# Patient Record
Sex: FEMALE | Race: WHITE | Employment: UNEMPLOYED | URBAN - METROPOLITAN AREA
[De-identification: names, ages, dates, MRNs, and addresses within clinical notes are randomized per-mention and may not be internally consistent; named-entity substitution may affect disease eponyms.]

---

## 2017-01-03 RX ORDER — ALBUTEROL SULFATE 90 UG/1
2 AEROSOL, METERED RESPIRATORY (INHALATION) EVERY 6 HOURS PRN
Qty: 18 G | Refills: 1 | Status: SHIPPED | OUTPATIENT
Start: 2017-01-03 | End: 2017-05-24 | Stop reason: SDUPTHER

## 2017-01-04 DIAGNOSIS — M79.605 LEG PAIN, LEFT: ICD-10-CM

## 2017-01-04 RX ORDER — MELOXICAM 15 MG/1
15 TABLET ORAL DAILY
Qty: 30 TABLET | Refills: 3 | Status: SHIPPED | OUTPATIENT
Start: 2017-01-04 | End: 2017-04-06 | Stop reason: SDUPTHER

## 2017-01-04 RX ORDER — BUSPIRONE HYDROCHLORIDE 10 MG/1
10 TABLET ORAL 3 TIMES DAILY
Qty: 90 TABLET | Refills: 2 | Status: SHIPPED | OUTPATIENT
Start: 2017-01-04 | End: 2017-04-06 | Stop reason: SDUPTHER

## 2017-01-04 RX ORDER — ACETAMINOPHEN 500 MG
1000 TABLET ORAL 3 TIMES DAILY PRN
Qty: 90 TABLET | Refills: 5 | Status: SHIPPED | OUTPATIENT
Start: 2017-01-04 | End: 2017-05-18 | Stop reason: SDUPTHER

## 2017-01-11 DIAGNOSIS — M19.019 OSTEOARTHRITIS OF SHOULDER, UNSPECIFIED LATERALITY, UNSPECIFIED OSTEOARTHRITIS TYPE: ICD-10-CM

## 2017-01-11 RX ORDER — FLUOXETINE HYDROCHLORIDE 20 MG/1
CAPSULE ORAL
Qty: 90 CAPSULE | Refills: 3 | Status: SHIPPED | OUTPATIENT
Start: 2017-01-11 | End: 2017-04-06 | Stop reason: SDUPTHER

## 2017-01-11 RX ORDER — SIMVASTATIN 40 MG
TABLET ORAL
Qty: 30 TABLET | Refills: 3 | Status: SHIPPED | OUTPATIENT
Start: 2017-01-11 | End: 2017-04-06 | Stop reason: SDUPTHER

## 2017-01-11 RX ORDER — NITROGLYCERIN 0.4 MG/1
0.4 TABLET SUBLINGUAL EVERY 5 MIN PRN
Qty: 25 TABLET | Refills: 5 | Status: SHIPPED | OUTPATIENT
Start: 2017-01-11 | End: 2017-05-18 | Stop reason: SDUPTHER

## 2017-01-11 RX ORDER — CYCLOBENZAPRINE HCL 10 MG
10 TABLET ORAL 2 TIMES DAILY PRN
Qty: 60 TABLET | Refills: 2 | Status: SHIPPED | OUTPATIENT
Start: 2017-01-11 | End: 2017-04-06 | Stop reason: SDUPTHER

## 2017-01-11 RX ORDER — METOPROLOL SUCCINATE 50 MG/1
50 TABLET, EXTENDED RELEASE ORAL DAILY
Qty: 30 TABLET | Refills: 3 | Status: SHIPPED | OUTPATIENT
Start: 2017-01-11 | End: 2017-04-06 | Stop reason: SDUPTHER

## 2017-04-06 ENCOUNTER — OFFICE VISIT (OUTPATIENT)
Dept: FAMILY MEDICINE CLINIC | Age: 57
End: 2017-04-06
Payer: MEDICAID

## 2017-04-06 VITALS
WEIGHT: 167 LBS | DIASTOLIC BLOOD PRESSURE: 82 MMHG | OXYGEN SATURATION: 96 % | HEART RATE: 75 BPM | SYSTOLIC BLOOD PRESSURE: 138 MMHG | RESPIRATION RATE: 17 BRPM | TEMPERATURE: 98 F | BODY MASS INDEX: 30.73 KG/M2 | HEIGHT: 62 IN

## 2017-04-06 DIAGNOSIS — Z23 NEED FOR PNEUMOCOCCAL VACCINATION: ICD-10-CM

## 2017-04-06 DIAGNOSIS — I25.10 CORONARY ARTERY DISEASE INVOLVING NATIVE CORONARY ARTERY OF NATIVE HEART WITHOUT ANGINA PECTORIS: ICD-10-CM

## 2017-04-06 DIAGNOSIS — I10 ESSENTIAL HYPERTENSION: ICD-10-CM

## 2017-04-06 DIAGNOSIS — M19.019 OSTEOARTHRITIS OF SHOULDER, UNSPECIFIED LATERALITY, UNSPECIFIED OSTEOARTHRITIS TYPE: Primary | ICD-10-CM

## 2017-04-06 PROBLEM — M50.30 DEGENERATIVE DISC DISEASE, CERVICAL: Status: ACTIVE | Noted: 2017-04-06

## 2017-04-06 PROCEDURE — 90471 IMMUNIZATION ADMIN: CPT | Performed by: FAMILY MEDICINE

## 2017-04-06 PROCEDURE — 99214 OFFICE O/P EST MOD 30 MIN: CPT | Performed by: FAMILY MEDICINE

## 2017-04-06 PROCEDURE — 90732 PPSV23 VACC 2 YRS+ SUBQ/IM: CPT | Performed by: FAMILY MEDICINE

## 2017-04-06 RX ORDER — SIMVASTATIN 40 MG
TABLET ORAL
Qty: 30 TABLET | Refills: 3 | Status: SHIPPED | OUTPATIENT
Start: 2017-04-06 | End: 2017-08-01 | Stop reason: SDUPTHER

## 2017-04-06 RX ORDER — MELOXICAM 15 MG/1
15 TABLET ORAL DAILY
Qty: 30 TABLET | Refills: 3 | Status: SHIPPED | OUTPATIENT
Start: 2017-04-06 | End: 2017-07-27 | Stop reason: SDUPTHER

## 2017-04-06 RX ORDER — CYCLOBENZAPRINE HCL 10 MG
10 TABLET ORAL 2 TIMES DAILY PRN
Qty: 60 TABLET | Refills: 2 | Status: SHIPPED | OUTPATIENT
Start: 2017-04-06 | End: 2017-06-23 | Stop reason: SDUPTHER

## 2017-04-06 RX ORDER — METOPROLOL SUCCINATE 50 MG/1
50 TABLET, EXTENDED RELEASE ORAL DAILY
Qty: 30 TABLET | Refills: 3 | Status: SHIPPED | OUTPATIENT
Start: 2017-04-06 | End: 2017-08-01 | Stop reason: SDUPTHER

## 2017-04-06 RX ORDER — BUSPIRONE HYDROCHLORIDE 10 MG/1
10 TABLET ORAL 3 TIMES DAILY
Qty: 90 TABLET | Refills: 2 | Status: SHIPPED | OUTPATIENT
Start: 2017-04-06 | End: 2017-06-23 | Stop reason: SDUPTHER

## 2017-04-06 RX ORDER — FLUOXETINE HYDROCHLORIDE 20 MG/1
CAPSULE ORAL
Qty: 90 CAPSULE | Refills: 3 | Status: SHIPPED | OUTPATIENT
Start: 2017-04-06 | End: 2017-08-01 | Stop reason: SDUPTHER

## 2017-04-06 ASSESSMENT — ENCOUNTER SYMPTOMS
VOMITING: 0
NAUSEA: 0
SHORTNESS OF BREATH: 0
COUGH: 0

## 2017-05-18 DIAGNOSIS — M79.605 LEG PAIN, LEFT: ICD-10-CM

## 2017-05-18 RX ORDER — ACETAMINOPHEN 500 MG
1000 TABLET ORAL 3 TIMES DAILY PRN
Qty: 90 TABLET | Refills: 5 | Status: SHIPPED | OUTPATIENT
Start: 2017-05-18 | End: 2017-09-19 | Stop reason: SDUPTHER

## 2017-05-18 RX ORDER — NITROGLYCERIN 0.4 MG/1
0.4 TABLET SUBLINGUAL EVERY 5 MIN PRN
Qty: 25 TABLET | Refills: 5 | Status: SHIPPED | OUTPATIENT
Start: 2017-05-18 | End: 2017-10-12 | Stop reason: SDUPTHER

## 2017-05-24 RX ORDER — ALBUTEROL SULFATE 90 UG/1
2 AEROSOL, METERED RESPIRATORY (INHALATION) EVERY 6 HOURS PRN
Qty: 18 G | Refills: 1 | Status: CANCELLED | OUTPATIENT
Start: 2017-05-24

## 2017-06-23 DIAGNOSIS — M19.019 OSTEOARTHRITIS OF SHOULDER, UNSPECIFIED LATERALITY, UNSPECIFIED OSTEOARTHRITIS TYPE: ICD-10-CM

## 2017-06-26 DIAGNOSIS — M19.019 OSTEOARTHRITIS OF SHOULDER, UNSPECIFIED LATERALITY, UNSPECIFIED OSTEOARTHRITIS TYPE: ICD-10-CM

## 2017-06-27 DIAGNOSIS — M19.019 OSTEOARTHRITIS OF SHOULDER, UNSPECIFIED LATERALITY, UNSPECIFIED OSTEOARTHRITIS TYPE: ICD-10-CM

## 2017-06-27 RX ORDER — BUSPIRONE HYDROCHLORIDE 10 MG/1
10 TABLET ORAL 3 TIMES DAILY
Qty: 90 TABLET | Refills: 2 | Status: SHIPPED | OUTPATIENT
Start: 2017-06-27 | End: 2017-10-12 | Stop reason: SDUPTHER

## 2017-06-27 RX ORDER — CYCLOBENZAPRINE HCL 10 MG
10 TABLET ORAL 2 TIMES DAILY PRN
Qty: 60 TABLET | Refills: 2 | Status: SHIPPED | OUTPATIENT
Start: 2017-06-27 | End: 2017-06-27

## 2017-06-27 RX ORDER — BUSPIRONE HYDROCHLORIDE 10 MG/1
TABLET ORAL
Qty: 90 TABLET | Refills: 0 | Status: SHIPPED | OUTPATIENT
Start: 2017-06-27 | End: 2017-06-27

## 2017-06-27 RX ORDER — BUSPIRONE HYDROCHLORIDE 10 MG/1
10 TABLET ORAL 3 TIMES DAILY
Qty: 90 TABLET | Refills: 2 | Status: SHIPPED | OUTPATIENT
Start: 2017-06-27 | End: 2017-06-27

## 2017-06-27 RX ORDER — CYCLOBENZAPRINE HCL 10 MG
10 TABLET ORAL 2 TIMES DAILY PRN
Qty: 60 TABLET | Refills: 2 | Status: SHIPPED | OUTPATIENT
Start: 2017-06-27 | End: 2017-10-12 | Stop reason: SDUPTHER

## 2017-06-27 RX ORDER — BUSPIRONE HYDROCHLORIDE 10 MG/1
10 TABLET ORAL 3 TIMES DAILY
Qty: 90 TABLET | Refills: 2 | OUTPATIENT
Start: 2017-06-27

## 2017-06-27 RX ORDER — CYCLOBENZAPRINE HCL 10 MG
10 TABLET ORAL 2 TIMES DAILY PRN
Qty: 60 TABLET | Refills: 2 | OUTPATIENT
Start: 2017-06-27

## 2017-06-27 RX ORDER — CYCLOBENZAPRINE HCL 10 MG
TABLET ORAL
Qty: 60 TABLET | Refills: 0 | Status: SHIPPED | OUTPATIENT
Start: 2017-06-27 | End: 2017-06-27

## 2017-07-05 ENCOUNTER — OFFICE VISIT (OUTPATIENT)
Dept: FAMILY MEDICINE CLINIC | Age: 57
End: 2017-07-05
Payer: MEDICARE

## 2017-07-05 VITALS
OXYGEN SATURATION: 97 % | SYSTOLIC BLOOD PRESSURE: 100 MMHG | HEIGHT: 65 IN | HEART RATE: 69 BPM | DIASTOLIC BLOOD PRESSURE: 80 MMHG | RESPIRATION RATE: 20 BRPM | BODY MASS INDEX: 27.76 KG/M2 | WEIGHT: 166.6 LBS | TEMPERATURE: 97.7 F

## 2017-07-05 DIAGNOSIS — Z01.818 PREOPERATIVE CLEARANCE: Primary | ICD-10-CM

## 2017-07-05 PROCEDURE — 99396 PREV VISIT EST AGE 40-64: CPT | Performed by: FAMILY MEDICINE

## 2017-07-05 ASSESSMENT — ENCOUNTER SYMPTOMS
SHORTNESS OF BREATH: 0
VOMITING: 0
ABDOMINAL PAIN: 0
NAUSEA: 0
COUGH: 0

## 2017-07-13 ENCOUNTER — HOSPITAL ENCOUNTER (OUTPATIENT)
Age: 57
Discharge: HOME | End: 2017-07-13
Payer: MEDICARE

## 2017-07-13 ENCOUNTER — TELEPHONE (OUTPATIENT)
Dept: FAMILY MEDICINE CLINIC | Age: 57
End: 2017-07-13

## 2017-07-13 DIAGNOSIS — Z01.818 PREOPERATIVE CLEARANCE: ICD-10-CM

## 2017-07-13 LAB
ANION GAP SERPL CALCULATED.3IONS-SCNC: 13 MMOL/L (ref 9–17)
BUN BLDV-MCNC: 13 MG/DL (ref 6–20)
BUN/CREAT BLD: NORMAL (ref 9–20)
CALCIUM SERPL-MCNC: 9.7 MG/DL (ref 8.6–10.4)
CHLORIDE BLD-SCNC: 101 MMOL/L (ref 98–107)
CO2: 27 MMOL/L (ref 20–31)
CREAT SERPL-MCNC: 0.77 MG/DL (ref 0.5–0.9)
GFR AFRICAN AMERICAN: >60 ML/MIN
GFR NON-AFRICAN AMERICAN: >60 ML/MIN
GFR SERPL CREATININE-BSD FRML MDRD: NORMAL ML/MIN/{1.73_M2}
GFR SERPL CREATININE-BSD FRML MDRD: NORMAL ML/MIN/{1.73_M2}
GLUCOSE BLD-MCNC: 86 MG/DL (ref 70–99)
POTASSIUM SERPL-SCNC: 4.5 MMOL/L (ref 3.7–5.3)
SODIUM BLD-SCNC: 141 MMOL/L (ref 135–144)

## 2017-07-13 PROCEDURE — 93005 ELECTROCARDIOGRAM TRACING: CPT

## 2017-07-13 PROCEDURE — 36415 COLL VENOUS BLD VENIPUNCTURE: CPT

## 2017-07-13 PROCEDURE — 80048 BASIC METABOLIC PNL TOTAL CA: CPT

## 2017-07-14 LAB
EKG ATRIAL RATE: 77 BPM
EKG P AXIS: 43 DEGREES
EKG P-R INTERVAL: 148 MS
EKG Q-T INTERVAL: 406 MS
EKG QRS DURATION: 76 MS
EKG QTC CALCULATION (BAZETT): 459 MS
EKG R AXIS: 48 DEGREES
EKG T AXIS: 35 DEGREES
EKG VENTRICULAR RATE: 77 BPM

## 2017-07-31 RX ORDER — MELOXICAM 15 MG/1
TABLET ORAL
Qty: 30 TABLET | Refills: 0 | Status: SHIPPED | OUTPATIENT
Start: 2017-07-31 | End: 2017-08-25

## 2017-07-31 RX ORDER — ENALAPRIL MALEATE 10 MG/1
TABLET ORAL
Qty: 90 TABLET | Refills: 0 | Status: SHIPPED | OUTPATIENT
Start: 2017-07-31 | End: 2018-04-25 | Stop reason: SDUPTHER

## 2017-08-02 RX ORDER — FLUOXETINE HYDROCHLORIDE 20 MG/1
CAPSULE ORAL
Qty: 90 CAPSULE | Refills: 0 | Status: SHIPPED | OUTPATIENT
Start: 2017-08-02 | End: 2017-08-28 | Stop reason: SDUPTHER

## 2017-08-02 RX ORDER — METOPROLOL SUCCINATE 50 MG/1
TABLET, EXTENDED RELEASE ORAL
Qty: 30 TABLET | Refills: 0 | Status: SHIPPED | OUTPATIENT
Start: 2017-08-02 | End: 2017-08-28 | Stop reason: SDUPTHER

## 2017-08-02 RX ORDER — SIMVASTATIN 40 MG
TABLET ORAL
Qty: 30 TABLET | Refills: 0 | Status: SHIPPED | OUTPATIENT
Start: 2017-08-02 | End: 2017-08-28 | Stop reason: SDUPTHER

## 2017-08-28 RX ORDER — FLUOXETINE HYDROCHLORIDE 20 MG/1
CAPSULE ORAL
Qty: 90 CAPSULE | Refills: 0 | Status: SHIPPED | OUTPATIENT
Start: 2017-08-28 | End: 2017-09-19 | Stop reason: SDUPTHER

## 2017-08-28 RX ORDER — SIMVASTATIN 40 MG
TABLET ORAL
Qty: 30 TABLET | Refills: 0 | Status: SHIPPED | OUTPATIENT
Start: 2017-08-28 | End: 2017-09-19 | Stop reason: SDUPTHER

## 2017-08-28 RX ORDER — METOPROLOL SUCCINATE 50 MG/1
TABLET, EXTENDED RELEASE ORAL
Qty: 30 TABLET | Refills: 0 | Status: SHIPPED | OUTPATIENT
Start: 2017-08-28 | End: 2017-09-19 | Stop reason: SDUPTHER

## 2017-10-12 ENCOUNTER — OFFICE VISIT (OUTPATIENT)
Dept: FAMILY MEDICINE CLINIC | Age: 57
End: 2017-10-12
Payer: MEDICARE

## 2017-10-12 VITALS
HEIGHT: 62 IN | SYSTOLIC BLOOD PRESSURE: 140 MMHG | BODY MASS INDEX: 31.83 KG/M2 | DIASTOLIC BLOOD PRESSURE: 80 MMHG | WEIGHT: 173 LBS | TEMPERATURE: 97.9 F | RESPIRATION RATE: 20 BRPM | OXYGEN SATURATION: 98 % | HEART RATE: 71 BPM

## 2017-10-12 DIAGNOSIS — F41.9 ANXIETY: ICD-10-CM

## 2017-10-12 DIAGNOSIS — J30.9 ALLERGIC SINUSITIS: ICD-10-CM

## 2017-10-12 DIAGNOSIS — I10 ESSENTIAL HYPERTENSION: Primary | ICD-10-CM

## 2017-10-12 DIAGNOSIS — Z11.4 ENCOUNTER FOR SCREENING FOR HIV: ICD-10-CM

## 2017-10-12 DIAGNOSIS — Z23 NEED FOR VACCINATION: ICD-10-CM

## 2017-10-12 DIAGNOSIS — I25.10 CORONARY ARTERY DISEASE INVOLVING NATIVE CORONARY ARTERY OF NATIVE HEART WITHOUT ANGINA PECTORIS: ICD-10-CM

## 2017-10-12 DIAGNOSIS — Z11.59 NEED FOR HEPATITIS C SCREENING TEST: ICD-10-CM

## 2017-10-12 DIAGNOSIS — Z12.39 BREAST CANCER SCREENING: ICD-10-CM

## 2017-10-12 DIAGNOSIS — M19.019 OSTEOARTHRITIS OF SHOULDER, UNSPECIFIED LATERALITY, UNSPECIFIED OSTEOARTHRITIS TYPE: ICD-10-CM

## 2017-10-12 DIAGNOSIS — M50.30 DEGENERATIVE DISC DISEASE, CERVICAL: ICD-10-CM

## 2017-10-12 PROCEDURE — 90688 IIV4 VACCINE SPLT 0.5 ML IM: CPT | Performed by: FAMILY MEDICINE

## 2017-10-12 PROCEDURE — 90471 IMMUNIZATION ADMIN: CPT | Performed by: FAMILY MEDICINE

## 2017-10-12 PROCEDURE — 99214 OFFICE O/P EST MOD 30 MIN: CPT | Performed by: FAMILY MEDICINE

## 2017-10-12 RX ORDER — METOPROLOL SUCCINATE 50 MG/1
TABLET, EXTENDED RELEASE ORAL
Qty: 30 TABLET | Refills: 3 | Status: SHIPPED | OUTPATIENT
Start: 2017-10-12 | End: 2018-01-31 | Stop reason: SDUPTHER

## 2017-10-12 RX ORDER — FLUOXETINE HYDROCHLORIDE 20 MG/1
CAPSULE ORAL
Qty: 90 CAPSULE | Refills: 3 | Status: SHIPPED | OUTPATIENT
Start: 2017-10-12 | End: 2018-01-31 | Stop reason: SDUPTHER

## 2017-10-12 RX ORDER — CYCLOBENZAPRINE HCL 10 MG
10 TABLET ORAL 2 TIMES DAILY PRN
Qty: 60 TABLET | Refills: 2 | Status: SHIPPED | OUTPATIENT
Start: 2017-10-12 | End: 2018-02-27 | Stop reason: SDUPTHER

## 2017-10-12 RX ORDER — ASPIRIN 81 MG/1
81 TABLET ORAL DAILY
Qty: 30 TABLET | Refills: 3 | Status: SHIPPED | OUTPATIENT
Start: 2017-10-12 | End: 2018-01-31 | Stop reason: SDUPTHER

## 2017-10-12 RX ORDER — ISOSORBIDE MONONITRATE 30 MG/1
TABLET, EXTENDED RELEASE ORAL
Qty: 90 TABLET | Refills: 3 | Status: SHIPPED | OUTPATIENT
Start: 2017-10-12 | End: 2018-09-10 | Stop reason: SDUPTHER

## 2017-10-12 RX ORDER — ALBUTEROL SULFATE 90 UG/1
AEROSOL, METERED RESPIRATORY (INHALATION)
Qty: 18 G | Refills: 5 | Status: SHIPPED | OUTPATIENT
Start: 2017-10-12 | End: 2018-03-27 | Stop reason: SDUPTHER

## 2017-10-12 RX ORDER — MELOXICAM 15 MG/1
TABLET ORAL
Qty: 30 TABLET | Refills: 3 | Status: SHIPPED | OUTPATIENT
Start: 2017-10-12 | End: 2018-01-15 | Stop reason: SDUPTHER

## 2017-10-12 RX ORDER — ACETAMINOPHEN 500 MG
TABLET ORAL
Qty: 90 TABLET | Refills: 3 | Status: SHIPPED | OUTPATIENT
Start: 2017-10-12 | End: 2018-01-31 | Stop reason: SDUPTHER

## 2017-10-12 RX ORDER — NITROGLYCERIN 0.4 MG/1
0.4 TABLET SUBLINGUAL EVERY 5 MIN PRN
Qty: 25 TABLET | Refills: 5 | Status: SHIPPED | OUTPATIENT
Start: 2017-10-12 | End: 2018-03-27 | Stop reason: SDUPTHER

## 2017-10-12 RX ORDER — SIMVASTATIN 40 MG
TABLET ORAL
Qty: 30 TABLET | Refills: 3 | Status: SHIPPED | OUTPATIENT
Start: 2017-10-12 | End: 2018-01-31 | Stop reason: SDUPTHER

## 2017-10-12 RX ORDER — CETIRIZINE HYDROCHLORIDE 10 MG/1
10 TABLET ORAL DAILY
Qty: 30 TABLET | Refills: 3 | Status: SHIPPED | OUTPATIENT
Start: 2017-10-12 | End: 2018-01-31 | Stop reason: SDUPTHER

## 2017-10-12 RX ORDER — BUSPIRONE HYDROCHLORIDE 10 MG/1
10 TABLET ORAL 3 TIMES DAILY
Qty: 90 TABLET | Refills: 2 | Status: SHIPPED | OUTPATIENT
Start: 2017-10-12 | End: 2018-08-13 | Stop reason: SDUPTHER

## 2017-10-12 NOTE — PROGRESS NOTES
Chief Complaint   Patient presents with    Hypertension    Anxiety         Dinah Walter  here today for follow up on chronic medical problems, go over labs and/or diagnostic studies, and medication refills. Hypertension and Anxiety      HPI:Patient is here for follow-up on hypertension and anxiety.    -Hypertension fairly controlled, compliant with her medications. Denies any chest pain, shortness of breath. Patient doesn't check her blood pressure at home and it usually runs below 130/80. Blood pressure in the past is normal.    -Anxiety stable on Prozac and BuSpar.    -She has multiple degenerative disc disease and also bilateral osteoarthritis take a Tylenol and Mobic as needed basis also takes muscle relaxants as needed. She denies any acute pain    -Patient also needs medication refills. And also due for mammogram          BP (!) 140/80   Pulse 71   Temp 97.9 °F (36.6 °C)   Resp 20   Ht 5' 2\" (1.575 m)   Wt 173 lb (78.5 kg)   LMP 11/04/2012   SpO2 98%   BMI 31.64 kg/m²   Body mass index is 31.64 kg/m². Wt Readings from Last 3 Encounters:   10/12/17 173 lb (78.5 kg)   07/05/17 166 lb 9.6 oz (75.6 kg)   04/06/17 167 lb (75.8 kg)        []Negative depression screening. No flowsheet data found. []1-4 = Minimal depression   []5-9 = Mild depression   []10-14 = Moderate depression   []15-19 = Moderately severe depression   []20-27 = Severe depression    Discussed testing with the patient and all questions fully answered.     Hospital Outpatient Visit on 07/13/2017   Component Date Value Ref Range Status    Ventricular Rate 07/14/2017 77  BPM Final    Atrial Rate 07/14/2017 77  BPM Final    P-R Interval 07/14/2017 148  ms Final    QRS Duration 07/14/2017 76  ms Final    Q-T Interval 07/14/2017 406  ms Final    QTc Calculation (Bazett) 07/14/2017 459  ms Final    P Axis 07/14/2017 43  degrees Final    R Axis 07/14/2017 48  degrees Final    T Axis 07/14/2017 35  degrees Final         Most recent labs reviewed:     Lab Results   Component Value Date    WBC 6.3 10/04/2016    HGB 14.9 10/04/2016    HCT 43.8 10/04/2016    MCV 94.2 10/04/2016     10/04/2016       Lab Results   Component Value Date     07/13/2017    K 4.5 07/13/2017     07/13/2017    CO2 27 07/13/2017    BUN 13 07/13/2017    CREATININE 0.77 07/13/2017    GLUCOSE 86 07/13/2017    CALCIUM 9.7 07/13/2017        Lab Results   Component Value Date    ALT 16 10/04/2016    AST 20 10/04/2016    ALKPHOS 72 10/04/2016    BILITOT 0.38 10/04/2016       Lab Results   Component Value Date    TSH 2.18 10/04/2016       Lab Results   Component Value Date    CHOL 142 10/04/2016    CHOL 129 07/22/2015    CHOL 157 01/21/2014     Lab Results   Component Value Date    TRIG 111 10/04/2016    TRIG 79 07/22/2015    TRIG 92 01/21/2014     Lab Results   Component Value Date    HDL 56 10/04/2016    HDL 57 07/22/2015    HDL 53 01/21/2014     Lab Results   Component Value Date    LDLCHOLESTEROL 64 10/04/2016    LDLCHOLESTEROL 56 07/22/2015    LDLCHOLESTEROL 86 01/21/2014     Lab Results   Component Value Date    VLDL NOT REPORTED 10/04/2016    VLDL NOT REPORTED 07/22/2015    VLDL NOT REPORTED 01/21/2014     Lab Results   Component Value Date    CHOLHDLRATIO 2.5 10/04/2016    CHOLHDLRATIO 2.3 07/22/2015    CHOLHDLRATIO 3.0 01/21/2014       Lab Results   Component Value Date    LABA1C 5.3 10/04/2016       No results found for: [id number]    No results found for: FOLATE    No results found for: IRON, TIBC, FERRITIN    Lab Results   Component Value Date    VITD25 59.1 10/04/2016             Current Outpatient Prescriptions   Medication Sig Dispense Refill    busPIRone (BUSPAR) 10 MG tablet Take 1 tablet by mouth 3 times daily 90 tablet 2    cyclobenzaprine (FLEXERIL) 10 MG tablet Take 1 tablet by mouth 2 times daily as needed for Muscle spasms 60 tablet 2    FLUoxetine (PROZAC) 20 MG capsule TAKE 3 CAPSULES BY MOUTH EVERY DAY 90 capsule 3    isosorbide mononitrate (IMDUR) 30 MG extended release tablet TAKE 1 TABLET BY MOUTH EVERY DAY 90 tablet 3    acetaminophen (MAPAP) 500 MG tablet TAKE 2 TABLETS BY MOUTH THREE TIMES DAILY AS NEEDED FOR PAIN. MAXIMUM DOSE- 6 TABLETS/ 24 HOURS 90 tablet 3    meloxicam (MOBIC) 15 MG tablet TAKE 1 TABLET BY MOUTH DAILY 30 tablet 3    metoprolol succinate (TOPROL XL) 50 MG extended release tablet TAKE 1 TABLET BY MOUTH DAILY 30 tablet 3    nitroGLYCERIN (NITROSTAT) 0.4 MG SL tablet Place 1 tablet under the tongue every 5 minutes as needed for Chest pain up to max of 3 total doses. If no relief after 1 dose, call 911. 25 tablet 5    simvastatin (ZOCOR) 40 MG tablet TAKE 1 TABLET BY MOUTH EVERY NIGHT 30 tablet 3    albuterol sulfate HFA (VENTOLIN HFA) 108 (90 Base) MCG/ACT inhaler INHALE 2 PUFFS INTO THE LUNGS EVERY 6 HOURS AS NEEDED FOR WHEEZING 18 g 5    aspirin 81 MG EC tablet Take 1 tablet by mouth daily 30 tablet 3    Calcium Carbonate-Vitamin D (CALCIUM-D) 600-400 MG-UNIT TABS Take 1 tablet by mouth daily 60 tablet 3    cetirizine (ZYRTEC ALLERGY) 10 MG tablet Take 1 tablet by mouth daily 30 tablet 3    meloxicam (MOBIC) 15 MG tablet TAKE 1 TABLET BY MOUTH DAILY 30 tablet 0    enalapril (VASOTEC) 10 MG tablet TAKE 1 TABLET BY MOUTH EVERY DAY 90 tablet 0    Cetirizine HCl (ZYRTEC PO)   Take 10 mg by mouth daily        No current facility-administered medications for this visit. Social History     Social History    Marital status:      Spouse name: N/A    Number of children: N/A    Years of education: N/A     Occupational History    Not on file.      Social History Main Topics    Smoking status: Former Smoker     Years: 25.00     Types: Cigarettes     Quit date: 6/1/2009    Smokeless tobacco: Never Used      Comment: quit 5 years ago    Alcohol use No    Drug use: No    Sexual activity: Yes     Other Topics Concern    Not on file     Social History Narrative    No narrative on medications. Barriers to medication compliance addressed. Patient given educational materials - see patient instructions  Was a self-tracking handout given in paper form or via [employer]? Yes    Requested Prescriptions     Signed Prescriptions Disp Refills    busPIRone (BUSPAR) 10 MG tablet 90 tablet 2     Sig: Take 1 tablet by mouth 3 times daily    cyclobenzaprine (FLEXERIL) 10 MG tablet 60 tablet 2     Sig: Take 1 tablet by mouth 2 times daily as needed for Muscle spasms    FLUoxetine (PROZAC) 20 MG capsule 90 capsule 3     Sig: TAKE 3 CAPSULES BY MOUTH EVERY DAY    isosorbide mononitrate (IMDUR) 30 MG extended release tablet 90 tablet 3     Sig: TAKE 1 TABLET BY MOUTH EVERY DAY    acetaminophen (MAPAP) 500 MG tablet 90 tablet 3     Sig: TAKE 2 TABLETS BY MOUTH THREE TIMES DAILY AS NEEDED FOR PAIN. MAXIMUM DOSE- 6 TABLETS/ 24 HOURS    meloxicam (MOBIC) 15 MG tablet 30 tablet 3     Sig: TAKE 1 TABLET BY MOUTH DAILY    metoprolol succinate (TOPROL XL) 50 MG extended release tablet 30 tablet 3     Sig: TAKE 1 TABLET BY MOUTH DAILY    nitroGLYCERIN (NITROSTAT) 0.4 MG SL tablet 25 tablet 5     Sig: Place 1 tablet under the tongue every 5 minutes as needed for Chest pain up to max of 3 total doses. If no relief after 1 dose, call 911.  simvastatin (ZOCOR) 40 MG tablet 30 tablet 3     Sig: TAKE 1 TABLET BY MOUTH EVERY NIGHT    albuterol sulfate HFA (VENTOLIN HFA) 108 (90 Base) MCG/ACT inhaler 18 g 5     Sig: INHALE 2 PUFFS INTO THE LUNGS EVERY 6 HOURS AS NEEDED FOR WHEEZING    aspirin 81 MG EC tablet 30 tablet 3     Sig: Take 1 tablet by mouth daily    Calcium Carbonate-Vitamin D (CALCIUM-D) 600-400 MG-UNIT TABS 60 tablet 3     Sig: Take 1 tablet by mouth daily    cetirizine (ZYRTEC ALLERGY) 10 MG tablet 30 tablet 3     Sig: Take 1 tablet by mouth daily       All patient questions answered. Patient voiced understanding. Quality Measures    Body mass index is 31.64 kg/m². Elevated.  Weight control planned discussed Healthy diet and regular exercise. BP: (!) 140/80 Blood pressure is high. Treatment plan consists of Weight Reduction, DASH Eating Plan, Dietary Sodium Restriction, Increased Physical Activity and No treatment change needed. Lab Results   Component Value Date    LDLCHOLESTEROL 64 10/04/2016    (goal LDL reduction with dx if diabetes is 50% LDL reduction)      No flowsheet data found. Interpretation of Total Score Depression Severity: 1-4 = Minimal depression, 5-9 = Mild depression, 10-14 = Moderate depression, 15-19 = Moderately severe depression, 20-27 = Severe depression      The patient's past medical, surgical, social, and family history as well as her   current medications and allergies were reviewed as documented in today's encounter. Medications, labs, diagnostic studies, consultations and follow-up as documented in this encounter. Return in about 3 months (around 1/12/2018) for for htn,labw ork , mammogram , hld, . Patient was seen with total face to face time of  20 minutes. More than 50% of this visit was counseling and education. Future Appointments  Date Time Provider Matt Diez   1/15/2018 2:15 PM MD nicholas Nunes     This note was completed by using the assistance of a speech-recognition program. However, inadvertent computerized transcription errors may be present. Although every effort was made to ensure accuracy, no guarantees can be provided that every mistake has been identified and corrected by editing.   Electronically signed by Aviva Xiong MD on 10/12/2017  12:48 PM

## 2017-10-20 ENCOUNTER — HOSPITAL ENCOUNTER (OUTPATIENT)
Dept: WOMENS IMAGING | Age: 57
Discharge: HOME | End: 2017-10-20
Payer: MEDICARE

## 2017-10-20 DIAGNOSIS — Z12.39 BREAST CANCER SCREENING: ICD-10-CM

## 2017-10-20 PROCEDURE — 77063 BREAST TOMOSYNTHESIS BI: CPT

## 2017-10-26 ENCOUNTER — HOSPITAL ENCOUNTER (OUTPATIENT)
Age: 57
Discharge: HOME | End: 2017-10-26
Payer: MEDICARE

## 2017-10-26 DIAGNOSIS — I10 ESSENTIAL HYPERTENSION: ICD-10-CM

## 2017-10-26 DIAGNOSIS — Z11.59 NEED FOR HEPATITIS C SCREENING TEST: ICD-10-CM

## 2017-10-26 LAB
CHOLESTEROL/HDL RATIO: 2.1
CHOLESTEROL: 137 MG/DL
HDLC SERPL-MCNC: 64 MG/DL
HIV AG/AB: NONREACTIVE
LDL CHOLESTEROL: 51 MG/DL (ref 0–130)
TRIGL SERPL-MCNC: 108 MG/DL
VLDLC SERPL CALC-MCNC: NORMAL MG/DL (ref 1–30)

## 2017-10-26 PROCEDURE — 80061 LIPID PANEL: CPT

## 2017-10-26 PROCEDURE — 87389 HIV-1 AG W/HIV-1&-2 AB AG IA: CPT

## 2017-10-26 PROCEDURE — 36415 COLL VENOUS BLD VENIPUNCTURE: CPT

## 2018-01-08 RX ORDER — ENALAPRIL MALEATE 10 MG/1
TABLET ORAL
Qty: 90 TABLET | Refills: 0 | Status: SHIPPED | OUTPATIENT
Start: 2018-01-08 | End: 2018-01-15 | Stop reason: SDUPTHER

## 2018-01-15 ENCOUNTER — OFFICE VISIT (OUTPATIENT)
Dept: FAMILY MEDICINE CLINIC | Age: 58
End: 2018-01-15
Payer: MEDICARE

## 2018-01-15 ENCOUNTER — TELEPHONE (OUTPATIENT)
Dept: FAMILY MEDICINE CLINIC | Age: 58
End: 2018-01-15

## 2018-01-15 VITALS
HEART RATE: 85 BPM | DIASTOLIC BLOOD PRESSURE: 88 MMHG | SYSTOLIC BLOOD PRESSURE: 136 MMHG | HEIGHT: 62 IN | WEIGHT: 171.4 LBS | BODY MASS INDEX: 31.54 KG/M2

## 2018-01-15 DIAGNOSIS — Z23 NEED FOR VACCINATION: ICD-10-CM

## 2018-01-15 DIAGNOSIS — J45.20 MILD INTERMITTENT ASTHMA WITHOUT COMPLICATION: ICD-10-CM

## 2018-01-15 DIAGNOSIS — Z11.59 NEED FOR HEPATITIS C SCREENING TEST: ICD-10-CM

## 2018-01-15 DIAGNOSIS — M50.30 DEGENERATIVE DISC DISEASE, CERVICAL: ICD-10-CM

## 2018-01-15 DIAGNOSIS — E78.2 MIXED HYPERLIPIDEMIA: ICD-10-CM

## 2018-01-15 DIAGNOSIS — I10 ESSENTIAL HYPERTENSION: Primary | ICD-10-CM

## 2018-01-15 DIAGNOSIS — I25.10 CORONARY ARTERY DISEASE INVOLVING NATIVE CORONARY ARTERY OF NATIVE HEART WITHOUT ANGINA PECTORIS: ICD-10-CM

## 2018-01-15 PROCEDURE — G8427 DOCREV CUR MEDS BY ELIG CLIN: HCPCS | Performed by: FAMILY MEDICINE

## 2018-01-15 PROCEDURE — G8484 FLU IMMUNIZE NO ADMIN: HCPCS | Performed by: FAMILY MEDICINE

## 2018-01-15 PROCEDURE — G8598 ASA/ANTIPLAT THER USED: HCPCS | Performed by: FAMILY MEDICINE

## 2018-01-15 PROCEDURE — G8417 CALC BMI ABV UP PARAM F/U: HCPCS | Performed by: FAMILY MEDICINE

## 2018-01-15 PROCEDURE — 99214 OFFICE O/P EST MOD 30 MIN: CPT | Performed by: FAMILY MEDICINE

## 2018-01-15 PROCEDURE — 3017F COLORECTAL CA SCREEN DOC REV: CPT | Performed by: FAMILY MEDICINE

## 2018-01-15 PROCEDURE — 3014F SCREEN MAMMO DOC REV: CPT | Performed by: FAMILY MEDICINE

## 2018-01-15 PROCEDURE — 1036F TOBACCO NON-USER: CPT | Performed by: FAMILY MEDICINE

## 2018-01-15 ASSESSMENT — ENCOUNTER SYMPTOMS
SINUS PRESSURE: 0
BLOOD IN STOOL: 0
COUGH: 0
SINUS PAIN: 0
ABDOMINAL PAIN: 0
PHOTOPHOBIA: 0
BACK PAIN: 0
DIARRHEA: 0
SHORTNESS OF BREATH: 0
WHEEZING: 0
RHINORRHEA: 0
CONSTIPATION: 0

## 2018-01-15 NOTE — TELEPHONE ENCOUNTER
Patient seen today . She states that enalapril (VASOTEC) 10 MG tablet should be changed to 20 mg ?    Please Advice

## 2018-01-15 NOTE — PROGRESS NOTES
HYPERTENSION visit     BP Readings from Last 3 Encounters:   10/12/17 (!) 140/80   07/05/17 100/80   04/06/17 138/82       LDL Cholesterol (mg/dL)   Date Value   10/26/2017 51     HDL (mg/dL)   Date Value   10/26/2017 64     BUN (mg/dL)   Date Value   07/13/2017 13     CREATININE (mg/dL)   Date Value   07/13/2017 0.77     Glucose (mg/dL)   Date Value   07/13/2017 86              Have you changed or started any medications since your last visit including any over-the-counter medicines, vitamins, or herbal medicines? no   Have you stopped taking any of your medications? Is so, why? -  no  Are you having any side effects from any of your medications? - no    Have you seen any other physician or provider since your last visit?  no   Have you had any other diagnostic tests since your last visit?  no   Have you been seen in the emergency room and/or had an admission in a hospital since we last saw you?  no   Have you had your routine dental cleaning in the past 6 months?  no     Do you have an active [employer] account? If no, what is the barrier?   Yes    Patient Care Team:  Kaylene Lockhart MD as PCP - General (Family Medicine)    Medical History Review  Past Medical, Family, and Social History reviewed and does contribute to the patient presenting condition    Health Maintenance   Topic Date Due    Hepatitis C screen  1960    DTaP/Tdap/Td vaccine (1 - Tdap) 05/02/1979    Cervical cancer screen  01/14/2016    Potassium monitoring  07/13/2018    Creatinine monitoring  07/13/2018    Breast cancer screen  10/20/2019    Lipid screen  10/26/2022    Colon cancer screen colonoscopy  04/08/2024    Flu vaccine  Completed    Pneumococcal med risk  Completed    HIV screen  Completed

## 2018-01-15 NOTE — PROGRESS NOTES
64 10/26/2017    HDL 56 10/04/2016    HDL 57 07/22/2015     Lab Results   Component Value Date    LDLCHOLESTEROL 51 10/26/2017    LDLCHOLESTEROL 64 10/04/2016    LDLCHOLESTEROL 56 07/22/2015     Lab Results   Component Value Date    VLDL NOT REPORTED 10/26/2017    VLDL NOT REPORTED 10/04/2016    VLDL NOT REPORTED 07/22/2015     Lab Results   Component Value Date    CHOLHDLRATIO 2.1 10/26/2017    CHOLHDLRATIO 2.5 10/04/2016    CHOLHDLRATIO 2.3 07/22/2015       Lab Results   Component Value Date    LABA1C 5.3 10/04/2016       No results found for: [id number]    No results found for: FOLATE    No results found for: IRON, TIBC, FERRITIN    Lab Results   Component Value Date    VITD25 59.1 10/04/2016             Current Outpatient Prescriptions   Medication Sig Dispense Refill    Tdap (ADACEL) 5-2-15.5 LF-MCG/0.5 injection Inject 0.5 mLs into the muscle once for 1 dose 0.5 mL 0    busPIRone (BUSPAR) 10 MG tablet Take 1 tablet by mouth 3 times daily 90 tablet 2    cyclobenzaprine (FLEXERIL) 10 MG tablet Take 1 tablet by mouth 2 times daily as needed for Muscle spasms 60 tablet 2    FLUoxetine (PROZAC) 20 MG capsule TAKE 3 CAPSULES BY MOUTH EVERY DAY 90 capsule 3    isosorbide mononitrate (IMDUR) 30 MG extended release tablet TAKE 1 TABLET BY MOUTH EVERY DAY 90 tablet 3    acetaminophen (MAPAP) 500 MG tablet TAKE 2 TABLETS BY MOUTH THREE TIMES DAILY AS NEEDED FOR PAIN. MAXIMUM DOSE- 6 TABLETS/ 24 HOURS 90 tablet 3    metoprolol succinate (TOPROL XL) 50 MG extended release tablet TAKE 1 TABLET BY MOUTH DAILY 30 tablet 3    nitroGLYCERIN (NITROSTAT) 0.4 MG SL tablet Place 1 tablet under the tongue every 5 minutes as needed for Chest pain up to max of 3 total doses.  If no relief after 1 dose, call 911. 25 tablet 5    simvastatin (ZOCOR) 40 MG tablet TAKE 1 TABLET BY MOUTH EVERY NIGHT 30 tablet 3    albuterol sulfate HFA (VENTOLIN HFA) 108 (90 Base) MCG/ACT inhaler INHALE 2 PUFFS INTO THE LUNGS EVERY 6 HOURS AS NEEDED FOR WHEEZING 18 g 5    aspirin 81 MG EC tablet Take 1 tablet by mouth daily 30 tablet 3    Calcium Carbonate-Vitamin D (CALCIUM-D) 600-400 MG-UNIT TABS Take 1 tablet by mouth daily 60 tablet 3    cetirizine (ZYRTEC ALLERGY) 10 MG tablet Take 1 tablet by mouth daily 30 tablet 3    meloxicam (MOBIC) 15 MG tablet TAKE 1 TABLET BY MOUTH DAILY 30 tablet 0    enalapril (VASOTEC) 10 MG tablet TAKE 1 TABLET BY MOUTH EVERY DAY 90 tablet 0     No current facility-administered medications for this visit. Social History     Social History    Marital status:      Spouse name: N/A    Number of children: N/A    Years of education: N/A     Occupational History    Not on file. Social History Main Topics    Smoking status: Former Smoker     Years: 25.00     Types: Cigarettes     Quit date: 6/1/2009    Smokeless tobacco: Never Used      Comment: quit 5 years ago    Alcohol use No    Drug use: No    Sexual activity: Yes     Other Topics Concern    Not on file     Social History Narrative    No narrative on file     Counseling given: Yes        No family history on file.          -rest of complaints with corresponding details per ROS    The patient's past medical, surgical, social, and family history as well as her current medications and allergies were reviewed as documented in today's encounter. Review of Systems   Constitutional: Negative for appetite change, chills, fatigue, fever and unexpected weight change. HENT: Negative for congestion, postnasal drip, rhinorrhea, sinus pain and sinus pressure. Eyes: Negative for photophobia and visual disturbance. Respiratory: Negative for cough, shortness of breath and wheezing. Cardiovascular: Negative for chest pain, palpitations and leg swelling. Gastrointestinal: Negative for abdominal pain, blood in stool, constipation and diarrhea. Endocrine: Negative for polydipsia, polyphagia and polyuria.    Genitourinary: Negative for Inject 0.5 mLs into the muscle once for 1 dose  Dispense: 0.5 mL; Refill: 0    6. Need for hepatitis C screening test    - Hepatitis C Antibody; Future      Orders Placed This Encounter   Procedures    Hepatitis C Antibody     Standing Status:   Future     Standing Expiration Date:   1/15/2019   [address] [facility] Leeroy Ward MD*     Referral Priority:   Routine     Referral Type:   Consult for Advice and Opinion     Referral Reason:   Specialty Services Required     Referred to Provider:   Natasha Oleary MD     Requested Specialty:   Internal Medicine Cardiovascular Disease     Number of Visits Requested:   1         Medications Discontinued During This Encounter   Medication Reason    meloxicam (MOBIC) 15 MG tablet Duplicate Order    Cetirizine HCl (ZYRTEC PO) Duplicate Order    enalapril (VASOTEC) 10 MG tablet Duplicate Order       Elba Band received counseling on the following healthy behaviors: nutrition, exercise and medication adherence  Reviewed prior labs and health maintenance  Continue current medications, diet and exercise. Discussed use, benefit, and side effects of prescribed medications. Barriers to medication compliance addressed. Patient given educational materials - see patient instructions  Was a self-tracking handout given in paper form or via [employer]? Yes    Requested Prescriptions     Signed Prescriptions Disp Refills    Tdap (ADACEL) 5-2-15.5 LF-MCG/0.5 injection 0.5 mL 0     Sig: Inject 0.5 mLs into the muscle once for 1 dose       All patient questions answered. Patient voiced understanding. Quality Measures    Body mass index is 31.35 kg/m². Elevated. Weight control planned discussed Healthy diet and regular exercise.     BP: 136/88 Blood pressure is normal. Treatment plan consists of Weight Reduction, DASH Eating Plan, Dietary Sodium Restriction, Increased Physical Activity, Patient In-home Blood Pressure Monitoring and No treatment change

## 2018-02-26 ENCOUNTER — HOSPITAL ENCOUNTER (OUTPATIENT)
Age: 58
Setting detail: SPECIMEN
Discharge: HOME | End: 2018-02-26
Payer: MEDICARE

## 2018-02-26 LAB
ANION GAP SERPL CALCULATED.3IONS-SCNC: 12 MMOL/L (ref 9–17)
BUN BLDV-MCNC: 10 MG/DL (ref 6–20)
BUN/CREAT BLD: NORMAL (ref 9–20)
CALCIUM SERPL-MCNC: 9.2 MG/DL (ref 8.6–10.4)
CHLORIDE BLD-SCNC: 101 MMOL/L (ref 98–107)
CO2: 27 MMOL/L (ref 20–31)
CREAT SERPL-MCNC: 0.74 MG/DL (ref 0.5–0.9)
GFR AFRICAN AMERICAN: >60 ML/MIN
GFR NON-AFRICAN AMERICAN: >60 ML/MIN
GFR SERPL CREATININE-BSD FRML MDRD: NORMAL ML/MIN/{1.73_M2}
GFR SERPL CREATININE-BSD FRML MDRD: NORMAL ML/MIN/{1.73_M2}
GLUCOSE BLD-MCNC: 97 MG/DL (ref 70–99)
POTASSIUM SERPL-SCNC: 4.6 MMOL/L (ref 3.7–5.3)
SODIUM BLD-SCNC: 140 MMOL/L (ref 135–144)

## 2018-02-27 DIAGNOSIS — M50.30 DEGENERATIVE DISC DISEASE, CERVICAL: ICD-10-CM

## 2018-02-27 DIAGNOSIS — M19.019 OSTEOARTHRITIS OF SHOULDER, UNSPECIFIED LATERALITY, UNSPECIFIED OSTEOARTHRITIS TYPE: ICD-10-CM

## 2018-02-28 RX ORDER — CYCLOBENZAPRINE HCL 10 MG
TABLET ORAL
Qty: 60 TABLET | Refills: 0 | Status: SHIPPED | OUTPATIENT
Start: 2018-02-28 | End: 2018-03-27 | Stop reason: SDUPTHER

## 2018-02-28 NOTE — TELEPHONE ENCOUNTER
Please Approve or Refuse.        Next Visit Date:  4/16/2018    Hemoglobin A1C (%)   Date Value   10/04/2016 5.3   08/24/2012 5.2             ( goal A1C is < 7)   No results found for: LABMICR  LDL Cholesterol (mg/dL)   Date Value   10/26/2017 51       (goal LDL is <100)   AST (U/L)   Date Value   10/04/2016 20     ALT (U/L)   Date Value   10/04/2016 16     BUN (mg/dL)   Date Value   02/26/2018 10     BP Readings from Last 3 Encounters:   01/15/18 136/88   10/12/17 (!) 140/80   07/05/17 100/80          (goal 120/80)        Patient Active Problem List:     HTN (hypertension)     CAD (coronary artery disease)     OA (osteoarthritis)     Anxiety     Asthma     Depression     Neck pain     Endometrial polyp     PVD (peripheral vascular disease) with claudication (HCC)     Lymphadenopathy     Degenerative disc disease, cervical     Mild intermittent asthma without complication     Mixed hyperlipidemia

## 2018-03-27 DIAGNOSIS — M19.019 OSTEOARTHRITIS OF SHOULDER, UNSPECIFIED LATERALITY, UNSPECIFIED OSTEOARTHRITIS TYPE: ICD-10-CM

## 2018-03-27 DIAGNOSIS — M50.30 DEGENERATIVE DISC DISEASE, CERVICAL: ICD-10-CM

## 2018-03-27 DIAGNOSIS — I10 ESSENTIAL HYPERTENSION: ICD-10-CM

## 2018-03-27 RX ORDER — BUSPIRONE HYDROCHLORIDE 10 MG/1
TABLET ORAL
Qty: 90 TABLET | Refills: 0 | Status: SHIPPED | OUTPATIENT
Start: 2018-03-27 | End: 2018-04-16 | Stop reason: SDUPTHER

## 2018-03-27 RX ORDER — NITROGLYCERIN 0.4 MG/1
TABLET SUBLINGUAL
Qty: 25 TABLET | Refills: 0 | Status: SHIPPED | OUTPATIENT
Start: 2018-03-27 | End: 2018-04-02 | Stop reason: SDUPTHER

## 2018-03-27 RX ORDER — CYCLOBENZAPRINE HCL 10 MG
TABLET ORAL
Qty: 60 TABLET | Refills: 0 | Status: SHIPPED | OUTPATIENT
Start: 2018-03-27 | End: 2018-04-24 | Stop reason: SDUPTHER

## 2018-03-27 RX ORDER — ACETAMINOPHEN 500 MG
TABLET ORAL
Qty: 90 TABLET | Refills: 0 | Status: SHIPPED | OUTPATIENT
Start: 2018-03-27 | End: 2018-04-09 | Stop reason: SDUPTHER

## 2018-04-02 DIAGNOSIS — I10 ESSENTIAL HYPERTENSION: ICD-10-CM

## 2018-04-02 DIAGNOSIS — I25.10 CORONARY ARTERY DISEASE INVOLVING NATIVE CORONARY ARTERY OF NATIVE HEART WITHOUT ANGINA PECTORIS: Primary | ICD-10-CM

## 2018-04-02 RX ORDER — NITROGLYCERIN 0.4 MG/1
TABLET SUBLINGUAL
Qty: 25 TABLET | Refills: 1 | Status: SHIPPED | OUTPATIENT
Start: 2018-04-02 | End: 2018-06-10 | Stop reason: SDUPTHER

## 2018-04-09 RX ORDER — ACETAMINOPHEN 500 MG
TABLET ORAL
Qty: 90 TABLET | Refills: 0 | Status: SHIPPED | OUTPATIENT
Start: 2018-04-09 | End: 2018-04-23 | Stop reason: SDUPTHER

## 2018-04-16 ENCOUNTER — HOSPITAL ENCOUNTER (OUTPATIENT)
Age: 58
Setting detail: SPECIMEN
Discharge: HOME | End: 2018-04-16
Payer: MEDICARE

## 2018-04-16 ENCOUNTER — OFFICE VISIT (OUTPATIENT)
Dept: FAMILY MEDICINE CLINIC | Age: 58
End: 2018-04-16
Payer: MEDICARE

## 2018-04-16 VITALS
BODY MASS INDEX: 31.03 KG/M2 | DIASTOLIC BLOOD PRESSURE: 87 MMHG | HEART RATE: 89 BPM | WEIGHT: 168.6 LBS | SYSTOLIC BLOOD PRESSURE: 144 MMHG | HEIGHT: 62 IN

## 2018-04-16 DIAGNOSIS — Z23 NEED FOR PROPHYLACTIC VACCINATION AND INOCULATION AGAINST VARICELLA: ICD-10-CM

## 2018-04-16 DIAGNOSIS — B37.31 CANDIDAL VAGINITIS: ICD-10-CM

## 2018-04-16 DIAGNOSIS — Z23 NEED FOR PROPHYLACTIC VACCINATION AGAINST DIPHTHERIA-TETANUS-PERTUSSIS (DTP): ICD-10-CM

## 2018-04-16 DIAGNOSIS — Z12.4 PAP SMEAR FOR CERVICAL CANCER SCREENING: Primary | ICD-10-CM

## 2018-04-16 DIAGNOSIS — Z12.4 PAP SMEAR FOR CERVICAL CANCER SCREENING: ICD-10-CM

## 2018-04-16 PROCEDURE — 90715 TDAP VACCINE 7 YRS/> IM: CPT | Performed by: FAMILY MEDICINE

## 2018-04-16 PROCEDURE — 90471 IMMUNIZATION ADMIN: CPT | Performed by: FAMILY MEDICINE

## 2018-04-16 RX ORDER — KETOCONAZOLE 20 MG/G
CREAM TOPICAL
Qty: 60 G | Refills: 3 | Status: SHIPPED | OUTPATIENT
Start: 2018-04-16 | End: 2018-09-25 | Stop reason: ALTCHOICE

## 2018-04-16 RX ORDER — KETOCONAZOLE 20 MG/G
CREAM TOPICAL
Qty: 60 G | Refills: 3 | Status: SHIPPED | OUTPATIENT
Start: 2018-04-16 | End: 2018-04-16 | Stop reason: ALTCHOICE

## 2018-04-16 RX ORDER — FLUCONAZOLE 150 MG/1
150 TABLET ORAL ONCE
Qty: 1 TABLET | Refills: 0 | Status: SHIPPED | OUTPATIENT
Start: 2018-04-16 | End: 2018-04-16

## 2018-04-16 RX ORDER — DOXYCYCLINE HYCLATE 100 MG
100 TABLET ORAL 2 TIMES DAILY
Qty: 20 TABLET | Refills: 0 | Status: SHIPPED | OUTPATIENT
Start: 2018-04-16 | End: 2018-04-25 | Stop reason: ALTCHOICE

## 2018-04-16 ASSESSMENT — PATIENT HEALTH QUESTIONNAIRE - PHQ9
SUM OF ALL RESPONSES TO PHQ9 QUESTIONS 1 & 2: 0
1. LITTLE INTEREST OR PLEASURE IN DOING THINGS: 0
2. FEELING DOWN, DEPRESSED OR HOPELESS: 0
SUM OF ALL RESPONSES TO PHQ QUESTIONS 1-9: 0

## 2018-04-16 ASSESSMENT — ENCOUNTER SYMPTOMS
NAUSEA: 0
SINUS PRESSURE: 0
DIARRHEA: 0
PHOTOPHOBIA: 0
BACK PAIN: 0
CONSTIPATION: 0
SHORTNESS OF BREATH: 0
WHEEZING: 0
ABDOMINAL PAIN: 0
COUGH: 0
CHEST TIGHTNESS: 0
BLOOD IN STOOL: 0
RHINORRHEA: 0

## 2018-04-17 ENCOUNTER — TELEPHONE (OUTPATIENT)
Dept: FAMILY MEDICINE CLINIC | Age: 58
End: 2018-04-17

## 2018-04-17 DIAGNOSIS — N76.0 ACUTE VAGINITIS: Primary | ICD-10-CM

## 2018-04-17 LAB
C TRACH DNA GENITAL QL NAA+PROBE: NEGATIVE
N. GONORRHOEAE DNA: NEGATIVE

## 2018-04-18 LAB
HPV SAMPLE: NORMAL
HPV SOURCE: NORMAL
HPV, GENOTYPE 16: NOT DETECTED
HPV, GENOTYPE 18: NOT DETECTED
HPV, HIGH RISK OTHER: NOT DETECTED
HPV, INTERPRETATION: NORMAL

## 2018-04-19 ENCOUNTER — HOSPITAL ENCOUNTER (OUTPATIENT)
Age: 58
Discharge: HOME | End: 2018-04-19
Payer: MEDICARE

## 2018-04-19 DIAGNOSIS — Z11.59 NEED FOR HEPATITIS C SCREENING TEST: ICD-10-CM

## 2018-04-19 LAB
CULTURE: NORMAL
HEPATITIS C ANTIBODY: NONREACTIVE
Lab: NORMAL
SPECIMEN DESCRIPTION: NORMAL
STATUS: NORMAL

## 2018-04-19 PROCEDURE — 86803 HEPATITIS C AB TEST: CPT

## 2018-04-19 PROCEDURE — 36415 COLL VENOUS BLD VENIPUNCTURE: CPT

## 2018-04-25 ENCOUNTER — HOSPITAL ENCOUNTER (OUTPATIENT)
Dept: GENERAL RADIOLOGY | Age: 58
Discharge: HOME | End: 2018-04-27
Payer: MEDICARE

## 2018-04-25 ENCOUNTER — OFFICE VISIT (OUTPATIENT)
Dept: FAMILY MEDICINE CLINIC | Age: 58
End: 2018-04-25
Payer: MEDICARE

## 2018-04-25 ENCOUNTER — HOSPITAL ENCOUNTER (OUTPATIENT)
Age: 58
Discharge: HOME | End: 2018-04-27
Payer: MEDICARE

## 2018-04-25 VITALS
DIASTOLIC BLOOD PRESSURE: 85 MMHG | HEIGHT: 62 IN | SYSTOLIC BLOOD PRESSURE: 143 MMHG | WEIGHT: 171.8 LBS | HEART RATE: 73 BPM | OXYGEN SATURATION: 93 % | TEMPERATURE: 94.6 F | BODY MASS INDEX: 31.62 KG/M2

## 2018-04-25 DIAGNOSIS — M50.30 DEGENERATIVE DISC DISEASE, CERVICAL: ICD-10-CM

## 2018-04-25 DIAGNOSIS — S43.421A SPRAIN OF RIGHT ROTATOR CUFF CAPSULE, INITIAL ENCOUNTER: ICD-10-CM

## 2018-04-25 DIAGNOSIS — I10 ESSENTIAL HYPERTENSION: Primary | ICD-10-CM

## 2018-04-25 PROCEDURE — G8598 ASA/ANTIPLAT THER USED: HCPCS | Performed by: FAMILY MEDICINE

## 2018-04-25 PROCEDURE — 99214 OFFICE O/P EST MOD 30 MIN: CPT | Performed by: FAMILY MEDICINE

## 2018-04-25 PROCEDURE — 3017F COLORECTAL CA SCREEN DOC REV: CPT | Performed by: FAMILY MEDICINE

## 2018-04-25 PROCEDURE — G8427 DOCREV CUR MEDS BY ELIG CLIN: HCPCS | Performed by: FAMILY MEDICINE

## 2018-04-25 PROCEDURE — G8417 CALC BMI ABV UP PARAM F/U: HCPCS | Performed by: FAMILY MEDICINE

## 2018-04-25 PROCEDURE — 1036F TOBACCO NON-USER: CPT | Performed by: FAMILY MEDICINE

## 2018-04-25 PROCEDURE — 72040 X-RAY EXAM NECK SPINE 2-3 VW: CPT

## 2018-04-25 RX ORDER — LIDOCAINE 40 MG/G
CREAM TOPICAL
Qty: 45 G | Refills: 3 | Status: SHIPPED | OUTPATIENT
Start: 2018-04-25 | End: 2018-09-25 | Stop reason: ALTCHOICE

## 2018-04-25 RX ORDER — ENALAPRIL MALEATE 20 MG/1
TABLET ORAL
Qty: 30 TABLET | Refills: 3 | Status: SHIPPED | OUTPATIENT
Start: 2018-04-25 | End: 2018-06-25 | Stop reason: SDUPTHER

## 2018-04-25 ASSESSMENT — ENCOUNTER SYMPTOMS
RHINORRHEA: 0
BLOOD IN STOOL: 0
SHORTNESS OF BREATH: 0
DIARRHEA: 0
WHEEZING: 0
EYE REDNESS: 0
PHOTOPHOBIA: 0
NAUSEA: 0
COUGH: 0
SINUS PRESSURE: 0

## 2018-04-26 ENCOUNTER — HOSPITAL ENCOUNTER (OUTPATIENT)
Dept: PHYSICAL THERAPY | Age: 58
Setting detail: THERAPIES SERIES
Discharge: HOME | End: 2018-04-26
Payer: MEDICARE

## 2018-04-26 PROCEDURE — 97110 THERAPEUTIC EXERCISES: CPT

## 2018-04-26 PROCEDURE — 97162 PT EVAL MOD COMPLEX 30 MIN: CPT

## 2018-04-26 ASSESSMENT — ACTIVITIES OF DAILY LIVING (ADL): EFFECT OF PAIN ON DAILY ACTIVITIES: 100% OF HER ADL'S

## 2018-04-26 ASSESSMENT — PAIN SCALES - GENERAL: PAINLEVEL_OUTOF10: 4

## 2018-04-26 ASSESSMENT — PAIN DESCRIPTION - ONSET: ONSET: PROGRESSIVE

## 2018-04-26 ASSESSMENT — PAIN DESCRIPTION - LOCATION: LOCATION: NECK

## 2018-04-26 ASSESSMENT — PAIN DESCRIPTION - PAIN TYPE: TYPE: CHRONIC PAIN

## 2018-04-26 ASSESSMENT — PAIN DESCRIPTION - PROGRESSION: CLINICAL_PROGRESSION: GRADUALLY WORSENING

## 2018-04-26 ASSESSMENT — PAIN DESCRIPTION - ORIENTATION: ORIENTATION: RIGHT

## 2018-04-26 ASSESSMENT — PAIN DESCRIPTION - FREQUENCY: FREQUENCY: CONTINUOUS

## 2018-05-02 ENCOUNTER — HOSPITAL ENCOUNTER (OUTPATIENT)
Dept: PHYSICAL THERAPY | Age: 58
Setting detail: THERAPIES SERIES
Discharge: HOME | End: 2018-05-02
Payer: MEDICARE

## 2018-05-02 PROCEDURE — 97110 THERAPEUTIC EXERCISES: CPT

## 2018-05-02 PROCEDURE — 97112 NEUROMUSCULAR REEDUCATION: CPT

## 2018-05-02 ASSESSMENT — PAIN SCALES - GENERAL: PAINLEVEL_OUTOF10: 1

## 2018-05-02 ASSESSMENT — PAIN DESCRIPTION - PAIN TYPE: TYPE: CHRONIC PAIN

## 2018-05-02 ASSESSMENT — PAIN DESCRIPTION - PROGRESSION: CLINICAL_PROGRESSION: GRADUALLY WORSENING

## 2018-05-02 ASSESSMENT — PAIN DESCRIPTION - ORIENTATION: ORIENTATION: POSTERIOR;RIGHT

## 2018-05-02 ASSESSMENT — PAIN DESCRIPTION - LOCATION: LOCATION: NECK

## 2018-05-03 ENCOUNTER — HOSPITAL ENCOUNTER (OUTPATIENT)
Dept: PHYSICAL THERAPY | Age: 58
Setting detail: THERAPIES SERIES
Discharge: HOME | End: 2018-05-03
Payer: MEDICARE

## 2018-05-03 LAB — CYTOLOGY REPORT: NORMAL

## 2018-05-03 PROCEDURE — 97110 THERAPEUTIC EXERCISES: CPT

## 2018-05-03 ASSESSMENT — PAIN DESCRIPTION - FREQUENCY: FREQUENCY: CONTINUOUS

## 2018-05-03 ASSESSMENT — PAIN DESCRIPTION - PROGRESSION: CLINICAL_PROGRESSION: GRADUALLY WORSENING

## 2018-05-03 ASSESSMENT — PAIN DESCRIPTION - ORIENTATION: ORIENTATION: POSTERIOR;RIGHT

## 2018-05-03 ASSESSMENT — PAIN SCALES - GENERAL: PAINLEVEL_OUTOF10: 1

## 2018-05-03 ASSESSMENT — PAIN DESCRIPTION - LOCATION: LOCATION: NECK

## 2018-05-03 ASSESSMENT — PAIN DESCRIPTION - PAIN TYPE: TYPE: CHRONIC PAIN

## 2018-05-03 ASSESSMENT — PAIN DESCRIPTION - ONSET: ONSET: PROGRESSIVE

## 2018-05-07 ENCOUNTER — HOSPITAL ENCOUNTER (OUTPATIENT)
Dept: PHYSICAL THERAPY | Age: 58
Setting detail: THERAPIES SERIES
Discharge: HOME | End: 2018-05-07
Payer: MEDICARE

## 2018-05-07 PROCEDURE — 97110 THERAPEUTIC EXERCISES: CPT

## 2018-05-07 ASSESSMENT — PAIN SCALES - GENERAL: PAINLEVEL_OUTOF10: 1

## 2018-05-07 ASSESSMENT — PAIN DESCRIPTION - FREQUENCY: FREQUENCY: CONTINUOUS

## 2018-05-07 ASSESSMENT — PAIN DESCRIPTION - PROGRESSION: CLINICAL_PROGRESSION: GRADUALLY WORSENING

## 2018-05-07 ASSESSMENT — PAIN DESCRIPTION - ONSET: ONSET: PROGRESSIVE

## 2018-05-07 ASSESSMENT — PAIN DESCRIPTION - LOCATION: LOCATION: NECK

## 2018-05-07 ASSESSMENT — PAIN DESCRIPTION - PAIN TYPE: TYPE: CHRONIC PAIN

## 2018-05-07 ASSESSMENT — PAIN DESCRIPTION - ORIENTATION: ORIENTATION: POSTERIOR;RIGHT

## 2018-05-10 ENCOUNTER — HOSPITAL ENCOUNTER (OUTPATIENT)
Dept: PHYSICAL THERAPY | Age: 58
Setting detail: THERAPIES SERIES
Discharge: HOME | End: 2018-05-10
Payer: MEDICARE

## 2018-05-10 PROCEDURE — 97110 THERAPEUTIC EXERCISES: CPT

## 2018-05-10 ASSESSMENT — PAIN DESCRIPTION - PROGRESSION: CLINICAL_PROGRESSION: GRADUALLY IMPROVING

## 2018-05-10 ASSESSMENT — PAIN DESCRIPTION - ONSET: ONSET: ON-GOING

## 2018-05-10 ASSESSMENT — PAIN DESCRIPTION - PAIN TYPE: TYPE: CHRONIC PAIN

## 2018-05-10 ASSESSMENT — PAIN DESCRIPTION - LOCATION: LOCATION: FINGER (COMMENT WHICH ONE)

## 2018-05-10 ASSESSMENT — PAIN SCALES - GENERAL: PAINLEVEL_OUTOF10: 1

## 2018-05-10 ASSESSMENT — PAIN DESCRIPTION - FREQUENCY: FREQUENCY: CONTINUOUS

## 2018-05-16 ENCOUNTER — HOSPITAL ENCOUNTER (OUTPATIENT)
Dept: PHYSICAL THERAPY | Age: 58
Setting detail: THERAPIES SERIES
Discharge: HOME | End: 2018-05-16
Payer: MEDICARE

## 2018-05-16 PROCEDURE — 97140 MANUAL THERAPY 1/> REGIONS: CPT

## 2018-05-16 PROCEDURE — 97110 THERAPEUTIC EXERCISES: CPT

## 2018-05-16 ASSESSMENT — PAIN SCALES - GENERAL: PAINLEVEL_OUTOF10: 3

## 2018-05-16 ASSESSMENT — PAIN DESCRIPTION - PAIN TYPE: TYPE: CHRONIC PAIN

## 2018-05-16 ASSESSMENT — PAIN DESCRIPTION - PROGRESSION: CLINICAL_PROGRESSION: GRADUALLY IMPROVING

## 2018-05-16 ASSESSMENT — PAIN DESCRIPTION - LOCATION: LOCATION: FINGER (COMMENT WHICH ONE)

## 2018-05-23 ENCOUNTER — HOSPITAL ENCOUNTER (OUTPATIENT)
Dept: PHYSICAL THERAPY | Age: 58
Setting detail: THERAPIES SERIES
Discharge: HOME | End: 2018-05-23
Payer: MEDICARE

## 2018-05-23 PROCEDURE — 97110 THERAPEUTIC EXERCISES: CPT

## 2018-05-23 ASSESSMENT — PAIN SCALES - GENERAL: PAINLEVEL_OUTOF10: 1

## 2018-05-23 ASSESSMENT — PAIN DESCRIPTION - PROGRESSION: CLINICAL_PROGRESSION: GRADUALLY IMPROVING

## 2018-05-23 ASSESSMENT — PAIN DESCRIPTION - PAIN TYPE: TYPE: CHRONIC PAIN

## 2018-05-30 ENCOUNTER — HOSPITAL ENCOUNTER (OUTPATIENT)
Dept: PHYSICAL THERAPY | Age: 58
Setting detail: THERAPIES SERIES
Discharge: HOME | End: 2018-05-30
Payer: MEDICARE

## 2018-05-30 PROCEDURE — 97110 THERAPEUTIC EXERCISES: CPT

## 2018-06-06 ENCOUNTER — HOSPITAL ENCOUNTER (OUTPATIENT)
Dept: PHYSICAL THERAPY | Age: 58
Setting detail: THERAPIES SERIES
Discharge: HOME | End: 2018-06-06
Payer: MEDICARE

## 2018-06-06 PROCEDURE — 97110 THERAPEUTIC EXERCISES: CPT

## 2018-06-06 ASSESSMENT — PAIN SCALES - GENERAL: PAINLEVEL_OUTOF10: 1

## 2018-06-06 ASSESSMENT — PAIN DESCRIPTION - PROGRESSION: CLINICAL_PROGRESSION: GRADUALLY IMPROVING

## 2018-06-06 ASSESSMENT — PAIN DESCRIPTION - PAIN TYPE: TYPE: CHRONIC PAIN

## 2018-06-10 DIAGNOSIS — I25.10 CORONARY ARTERY DISEASE INVOLVING NATIVE CORONARY ARTERY OF NATIVE HEART WITHOUT ANGINA PECTORIS: ICD-10-CM

## 2018-06-12 RX ORDER — NITROGLYCERIN 0.4 MG/1
TABLET SUBLINGUAL
Qty: 25 TABLET | Refills: 0 | Status: SHIPPED | OUTPATIENT
Start: 2018-06-12 | End: 2018-08-13 | Stop reason: SDUPTHER

## 2018-06-13 ENCOUNTER — HOSPITAL ENCOUNTER (OUTPATIENT)
Dept: PHYSICAL THERAPY | Age: 58
Setting detail: THERAPIES SERIES
Discharge: HOME | End: 2018-06-13
Payer: MEDICARE

## 2018-06-13 PROCEDURE — 97110 THERAPEUTIC EXERCISES: CPT

## 2018-06-13 ASSESSMENT — PAIN SCALES - GENERAL: PAINLEVEL_OUTOF10: 0

## 2018-06-25 ENCOUNTER — OFFICE VISIT (OUTPATIENT)
Dept: FAMILY MEDICINE CLINIC | Age: 58
End: 2018-06-25
Payer: MEDICARE

## 2018-06-25 VITALS
OXYGEN SATURATION: 95 % | SYSTOLIC BLOOD PRESSURE: 148 MMHG | HEIGHT: 62 IN | HEART RATE: 71 BPM | DIASTOLIC BLOOD PRESSURE: 88 MMHG | BODY MASS INDEX: 31.17 KG/M2 | WEIGHT: 169.4 LBS

## 2018-06-25 DIAGNOSIS — M50.30 DEGENERATIVE DISC DISEASE, CERVICAL: Primary | ICD-10-CM

## 2018-06-25 DIAGNOSIS — I10 ESSENTIAL HYPERTENSION: ICD-10-CM

## 2018-06-25 PROCEDURE — 1036F TOBACCO NON-USER: CPT | Performed by: FAMILY MEDICINE

## 2018-06-25 PROCEDURE — G8598 ASA/ANTIPLAT THER USED: HCPCS | Performed by: FAMILY MEDICINE

## 2018-06-25 PROCEDURE — 3017F COLORECTAL CA SCREEN DOC REV: CPT | Performed by: FAMILY MEDICINE

## 2018-06-25 PROCEDURE — G8427 DOCREV CUR MEDS BY ELIG CLIN: HCPCS | Performed by: FAMILY MEDICINE

## 2018-06-25 PROCEDURE — 99213 OFFICE O/P EST LOW 20 MIN: CPT | Performed by: FAMILY MEDICINE

## 2018-06-25 PROCEDURE — G8417 CALC BMI ABV UP PARAM F/U: HCPCS | Performed by: FAMILY MEDICINE

## 2018-06-25 RX ORDER — ENALAPRIL MALEATE 10 MG/1
TABLET ORAL
Qty: 90 TABLET | Refills: 3 | Status: SHIPPED | OUTPATIENT
Start: 2018-06-25 | End: 2018-10-14 | Stop reason: SDUPTHER

## 2018-06-25 ASSESSMENT — ENCOUNTER SYMPTOMS
DIARRHEA: 0
NAUSEA: 0
ABDOMINAL DISTENTION: 0
WHEEZING: 0
ABDOMINAL PAIN: 0
SHORTNESS OF BREATH: 0
CONSTIPATION: 0
COUGH: 0

## 2018-07-19 RX ORDER — PSEUDOEPHED/ACETAMINOPH/DIPHEN 30MG-500MG
TABLET ORAL
Qty: 90 TABLET | Refills: 0 | Status: SHIPPED | OUTPATIENT
Start: 2018-07-19 | End: 2018-07-31 | Stop reason: SDUPTHER

## 2018-07-31 RX ORDER — ACETAMINOPHEN 500 MG
TABLET ORAL
Qty: 90 TABLET | Refills: 0 | Status: SHIPPED | OUTPATIENT
Start: 2018-07-31 | End: 2018-08-12 | Stop reason: SDUPTHER

## 2018-07-31 NOTE — TELEPHONE ENCOUNTER
Please Approve or Refuse.   Send to Pharmacy per Pt's Request:    Next Visit Date:  9/25/2018   Last Visit Date: 6/25/2018    Hemoglobin A1C (%)   Date Value   10/04/2016 5.3   08/24/2012 5.2             ( goal A1C is < 7)   BP Readings from Last 3 Encounters:   06/25/18 (!) 148/88   04/25/18 (!) 143/85   04/16/18 (!) 144/87          (goal 120/80)

## 2018-09-10 DIAGNOSIS — F41.9 ANXIETY: ICD-10-CM

## 2018-09-10 DIAGNOSIS — I10 ESSENTIAL HYPERTENSION: ICD-10-CM

## 2018-09-10 DIAGNOSIS — J30.9 ALLERGIC SINUSITIS: ICD-10-CM

## 2018-09-10 DIAGNOSIS — M19.019 OSTEOARTHRITIS OF SHOULDER, UNSPECIFIED LATERALITY, UNSPECIFIED OSTEOARTHRITIS TYPE: ICD-10-CM

## 2018-09-10 DIAGNOSIS — M50.30 DEGENERATIVE DISC DISEASE, CERVICAL: ICD-10-CM

## 2018-09-10 RX ORDER — FLUOXETINE HYDROCHLORIDE 20 MG/1
CAPSULE ORAL
Qty: 90 CAPSULE | Refills: 0 | Status: SHIPPED | OUTPATIENT
Start: 2018-09-10 | End: 2018-10-08 | Stop reason: SDUPTHER

## 2018-09-10 RX ORDER — ASPIRIN 81 MG/1
81 TABLET, COATED ORAL DAILY
Qty: 30 TABLET | Refills: 0 | Status: SHIPPED | OUTPATIENT
Start: 2018-09-10 | End: 2018-10-08 | Stop reason: SDUPTHER

## 2018-09-10 RX ORDER — SIMVASTATIN 40 MG
TABLET ORAL
Qty: 30 TABLET | Refills: 0 | Status: SHIPPED | OUTPATIENT
Start: 2018-09-10 | End: 2018-10-14 | Stop reason: SDUPTHER

## 2018-09-10 RX ORDER — METOPROLOL SUCCINATE 50 MG/1
TABLET, EXTENDED RELEASE ORAL
Qty: 30 TABLET | Refills: 0 | Status: SHIPPED | OUTPATIENT
Start: 2018-09-10 | End: 2018-10-08 | Stop reason: SDUPTHER

## 2018-09-10 RX ORDER — CETIRIZINE HYDROCHLORIDE 10 MG/1
10 TABLET ORAL DAILY
Qty: 30 TABLET | Refills: 0 | Status: SHIPPED | OUTPATIENT
Start: 2018-09-10 | End: 2018-10-08 | Stop reason: SDUPTHER

## 2018-09-10 RX ORDER — MELOXICAM 15 MG/1
TABLET ORAL
Qty: 60 TABLET | Refills: 0 | Status: SHIPPED | OUTPATIENT
Start: 2018-09-10 | End: 2018-09-25 | Stop reason: SDUPTHER

## 2018-09-10 RX ORDER — CYCLOBENZAPRINE HCL 10 MG
TABLET ORAL
Qty: 60 TABLET | Refills: 0 | Status: SHIPPED | OUTPATIENT
Start: 2018-09-10 | End: 2018-10-08 | Stop reason: SDUPTHER

## 2018-09-10 RX ORDER — ISOSORBIDE MONONITRATE 30 MG/1
TABLET, EXTENDED RELEASE ORAL
Qty: 90 TABLET | Refills: 0 | Status: SHIPPED | OUTPATIENT
Start: 2018-09-10 | End: 2018-12-05 | Stop reason: SDUPTHER

## 2018-09-10 NOTE — TELEPHONE ENCOUNTER
Please Approve or Refuse.   Send to Pharmacy per Pt's Request:      Next Visit Date:  9/25/2018   Last Visit Date: 6/25/2018    Hemoglobin A1C (%)   Date Value   10/04/2016 5.3   08/24/2012 5.2             ( goal A1C is < 7)   BP Readings from Last 3 Encounters:   06/25/18 (!) 148/88   04/25/18 (!) 143/85   04/16/18 (!) 144/87          (goal 120/80)  Lab Results   Component Value Date    BUN 10 02/26/2018     Lab Results   Component Value Date    CREATININE 0.74 02/26/2018     Lab Results   Component Value Date    K 4.6 02/26/2018     @[id number])@

## 2018-09-25 ENCOUNTER — OFFICE VISIT (OUTPATIENT)
Dept: FAMILY MEDICINE CLINIC | Age: 58
End: 2018-09-25
Payer: MEDICARE

## 2018-09-25 VITALS
HEIGHT: 62 IN | WEIGHT: 167.4 LBS | BODY MASS INDEX: 30.8 KG/M2 | SYSTOLIC BLOOD PRESSURE: 134 MMHG | HEART RATE: 73 BPM | OXYGEN SATURATION: 98 % | DIASTOLIC BLOOD PRESSURE: 86 MMHG | TEMPERATURE: 97 F

## 2018-09-25 DIAGNOSIS — I25.10 CORONARY ARTERY DISEASE INVOLVING NATIVE CORONARY ARTERY OF NATIVE HEART WITHOUT ANGINA PECTORIS: ICD-10-CM

## 2018-09-25 DIAGNOSIS — Z23 NEED FOR PROPHYLACTIC VACCINATION AND INOCULATION AGAINST INFLUENZA: ICD-10-CM

## 2018-09-25 DIAGNOSIS — I10 ESSENTIAL HYPERTENSION: Primary | ICD-10-CM

## 2018-09-25 DIAGNOSIS — M50.30 DEGENERATIVE DISC DISEASE, CERVICAL: ICD-10-CM

## 2018-09-25 PROCEDURE — G8598 ASA/ANTIPLAT THER USED: HCPCS | Performed by: FAMILY MEDICINE

## 2018-09-25 PROCEDURE — 90471 IMMUNIZATION ADMIN: CPT | Performed by: FAMILY MEDICINE

## 2018-09-25 PROCEDURE — 90686 IIV4 VACC NO PRSV 0.5 ML IM: CPT | Performed by: FAMILY MEDICINE

## 2018-09-25 PROCEDURE — G8427 DOCREV CUR MEDS BY ELIG CLIN: HCPCS | Performed by: FAMILY MEDICINE

## 2018-09-25 PROCEDURE — 1036F TOBACCO NON-USER: CPT | Performed by: FAMILY MEDICINE

## 2018-09-25 PROCEDURE — 99213 OFFICE O/P EST LOW 20 MIN: CPT | Performed by: FAMILY MEDICINE

## 2018-09-25 PROCEDURE — G8417 CALC BMI ABV UP PARAM F/U: HCPCS | Performed by: FAMILY MEDICINE

## 2018-09-25 PROCEDURE — 3017F COLORECTAL CA SCREEN DOC REV: CPT | Performed by: FAMILY MEDICINE

## 2018-09-25 ASSESSMENT — PATIENT HEALTH QUESTIONNAIRE - PHQ9
SUM OF ALL RESPONSES TO PHQ QUESTIONS 1-9: 0
2. FEELING DOWN, DEPRESSED OR HOPELESS: 0
SUM OF ALL RESPONSES TO PHQ9 QUESTIONS 1 & 2: 0
SUM OF ALL RESPONSES TO PHQ QUESTIONS 1-9: 0
1. LITTLE INTEREST OR PLEASURE IN DOING THINGS: 0

## 2018-09-25 ASSESSMENT — ENCOUNTER SYMPTOMS
ABDOMINAL PAIN: 0
SHORTNESS OF BREATH: 0
COUGH: 0
WHEEZING: 0
BLOOD IN STOOL: 0
CONSTIPATION: 0
PHOTOPHOBIA: 0
ABDOMINAL DISTENTION: 0

## 2018-09-25 NOTE — PROGRESS NOTES
narrative on file     Counseling given: Yes        No family history on file.          -rest of complaints with corresponding details per ROS    The patient's past medical, surgical, social, and family history as well as her current medications and allergies were reviewed as documented in today's encounter. Review of Systems   Constitutional: Negative for activity change, appetite change, diaphoresis and fatigue. Eyes: Negative for photophobia and visual disturbance. Respiratory: Negative for cough, shortness of breath and wheezing. Cardiovascular: Negative for chest pain, palpitations and leg swelling. Gastrointestinal: Negative for abdominal distention, abdominal pain, blood in stool and constipation. Endocrine: Negative for polyphagia. Musculoskeletal: Negative for arthralgias, myalgias and neck pain. Neurological: Negative for dizziness, weakness, numbness and headaches. Psychiatric/Behavioral: The patient is not nervous/anxious. Physical Exam  PHYSICAL EXAM:   VITALS:   Vitals:    09/25/18 1346   BP: 134/86   Pulse:    Temp:    SpO2:      GENERAL:  Patient is a well-developed, well-nourished female  in no acute distress, alert and oriented x3, appropriate and pleasant conversation. HEAD: Normocephalic, atraumatic. EYES: Pupils equal, round and reactive to light and accommodation, extraocular   movements intact. ENT: Moist mucous membranes. No erythema is noted. NECK: Supple. No masses. No lymphadenopathy. CARDIOVASCULAR: Regular rate and rhythm. PULMONARY: Lungs are clear to auscultation bilaterally. ABDOMEN: Soft, nontender, nondistended. Positive bowel sounds. ASSEESSMENT AND PLAN      1. Essential hypertension  -Controlled on current treatment, continue same medications    2. Degenerative disc disease, cervical  -Stable on current treatment    3.  Coronary artery disease involving native coronary artery of native heart without angina pectoris  -Stable

## 2018-10-05 RX ORDER — PSEUDOEPHED/ACETAMINOPH/DIPHEN 30MG-500MG
TABLET ORAL
Qty: 90 TABLET | Refills: 0 | Status: SHIPPED | OUTPATIENT
Start: 2018-10-05 | End: 2018-12-22

## 2018-10-08 DIAGNOSIS — F41.9 ANXIETY: ICD-10-CM

## 2018-10-08 DIAGNOSIS — I10 ESSENTIAL HYPERTENSION: ICD-10-CM

## 2018-10-08 DIAGNOSIS — M50.30 DEGENERATIVE DISC DISEASE, CERVICAL: ICD-10-CM

## 2018-10-08 DIAGNOSIS — M19.019 OSTEOARTHRITIS OF SHOULDER, UNSPECIFIED LATERALITY, UNSPECIFIED OSTEOARTHRITIS TYPE: ICD-10-CM

## 2018-10-08 DIAGNOSIS — J30.9 ALLERGIC SINUSITIS: ICD-10-CM

## 2018-10-08 RX ORDER — ASPIRIN 81 MG/1
81 TABLET, COATED ORAL DAILY
Qty: 30 TABLET | Refills: 0 | Status: SHIPPED | OUTPATIENT
Start: 2018-10-08 | End: 2018-11-06 | Stop reason: SDUPTHER

## 2018-10-08 RX ORDER — FLUOXETINE HYDROCHLORIDE 20 MG/1
CAPSULE ORAL
Qty: 90 CAPSULE | Refills: 0 | Status: SHIPPED | OUTPATIENT
Start: 2018-10-08 | End: 2018-11-06 | Stop reason: SDUPTHER

## 2018-10-08 RX ORDER — CETIRIZINE HYDROCHLORIDE 10 MG/1
10 TABLET ORAL DAILY
Qty: 30 TABLET | Refills: 0 | Status: SHIPPED | OUTPATIENT
Start: 2018-10-08 | End: 2018-11-06 | Stop reason: SDUPTHER

## 2018-10-08 RX ORDER — METOPROLOL SUCCINATE 50 MG/1
TABLET, EXTENDED RELEASE ORAL
Qty: 30 TABLET | Refills: 0 | Status: SHIPPED | OUTPATIENT
Start: 2018-10-08 | End: 2018-11-06 | Stop reason: SDUPTHER

## 2018-10-08 RX ORDER — CYCLOBENZAPRINE HCL 10 MG
TABLET ORAL
Qty: 60 TABLET | Refills: 0 | Status: SHIPPED | OUTPATIENT
Start: 2018-10-08 | End: 2018-11-06 | Stop reason: SDUPTHER

## 2018-11-04 DIAGNOSIS — M79.605 LEG PAIN, LEFT: ICD-10-CM

## 2018-11-05 RX ORDER — ACETAMINOPHEN 500 MG
TABLET ORAL
Qty: 90 TABLET | Refills: 0 | Status: SHIPPED | OUTPATIENT
Start: 2018-11-05 | End: 2018-11-20 | Stop reason: SDUPTHER

## 2018-11-06 DIAGNOSIS — M19.019 OSTEOARTHRITIS OF SHOULDER, UNSPECIFIED LATERALITY, UNSPECIFIED OSTEOARTHRITIS TYPE: ICD-10-CM

## 2018-11-06 DIAGNOSIS — J30.9 ALLERGIC SINUSITIS: ICD-10-CM

## 2018-11-06 DIAGNOSIS — F41.9 ANXIETY: ICD-10-CM

## 2018-11-06 DIAGNOSIS — M50.30 DEGENERATIVE DISC DISEASE, CERVICAL: ICD-10-CM

## 2018-11-06 DIAGNOSIS — I10 ESSENTIAL HYPERTENSION: ICD-10-CM

## 2018-11-06 RX ORDER — ASPIRIN 81 MG/1
81 TABLET, COATED ORAL DAILY
Qty: 30 TABLET | Refills: 0 | Status: SHIPPED | OUTPATIENT
Start: 2018-11-06 | End: 2018-11-23 | Stop reason: SDUPTHER

## 2018-11-06 RX ORDER — METOPROLOL SUCCINATE 50 MG/1
TABLET, EXTENDED RELEASE ORAL
Qty: 30 TABLET | Refills: 0 | Status: SHIPPED | OUTPATIENT
Start: 2018-11-06 | End: 2018-11-23 | Stop reason: SDUPTHER

## 2018-11-06 RX ORDER — MELOXICAM 15 MG/1
TABLET ORAL
Qty: 60 TABLET | Refills: 0 | Status: SHIPPED | OUTPATIENT
Start: 2018-11-06 | End: 2019-01-05 | Stop reason: SDUPTHER

## 2018-11-06 RX ORDER — CETIRIZINE HYDROCHLORIDE 10 MG/1
10 TABLET ORAL DAILY
Qty: 30 TABLET | Refills: 0 | Status: SHIPPED | OUTPATIENT
Start: 2018-11-06 | End: 2018-11-23 | Stop reason: SDUPTHER

## 2018-11-06 RX ORDER — FLUOXETINE HYDROCHLORIDE 20 MG/1
CAPSULE ORAL
Qty: 90 CAPSULE | Refills: 0 | Status: SHIPPED | OUTPATIENT
Start: 2018-11-06 | End: 2018-11-23 | Stop reason: SDUPTHER

## 2018-11-06 RX ORDER — CYCLOBENZAPRINE HCL 10 MG
TABLET ORAL
Qty: 60 TABLET | Refills: 0 | Status: SHIPPED | OUTPATIENT
Start: 2018-11-06 | End: 2018-11-23 | Stop reason: SDUPTHER

## 2018-11-20 DIAGNOSIS — M79.605 LEG PAIN, LEFT: ICD-10-CM

## 2018-11-20 RX ORDER — PSEUDOEPHED/ACETAMINOPH/DIPHEN 30MG-500MG
TABLET ORAL
Qty: 90 TABLET | Refills: 0 | Status: SHIPPED | OUTPATIENT
Start: 2018-11-20 | End: 2018-12-05 | Stop reason: SDUPTHER

## 2018-11-23 DIAGNOSIS — I10 ESSENTIAL HYPERTENSION: ICD-10-CM

## 2018-11-23 DIAGNOSIS — M79.605 LEG PAIN, LEFT: ICD-10-CM

## 2018-11-23 DIAGNOSIS — M50.30 DEGENERATIVE DISC DISEASE, CERVICAL: ICD-10-CM

## 2018-11-23 DIAGNOSIS — F41.9 ANXIETY: ICD-10-CM

## 2018-11-23 DIAGNOSIS — M19.019 OSTEOARTHRITIS OF SHOULDER, UNSPECIFIED LATERALITY, UNSPECIFIED OSTEOARTHRITIS TYPE: ICD-10-CM

## 2018-11-23 DIAGNOSIS — J30.9 ALLERGIC SINUSITIS: ICD-10-CM

## 2018-11-23 RX ORDER — CETIRIZINE HYDROCHLORIDE 10 MG/1
10 TABLET ORAL DAILY
Qty: 30 TABLET | Refills: 0 | Status: SHIPPED | OUTPATIENT
Start: 2018-11-23 | End: 2018-12-28 | Stop reason: SDUPTHER

## 2018-11-23 RX ORDER — ALBUTEROL SULFATE 90 UG/1
AEROSOL, METERED RESPIRATORY (INHALATION)
Qty: 2 INHALER | Refills: 1 | Status: SHIPPED | OUTPATIENT
Start: 2018-11-23 | End: 2019-11-05 | Stop reason: SDUPTHER

## 2018-11-23 RX ORDER — ENALAPRIL MALEATE 10 MG/1
TABLET ORAL
Qty: 270 TABLET | Refills: 3 | Status: SHIPPED | OUTPATIENT
Start: 2018-11-23 | End: 2019-12-08 | Stop reason: SDUPTHER

## 2018-11-23 RX ORDER — CYCLOBENZAPRINE HCL 10 MG
TABLET ORAL
Qty: 60 TABLET | Refills: 0 | Status: SHIPPED | OUTPATIENT
Start: 2018-11-23 | End: 2018-12-28 | Stop reason: SDUPTHER

## 2018-11-23 RX ORDER — FLUOXETINE HYDROCHLORIDE 20 MG/1
CAPSULE ORAL
Qty: 90 CAPSULE | Refills: 0 | Status: SHIPPED | OUTPATIENT
Start: 2018-11-23 | End: 2018-12-27 | Stop reason: SDUPTHER

## 2018-11-23 RX ORDER — ASPIRIN 81 MG/1
81 TABLET ORAL DAILY
Qty: 30 TABLET | Refills: 0 | Status: SHIPPED | OUTPATIENT
Start: 2018-11-23 | End: 2018-12-27 | Stop reason: SDUPTHER

## 2018-11-23 RX ORDER — BUSPIRONE HYDROCHLORIDE 10 MG/1
TABLET ORAL
Qty: 90 TABLET | Refills: 3 | Status: SHIPPED | OUTPATIENT
Start: 2018-11-23 | End: 2019-03-26 | Stop reason: SDUPTHER

## 2018-11-23 RX ORDER — SIMVASTATIN 40 MG
TABLET ORAL
Qty: 90 TABLET | Refills: 1 | Status: SHIPPED | OUTPATIENT
Start: 2018-11-23 | End: 2019-02-11 | Stop reason: SDUPTHER

## 2018-11-23 RX ORDER — METOPROLOL SUCCINATE 50 MG/1
TABLET, EXTENDED RELEASE ORAL
Qty: 90 TABLET | Refills: 1 | Status: SHIPPED | OUTPATIENT
Start: 2018-11-23 | End: 2019-05-22 | Stop reason: SDUPTHER

## 2018-11-23 NOTE — TELEPHONE ENCOUNTER
Patient emailed asking for 90 day supply on all of her scripts.      Health Maintenance   Topic Date Due    Potassium monitoring  02/26/2019    Creatinine monitoring  02/26/2019    Breast cancer screen  10/20/2019    Lipid screen  10/26/2022    Cervical cancer screen  04/16/2023    Colon cancer screen colonoscopy  04/08/2024    DTaP/Tdap/Td vaccine (2 - Td) 04/16/2028    Flu vaccine  Completed    Pneumococcal med risk  Completed    Shingles Vaccine  Completed    Hepatitis C screen  Completed    HIV screen  Completed             (applicable per patient's age: Cancer Screenings, Depression Screening, Fall Risk Screening, Immunizations)    Hemoglobin A1C (%)   Date Value   10/04/2016 5.3   08/24/2012 5.2     LDL Cholesterol (mg/dL)   Date Value   10/26/2017 51     AST (U/L)   Date Value   10/04/2016 20     ALT (U/L)   Date Value   10/04/2016 16     BUN (mg/dL)   Date Value   02/26/2018 10      (goal A1C is < 7)   (goal LDL is <100) need 30-50% reduction from baseline     BP Readings from Last 3 Encounters:   09/25/18 134/86   06/25/18 (!) 148/88   04/25/18 (!) 143/85    (goal /80)      All Future Testing planned in [facility]:  Lab Frequency Next Occurrence       Next Visit Date:  Future Appointments  Date Time Provider Matt Diez   3/25/2019 2:15 PM Lui Cheema MD [id number]            Patient Active Problem List:     Essential hypertension     CAD (coronary artery disease)     OA (osteoarthritis)     Anxiety     Asthma     Depression     Neck pain     Endometrial polyp     PVD (peripheral vascular disease) with claudication (HCC)     Degenerative disc disease, cervical     Mild intermittent asthma without complication     Mixed hyperlipidemia

## 2018-12-05 DIAGNOSIS — J30.9 ALLERGIC SINUSITIS: ICD-10-CM

## 2018-12-05 DIAGNOSIS — M79.605 LEG PAIN, LEFT: ICD-10-CM

## 2018-12-05 DIAGNOSIS — M19.019 OSTEOARTHRITIS OF SHOULDER, UNSPECIFIED LATERALITY, UNSPECIFIED OSTEOARTHRITIS TYPE: ICD-10-CM

## 2018-12-05 DIAGNOSIS — M50.30 DEGENERATIVE DISC DISEASE, CERVICAL: ICD-10-CM

## 2018-12-05 RX ORDER — BUSPIRONE HYDROCHLORIDE 10 MG/1
TABLET ORAL
Qty: 90 TABLET | Refills: 0 | Status: SHIPPED | OUTPATIENT
Start: 2018-12-05 | End: 2019-03-25

## 2018-12-05 RX ORDER — CYCLOBENZAPRINE HCL 10 MG
TABLET ORAL
Qty: 60 TABLET | Refills: 0 | Status: SHIPPED | OUTPATIENT
Start: 2018-12-05 | End: 2019-01-27 | Stop reason: SDUPTHER

## 2018-12-05 RX ORDER — ISOSORBIDE MONONITRATE 30 MG/1
TABLET, EXTENDED RELEASE ORAL
Qty: 90 TABLET | Refills: 0 | Status: SHIPPED | OUTPATIENT
Start: 2018-12-05 | End: 2019-03-05 | Stop reason: SDUPTHER

## 2018-12-05 RX ORDER — CETIRIZINE HYDROCHLORIDE 10 MG/1
10 TABLET ORAL DAILY
Qty: 30 TABLET | Refills: 0 | Status: SHIPPED | OUTPATIENT
Start: 2018-12-05 | End: 2019-01-27 | Stop reason: SDUPTHER

## 2018-12-05 RX ORDER — PSEUDOEPHED/ACETAMINOPH/DIPHEN 30MG-500MG
TABLET ORAL
Qty: 90 TABLET | Refills: 0 | Status: SHIPPED | OUTPATIENT
Start: 2018-12-05 | End: 2018-12-21 | Stop reason: SDUPTHER

## 2018-12-21 DIAGNOSIS — M79.605 LEG PAIN, LEFT: ICD-10-CM

## 2018-12-22 RX ORDER — PSEUDOEPHED/ACETAMINOPH/DIPHEN 30MG-500MG
TABLET ORAL
Qty: 90 TABLET | Refills: 0 | Status: SHIPPED | OUTPATIENT
Start: 2018-12-22 | End: 2019-01-11 | Stop reason: SDUPTHER

## 2018-12-27 DIAGNOSIS — F41.9 ANXIETY: ICD-10-CM

## 2018-12-27 DIAGNOSIS — I10 ESSENTIAL HYPERTENSION: ICD-10-CM

## 2018-12-27 RX ORDER — FLUOXETINE HYDROCHLORIDE 20 MG/1
CAPSULE ORAL
Qty: 90 CAPSULE | Refills: 0 | Status: SHIPPED | OUTPATIENT
Start: 2018-12-27 | End: 2019-01-26 | Stop reason: SDUPTHER

## 2018-12-27 RX ORDER — ASPIRIN 81 MG/1
81 TABLET, COATED ORAL DAILY
Qty: 30 TABLET | Refills: 0 | Status: SHIPPED | OUTPATIENT
Start: 2018-12-27 | End: 2019-01-26 | Stop reason: SDUPTHER

## 2018-12-28 DIAGNOSIS — M50.30 DEGENERATIVE DISC DISEASE, CERVICAL: ICD-10-CM

## 2018-12-28 DIAGNOSIS — J30.9 ALLERGIC SINUSITIS: ICD-10-CM

## 2018-12-28 DIAGNOSIS — M19.019 OSTEOARTHRITIS OF SHOULDER, UNSPECIFIED LATERALITY, UNSPECIFIED OSTEOARTHRITIS TYPE: ICD-10-CM

## 2018-12-28 RX ORDER — CYCLOBENZAPRINE HCL 10 MG
TABLET ORAL
Qty: 60 TABLET | Refills: 0 | Status: SHIPPED | OUTPATIENT
Start: 2018-12-28 | End: 2019-10-29 | Stop reason: SDUPTHER

## 2018-12-28 RX ORDER — CETIRIZINE HYDROCHLORIDE 10 MG/1
10 TABLET ORAL DAILY
Qty: 30 TABLET | Refills: 0 | Status: SHIPPED | OUTPATIENT
Start: 2018-12-28 | End: 2019-10-29 | Stop reason: SDUPTHER

## 2019-01-05 DIAGNOSIS — M50.30 DEGENERATIVE DISC DISEASE, CERVICAL: ICD-10-CM

## 2019-01-05 DIAGNOSIS — M19.019 OSTEOARTHRITIS OF SHOULDER, UNSPECIFIED LATERALITY, UNSPECIFIED OSTEOARTHRITIS TYPE: ICD-10-CM

## 2019-01-07 RX ORDER — MELOXICAM 15 MG/1
TABLET ORAL
Qty: 60 TABLET | Refills: 0 | Status: SHIPPED | OUTPATIENT
Start: 2019-01-07 | End: 2019-03-05 | Stop reason: SDUPTHER

## 2019-01-11 DIAGNOSIS — M79.605 LEG PAIN, LEFT: ICD-10-CM

## 2019-01-11 RX ORDER — PSEUDOEPHED/ACETAMINOPH/DIPHEN 30MG-500MG
TABLET ORAL
Qty: 90 TABLET | Refills: 0 | Status: SHIPPED | OUTPATIENT
Start: 2019-01-11 | End: 2019-01-25 | Stop reason: SDUPTHER

## 2019-01-25 DIAGNOSIS — M79.605 LEG PAIN, LEFT: ICD-10-CM

## 2019-01-25 RX ORDER — PSEUDOEPHED/ACETAMINOPH/DIPHEN 30MG-500MG
TABLET ORAL
Qty: 90 TABLET | Refills: 0 | Status: SHIPPED | OUTPATIENT
Start: 2019-01-25 | End: 2019-02-09 | Stop reason: SDUPTHER

## 2019-01-26 DIAGNOSIS — F41.9 ANXIETY: ICD-10-CM

## 2019-01-26 DIAGNOSIS — I10 ESSENTIAL HYPERTENSION: ICD-10-CM

## 2019-01-27 DIAGNOSIS — J30.9 ALLERGIC SINUSITIS: ICD-10-CM

## 2019-01-27 DIAGNOSIS — M19.019 OSTEOARTHRITIS OF SHOULDER, UNSPECIFIED LATERALITY, UNSPECIFIED OSTEOARTHRITIS TYPE: ICD-10-CM

## 2019-01-27 DIAGNOSIS — M50.30 DEGENERATIVE DISC DISEASE, CERVICAL: ICD-10-CM

## 2019-01-28 RX ORDER — FLUOXETINE HYDROCHLORIDE 20 MG/1
CAPSULE ORAL
Qty: 90 CAPSULE | Refills: 0 | Status: SHIPPED | OUTPATIENT
Start: 2019-01-28 | End: 2019-02-24 | Stop reason: SDUPTHER

## 2019-01-28 RX ORDER — CYCLOBENZAPRINE HCL 10 MG
TABLET ORAL
Qty: 60 TABLET | Refills: 0 | Status: SHIPPED | OUTPATIENT
Start: 2019-01-28 | End: 2019-03-25 | Stop reason: SDUPTHER

## 2019-01-28 RX ORDER — ASPIRIN 81 MG/1
81 TABLET, COATED ORAL DAILY
Qty: 30 TABLET | Refills: 0 | Status: SHIPPED | OUTPATIENT
Start: 2019-01-28 | End: 2019-02-24 | Stop reason: SDUPTHER

## 2019-01-28 RX ORDER — CETIRIZINE HYDROCHLORIDE 10 MG/1
10 TABLET ORAL DAILY
Qty: 30 TABLET | Refills: 0 | Status: SHIPPED | OUTPATIENT
Start: 2019-01-28 | End: 2019-03-25 | Stop reason: SDUPTHER

## 2019-02-09 DIAGNOSIS — M79.605 LEG PAIN, LEFT: ICD-10-CM

## 2019-02-11 RX ORDER — PSEUDOEPHED/ACETAMINOPH/DIPHEN 30MG-500MG
TABLET ORAL
Qty: 90 TABLET | Refills: 0 | Status: SHIPPED | OUTPATIENT
Start: 2019-02-11 | End: 2019-02-28 | Stop reason: SDUPTHER

## 2019-02-11 RX ORDER — SIMVASTATIN 40 MG
TABLET ORAL
Qty: 30 TABLET | Refills: 0 | Status: SHIPPED | OUTPATIENT
Start: 2019-02-11 | End: 2019-04-07 | Stop reason: SDUPTHER

## 2019-02-24 DIAGNOSIS — I10 ESSENTIAL HYPERTENSION: ICD-10-CM

## 2019-02-24 DIAGNOSIS — F41.9 ANXIETY: ICD-10-CM

## 2019-02-25 RX ORDER — ASPIRIN 81 MG/1
81 TABLET, COATED ORAL DAILY
Qty: 30 TABLET | Refills: 0 | Status: SHIPPED | OUTPATIENT
Start: 2019-02-25 | End: 2019-03-25 | Stop reason: SDUPTHER

## 2019-02-25 RX ORDER — FLUOXETINE HYDROCHLORIDE 20 MG/1
CAPSULE ORAL
Qty: 90 CAPSULE | Refills: 0 | Status: SHIPPED | OUTPATIENT
Start: 2019-02-25 | End: 2019-03-25 | Stop reason: SDUPTHER

## 2019-02-28 DIAGNOSIS — M79.605 LEG PAIN, LEFT: ICD-10-CM

## 2019-02-28 RX ORDER — PSEUDOEPHED/ACETAMINOPH/DIPHEN 30MG-500MG
TABLET ORAL
Qty: 90 TABLET | Refills: 0 | Status: SHIPPED | OUTPATIENT
Start: 2019-02-28 | End: 2019-03-15 | Stop reason: SDUPTHER

## 2019-03-05 DIAGNOSIS — M19.019 OSTEOARTHRITIS OF SHOULDER, UNSPECIFIED LATERALITY, UNSPECIFIED OSTEOARTHRITIS TYPE: ICD-10-CM

## 2019-03-05 DIAGNOSIS — M50.30 DEGENERATIVE DISC DISEASE, CERVICAL: ICD-10-CM

## 2019-03-05 RX ORDER — ISOSORBIDE MONONITRATE 30 MG/1
TABLET, EXTENDED RELEASE ORAL
Qty: 90 TABLET | Refills: 0 | Status: SHIPPED | OUTPATIENT
Start: 2019-03-05 | End: 2019-05-28 | Stop reason: SDUPTHER

## 2019-03-05 RX ORDER — MELOXICAM 15 MG/1
TABLET ORAL
Qty: 60 TABLET | Refills: 0 | Status: SHIPPED | OUTPATIENT
Start: 2019-03-05 | End: 2019-03-25 | Stop reason: SDUPTHER

## 2019-03-15 DIAGNOSIS — M79.605 LEG PAIN, LEFT: ICD-10-CM

## 2019-03-15 RX ORDER — PSEUDOEPHED/ACETAMINOPH/DIPHEN 30MG-500MG
TABLET ORAL
Qty: 90 TABLET | Refills: 0 | Status: SHIPPED | OUTPATIENT
Start: 2019-03-15 | End: 2019-03-29 | Stop reason: SDUPTHER

## 2019-03-25 ENCOUNTER — OFFICE VISIT (OUTPATIENT)
Dept: FAMILY MEDICINE CLINIC | Age: 59
End: 2019-03-25
Payer: MEDICARE

## 2019-03-25 VITALS
WEIGHT: 167.6 LBS | DIASTOLIC BLOOD PRESSURE: 88 MMHG | HEART RATE: 69 BPM | OXYGEN SATURATION: 96 % | HEIGHT: 62 IN | SYSTOLIC BLOOD PRESSURE: 138 MMHG | BODY MASS INDEX: 30.84 KG/M2

## 2019-03-25 DIAGNOSIS — Z12.39 BREAST CANCER SCREENING: ICD-10-CM

## 2019-03-25 DIAGNOSIS — K63.5 POLYP OF COLON, UNSPECIFIED PART OF COLON, UNSPECIFIED TYPE: ICD-10-CM

## 2019-03-25 DIAGNOSIS — Z12.11 SCREENING FOR COLON CANCER: ICD-10-CM

## 2019-03-25 DIAGNOSIS — I10 ESSENTIAL HYPERTENSION: ICD-10-CM

## 2019-03-25 DIAGNOSIS — F41.9 ANXIETY: ICD-10-CM

## 2019-03-25 DIAGNOSIS — J45.20 MILD INTERMITTENT ASTHMA WITHOUT COMPLICATION: ICD-10-CM

## 2019-03-25 DIAGNOSIS — I25.10 CORONARY ARTERY DISEASE INVOLVING NATIVE CORONARY ARTERY OF NATIVE HEART WITHOUT ANGINA PECTORIS: ICD-10-CM

## 2019-03-25 DIAGNOSIS — M19.019 OSTEOARTHRITIS OF SHOULDER, UNSPECIFIED LATERALITY, UNSPECIFIED OSTEOARTHRITIS TYPE: ICD-10-CM

## 2019-03-25 DIAGNOSIS — I10 ESSENTIAL HYPERTENSION: Primary | ICD-10-CM

## 2019-03-25 DIAGNOSIS — J30.9 ALLERGIC SINUSITIS: ICD-10-CM

## 2019-03-25 DIAGNOSIS — E78.2 MIXED HYPERLIPIDEMIA: ICD-10-CM

## 2019-03-25 DIAGNOSIS — M50.30 DEGENERATIVE DISC DISEASE, CERVICAL: ICD-10-CM

## 2019-03-25 PROCEDURE — 1036F TOBACCO NON-USER: CPT | Performed by: FAMILY MEDICINE

## 2019-03-25 PROCEDURE — 99214 OFFICE O/P EST MOD 30 MIN: CPT | Performed by: FAMILY MEDICINE

## 2019-03-25 PROCEDURE — G8482 FLU IMMUNIZE ORDER/ADMIN: HCPCS | Performed by: FAMILY MEDICINE

## 2019-03-25 PROCEDURE — G8417 CALC BMI ABV UP PARAM F/U: HCPCS | Performed by: FAMILY MEDICINE

## 2019-03-25 PROCEDURE — G8427 DOCREV CUR MEDS BY ELIG CLIN: HCPCS | Performed by: FAMILY MEDICINE

## 2019-03-25 PROCEDURE — G8598 ASA/ANTIPLAT THER USED: HCPCS | Performed by: FAMILY MEDICINE

## 2019-03-25 PROCEDURE — 3017F COLORECTAL CA SCREEN DOC REV: CPT | Performed by: FAMILY MEDICINE

## 2019-03-25 RX ORDER — CETIRIZINE HYDROCHLORIDE 10 MG/1
10 TABLET ORAL DAILY
Qty: 30 TABLET | Refills: 0 | Status: SHIPPED | OUTPATIENT
Start: 2019-03-25 | End: 2019-03-25 | Stop reason: SDUPTHER

## 2019-03-25 RX ORDER — ASPIRIN 81 MG/1
TABLET ORAL
Qty: 30 TABLET | Refills: 0 | Status: SHIPPED | OUTPATIENT
Start: 2019-03-25 | End: 2019-04-22 | Stop reason: SDUPTHER

## 2019-03-25 RX ORDER — FLUOXETINE HYDROCHLORIDE 20 MG/1
CAPSULE ORAL
Qty: 90 CAPSULE | Refills: 0 | Status: SHIPPED | OUTPATIENT
Start: 2019-03-25 | End: 2019-04-22 | Stop reason: SDUPTHER

## 2019-03-25 RX ORDER — NITROGLYCERIN 0.4 MG/1
TABLET SUBLINGUAL
Qty: 25 TABLET | Refills: 0 | Status: SHIPPED | OUTPATIENT
Start: 2019-03-25 | End: 2019-03-31 | Stop reason: SDUPTHER

## 2019-03-25 RX ORDER — CYCLOBENZAPRINE HCL 10 MG
TABLET ORAL
Qty: 60 TABLET | Refills: 0 | Status: SHIPPED | OUTPATIENT
Start: 2019-03-25 | End: 2019-03-25 | Stop reason: SDUPTHER

## 2019-03-25 ASSESSMENT — PATIENT HEALTH QUESTIONNAIRE - PHQ9
2. FEELING DOWN, DEPRESSED OR HOPELESS: 0
SUM OF ALL RESPONSES TO PHQ QUESTIONS 1-9: 0
SUM OF ALL RESPONSES TO PHQ QUESTIONS 1-9: 0
1. LITTLE INTEREST OR PLEASURE IN DOING THINGS: 0
SUM OF ALL RESPONSES TO PHQ9 QUESTIONS 1 & 2: 0

## 2019-03-25 ASSESSMENT — ENCOUNTER SYMPTOMS
SHORTNESS OF BREATH: 0
CHEST TIGHTNESS: 0
SINUS PRESSURE: 0
RHINORRHEA: 0
BACK PAIN: 0
WHEEZING: 0
NAUSEA: 0
BLOOD IN STOOL: 0
ABDOMINAL DISTENTION: 0
COUGH: 0
PHOTOPHOBIA: 0
CONSTIPATION: 0

## 2019-03-26 ENCOUNTER — TELEPHONE (OUTPATIENT)
Dept: FAMILY MEDICINE CLINIC | Age: 59
End: 2019-03-26

## 2019-03-26 ENCOUNTER — HOSPITAL ENCOUNTER (OUTPATIENT)
Age: 59
Discharge: HOME | End: 2019-03-26
Payer: MEDICARE

## 2019-03-26 DIAGNOSIS — E78.2 MIXED HYPERLIPIDEMIA: ICD-10-CM

## 2019-03-26 DIAGNOSIS — Z12.11 COLON CANCER SCREENING: Primary | ICD-10-CM

## 2019-03-26 DIAGNOSIS — F41.9 ANXIETY: Primary | ICD-10-CM

## 2019-03-26 DIAGNOSIS — I10 ESSENTIAL HYPERTENSION: ICD-10-CM

## 2019-03-26 DIAGNOSIS — K63.5 POLYP OF COLON, UNSPECIFIED PART OF COLON, UNSPECIFIED TYPE: ICD-10-CM

## 2019-03-26 LAB
ABSOLUTE EOS #: 0.1 K/UL (ref 0–0.4)
ABSOLUTE IMMATURE GRANULOCYTE: ABNORMAL K/UL (ref 0–0.3)
ABSOLUTE LYMPH #: 1.9 K/UL (ref 1–4.8)
ABSOLUTE MONO #: 0.9 K/UL (ref 0.1–1.3)
ALBUMIN SERPL-MCNC: 4.3 G/DL (ref 3.5–5.2)
ALBUMIN/GLOBULIN RATIO: NORMAL (ref 1–2.5)
ALP BLD-CCNC: 72 U/L (ref 35–104)
ALT SERPL-CCNC: 19 U/L (ref 5–33)
ANION GAP SERPL CALCULATED.3IONS-SCNC: 12 MMOL/L (ref 9–17)
AST SERPL-CCNC: 28 U/L
BASOPHILS # BLD: 1 % (ref 0–2)
BASOPHILS ABSOLUTE: 0 K/UL (ref 0–0.2)
BILIRUB SERPL-MCNC: 0.41 MG/DL (ref 0.3–1.2)
BUN BLDV-MCNC: 11 MG/DL (ref 6–20)
BUN/CREAT BLD: NORMAL (ref 9–20)
CALCIUM SERPL-MCNC: 9.7 MG/DL (ref 8.6–10.4)
CHLORIDE BLD-SCNC: 98 MMOL/L (ref 98–107)
CHOLESTEROL/HDL RATIO: 2.3
CHOLESTEROL: 116 MG/DL
CO2: 30 MMOL/L (ref 20–31)
CREAT SERPL-MCNC: 0.78 MG/DL (ref 0.5–0.9)
DIFFERENTIAL TYPE: ABNORMAL
EOSINOPHILS RELATIVE PERCENT: 2 % (ref 0–4)
GFR AFRICAN AMERICAN: >60 ML/MIN
GFR NON-AFRICAN AMERICAN: >60 ML/MIN
GFR SERPL CREATININE-BSD FRML MDRD: NORMAL ML/MIN/{1.73_M2}
GFR SERPL CREATININE-BSD FRML MDRD: NORMAL ML/MIN/{1.73_M2}
GLUCOSE BLD-MCNC: 96 MG/DL (ref 70–99)
HCT VFR BLD CALC: 43.7 % (ref 36–46)
HDLC SERPL-MCNC: 51 MG/DL
HEMOGLOBIN: 14.8 G/DL (ref 12–16)
IMMATURE GRANULOCYTES: ABNORMAL %
LDL CHOLESTEROL: 47 MG/DL (ref 0–130)
LYMPHOCYTES # BLD: 23 % (ref 24–44)
MCH RBC QN AUTO: 32.5 PG (ref 26–34)
MCHC RBC AUTO-ENTMCNC: 33.8 G/DL (ref 31–37)
MCV RBC AUTO: 96.1 FL (ref 80–100)
MONOCYTES # BLD: 11 % (ref 1–7)
NRBC AUTOMATED: ABNORMAL PER 100 WBC
PDW BLD-RTO: 13.8 % (ref 11.5–14.9)
PLATELET # BLD: 209 K/UL (ref 150–450)
PLATELET ESTIMATE: ABNORMAL
PMV BLD AUTO: 8.6 FL (ref 6–12)
POTASSIUM SERPL-SCNC: 4.1 MMOL/L (ref 3.7–5.3)
RBC # BLD: 4.55 M/UL (ref 4–5.2)
RBC # BLD: ABNORMAL 10*6/UL
SEG NEUTROPHILS: 63 % (ref 36–66)
SEGMENTED NEUTROPHILS ABSOLUTE COUNT: 5.3 K/UL (ref 1.3–9.1)
SODIUM BLD-SCNC: 140 MMOL/L (ref 135–144)
TOTAL PROTEIN: 7.1 G/DL (ref 6.4–8.3)
TRIGL SERPL-MCNC: 91 MG/DL
VLDLC SERPL CALC-MCNC: NORMAL MG/DL (ref 1–30)
WBC # BLD: 8.4 K/UL (ref 3.5–11)
WBC # BLD: ABNORMAL 10*3/UL

## 2019-03-26 PROCEDURE — 80053 COMPREHEN METABOLIC PANEL: CPT

## 2019-03-26 PROCEDURE — 80061 LIPID PANEL: CPT

## 2019-03-26 PROCEDURE — 85025 COMPLETE CBC W/AUTO DIFF WBC: CPT

## 2019-03-26 PROCEDURE — 36415 COLL VENOUS BLD VENIPUNCTURE: CPT

## 2019-03-26 RX ORDER — BUSPIRONE HYDROCHLORIDE 10 MG/1
TABLET ORAL
Qty: 90 TABLET | Refills: 0 | Status: SHIPPED | OUTPATIENT
Start: 2019-03-26 | End: 2019-03-26

## 2019-03-26 RX ORDER — BUSPIRONE HYDROCHLORIDE 15 MG/1
TABLET ORAL
Qty: 60 TABLET | Refills: 2 | Status: SHIPPED | OUTPATIENT
Start: 2019-03-26 | End: 2019-06-19 | Stop reason: SDUPTHER

## 2019-03-27 ENCOUNTER — TELEPHONE (OUTPATIENT)
Dept: GASTROENTEROLOGY | Age: 59
End: 2019-03-27

## 2019-03-29 DIAGNOSIS — M79.605 LEG PAIN, LEFT: ICD-10-CM

## 2019-03-29 RX ORDER — PSEUDOEPHED/ACETAMINOPH/DIPHEN 30MG-500MG
TABLET ORAL
Qty: 90 TABLET | Refills: 0 | Status: SHIPPED | OUTPATIENT
Start: 2019-03-29 | End: 2019-04-13 | Stop reason: SDUPTHER

## 2019-03-31 DIAGNOSIS — I25.10 CORONARY ARTERY DISEASE INVOLVING NATIVE CORONARY ARTERY OF NATIVE HEART WITHOUT ANGINA PECTORIS: ICD-10-CM

## 2019-04-01 RX ORDER — NITROGLYCERIN 0.4 MG/1
TABLET SUBLINGUAL
Qty: 25 TABLET | Refills: 0 | Status: SHIPPED | OUTPATIENT
Start: 2019-04-01 | End: 2019-10-14 | Stop reason: SDUPTHER

## 2019-04-01 NOTE — TELEPHONE ENCOUNTER
Please Approve or Refuse.   Send to Pharmacy per Pt's Request:      Next Visit Date:  9/25/2019   Last Visit Date: 3/25/2019    Hemoglobin A1C (%)   Date Value   10/04/2016 5.3   08/24/2012 5.2             ( goal A1C is < 7)   BP Readings from Last 3 Encounters:   03/25/19 138/88   09/25/18 134/86   06/25/18 (!) 148/88          (goal 120/80)  BUN   Date Value Ref Range Status   03/26/2019 11 6 - 20 mg/dL Final     CREATININE   Date Value Ref Range Status   03/26/2019 0.78 0.50 - 0.90 mg/dL Final     Potassium   Date Value Ref Range Status   03/26/2019 4.1 3.7 - 5.3 mmol/L Final

## 2019-04-08 RX ORDER — SIMVASTATIN 40 MG
TABLET ORAL
Qty: 30 TABLET | Refills: 0 | Status: SHIPPED | OUTPATIENT
Start: 2019-04-08 | End: 2019-05-05 | Stop reason: SDUPTHER

## 2019-04-11 ENCOUNTER — HOSPITAL ENCOUNTER (OUTPATIENT)
Dept: WOMENS IMAGING | Age: 59
Discharge: HOME | End: 2019-04-13
Payer: MEDICARE

## 2019-04-11 DIAGNOSIS — Z12.39 BREAST CANCER SCREENING: ICD-10-CM

## 2019-04-11 PROCEDURE — 77063 BREAST TOMOSYNTHESIS BI: CPT

## 2019-04-13 DIAGNOSIS — M79.605 LEG PAIN, LEFT: ICD-10-CM

## 2019-04-15 RX ORDER — PSEUDOEPHED/ACETAMINOPH/DIPHEN 30MG-500MG
TABLET ORAL
Qty: 90 TABLET | Refills: 0 | Status: SHIPPED | OUTPATIENT
Start: 2019-04-15 | End: 2019-04-28 | Stop reason: SDUPTHER

## 2019-04-22 DIAGNOSIS — M50.30 DEGENERATIVE DISC DISEASE, CERVICAL: ICD-10-CM

## 2019-04-22 DIAGNOSIS — M19.019 OSTEOARTHRITIS OF SHOULDER, UNSPECIFIED LATERALITY, UNSPECIFIED OSTEOARTHRITIS TYPE: ICD-10-CM

## 2019-04-22 DIAGNOSIS — J30.9 ALLERGIC SINUSITIS: ICD-10-CM

## 2019-04-22 DIAGNOSIS — F41.9 ANXIETY: ICD-10-CM

## 2019-04-22 DIAGNOSIS — I10 ESSENTIAL HYPERTENSION: ICD-10-CM

## 2019-04-22 RX ORDER — CETIRIZINE HYDROCHLORIDE 10 MG/1
10 TABLET ORAL DAILY
Qty: 30 TABLET | Refills: 0 | Status: SHIPPED | OUTPATIENT
Start: 2019-04-22 | End: 2019-05-22 | Stop reason: SDUPTHER

## 2019-04-22 RX ORDER — ASPIRIN 81 MG/1
TABLET ORAL
Qty: 30 TABLET | Refills: 0 | Status: SHIPPED | OUTPATIENT
Start: 2019-04-22 | End: 2019-05-22 | Stop reason: SDUPTHER

## 2019-04-22 RX ORDER — CYCLOBENZAPRINE HCL 10 MG
TABLET ORAL
Qty: 60 TABLET | Refills: 0 | Status: SHIPPED | OUTPATIENT
Start: 2019-04-22 | End: 2019-05-22 | Stop reason: SDUPTHER

## 2019-04-22 RX ORDER — FLUOXETINE HYDROCHLORIDE 20 MG/1
CAPSULE ORAL
Qty: 90 CAPSULE | Refills: 0 | Status: SHIPPED | OUTPATIENT
Start: 2019-04-22 | End: 2019-05-22 | Stop reason: SDUPTHER

## 2019-04-28 DIAGNOSIS — M79.605 LEG PAIN, LEFT: ICD-10-CM

## 2019-04-29 RX ORDER — PSEUDOEPHED/ACETAMINOPH/DIPHEN 30MG-500MG
TABLET ORAL
Qty: 90 TABLET | Refills: 0 | Status: SHIPPED | OUTPATIENT
Start: 2019-04-29 | End: 2019-05-13 | Stop reason: SDUPTHER

## 2019-04-30 DIAGNOSIS — M19.019 OSTEOARTHRITIS OF SHOULDER, UNSPECIFIED LATERALITY, UNSPECIFIED OSTEOARTHRITIS TYPE: ICD-10-CM

## 2019-04-30 DIAGNOSIS — M50.30 DEGENERATIVE DISC DISEASE, CERVICAL: ICD-10-CM

## 2019-04-30 RX ORDER — MELOXICAM 15 MG/1
TABLET ORAL
Qty: 60 TABLET | Refills: 0 | Status: SHIPPED | OUTPATIENT
Start: 2019-04-30 | End: 2019-06-24 | Stop reason: SDUPTHER

## 2019-05-01 ENCOUNTER — TELEPHONE (OUTPATIENT)
Dept: GASTROENTEROLOGY | Age: 59
End: 2019-05-01

## 2019-05-01 NOTE — TELEPHONE ENCOUNTER
lvm for Manny Lemons to confirm scr colon on 05/06/19 with Dr Catalina Terry at [facility]

## 2019-05-05 ENCOUNTER — ANESTHESIA EVENT (OUTPATIENT)
Dept: ENDOSCOPY | Age: 59
End: 2019-05-05
Payer: MEDICARE

## 2019-05-06 ENCOUNTER — HOSPITAL ENCOUNTER (OUTPATIENT)
Age: 59
Setting detail: OUTPATIENT SURGERY
Discharge: HOME | End: 2019-05-06
Attending: INTERNAL MEDICINE | Admitting: INTERNAL MEDICINE
Payer: MEDICARE

## 2019-05-06 ENCOUNTER — ANESTHESIA (OUTPATIENT)
Dept: ENDOSCOPY | Age: 59
End: 2019-05-06
Payer: MEDICARE

## 2019-05-06 VITALS
SYSTOLIC BLOOD PRESSURE: 143 MMHG | OXYGEN SATURATION: 100 % | RESPIRATION RATE: 20 BRPM | DIASTOLIC BLOOD PRESSURE: 78 MMHG

## 2019-05-06 VITALS
WEIGHT: 165 LBS | DIASTOLIC BLOOD PRESSURE: 83 MMHG | HEIGHT: 62 IN | TEMPERATURE: 97.8 F | BODY MASS INDEX: 30.36 KG/M2 | RESPIRATION RATE: 16 BRPM | HEART RATE: 82 BPM | SYSTOLIC BLOOD PRESSURE: 126 MMHG | OXYGEN SATURATION: 98 %

## 2019-05-06 PROCEDURE — 6360000002 HC RX W HCPCS: Performed by: NURSE ANESTHETIST, CERTIFIED REGISTERED

## 2019-05-06 PROCEDURE — 45378 DIAGNOSTIC COLONOSCOPY: CPT | Performed by: INTERNAL MEDICINE

## 2019-05-06 PROCEDURE — 7100000010 HC PHASE II RECOVERY - FIRST 15 MIN: Performed by: INTERNAL MEDICINE

## 2019-05-06 PROCEDURE — 7100000011 HC PHASE II RECOVERY - ADDTL 15 MIN: Performed by: INTERNAL MEDICINE

## 2019-05-06 PROCEDURE — 2580000003 HC RX 258: Performed by: INTERNAL MEDICINE

## 2019-05-06 PROCEDURE — 3609027000 HC COLONOSCOPY: Performed by: INTERNAL MEDICINE

## 2019-05-06 PROCEDURE — 2500000003 HC RX 250 WO HCPCS: Performed by: NURSE ANESTHETIST, CERTIFIED REGISTERED

## 2019-05-06 PROCEDURE — 3700000001 HC ADD 15 MINUTES (ANESTHESIA): Performed by: INTERNAL MEDICINE

## 2019-05-06 PROCEDURE — 2580000003 HC RX 258: Performed by: NURSE ANESTHETIST, CERTIFIED REGISTERED

## 2019-05-06 PROCEDURE — 3700000000 HC ANESTHESIA ATTENDED CARE: Performed by: INTERNAL MEDICINE

## 2019-05-06 RX ORDER — PROPOFOL 10 MG/ML
INJECTION, EMULSION INTRAVENOUS CONTINUOUS PRN
Status: DISCONTINUED | OUTPATIENT
Start: 2019-05-06 | End: 2019-05-06 | Stop reason: SDUPTHER

## 2019-05-06 RX ORDER — GLYCOPYRROLATE 1 MG/5 ML
SYRINGE (ML) INTRAVENOUS PRN
Status: DISCONTINUED | OUTPATIENT
Start: 2019-05-06 | End: 2019-05-06 | Stop reason: SDUPTHER

## 2019-05-06 RX ORDER — SODIUM CHLORIDE 9 MG/ML
INJECTION, SOLUTION INTRAVENOUS CONTINUOUS PRN
Status: DISCONTINUED | OUTPATIENT
Start: 2019-05-06 | End: 2019-05-06 | Stop reason: SDUPTHER

## 2019-05-06 RX ORDER — PROPOFOL 10 MG/ML
INJECTION, EMULSION INTRAVENOUS PRN
Status: DISCONTINUED | OUTPATIENT
Start: 2019-05-06 | End: 2019-05-06 | Stop reason: SDUPTHER

## 2019-05-06 RX ORDER — LIDOCAINE HYDROCHLORIDE 20 MG/ML
INJECTION, SOLUTION EPIDURAL; INFILTRATION; INTRACAUDAL; PERINEURAL PRN
Status: DISCONTINUED | OUTPATIENT
Start: 2019-05-06 | End: 2019-05-06 | Stop reason: SDUPTHER

## 2019-05-06 RX ORDER — SIMVASTATIN 40 MG
TABLET ORAL
Qty: 30 TABLET | Refills: 0 | Status: SHIPPED | OUTPATIENT
Start: 2019-05-06 | End: 2019-06-03 | Stop reason: SDUPTHER

## 2019-05-06 RX ORDER — SODIUM CHLORIDE 9 MG/ML
INJECTION, SOLUTION INTRAVENOUS ONCE
Status: COMPLETED | OUTPATIENT
Start: 2019-05-06 | End: 2019-05-06

## 2019-05-06 RX ADMIN — PROPOFOL 50 MG: 10 INJECTION, EMULSION INTRAVENOUS at 10:54

## 2019-05-06 RX ADMIN — SODIUM CHLORIDE: 9 INJECTION, SOLUTION INTRAVENOUS at 10:48

## 2019-05-06 RX ADMIN — Medication 0.2 MG: at 10:54

## 2019-05-06 RX ADMIN — LIDOCAINE HYDROCHLORIDE 50 MG: 20 INJECTION, SOLUTION EPIDURAL; INFILTRATION; INTRACAUDAL; PERINEURAL at 10:54

## 2019-05-06 RX ADMIN — SODIUM CHLORIDE: 9 INJECTION, SOLUTION INTRAVENOUS at 09:53

## 2019-05-06 RX ADMIN — PROPOFOL 20 MG: 10 INJECTION, EMULSION INTRAVENOUS at 10:56

## 2019-05-06 RX ADMIN — PROPOFOL 125 MCG/KG/MIN: 10 INJECTION, EMULSION INTRAVENOUS at 10:54

## 2019-05-06 ASSESSMENT — PAIN SCALES - GENERAL
PAINLEVEL_OUTOF10: 0

## 2019-05-06 ASSESSMENT — PAIN - FUNCTIONAL ASSESSMENT: PAIN_FUNCTIONAL_ASSESSMENT: 0-10

## 2019-05-06 ASSESSMENT — LIFESTYLE VARIABLES: SMOKING_STATUS: 0

## 2019-05-06 NOTE — ANESTHESIA PRE PROCEDURE
Department of Anesthesiology  Preprocedure Note       Name:  Re Foster   Age:  61 y.o.  :  1960                                          MRN:  [id number]         Date:  2019      Surgeon: Ephraim Horta):  Renetta Lakhani MD    Procedure: COLORECTAL CANCER SCREENING, NOT HIGH RISK (N/A )    Medications prior to admission:   Prior to Admission medications    Medication Sig Start Date End Date Taking? Authorizing Provider   meloxicam (MOBIC) 15 MG tablet TAKE 1 TABLET BY MOUTH DAILY 19   Ana Laura Alcantar MD   ACETAMINOPHEN EXTRA STRENGTH 500 MG tablet TAKE 2 TABLETS BY MOUTH THREE TIMES DAILY AS NEEDED FOR PAIN. MAXIMUM DOSE- 6 TABLETS/ 24 HOURS 19   Ana Laura Alcantar MD   FLUoxetine (PROZAC) 20 MG capsule TAKE 3 CAPSULES BY MOUTH EVERY DAY 19   Ana Laura Alcantar MD   cyclobenzaprine (FLEXERIL) 10 MG tablet TAKE 1 TABLET BY MOUTH TWICE DAILY AS NEEDED FOR MUSCLE SPASMS 19   Ana Laura Alcantar MD   cetirizine (ZYRTEC) 10 MG tablet TAKE 1 TABLET BY MOUTH DAILY 19   Ana Laura Alcantar MD   aspirin 81 MG EC tablet TAKE 1 TABLET BY MOUTH DAILY 19   Ana Laura Alcantar MD   simvastatin (ZOCOR) 40 MG tablet TAKE 1 TABLET BY MOUTH EVERY NIGHT 19   Ana Laura Alcantar MD   nitroGLYCERIN (NITROSTAT) 0.4 MG SL tablet DISSOLVE 1 TABLET UNDER THE TONGUE EVERY 5 MINUTES AS NEEDED FOR CHEST PAIN.  IF NO RELIEF AFTER 1 DOSE, CALL 911 19   Ana Laura Alcantar MD   busPIRone (BUSPAR) 15 MG tablet Use every 12 hrs as needed 3/26/19   Ana Laura Alcantar MD   isosorbide mononitrate (IMDUR) 30 MG extended release tablet TAKE 1 TABLET BY MOUTH EVERY DAY 3/5/19   Ana Laura Alcantar MD   cyclobenzaprine (FLEXERIL) 10 MG tablet TAKE 1 TABLET BY MOUTH TWICE DAILY AS NEEDED FOR MUSCLE SPASMS 18   Ana Laura Alcantar MD   cetirizine (ZYRTEC) 10 MG tablet TAKE 1 TABLET BY MOUTH DAILY 18   Ana Laura Alcantar MD   enalapril (VASOTEC) 10 MG tablet TAKE 2 TABLETS BY MOUTH IN THE MORNING AND TAKE ONE TABLET BY MOUTH IN THE EVENING 18 Julio C Renteria MD   metoprolol succinate (TOPROL XL) 50 MG extended release tablet TAKE 1 TABLET BY MOUTH DAILY 18   Julio C Renteria MD   albuterol sulfate HFA (VENTOLIN HFA) 108 (90 Base) MCG/ACT inhaler INHALE 2 PUFFS INTO THE LUNGS EVERY 6 HOURS AS NEEDED FOR WHEEZING 18   Julio C Renteria MD   calcium carbonate-vitamin D3 (CALCIUM 600+D3) 600-400 MG-UNIT TABS Take 1 tablet by mouth daily 18   Julio C Renteria MD   Heat Wraps ([address]) [address] Use daily for neck pain 18   Julio C Renteria MD       Current medications:    No current facility-administered medications for this encounter. Allergies:     Allergies   Allergen Reactions    Claritin [Loratadine]      2010 Heart palpitations  2010 Heart palpitations    Codeine Nausea Only       Problem List:    Patient Active Problem List   Diagnosis Code    Essential hypertension I10    CAD (coronary artery disease) I25.10    OA (osteoarthritis) M19.90    Anxiety F41.9    Asthma J45.909    Depression F32.9    Neck pain M54.2    Endometrial polyp N84.0    PVD (peripheral vascular disease) with claudication ([address] I73.9    Degenerative disc disease, cervical M50.30    Mild intermittent asthma without complication M63.29    Mixed hyperlipidemia E78.2    Polyp of colon K63.5       Past Medical History:        Diagnosis Date    Anxiety     Asthma     CAD (coronary artery disease)     Depression     Examination of participant in clinical trial 11    End date 2015    HTN (hypertension)     Mixed hyperlipidemia 1/15/2018    OA (osteoarthritis)     PVD (peripheral vascular disease) with claudication ([address] 2015       Past Surgical History:        Procedure Laterality Date     SECTION      CORONARY ANGIOPLASTY         Social History:    Social History     Tobacco Use    Smoking status: Former Smoker     Packs/day: 1.00     Years: 25.00     Pack years: 25.00     Types: Cigarettes     Last attempt to quit: 2009     Years since quittin.9    Smokeless tobacco: Never Used    Tobacco comment: quit 5 years ago   Substance Use Topics    Alcohol use: No     Alcohol/week: 0.0 oz                                Counseling given: Not Answered  Comment: quit 5 years ago      Vital Signs (Current): There were no vitals filed for this visit. BP Readings from Last 3 Encounters:   19 138/88   18 134/86   18 (!) 148/88       NPO Status:                                                                                 BMI:   Wt Readings from Last 3 Encounters:   19 167 lb 9.6 oz (76 kg)   18 167 lb 6.4 oz (75.9 kg)   18 169 lb 6.4 oz (76.8 kg)     There is no height or weight on file to calculate BMI.    CBC:   Lab Results   Component Value Date    WBC 8.4 2019    RBC 4.55 2019    HGB 14.8 2019    HCT 43.7 2019    MCV 96.1 2019    RDW 13.8 2019     2019       CMP:   Lab Results   Component Value Date     2019    K 4.1 2019    CL 98 2019    CO2 30 2019    BUN 11 2019    CREATININE 0.78 2019    GFRAA >60 2019    LABGLOM >60 2019    GLUCOSE 96 2019    PROT 7.1 2019    CALCIUM 9.7 2019    BILITOT 0.41 2019    ALKPHOS 72 2019    AST 28 2019    ALT 19 2019       POC Tests: No results for input(s): POCGLU, POCNA, POCK, POCCL, POCBUN, POCHEMO, POCHCT in the last 72 hours.     Coags:   Lab Results   Component Value Date    PROTIME 10.7 2013    INR 1.0 2013       HCG (If Applicable): No results found for: PREGTESTUR, PREGSERUM, HCG, HCGQUANT     ABGs: No results found for: PHART, PO2ART, QEG2BPS, RAB4QPK, BEART, H6DASRSC     Type & Screen (If Applicable):  No results found for: LABABO, [address]    Anesthesia Evaluation  Patient summary reviewed no history of anesthetic complications:   Airway: Mallampati: II  TM distance: >3 FB   Neck ROM: full  Mouth opening: > = 3 FB Dental: normal exam         Pulmonary:normal exam    (+) asthma:     (-) not a current smoker                           Cardiovascular:  Exercise tolerance: good (>4 METS),   (+) hypertension:, CAD:, hyperlipidemia         Beta Blocker:  Dose within 24 Hrs         Neuro/Psych:   (+) psychiatric history:depression/anxiety             GI/Hepatic/Renal: Neg GI/Hepatic/Renal ROS            Endo/Other:    (+) : arthritis:., .          Pt had PAT visit. Abdominal:           Vascular:                                        Anesthesia Plan      MAC     ASA 3       Induction: intravenous. Anesthetic plan and risks discussed with patient. Plan discussed with CRNA.                   Babs Garsia MD   5/6/2019

## 2019-05-06 NOTE — OP NOTE
[place] [facility]     [facility] ENDOSCOPY     COLONOSCOPY    PROCEDURE DATE: 05/06/19    REFERRING PHYSICIAN: No ref. provider found     PRIMARY CARE PROVIDER: Sarah Hugo MD    ATTENDING PHYSICIAN: Ebonie Segura MD     HISTORY: Ms. Jeana Ramirez is a 61 y.o. female who presents to the [facility] endoscopy unit for colonoscopy. The patient's clinical history is remarkable for asthma, depression, endometrial polyp, PVD, HTN, HL. She is currently medically stable and appropriate for the planned procedure. PREOPERATIVE DIAGNOSIS: previous adenomatous polyp. PROCEDURES:   Transanal Colonoscopy --screening. POSTPROCEDURE DIAGNOSIS:    FAIR prep  Mild left sided diverticulosis  No large polyps, lesions, or masses identified  Moderate sized internal and external hemorrhoids    MEDICATIONS:     MAC per anesthesia     EBL 0cc    INSTRUMENT: Olympus CF-H180 AL Pediatric flexible Colonoscope. PREPARATION: The nature and character of the procedure as well as risks, benefits, and alternatives were discussed with the patient and informed consent was obtained. Complications were said to include, but were not limited to: medication allergy, medication reaction, cardiovascular and respiratory problems, bleeding, perforation, infection, and/or missed diagnosis. Following arrival in the endoscopy room, the patient was placed in the left lateral decubitus position and final time-out accomplished in the presence of the nursing staff. Baseline vital signs were obtained and reviewed, and IV sedation was subsequently initiated. FINDINGS: Rectal examination demonstrated no significant visible external abnormality and digital palpation was unremarkable. Following adequate conscious sedation the colonoscope was introduced and advanced under direct visualization to the cecum, which was identified by the ileocecal valve and appendiceal orifice. The bowel preparation was felt to be FAIR.  This included small amounts of green stool that was mostly able to be adequately irrigated and aspirated. Cecal intubation time was 9 minutes. Once maximally inserted, the endoscope was withdrawn and the mucosa was carefully inspected. The mucosal exam was mild diverticular disease noted, left sided, no large polyps, lesions, no masses identified, smaller lesions, if present, could not be identified due to prep quality. Retroflexion was performed in the rectum and moderate sized internal hemorrhoids seen. Withdrawal time was 9 minutes. IMPRESSION:      FAIR prep  Mild left sided diverticulosis  No large polyps, lesions, or masses identified  Moderate sized internal and external hemorrhoids    RECOMMENDATIONS:   1) Follow up with referring provider, as previously scheduled.    2) Repeat Colonoscopy in 3 yrs with extended bowel prep       Mario Storey MD  [facility]   05/06/19

## 2019-05-06 NOTE — H&P
History and Physical    Pt Name: Franklin Montemayor  MRN: [id number]  YOB: 1960  Date of evaluation: 2019  Primary Care Physician: Hannah Tobar MD  Patient evaluated at the request of  Dr. Nolberto Heredia    Reason for evaluation:  Colon polyp   SUBJECTIVE:   History of Chief Complaint:      Mahesh Murphy is a 61 y.o. female     Who is scheduled to have  His  Her  Colonoscopy    today , her last colonoscopy was 5 yrs ago , hx of a colon polyp, denies GI blleding , abdominal pain , and quit smoking 10 yrs ago . Past Medical History      has a past medical history of Anxiety, Asthma, CAD (coronary artery disease), Depression, Examination of participant in clinical trial, HTN (hypertension), Mixed hyperlipidemia, OA (osteoarthritis), and PVD (peripheral vascular disease) with claudication [address] [address] Past Surgical History   has a past surgical history that includes  section and Coronary angioplasty (). Medications   Scheduled Meds:  Continuous Infusions:  PRN Meds:. Allergies  is allergic to claritin [loratadine] and codeine. Family History    family history is not on file. No family status information on file.          Social History  Social History     Socioeconomic History    Marital status:      Spouse name: Not on file    Number of children: Not on file    Years of education: Not on file    Highest education level: Not on file   Occupational History    Not on file   Social Needs    Financial resource strain: Not on file    Food insecurity:     Worry: Not on file     Inability: Not on file    Transportation needs:     Medical: Not on file     Non-medical: Not on file   Tobacco Use    Smoking status: Former Smoker     Packs/day: 1.00     Years: 25.00     Pack years: 25.00     Types: Cigarettes     Last attempt to quit: 2009     Years since quittin.9    Smokeless tobacco: Never Used    Tobacco comment: quit 5 years ago   Substance and Sexual Activity    Alcohol use: No     Alcohol/week: 0.0 oz    Drug use: No    Sexual activity: Yes   Lifestyle    Physical activity:     Days per week: Not on file     Minutes per session: Not on file    Stress: Not on file   Relationships    Social connections:     Talks on phone: Not on file     Gets together: Not on file     Attends Mandaen service: Not on file     Active member of club or organization: Not on file     Attends meetings of clubs or organizations: Not on file     Relationship status: Not on file    Intimate partner violence:     Fear of current or ex partner: Not on file     Emotionally abused: Not on file     Physically abused: Not on file     Forced sexual activity: Not on file   Other Topics Concern    Not on file   Social History Narrative    Not on file                 Hobbies: is a grandmother     OBJECTIVE:   VITALS:  height is 5' 2\" (1.575 m) and weight is 165 lb (74.8 kg). Her oral temperature is 98.3 °F (36.8 °C). Her blood pressure is 130/80 and her pulse is 79. Her respiration is 17 and oxygen saturation is 96%. CONSTITUTIONAL: Alert and oriented times 3, no acute distress and cooperative to examination. friendly and pleasant     SKIN: rash No    HEENT: Head is normocephalic, atraumatic. EOMI, PERRLA    Oral air way :slightly narrow Yes    NECK: neck supple, no lymphadenopathy noted, trachea midline and straight       2+ carotid, no bruit    LUNGS: Chest expands equally bilaterally upon respiration, no accessory muscle used. Ausculation reveals no adventitious breath sounds. CARDIOVASCULAR: \"Heart sounds are normal.  Regular rate and rhythm without murmur,    ABDOMEN: Bowel sounds are present in all four quadrants      GENATALIA:Deferred. NEUROLOGIC: \"CN II-XII are grossly intact.        EXTREMITIES: Pitting edema:  No,  Varicose veins: No     Dorsal pedal/posterior tibial pulses palpable: Yes         Strength:  Normal       Patient Active Problem List   Diagnosis   

## 2019-05-06 NOTE — ANESTHESIA POSTPROCEDURE EVALUATION
Department of Anesthesiology  Postprocedure Note    Patient: Jacquelyn Rosario  MRN: [id number]  YOB: 1960  Date of evaluation: 5/6/2019  Time:  11:29 AM     Procedure Summary     Date:  05/06/19 Room / Location:  [facility] ENDO 03 / [facility]    Anesthesia Start:  2105 Anesthesia Stop:  4506    Procedure:  COLORECTAL CANCER SCREENING, NOT HIGH RISK (N/A ) Diagnosis:  (SCREENING)    Surgeon:  Juwan Garcia MD Responsible Provider:  Ramone Orourke MD    Anesthesia Type:  MAC ASA Status:  3          Anesthesia Type: MAC    Jenaro Phase I: Jenaro Score: 10    Jenaro Phase II: Jenaro Score: 8    Last vitals: Reviewed and per EMR flowsheets.        Anesthesia Post Evaluation    Patient location during evaluation: PACU  Patient participation: complete - patient participated  Level of consciousness: awake and alert  Pain score: 2  Airway patency: patent  Nausea & Vomiting: no nausea and no vomiting  Complications: no  Cardiovascular status: hemodynamically stable  Respiratory status: acceptable, nasal cannula and room air  Hydration status: stable

## 2019-05-06 NOTE — TELEPHONE ENCOUNTER
Please Approve or Refuse.   Send to Pharmacy per Pt's Request:      Next Visit Date:  9/25/2019   Last Visit Date: 3/25/2019    Hemoglobin A1C (%)   Date Value   10/04/2016 5.3   08/24/2012 5.2             ( goal A1C is < 7)   BP Readings from Last 3 Encounters:   05/06/19 130/80   03/25/19 138/88   09/25/18 134/86          (goal 120/80)  BUN   Date Value Ref Range Status   03/26/2019 11 6 - 20 mg/dL Final     CREATININE   Date Value Ref Range Status   03/26/2019 0.78 0.50 - 0.90 mg/dL Final     Potassium   Date Value Ref Range Status   03/26/2019 4.1 3.7 - 5.3 mmol/L Final

## 2019-05-13 DIAGNOSIS — M79.605 LEG PAIN, LEFT: ICD-10-CM

## 2019-05-13 RX ORDER — PSEUDOEPHED/ACETAMINOPH/DIPHEN 30MG-500MG
TABLET ORAL
Qty: 90 TABLET | Refills: 0 | Status: SHIPPED | OUTPATIENT
Start: 2019-05-13 | End: 2019-05-26 | Stop reason: SDUPTHER

## 2019-05-13 NOTE — TELEPHONE ENCOUNTER
Please Approve or Refuse.   Send to Pharmacy per Pt's Request:      Next Visit Date:  9/25/2019   Last Visit Date: 3/25/2019    Hemoglobin A1C (%)   Date Value   10/04/2016 5.3   08/24/2012 5.2             ( goal A1C is < 7)   BP Readings from Last 3 Encounters:   05/06/19 (!) 143/78   05/06/19 126/83   03/25/19 138/88          (goal 120/80)  BUN   Date Value Ref Range Status   03/26/2019 11 6 - 20 mg/dL Final     CREATININE   Date Value Ref Range Status   03/26/2019 0.78 0.50 - 0.90 mg/dL Final     Potassium   Date Value Ref Range Status   03/26/2019 4.1 3.7 - 5.3 mmol/L Final

## 2019-05-22 DIAGNOSIS — M50.30 DEGENERATIVE DISC DISEASE, CERVICAL: ICD-10-CM

## 2019-05-22 DIAGNOSIS — J30.9 ALLERGIC SINUSITIS: ICD-10-CM

## 2019-05-22 DIAGNOSIS — M19.019 OSTEOARTHRITIS OF SHOULDER, UNSPECIFIED LATERALITY, UNSPECIFIED OSTEOARTHRITIS TYPE: ICD-10-CM

## 2019-05-22 DIAGNOSIS — F41.9 ANXIETY: ICD-10-CM

## 2019-05-22 DIAGNOSIS — I10 ESSENTIAL HYPERTENSION: ICD-10-CM

## 2019-05-22 RX ORDER — FLUOXETINE HYDROCHLORIDE 20 MG/1
CAPSULE ORAL
Qty: 90 CAPSULE | Refills: 0 | Status: SHIPPED | OUTPATIENT
Start: 2019-05-22 | End: 2019-06-19 | Stop reason: SDUPTHER

## 2019-05-22 RX ORDER — CETIRIZINE HYDROCHLORIDE 10 MG/1
10 TABLET ORAL DAILY
Qty: 30 TABLET | Refills: 0 | Status: SHIPPED | OUTPATIENT
Start: 2019-05-22 | End: 2019-06-19 | Stop reason: SDUPTHER

## 2019-05-22 RX ORDER — CYCLOBENZAPRINE HCL 10 MG
TABLET ORAL
Qty: 60 TABLET | Refills: 0 | Status: SHIPPED | OUTPATIENT
Start: 2019-05-22 | End: 2019-06-19 | Stop reason: SDUPTHER

## 2019-05-22 RX ORDER — METOPROLOL SUCCINATE 50 MG/1
TABLET, EXTENDED RELEASE ORAL
Qty: 90 TABLET | Refills: 0 | Status: SHIPPED | OUTPATIENT
Start: 2019-05-22 | End: 2019-08-14 | Stop reason: SDUPTHER

## 2019-05-22 RX ORDER — ASPIRIN 81 MG/1
TABLET ORAL
Qty: 30 TABLET | Refills: 0 | Status: SHIPPED | OUTPATIENT
Start: 2019-05-22 | End: 2019-06-19 | Stop reason: SDUPTHER

## 2019-05-26 DIAGNOSIS — M79.605 LEG PAIN, LEFT: ICD-10-CM

## 2019-05-28 RX ORDER — PSEUDOEPHED/ACETAMINOPH/DIPHEN 30MG-500MG
TABLET ORAL
Qty: 90 TABLET | Refills: 0 | Status: SHIPPED | OUTPATIENT
Start: 2019-05-28 | End: 2019-06-09 | Stop reason: SDUPTHER

## 2019-05-28 RX ORDER — ISOSORBIDE MONONITRATE 30 MG/1
TABLET, EXTENDED RELEASE ORAL
Qty: 90 TABLET | Refills: 0 | Status: SHIPPED | OUTPATIENT
Start: 2019-05-28 | End: 2019-08-19 | Stop reason: SDUPTHER

## 2019-06-03 RX ORDER — SIMVASTATIN 40 MG
TABLET ORAL
Qty: 30 TABLET | Refills: 0 | Status: SHIPPED | OUTPATIENT
Start: 2019-06-03 | End: 2019-07-03 | Stop reason: SDUPTHER

## 2019-06-09 DIAGNOSIS — M79.605 LEG PAIN, LEFT: ICD-10-CM

## 2019-06-10 RX ORDER — PSEUDOEPHED/ACETAMINOPH/DIPHEN 30MG-500MG
TABLET ORAL
Qty: 90 TABLET | Refills: 0 | Status: SHIPPED | OUTPATIENT
Start: 2019-06-10 | End: 2019-06-24 | Stop reason: SDUPTHER

## 2019-06-19 DIAGNOSIS — M50.30 DEGENERATIVE DISC DISEASE, CERVICAL: ICD-10-CM

## 2019-06-19 DIAGNOSIS — M19.019 OSTEOARTHRITIS OF SHOULDER, UNSPECIFIED LATERALITY, UNSPECIFIED OSTEOARTHRITIS TYPE: ICD-10-CM

## 2019-06-19 DIAGNOSIS — F41.9 ANXIETY: ICD-10-CM

## 2019-06-19 DIAGNOSIS — J30.9 ALLERGIC SINUSITIS: ICD-10-CM

## 2019-06-19 DIAGNOSIS — I10 ESSENTIAL HYPERTENSION: ICD-10-CM

## 2019-06-19 RX ORDER — CYCLOBENZAPRINE HCL 10 MG
TABLET ORAL
Qty: 60 TABLET | Refills: 0 | Status: SHIPPED | OUTPATIENT
Start: 2019-06-19 | End: 2019-07-17 | Stop reason: SDUPTHER

## 2019-06-19 RX ORDER — ASPIRIN 81 MG/1
TABLET, COATED ORAL
Qty: 30 TABLET | Refills: 0 | Status: SHIPPED | OUTPATIENT
Start: 2019-06-19 | End: 2019-07-17 | Stop reason: SDUPTHER

## 2019-06-19 RX ORDER — BUSPIRONE HYDROCHLORIDE 15 MG/1
TABLET ORAL
Qty: 60 TABLET | Refills: 3 | Status: SHIPPED | OUTPATIENT
Start: 2019-06-19 | End: 2019-10-15 | Stop reason: SDUPTHER

## 2019-06-19 RX ORDER — CETIRIZINE HYDROCHLORIDE 10 MG/1
10 TABLET ORAL DAILY
Qty: 30 TABLET | Refills: 0 | Status: SHIPPED | OUTPATIENT
Start: 2019-06-19 | End: 2019-07-17 | Stop reason: SDUPTHER

## 2019-06-19 RX ORDER — FLUOXETINE HYDROCHLORIDE 20 MG/1
CAPSULE ORAL
Qty: 90 CAPSULE | Refills: 0 | Status: SHIPPED | OUTPATIENT
Start: 2019-06-19 | End: 2019-07-17 | Stop reason: SDUPTHER

## 2019-06-24 DIAGNOSIS — M79.605 LEG PAIN, LEFT: ICD-10-CM

## 2019-06-24 DIAGNOSIS — M19.019 OSTEOARTHRITIS OF SHOULDER, UNSPECIFIED LATERALITY, UNSPECIFIED OSTEOARTHRITIS TYPE: ICD-10-CM

## 2019-06-24 DIAGNOSIS — M50.30 DEGENERATIVE DISC DISEASE, CERVICAL: ICD-10-CM

## 2019-06-24 RX ORDER — MELOXICAM 15 MG/1
TABLET ORAL
Qty: 60 TABLET | Refills: 0 | Status: SHIPPED | OUTPATIENT
Start: 2019-06-24 | End: 2019-08-19 | Stop reason: SDUPTHER

## 2019-06-24 RX ORDER — PSEUDOEPHED/ACETAMINOPH/DIPHEN 30MG-500MG
TABLET ORAL
Qty: 90 TABLET | Refills: 0 | Status: SHIPPED | OUTPATIENT
Start: 2019-06-24 | End: 2019-07-07 | Stop reason: SDUPTHER

## 2019-07-03 RX ORDER — SIMVASTATIN 40 MG
TABLET ORAL
Qty: 30 TABLET | Refills: 0 | Status: SHIPPED | OUTPATIENT
Start: 2019-07-03 | End: 2019-08-01 | Stop reason: SDUPTHER

## 2019-07-07 DIAGNOSIS — M79.605 LEG PAIN, LEFT: ICD-10-CM

## 2019-07-08 RX ORDER — PSEUDOEPHED/ACETAMINOPH/DIPHEN 30MG-500MG
TABLET ORAL
Qty: 90 TABLET | Refills: 0 | Status: SHIPPED | OUTPATIENT
Start: 2019-07-08 | End: 2019-07-21 | Stop reason: SDUPTHER

## 2019-07-17 DIAGNOSIS — J30.9 ALLERGIC SINUSITIS: ICD-10-CM

## 2019-07-17 DIAGNOSIS — F41.9 ANXIETY: ICD-10-CM

## 2019-07-17 DIAGNOSIS — M50.30 DEGENERATIVE DISC DISEASE, CERVICAL: ICD-10-CM

## 2019-07-17 DIAGNOSIS — I10 ESSENTIAL HYPERTENSION: ICD-10-CM

## 2019-07-17 DIAGNOSIS — M19.019 OSTEOARTHRITIS OF SHOULDER, UNSPECIFIED LATERALITY, UNSPECIFIED OSTEOARTHRITIS TYPE: ICD-10-CM

## 2019-07-17 RX ORDER — CETIRIZINE HYDROCHLORIDE 10 MG/1
10 TABLET ORAL DAILY
Qty: 30 TABLET | Refills: 0 | Status: SHIPPED | OUTPATIENT
Start: 2019-07-17 | End: 2019-08-14 | Stop reason: SDUPTHER

## 2019-07-17 RX ORDER — FLUOXETINE HYDROCHLORIDE 20 MG/1
CAPSULE ORAL
Qty: 90 CAPSULE | Refills: 0 | Status: SHIPPED | OUTPATIENT
Start: 2019-07-17 | End: 2019-08-14 | Stop reason: SDUPTHER

## 2019-07-17 RX ORDER — CYCLOBENZAPRINE HCL 10 MG
TABLET ORAL
Qty: 60 TABLET | Refills: 0 | Status: SHIPPED | OUTPATIENT
Start: 2019-07-17 | End: 2019-08-14 | Stop reason: SDUPTHER

## 2019-07-17 RX ORDER — ASPIRIN 81 MG/1
TABLET, COATED ORAL
Qty: 30 TABLET | Refills: 0 | Status: SHIPPED | OUTPATIENT
Start: 2019-07-17 | End: 2019-08-14 | Stop reason: SDUPTHER

## 2019-07-21 DIAGNOSIS — M79.605 LEG PAIN, LEFT: ICD-10-CM

## 2019-07-22 RX ORDER — ACETAMINOPHEN 500 MG
500 TABLET ORAL EVERY 6 HOURS PRN
Qty: 120 TABLET | Refills: 0 | Status: SHIPPED | OUTPATIENT
Start: 2019-07-22 | End: 2019-08-18 | Stop reason: SDUPTHER

## 2019-08-01 RX ORDER — SIMVASTATIN 40 MG
TABLET ORAL
Qty: 30 TABLET | Refills: 0 | Status: SHIPPED | OUTPATIENT
Start: 2019-08-01 | End: 2019-08-29 | Stop reason: SDUPTHER

## 2019-08-14 DIAGNOSIS — M19.019 OSTEOARTHRITIS OF SHOULDER, UNSPECIFIED LATERALITY, UNSPECIFIED OSTEOARTHRITIS TYPE: ICD-10-CM

## 2019-08-14 DIAGNOSIS — J30.9 ALLERGIC SINUSITIS: ICD-10-CM

## 2019-08-14 DIAGNOSIS — F41.9 ANXIETY: ICD-10-CM

## 2019-08-14 DIAGNOSIS — I10 ESSENTIAL HYPERTENSION: ICD-10-CM

## 2019-08-14 DIAGNOSIS — M50.30 DEGENERATIVE DISC DISEASE, CERVICAL: ICD-10-CM

## 2019-08-14 RX ORDER — CYCLOBENZAPRINE HCL 10 MG
TABLET ORAL
Qty: 60 TABLET | Refills: 0 | Status: SHIPPED | OUTPATIENT
Start: 2019-08-14 | End: 2019-08-29 | Stop reason: SDUPTHER

## 2019-08-14 RX ORDER — CETIRIZINE HYDROCHLORIDE 10 MG/1
10 TABLET ORAL DAILY
Qty: 30 TABLET | Refills: 0 | Status: SHIPPED | OUTPATIENT
Start: 2019-08-14 | End: 2019-08-29 | Stop reason: SDUPTHER

## 2019-08-14 RX ORDER — ASPIRIN 81 MG/1
TABLET, COATED ORAL
Qty: 30 TABLET | Refills: 0 | Status: SHIPPED | OUTPATIENT
Start: 2019-08-14 | End: 2019-09-12 | Stop reason: SDUPTHER

## 2019-08-14 RX ORDER — FLUOXETINE HYDROCHLORIDE 20 MG/1
CAPSULE ORAL
Qty: 90 CAPSULE | Refills: 0 | Status: SHIPPED | OUTPATIENT
Start: 2019-08-14 | End: 2019-09-12 | Stop reason: SDUPTHER

## 2019-08-14 RX ORDER — METOPROLOL SUCCINATE 50 MG/1
TABLET, EXTENDED RELEASE ORAL
Qty: 90 TABLET | Refills: 0 | Status: SHIPPED | OUTPATIENT
Start: 2019-08-14 | End: 2019-11-08 | Stop reason: SDUPTHER

## 2019-08-18 DIAGNOSIS — M79.605 LEG PAIN, LEFT: ICD-10-CM

## 2019-08-19 DIAGNOSIS — M50.30 DEGENERATIVE DISC DISEASE, CERVICAL: ICD-10-CM

## 2019-08-19 DIAGNOSIS — M19.019 OSTEOARTHRITIS OF SHOULDER, UNSPECIFIED LATERALITY, UNSPECIFIED OSTEOARTHRITIS TYPE: ICD-10-CM

## 2019-08-19 RX ORDER — ISOSORBIDE MONONITRATE 30 MG/1
TABLET, EXTENDED RELEASE ORAL
Qty: 90 TABLET | Refills: 0 | Status: SHIPPED | OUTPATIENT
Start: 2019-08-19 | End: 2019-11-08 | Stop reason: SDUPTHER

## 2019-08-19 RX ORDER — MELOXICAM 15 MG/1
TABLET ORAL
Qty: 60 TABLET | Refills: 0 | Status: SHIPPED | OUTPATIENT
Start: 2019-08-19 | End: 2019-10-09 | Stop reason: SDUPTHER

## 2019-08-19 RX ORDER — PSEUDOEPHED/ACETAMINOPH/DIPHEN 30MG-500MG
TABLET ORAL
Qty: 120 TABLET | Refills: 0 | Status: SHIPPED | OUTPATIENT
Start: 2019-08-19 | End: 2019-09-16 | Stop reason: SDUPTHER

## 2019-08-29 ENCOUNTER — OFFICE VISIT (OUTPATIENT)
Dept: FAMILY MEDICINE CLINIC | Age: 59
End: 2019-08-29
Payer: MEDICARE

## 2019-08-29 VITALS
BODY MASS INDEX: 29.7 KG/M2 | HEART RATE: 76 BPM | DIASTOLIC BLOOD PRESSURE: 80 MMHG | WEIGHT: 161.4 LBS | HEIGHT: 62 IN | OXYGEN SATURATION: 98 % | SYSTOLIC BLOOD PRESSURE: 150 MMHG | TEMPERATURE: 96.9 F

## 2019-08-29 DIAGNOSIS — M51.36 DEGENERATIVE DISC DISEASE, LUMBAR: Primary | ICD-10-CM

## 2019-08-29 DIAGNOSIS — S33.6XXA SPRAIN OF SACROILIAC LIGAMENT, INITIAL ENCOUNTER: ICD-10-CM

## 2019-08-29 DIAGNOSIS — I10 ESSENTIAL HYPERTENSION: ICD-10-CM

## 2019-08-29 DIAGNOSIS — M16.10 HIP ARTHRITIS: ICD-10-CM

## 2019-08-29 PROBLEM — M51.369 DEGENERATIVE DISC DISEASE, LUMBAR: Status: ACTIVE | Noted: 2019-08-29

## 2019-08-29 PROCEDURE — 99214 OFFICE O/P EST MOD 30 MIN: CPT | Performed by: FAMILY MEDICINE

## 2019-08-29 PROCEDURE — 1036F TOBACCO NON-USER: CPT | Performed by: FAMILY MEDICINE

## 2019-08-29 PROCEDURE — 3017F COLORECTAL CA SCREEN DOC REV: CPT | Performed by: FAMILY MEDICINE

## 2019-08-29 PROCEDURE — G8427 DOCREV CUR MEDS BY ELIG CLIN: HCPCS | Performed by: FAMILY MEDICINE

## 2019-08-29 PROCEDURE — G8598 ASA/ANTIPLAT THER USED: HCPCS | Performed by: FAMILY MEDICINE

## 2019-08-29 PROCEDURE — G8417 CALC BMI ABV UP PARAM F/U: HCPCS | Performed by: FAMILY MEDICINE

## 2019-08-29 RX ORDER — GABAPENTIN 100 MG/1
100 CAPSULE ORAL 2 TIMES DAILY
Qty: 60 CAPSULE | Refills: 0 | Status: SHIPPED | OUTPATIENT
Start: 2019-08-29 | End: 2019-09-25 | Stop reason: SDUPTHER

## 2019-08-29 ASSESSMENT — ENCOUNTER SYMPTOMS
ABDOMINAL PAIN: 0
WHEEZING: 0
RHINORRHEA: 0
SHORTNESS OF BREATH: 0
CHEST TIGHTNESS: 0
PHOTOPHOBIA: 0
CONSTIPATION: 0
ABDOMINAL DISTENTION: 0
DIARRHEA: 0
BACK PAIN: 1
SINUS PRESSURE: 0

## 2019-08-29 NOTE — PROGRESS NOTES
TABLET BY MOUTH DAILY 30 tablet 0    FLUoxetine (PROZAC) 20 MG capsule TAKE 3 CAPSULES BY MOUTH EVERY DAY 90 capsule 0    busPIRone (BUSPAR) 15 MG tablet TAKE 1 TABLET BY MOUTH EVERY 12 HOURS AS NEEDED 60 tablet 3    nitroGLYCERIN (NITROSTAT) 0.4 MG SL tablet DISSOLVE 1 TABLET UNDER THE TONGUE EVERY 5 MINUTES AS NEEDED FOR CHEST PAIN. IF NO RELIEF AFTER 1 DOSE, CALL 911 25 tablet 0    cyclobenzaprine (FLEXERIL) 10 MG tablet TAKE 1 TABLET BY MOUTH TWICE DAILY AS NEEDED FOR MUSCLE SPASMS 60 tablet 0    cetirizine (ZYRTEC) 10 MG tablet TAKE 1 TABLET BY MOUTH DAILY 30 tablet 0    enalapril (VASOTEC) 10 MG tablet TAKE 2 TABLETS BY MOUTH IN THE MORNING AND TAKE ONE TABLET BY MOUTH IN THE EVENING 270 tablet 3    albuterol sulfate HFA (VENTOLIN HFA) 108 (90 Base) MCG/ACT inhaler INHALE 2 PUFFS INTO THE LUNGS EVERY 6 HOURS AS NEEDED FOR WHEEZING 2 Inhaler 1    calcium carbonate-vitamin D3 (CALCIUM 600+D3) 600-400 MG-UNIT TABS Take 1 tablet by mouth daily 90 tablet 3    Heat Wraps (SOFTHEAT HEATING WRAP ULTRA) MISC Use daily for neck pain 1 each 0     No current facility-administered medications for this visit.               Social History     Socioeconomic History    Marital status:      Spouse name: Not on file    Number of children: Not on file    Years of education: Not on file    Highest education level: Not on file   Occupational History    Not on file   Social Needs    Financial resource strain: Not on file    Food insecurity:     Worry: Not on file     Inability: Not on file    Transportation needs:     Medical: Not on file     Non-medical: Not on file   Tobacco Use    Smoking status: Former Smoker     Packs/day: 1.00     Years: 25.00     Pack years: 25.00     Types: Cigarettes     Last attempt to quit: 6/1/2009     Years since quitting: 10.2    Smokeless tobacco: Never Used    Tobacco comment: quit 5 years ago   Substance and Sexual Activity    Alcohol use: No     Alcohol/week: 0.0 standard drinks    Drug use: No    Sexual activity: Yes   Lifestyle    Physical activity:     Days per week: Not on file     Minutes per session: Not on file    Stress: Not on file   Relationships    Social connections:     Talks on phone: Not on file     Gets together: Not on file     Attends [employer] service: Not on file     Active member of club or organization: Not on file     Attends meetings of clubs or organizations: Not on file     Relationship status: Not on file    Intimate partner violence:     Fear of current or ex partner: Not on file     Emotionally abused: Not on file     Physically abused: Not on file     Forced sexual activity: Not on file   Other Topics Concern    Not on file   Social History Narrative    Not on file     Counseling given: Not Answered  Comment: quit 5 years ago        No family history on file.          -rest of complaints with corresponding details per ROS    The patient's past medical, surgical, social, and family history as well as her current medications and allergies were reviewed as documented intoday's encounter. Review of Systems   Constitutional: Positive for activity change. Negative for appetite change, diaphoresis, fatigue and unexpected weight change. HENT: Negative for congestion, postnasal drip, rhinorrhea and sinus pressure. Eyes: Negative for photophobia and visual disturbance. Respiratory: Negative for chest tightness, shortness of breath and wheezing. Cardiovascular: Negative for chest pain, palpitations and leg swelling. Gastrointestinal: Negative for abdominal distention, abdominal pain, constipation and diarrhea. Endocrine: Negative for polyphagia and polyuria. Genitourinary: Negative for difficulty urinating, flank pain, frequency and vaginal pain. Musculoskeletal: Positive for arthralgias, back pain, gait problem, joint swelling and myalgias. Negative for neck pain and neck stiffness.    Neurological: Positive for weakness and

## 2019-08-29 NOTE — LETTER
which may also result in my being prevented from receiving further care from this office. · Other:____________________________________________________________________    AGREEMENT:    I have read the above and have had all of my questions answered. For chronic disease management, I know that my symptoms can be managed with many types of treatments. A chronic medication trial may be part of my treatment, but I must be an active participant in my care. Medication therapy is only one part of my symptom management plan. In some cases, there may be limited scientific evidence to support the chronic use of certain medications to improve symptoms and daily function. Furthermore, in certain circumstances, there may be scientific information that suggests that use of chronic controlled substances may actually worsen my symptoms and increase my risk of unintentional death directly related to this medication therapy. I know that if my provider feels my risk from controlled medications is greater than my benefit, I will have my controlled substance medication(s) compassionately lowered or removed altogether. I agree to a controlled substance medication trial.      I further agree to allow this office to contact my HIPAA contact on file if there are concerns about my safety and use of controlled medications. I have agreed to use the following medications above as instructed by my physician and as stated in this [id number].      Patient Signature:  ______________________  Date:8/29/2019 or _____________    Provider Signature:______________________  Date:8/29/2019 or _____________

## 2019-08-29 NOTE — PATIENT INSTRUCTIONS
Patient Education        Sacroiliac Pain: Exercises  Your Care Instructions  Here are some examples of typical rehabilitation exercises for your condition. Start each exercise slowly. Ease off the exercise if you start to have pain. Your doctor or physical therapist will tell you when you can start these exercises and which ones will work best for you. How to do the exercises  Knee-to-chest stretch    1. Do not do the knee-to-chest exercise if it causes or increases back or leg pain. 2. Lie on your back with your knees bent and your feet flat on the floor. You can put a small pillow under your head and neck if it is more comfortable. 3. Grasp your hands under one knee and bring the knee to your chest, keeping the other foot flat on the floor. 4. Keep your lower back pressed to the floor. Hold for at least 15 to 30 seconds. 5. Relax and lower the knee to the starting position. Repeat with the other leg. 6. Repeat 2 to 4 times with each leg. 7. To get more stretch, keep your other leg flat on the floor while pulling your knee to your chest.    Bridging    1. Lie on your back with both knees bent. Your knees should be bent about 90 degrees. 2. Tighten your belly muscles by pulling in your belly button toward your spine. Then push your feet into the floor, squeeze your buttocks, and lift your hips off the floor until your shoulders, hips, and knees are all in a straight line. 3. Hold for about 6 seconds as you continue to breathe normally, and then slowly lower your hips back down to the floor and rest for up to 10 seconds. 4. Repeat 8 to 12 times. Hip extension    1. Get down on your hands and knees on the floor. 2. Keeping your back and neck straight, lift one leg straight out behind you. When you lift your leg, keep your hips level. Don't let your back twist, and don't let your hip drop toward the floor. 3. Hold for 6 seconds. Repeat 8 to 12 times with each leg.   4. If you feel steady and strong spine.  3. Lift one foot off the floor and bring your knee toward your chest, so that your knee is straight above your hip and your leg is bent like the letter \"L. \"  4. Lift the other knee up to the same position. 5. Lower one leg at a time to the starting position. 6. Keep alternating legs until you have lifted each leg 8 to 12 times. 7. Be sure to keep your belly muscles tight and your back still as you are moving your legs. Be sure to breathe normally. Piriformis stretch    1. Lie on your back with your legs straight. 2. Lift your affected leg, and bend your knee. With your opposite hand, reach across your body, and then gently pull your knee toward your opposite shoulder. 3. Hold the stretch for 15 to 30 seconds. 4. Switch legs and repeat steps 1 through 3.  5. Repeat 2 to 4 times. Follow-up care is a key part of your treatment and safety. Be sure to make and go to all appointments, and call your doctor if you are having problems. It's also a good idea to know your test results and keep a list of the medicines you take. Where can you learn more? Go to [email or web address] and sign in to your [employer] account. Enter C271 in the [employer] box to learn more about \"Sacroiliac Pain: Exercises. \"     If you do not have an account, please click on the \"Sign Up Now\" link. Current as of: September 20, 2018  Content Version: 12.1  © 8790-6473 [employer]wise, Incorporated. Care instructions adapted under license by [facility] ([facility]). If you have questions about a medical condition or this instruction, always ask your healthcare professional. [place] [address] any warranty or liability for your use of this information.

## 2019-09-04 ENCOUNTER — OFFICE VISIT (OUTPATIENT)
Dept: FAMILY MEDICINE CLINIC | Age: 59
End: 2019-09-04
Payer: MEDICARE

## 2019-09-04 VITALS
HEIGHT: 62 IN | WEIGHT: 160.2 LBS | SYSTOLIC BLOOD PRESSURE: 136 MMHG | OXYGEN SATURATION: 96 % | HEART RATE: 87 BPM | BODY MASS INDEX: 29.48 KG/M2 | DIASTOLIC BLOOD PRESSURE: 84 MMHG

## 2019-09-04 DIAGNOSIS — M51.36 DEGENERATIVE DISC DISEASE, LUMBAR: ICD-10-CM

## 2019-09-04 DIAGNOSIS — K80.20 CALCULUS OF GALLBLADDER WITHOUT CHOLECYSTITIS WITHOUT OBSTRUCTION: ICD-10-CM

## 2019-09-04 DIAGNOSIS — N17.9 AKI (ACUTE KIDNEY INJURY) (HCC): ICD-10-CM

## 2019-09-04 DIAGNOSIS — S33.6XXD SPRAIN OF SACROILIAC LIGAMENT, SUBSEQUENT ENCOUNTER: ICD-10-CM

## 2019-09-04 DIAGNOSIS — K59.01 SLOW TRANSIT CONSTIPATION: ICD-10-CM

## 2019-09-04 DIAGNOSIS — K52.9 COLITIS: Primary | ICD-10-CM

## 2019-09-04 PROCEDURE — 3017F COLORECTAL CA SCREEN DOC REV: CPT | Performed by: FAMILY MEDICINE

## 2019-09-04 PROCEDURE — G8598 ASA/ANTIPLAT THER USED: HCPCS | Performed by: FAMILY MEDICINE

## 2019-09-04 PROCEDURE — G8427 DOCREV CUR MEDS BY ELIG CLIN: HCPCS | Performed by: FAMILY MEDICINE

## 2019-09-04 PROCEDURE — 99214 OFFICE O/P EST MOD 30 MIN: CPT | Performed by: FAMILY MEDICINE

## 2019-09-04 PROCEDURE — G8417 CALC BMI ABV UP PARAM F/U: HCPCS | Performed by: FAMILY MEDICINE

## 2019-09-04 PROCEDURE — 1036F TOBACCO NON-USER: CPT | Performed by: FAMILY MEDICINE

## 2019-09-04 ASSESSMENT — ENCOUNTER SYMPTOMS
ABDOMINAL DISTENTION: 0
SINUS PAIN: 0
BACK PAIN: 1
SHORTNESS OF BREATH: 0
VOMITING: 0
DIARRHEA: 1
ABDOMINAL PAIN: 1
COUGH: 0
BLOOD IN STOOL: 1
SINUS PRESSURE: 0
CONSTIPATION: 1
WHEEZING: 0
RECTAL PAIN: 0
NAUSEA: 1
PHOTOPHOBIA: 0

## 2019-09-04 NOTE — PROGRESS NOTES
Visit Information    Have you changed or started any medications since your last visit including any over-the-counter medicines, vitamins, or herbal medicines? no   Are you having any side effects from any of your medications? -  no  Have you stopped taking any of your medications? Is so, why? -  no    Have you seen any other physician or provider since your last visit? No  Have you had any other diagnostic tests since your last visit? Yes - Records Obtained  Have you been seen in the emergency room and/or had an admission to a hospital since we last saw you? Yes - Records Obtained  Have you had your routine dental cleaning in the past 6 months? no    Have you activated your [employer] account? If not, what are your barriers?  Yes     Patient Care Team:  Shayla Foley MD as PCP - General (Family Medicine)  Shayla Foley MD as PCP - [facility]    Medical History Review  Past Medical, Family, and Social History reviewed and does contribute to the patient presenting condition    Health Maintenance   Topic Date Due    Flu vaccine (1) 09/01/2019    Potassium monitoring  03/26/2020    Creatinine monitoring  03/26/2020    Breast cancer screen  04/11/2021    Cervical cancer screen  04/16/2023    Lipid screen  03/26/2024    Colon cancer screen colonoscopy  05/06/2024    DTaP/Tdap/Td vaccine (2 - Td) 04/16/2028    Shingles Vaccine  Completed    Pneumococcal 0-64 years Vaccine  Completed    Hepatitis C screen  Completed    HIV screen  Completed

## 2019-09-04 NOTE — PROGRESS NOTES
Chief Complaint   Patient presents with    ED Follow-up     rectal bleeding and gallstones         Alaina Marcelino  here today for follow up on chronic medical problems, go over labs and/or diagnostic studies, and medication refills. ED Follow-up (rectal bleeding and gallstones)      HPI: Patient is here for ER visit,  went to ER on with symptoms of abdominal pain cramping and was constipated for couple of days. Patient reports she had a bowel movement followed by diarrhea mixed with blood. She had chills denies any fever. She was feeling nauseated denied any vomiting. Patient reports she had 5-6 episodes of diarrhea. Patient reports she passed out    She went to [facility] had blood work done that showed elevated WBC count, had CT abdomen done that showed gallstones and colitis. Patient was given antibiotics Flagyl and Cipro reports her symptoms have improved. She still has diarrhea but has improved. She was seen for sciatic pain in degenerative disc disease, reports that has improved. She was started on Neurontin which is helping, has not started physical therapy yet. /84   Pulse 87   Ht 5' 2\" (1.575 m)   Wt 160 lb 3.2 oz (72.7 kg)   LMP 11/04/2012 (Approximate)   SpO2 96% Comment: resting @ RA  BMI 29.30 kg/m²    Body mass index is 29.3 kg/m². Wt Readings from Last 3 Encounters:   09/04/19 160 lb 3.2 oz (72.7 kg)   08/29/19 161 lb 6.4 oz (73.2 kg)   05/06/19 165 lb (74.8 kg)        [x]Negative depression screening. PHQ Scores 3/25/2019 9/25/2018 4/16/2018   PHQ2 Score 0 0 0   PHQ9 Score 0 0 0      []1-4 = Minimal depression   []5-9 = Milddepression   []10-14 = Moderate depression   []15-19 = Moderately severe depression   []20-27 = Severe depression    Discussed testing with the patient and all questions fully answered.     Hospital Outpatient Visit on 03/26/2019   Component Date Value Ref Range Status    Glucose 03/26/2019 96  70 - 99 mg/dL Final    BUN 03/26/2019 11  6 - 20

## 2019-09-11 ENCOUNTER — HOSPITAL ENCOUNTER (OUTPATIENT)
Dept: PHYSICAL THERAPY | Age: 59
Setting detail: THERAPIES SERIES
Discharge: HOME | End: 2019-09-11
Payer: MEDICARE

## 2019-09-11 PROCEDURE — 97110 THERAPEUTIC EXERCISES: CPT

## 2019-09-11 PROCEDURE — 97161 PT EVAL LOW COMPLEX 20 MIN: CPT

## 2019-09-11 PROCEDURE — G0283 ELEC STIM OTHER THAN WOUND: HCPCS

## 2019-09-11 ASSESSMENT — PAIN DESCRIPTION - ORIENTATION: ORIENTATION: RIGHT;LOWER

## 2019-09-11 ASSESSMENT — PAIN SCALES - GENERAL: PAINLEVEL_OUTOF10: 2

## 2019-09-11 ASSESSMENT — PAIN DESCRIPTION - DESCRIPTORS: DESCRIPTORS: CONSTANT

## 2019-09-11 ASSESSMENT — PAIN DESCRIPTION - FREQUENCY: FREQUENCY: CONTINUOUS

## 2019-09-11 ASSESSMENT — PAIN DESCRIPTION - LOCATION: LOCATION: BACK;HIP

## 2019-09-11 NOTE — PROGRESS NOTES
to Supine: Independent  Transfers  Sit to Stand: Independent  Stand to sit: Independent  Bed to Chair: Independent  Stand Pivot Transfers: Independent  Ambulation  Ambulation?: Yes  Ambulation 1  Surface: level tile  Device: No Device  Assistance: Independent  Gait Deviations: None  Stairs/Curb  Stairs?: No     Exercises  Exercise 1: B SKTC 10X10\"  Exercise 2: B LTR 10X10\"  Exercise 3: B hamstring stretch 3x30\"  Exercise 4: B pyriformis stretch 3x30\"  Exercise 5: hot pack and EStim to lumbar area 20 min sitting    Assessment   Conditions Requiring Skilled Therapeutic Intervention  Body structures, Functions, Activity limitations: Decreased functional mobility ; Decreased ADL status; Decreased ROM; Increased Pain  Assessment: low back and R hip pain limiting function  Treatment Diagnosis: difficulty walking  Prognosis: Good  Decision Making: Low Complexity  History: low back and R hip pain started 3 weeks ago,no injury  Exam: limited trunk ROM  Clinical Presentation: oswestry score 32  Barriers to Learning: none  REQUIRES PT FOLLOW UP: Yes  Treatment Initiated : therapeutic ex hot pack and EStim to lumbar area  Discharge Recommendations: Home independently  Activity Tolerance  Activity Tolerance: Patient Tolerated treatment well         Plan   Plan  Times per week: 2x/week  Plan weeks: 6 weeks  Specific instructions for Next Treatment: progress with ex as tolerated  Current Treatment Recommendations: Strengthening, ROM, Home Exercise Program, Modalities  Plan Comment: given written HEP    OutComes Score  Oswestry CMS Modifier: CJ (09/11/19 1500)  Oswestry Disability Scores %: 32 (09/11/19 1500)    Goals  Short term goals  Time Frame for Short term goals: 6 visits  Short term goal 1: decrease pain on low back and R hip 0-1/10 so patient can  Short term goal 2: increase AROM trunk to full  Short term goal 3: indep with HEP  Long term goals  Time Frame for Long term goals : 12 visits  Long term goal 1: improve oswestry

## 2019-09-12 DIAGNOSIS — J30.9 ALLERGIC SINUSITIS: ICD-10-CM

## 2019-09-12 DIAGNOSIS — I10 ESSENTIAL HYPERTENSION: ICD-10-CM

## 2019-09-12 DIAGNOSIS — F41.9 ANXIETY: ICD-10-CM

## 2019-09-12 RX ORDER — FLUOXETINE HYDROCHLORIDE 20 MG/1
CAPSULE ORAL
Qty: 90 CAPSULE | Refills: 0 | Status: SHIPPED | OUTPATIENT
Start: 2019-09-12 | End: 2019-10-09 | Stop reason: SDUPTHER

## 2019-09-12 RX ORDER — ASPIRIN 81 MG/1
TABLET, COATED ORAL
Qty: 30 TABLET | Refills: 0 | Status: SHIPPED | OUTPATIENT
Start: 2019-09-12 | End: 2019-10-09 | Stop reason: SDUPTHER

## 2019-09-12 RX ORDER — CETIRIZINE HYDROCHLORIDE 10 MG/1
10 TABLET ORAL DAILY
Qty: 30 TABLET | Refills: 0 | Status: SHIPPED | OUTPATIENT
Start: 2019-09-12 | End: 2019-10-14 | Stop reason: SDUPTHER

## 2019-09-13 DIAGNOSIS — M50.30 DEGENERATIVE DISC DISEASE, CERVICAL: ICD-10-CM

## 2019-09-13 DIAGNOSIS — M19.019 OSTEOARTHRITIS OF SHOULDER, UNSPECIFIED LATERALITY, UNSPECIFIED OSTEOARTHRITIS TYPE: ICD-10-CM

## 2019-09-13 RX ORDER — CYCLOBENZAPRINE HCL 10 MG
TABLET ORAL
Qty: 60 TABLET | Refills: 0 | Status: SHIPPED | OUTPATIENT
Start: 2019-09-13 | End: 2019-10-10 | Stop reason: SDUPTHER

## 2019-09-16 DIAGNOSIS — M79.605 LEG PAIN, LEFT: ICD-10-CM

## 2019-09-16 RX ORDER — PSEUDOEPHED/ACETAMINOPH/DIPHEN 30MG-500MG
TABLET ORAL
Qty: 120 TABLET | Refills: 0 | Status: SHIPPED | OUTPATIENT
Start: 2019-09-16 | End: 2019-10-14 | Stop reason: SDUPTHER

## 2019-09-17 ENCOUNTER — HOSPITAL ENCOUNTER (OUTPATIENT)
Dept: PHYSICAL THERAPY | Age: 59
Setting detail: THERAPIES SERIES
Discharge: HOME | End: 2019-09-17
Payer: MEDICARE

## 2019-09-17 PROCEDURE — G0283 ELEC STIM OTHER THAN WOUND: HCPCS

## 2019-09-17 PROCEDURE — 97110 THERAPEUTIC EXERCISES: CPT

## 2019-09-17 ASSESSMENT — PAIN DESCRIPTION - FREQUENCY: FREQUENCY: CONTINUOUS

## 2019-09-17 ASSESSMENT — PAIN DESCRIPTION - DESCRIPTORS: DESCRIPTORS: CONSTANT

## 2019-09-17 ASSESSMENT — PAIN DESCRIPTION - ORIENTATION: ORIENTATION: LOWER;RIGHT

## 2019-09-17 ASSESSMENT — PAIN DESCRIPTION - LOCATION: LOCATION: BACK;HIP

## 2019-09-17 ASSESSMENT — PAIN SCALES - GENERAL: PAINLEVEL_OUTOF10: 3

## 2019-09-19 ENCOUNTER — HOSPITAL ENCOUNTER (OUTPATIENT)
Dept: PHYSICAL THERAPY | Age: 59
Setting detail: THERAPIES SERIES
Discharge: HOME | End: 2019-09-19
Payer: MEDICARE

## 2019-09-19 PROCEDURE — G0283 ELEC STIM OTHER THAN WOUND: HCPCS

## 2019-09-19 PROCEDURE — 97110 THERAPEUTIC EXERCISES: CPT

## 2019-09-19 ASSESSMENT — PAIN DESCRIPTION - ORIENTATION: ORIENTATION: LOWER;RIGHT

## 2019-09-19 ASSESSMENT — PAIN DESCRIPTION - FREQUENCY: FREQUENCY: CONTINUOUS

## 2019-09-19 ASSESSMENT — PAIN SCALES - GENERAL: PAINLEVEL_OUTOF10: 2

## 2019-09-19 ASSESSMENT — PAIN DESCRIPTION - LOCATION: LOCATION: BACK;LEG

## 2019-09-19 ASSESSMENT — PAIN DESCRIPTION - DESCRIPTORS: DESCRIPTORS: CONSTANT

## 2019-09-19 NOTE — PROGRESS NOTES
Physical Therapy  Daily Treatment Note  Date: 2019  Patient Name: Geroge Burkitt  MRN: [id number]     :   1960    Subjective:      PT Visit Information  Onset Date: 19  PT Insurance Information: paramount advantage  Total # of Visits Approved: 12  Total # of Visits to Date: 3  Subjective  Subjective: complains of low back and R ant thigh pain  Pain Screening  Patient Currently in Pain: Yes  Pain Assessment  Pain Assessment: 0-10  Pain Level: 2  Pain Location: Back;Leg  Pain Orientation: Lower;Right  Pain Descriptors: Constant  Pain Frequency: Continuous  Vital Signs  Patient Currently in Pain: Yes       Treatment Activities:   Exercises  Exercise 1: slant board stretch 3x30\"  Exercise 2: step up F/L 6\" 10x  Exercise 3: B hamstring stretch 3x30\"  Exercise 4: B pyriformis stretch 3x30\"  Exercise 5: hot pack and EStim to lumbar area 20 min sitting  Exercise 6: Lime Tband 4 way hip holding on RW BLE 10x  Exercise 7: Green Tband standing rows/pull down 3x10  Exercise 8: prone R quads stretch 3x30\"  Exercise 9: seated FB stretch 10x10\" hold     Assessment:   Conditions Requiring Skilled Therapeutic Intervention  Assessment: progress with ex  REQUIRES PT FOLLOW UP: Yes  Discharge Recommendations: Home independently     Plan:    Plan  Times per week: 2x/week  Current Treatment Recommendations: Strengthening, ROM, Home Exercise Program, Modalities  Plan Comment: to continue PT per POC  Timed Code Treatment Minutes: 30 Minutes   Treatment Charges: Minutes Units   []  Ultrasound     [x]  Electrical-Stim 20 1   []  Iontophoresis     []  Traction     []  Massage       []  Eval     []  Gait     [x]  Ther Exercise 30  2    []  Manual Therapy       []  Ther Activities       []  Aquatics     []  Vasopneumatic Device     []  Neuro Re-Ed       []  Other       Total Treatment Time: 50  3       Therapy Time   Individual Concurrent Group Co-treatment   Time In 1445         Time Out 1535         Minutes 50         Timed Code Treatment Minutes: 30 Minutes     Electronically signed by: Cheyenne Huff PT

## 2019-09-24 ENCOUNTER — HOSPITAL ENCOUNTER (OUTPATIENT)
Dept: PHYSICAL THERAPY | Age: 59
Setting detail: THERAPIES SERIES
Discharge: HOME | End: 2019-09-24
Payer: MEDICARE

## 2019-09-24 PROCEDURE — 97110 THERAPEUTIC EXERCISES: CPT

## 2019-09-24 PROCEDURE — G0283 ELEC STIM OTHER THAN WOUND: HCPCS

## 2019-09-24 ASSESSMENT — PAIN DESCRIPTION - LOCATION: LOCATION: LEG

## 2019-09-24 ASSESSMENT — PAIN DESCRIPTION - FREQUENCY: FREQUENCY: INTERMITTENT

## 2019-09-24 ASSESSMENT — PAIN SCALES - GENERAL: PAINLEVEL_OUTOF10: 1

## 2019-09-24 ASSESSMENT — PAIN DESCRIPTION - ORIENTATION: ORIENTATION: RIGHT

## 2019-09-24 NOTE — PROGRESS NOTES
Physical Therapy  Daily Treatment Note  Date: 2019  Patient Name: Shira Galloway  MRN: [id number]     :   1960    Subjective:      PT Visit Information  Onset Date: 19  PT Insurance Information: paramount advantage  Total # of Visits Approved: 12  Total # of Visits to Date: 4  Subjective  Subjective: complains of  R ant thigh pain  Pain Screening  Patient Currently in Pain: Yes  Pain Assessment  Pain Assessment: 0-10  Pain Level: 1  Pain Location: Leg  Pain Orientation: Right  Pain Frequency: Intermittent  Vital Signs  Patient Currently in Pain: Yes       Treatment Activities:   Exercises  Exercise 1: slant board stretch 3x30\"  Exercise 2: step up F/L 6\" 10x  Exercise 3: B hamstring stretch 3x30\"  Exercise 4: B pyriformis stretch 3x30\"  Exercise 5: hot pack and EStim to lumbar area 20 min sitting  Exercise 6: Lime Tband 4 way hip holding on RW BLE 10x  Exercise 7: Green Tband standing rows/pull down 3x10  Exercise 8: prone R quads stretch 3x30\"  Exercise 9: seated FB stretch 10x10\" hold     Assessment:   Conditions Requiring Skilled Therapeutic Intervention  REQUIRES PT FOLLOW UP: Yes  Discharge Recommendations: Home independently     Plan:    Plan  Times per week: 2x/week  Current Treatment Recommendations: Strengthening, ROM, Home Exercise Program, Modalities  Plan Comment: to continue PT per POC  Timed Code Treatment Minutes: 25 Minutes   Treatment Charges: Minutes Units   []  Ultrasound     [x]  Electrical-Stim 20 1   []  Iontophoresis     []  Traction     []  Massage       []  Eval     []  Gait     [x]  Ther Exercise 25  2    []  Manual Therapy       []  Ther Activities       []  Aquatics     []  Vasopneumatic Device     []  Neuro Re-Ed       []  Other       Total Treatment Time: 45 3        Therapy Time   Individual Concurrent Group Co-treatment   Time In 1525         Time Out 1610         Minutes 45         Timed Code Treatment Minutes: 25 Minutes     Electronically signed by: Estella Goins

## 2019-09-25 DIAGNOSIS — M51.36 DEGENERATIVE DISC DISEASE, LUMBAR: ICD-10-CM

## 2019-09-25 DIAGNOSIS — M16.10 HIP ARTHRITIS: ICD-10-CM

## 2019-09-25 RX ORDER — GABAPENTIN 100 MG/1
CAPSULE ORAL
Qty: 60 CAPSULE | Refills: 0 | Status: SHIPPED | OUTPATIENT
Start: 2019-09-25 | End: 2019-10-21 | Stop reason: SDUPTHER

## 2019-09-27 ENCOUNTER — HOSPITAL ENCOUNTER (OUTPATIENT)
Dept: PHYSICAL THERAPY | Age: 59
Setting detail: THERAPIES SERIES
Discharge: HOME | End: 2019-09-27
Payer: MEDICARE

## 2019-09-27 PROCEDURE — G0283 ELEC STIM OTHER THAN WOUND: HCPCS

## 2019-09-27 PROCEDURE — 97110 THERAPEUTIC EXERCISES: CPT

## 2019-09-27 ASSESSMENT — PAIN SCALES - GENERAL: PAINLEVEL_OUTOF10: 2

## 2019-09-27 ASSESSMENT — PAIN DESCRIPTION - LOCATION: LOCATION: LEG

## 2019-09-27 ASSESSMENT — PAIN DESCRIPTION - FREQUENCY: FREQUENCY: INTERMITTENT

## 2019-09-27 ASSESSMENT — PAIN DESCRIPTION - ORIENTATION: ORIENTATION: RIGHT

## 2019-09-27 NOTE — PROGRESS NOTES
increase AROM trunk to full  Short term goal 3: indep with HEP  Long term goals  Time Frame for Long term goals : 12 visits  Long term goal 1: improve oswestry score from 32 to 22 or better  Patient Goals   Patient goals : pain free    Plan:      Progress ex's as marissa.         Therapy Time   Individual Concurrent Group Co-treatment   Time In 1325         Time Out 1410         Minutes 23 [facility] Evelyn Scales, PT

## 2019-09-30 ENCOUNTER — HOSPITAL ENCOUNTER (OUTPATIENT)
Dept: PHYSICAL THERAPY | Age: 59
Setting detail: THERAPIES SERIES
Discharge: HOME | End: 2019-09-30
Payer: MEDICARE

## 2019-09-30 PROCEDURE — 97110 THERAPEUTIC EXERCISES: CPT

## 2019-09-30 PROCEDURE — G0283 ELEC STIM OTHER THAN WOUND: HCPCS

## 2019-09-30 ASSESSMENT — PAIN DESCRIPTION - FREQUENCY: FREQUENCY: INTERMITTENT

## 2019-09-30 ASSESSMENT — PAIN DESCRIPTION - ORIENTATION: ORIENTATION: RIGHT

## 2019-09-30 ASSESSMENT — PAIN SCALES - GENERAL: PAINLEVEL_OUTOF10: 1

## 2019-09-30 ASSESSMENT — PAIN DESCRIPTION - LOCATION: LOCATION: LEG

## 2019-10-02 ENCOUNTER — HOSPITAL ENCOUNTER (OUTPATIENT)
Dept: PHYSICAL THERAPY | Age: 59
Setting detail: THERAPIES SERIES
Discharge: HOME | End: 2019-10-02
Payer: MEDICARE

## 2019-10-02 PROCEDURE — 97110 THERAPEUTIC EXERCISES: CPT

## 2019-10-02 PROCEDURE — G0283 ELEC STIM OTHER THAN WOUND: HCPCS

## 2019-10-07 ENCOUNTER — HOSPITAL ENCOUNTER (OUTPATIENT)
Dept: PHYSICAL THERAPY | Age: 59
Setting detail: THERAPIES SERIES
Discharge: HOME | End: 2019-10-07
Payer: MEDICARE

## 2019-10-07 PROCEDURE — G0283 ELEC STIM OTHER THAN WOUND: HCPCS

## 2019-10-07 PROCEDURE — 97110 THERAPEUTIC EXERCISES: CPT

## 2019-10-09 ENCOUNTER — HOSPITAL ENCOUNTER (OUTPATIENT)
Dept: PHYSICAL THERAPY | Age: 59
Setting detail: THERAPIES SERIES
Discharge: HOME | End: 2019-10-09
Payer: MEDICARE

## 2019-10-09 DIAGNOSIS — M19.019 OSTEOARTHRITIS OF SHOULDER, UNSPECIFIED LATERALITY, UNSPECIFIED OSTEOARTHRITIS TYPE: ICD-10-CM

## 2019-10-09 DIAGNOSIS — M50.30 DEGENERATIVE DISC DISEASE, CERVICAL: ICD-10-CM

## 2019-10-09 DIAGNOSIS — F41.9 ANXIETY: ICD-10-CM

## 2019-10-09 DIAGNOSIS — I10 ESSENTIAL HYPERTENSION: ICD-10-CM

## 2019-10-09 PROCEDURE — G0283 ELEC STIM OTHER THAN WOUND: HCPCS

## 2019-10-09 PROCEDURE — 97110 THERAPEUTIC EXERCISES: CPT

## 2019-10-09 RX ORDER — MELOXICAM 15 MG/1
TABLET ORAL
Qty: 60 TABLET | Refills: 0 | Status: SHIPPED | OUTPATIENT
Start: 2019-10-09 | End: 2019-12-08 | Stop reason: SDUPTHER

## 2019-10-09 RX ORDER — ASPIRIN 81 MG/1
TABLET, COATED ORAL
Qty: 30 TABLET | Refills: 0 | Status: SHIPPED | OUTPATIENT
Start: 2019-10-09 | End: 2019-11-08 | Stop reason: SDUPTHER

## 2019-10-09 RX ORDER — FLUOXETINE HYDROCHLORIDE 20 MG/1
CAPSULE ORAL
Qty: 90 CAPSULE | Refills: 0 | Status: SHIPPED | OUTPATIENT
Start: 2019-10-09 | End: 2019-11-13 | Stop reason: SDUPTHER

## 2019-10-09 ASSESSMENT — PAIN DESCRIPTION - DESCRIPTORS: DESCRIPTORS: CONSTANT

## 2019-10-09 ASSESSMENT — PAIN DESCRIPTION - LOCATION: LOCATION: LEG

## 2019-10-09 ASSESSMENT — PAIN DESCRIPTION - FREQUENCY: FREQUENCY: CONTINUOUS

## 2019-10-09 ASSESSMENT — PAIN SCALES - GENERAL: PAINLEVEL_OUTOF10: 1

## 2019-10-09 ASSESSMENT — PAIN DESCRIPTION - ORIENTATION: ORIENTATION: RIGHT

## 2019-10-10 DIAGNOSIS — M19.019 OSTEOARTHRITIS OF SHOULDER, UNSPECIFIED LATERALITY, UNSPECIFIED OSTEOARTHRITIS TYPE: ICD-10-CM

## 2019-10-10 DIAGNOSIS — M50.30 DEGENERATIVE DISC DISEASE, CERVICAL: ICD-10-CM

## 2019-10-10 RX ORDER — CYCLOBENZAPRINE HCL 10 MG
TABLET ORAL
Qty: 60 TABLET | Refills: 0 | Status: SHIPPED | OUTPATIENT
Start: 2019-10-10 | End: 2019-11-08 | Stop reason: SDUPTHER

## 2019-10-14 DIAGNOSIS — M79.605 LEG PAIN, LEFT: ICD-10-CM

## 2019-10-14 DIAGNOSIS — J30.9 ALLERGIC SINUSITIS: ICD-10-CM

## 2019-10-14 DIAGNOSIS — I25.10 CORONARY ARTERY DISEASE INVOLVING NATIVE CORONARY ARTERY OF NATIVE HEART WITHOUT ANGINA PECTORIS: ICD-10-CM

## 2019-10-14 RX ORDER — CETIRIZINE HYDROCHLORIDE 10 MG/1
10 TABLET ORAL DAILY
Qty: 30 TABLET | Refills: 0 | Status: SHIPPED | OUTPATIENT
Start: 2019-10-14 | End: 2019-11-13 | Stop reason: SDUPTHER

## 2019-10-14 RX ORDER — NITROGLYCERIN 0.4 MG/1
TABLET SUBLINGUAL
Qty: 25 TABLET | Refills: 0 | Status: SHIPPED | OUTPATIENT
Start: 2019-10-14 | End: 2019-11-13 | Stop reason: SDUPTHER

## 2019-10-14 RX ORDER — PSEUDOEPHED/ACETAMINOPH/DIPHEN 30MG-500MG
TABLET ORAL
Qty: 120 TABLET | Refills: 0 | Status: SHIPPED | OUTPATIENT
Start: 2019-10-14 | End: 2019-11-13 | Stop reason: SDUPTHER

## 2019-10-15 DIAGNOSIS — F41.9 ANXIETY: ICD-10-CM

## 2019-10-15 RX ORDER — BUSPIRONE HYDROCHLORIDE 15 MG/1
TABLET ORAL
Qty: 60 TABLET | Refills: 0 | Status: SHIPPED | OUTPATIENT
Start: 2019-10-15 | End: 2019-12-11 | Stop reason: SDUPTHER

## 2019-10-16 ENCOUNTER — HOSPITAL ENCOUNTER (OUTPATIENT)
Dept: PHYSICAL THERAPY | Age: 59
Setting detail: THERAPIES SERIES
Discharge: HOME | End: 2019-10-16
Payer: MEDICARE

## 2019-10-16 PROCEDURE — 97110 THERAPEUTIC EXERCISES: CPT

## 2019-10-16 PROCEDURE — G0283 ELEC STIM OTHER THAN WOUND: HCPCS

## 2019-10-18 ENCOUNTER — HOSPITAL ENCOUNTER (OUTPATIENT)
Dept: PHYSICAL THERAPY | Age: 59
Setting detail: THERAPIES SERIES
Discharge: HOME | End: 2019-10-18
Payer: MEDICARE

## 2019-10-18 PROCEDURE — 97110 THERAPEUTIC EXERCISES: CPT

## 2019-10-18 PROCEDURE — G0283 ELEC STIM OTHER THAN WOUND: HCPCS

## 2019-10-18 ASSESSMENT — PAIN DESCRIPTION - ORIENTATION: ORIENTATION: RIGHT

## 2019-10-18 ASSESSMENT — PAIN DESCRIPTION - DESCRIPTORS: DESCRIPTORS: CONSTANT

## 2019-10-18 ASSESSMENT — PAIN DESCRIPTION - FREQUENCY: FREQUENCY: CONTINUOUS

## 2019-10-18 ASSESSMENT — PAIN DESCRIPTION - LOCATION: LOCATION: OTHER (COMMENT)

## 2019-10-18 ASSESSMENT — PAIN SCALES - GENERAL: PAINLEVEL_OUTOF10: 2

## 2019-10-21 DIAGNOSIS — M51.36 DEGENERATIVE DISC DISEASE, LUMBAR: ICD-10-CM

## 2019-10-21 DIAGNOSIS — M16.10 HIP ARTHRITIS: ICD-10-CM

## 2019-10-21 RX ORDER — GABAPENTIN 100 MG/1
CAPSULE ORAL
Qty: 60 CAPSULE | Refills: 0 | Status: SHIPPED | OUTPATIENT
Start: 2019-10-21 | End: 2019-10-29

## 2019-10-29 ENCOUNTER — OFFICE VISIT (OUTPATIENT)
Dept: FAMILY MEDICINE CLINIC | Age: 59
End: 2019-10-29
Payer: MEDICARE

## 2019-10-29 VITALS
HEART RATE: 80 BPM | OXYGEN SATURATION: 96 % | DIASTOLIC BLOOD PRESSURE: 81 MMHG | SYSTOLIC BLOOD PRESSURE: 131 MMHG | HEIGHT: 62 IN | WEIGHT: 162.8 LBS | BODY MASS INDEX: 29.96 KG/M2

## 2019-10-29 DIAGNOSIS — E78.2 MIXED HYPERLIPIDEMIA: ICD-10-CM

## 2019-10-29 DIAGNOSIS — M16.10 HIP ARTHRITIS: ICD-10-CM

## 2019-10-29 DIAGNOSIS — Z23 NEED FOR VACCINATION: ICD-10-CM

## 2019-10-29 DIAGNOSIS — I10 ESSENTIAL HYPERTENSION: Primary | ICD-10-CM

## 2019-10-29 DIAGNOSIS — N17.9 AKI (ACUTE KIDNEY INJURY) (HCC): ICD-10-CM

## 2019-10-29 DIAGNOSIS — M51.36 DEGENERATIVE DISC DISEASE, LUMBAR: ICD-10-CM

## 2019-10-29 PROCEDURE — 1036F TOBACCO NON-USER: CPT | Performed by: FAMILY MEDICINE

## 2019-10-29 PROCEDURE — 90686 IIV4 VACC NO PRSV 0.5 ML IM: CPT | Performed by: FAMILY MEDICINE

## 2019-10-29 PROCEDURE — 3017F COLORECTAL CA SCREEN DOC REV: CPT | Performed by: FAMILY MEDICINE

## 2019-10-29 PROCEDURE — G8417 CALC BMI ABV UP PARAM F/U: HCPCS | Performed by: FAMILY MEDICINE

## 2019-10-29 PROCEDURE — 99213 OFFICE O/P EST LOW 20 MIN: CPT | Performed by: FAMILY MEDICINE

## 2019-10-29 PROCEDURE — G8598 ASA/ANTIPLAT THER USED: HCPCS | Performed by: FAMILY MEDICINE

## 2019-10-29 PROCEDURE — 90471 IMMUNIZATION ADMIN: CPT | Performed by: FAMILY MEDICINE

## 2019-10-29 PROCEDURE — G8427 DOCREV CUR MEDS BY ELIG CLIN: HCPCS | Performed by: FAMILY MEDICINE

## 2019-10-29 PROCEDURE — G8482 FLU IMMUNIZE ORDER/ADMIN: HCPCS | Performed by: FAMILY MEDICINE

## 2019-10-29 RX ORDER — LIDOCAINE 40 MG/G
CREAM TOPICAL
Qty: 45 G | Refills: 3 | Status: SHIPPED | OUTPATIENT
Start: 2019-10-29 | End: 2021-04-29

## 2019-10-29 RX ORDER — GABAPENTIN 300 MG/1
CAPSULE ORAL
Qty: 60 CAPSULE | Refills: 0 | Status: SHIPPED | OUTPATIENT
Start: 2019-10-29 | End: 2019-11-21 | Stop reason: SDUPTHER

## 2019-10-29 ASSESSMENT — ENCOUNTER SYMPTOMS
WHEEZING: 0
SHORTNESS OF BREATH: 0
ABDOMINAL PAIN: 0
PHOTOPHOBIA: 0
ABDOMINAL DISTENTION: 0
CHEST TIGHTNESS: 0
BACK PAIN: 1

## 2019-11-05 RX ORDER — ALBUTEROL SULFATE 90 UG/1
AEROSOL, METERED RESPIRATORY (INHALATION)
Qty: 36 G | Refills: 0 | Status: SHIPPED | OUTPATIENT
Start: 2019-11-05 | End: 2020-08-27

## 2019-11-08 DIAGNOSIS — I10 ESSENTIAL HYPERTENSION: ICD-10-CM

## 2019-11-08 DIAGNOSIS — M19.019 OSTEOARTHRITIS OF SHOULDER, UNSPECIFIED LATERALITY, UNSPECIFIED OSTEOARTHRITIS TYPE: ICD-10-CM

## 2019-11-08 DIAGNOSIS — M50.30 DEGENERATIVE DISC DISEASE, CERVICAL: ICD-10-CM

## 2019-11-08 RX ORDER — ASPIRIN 81 MG/1
TABLET, COATED ORAL
Qty: 30 TABLET | Refills: 0 | Status: SHIPPED | OUTPATIENT
Start: 2019-11-08 | End: 2019-12-08 | Stop reason: SDUPTHER

## 2019-11-08 RX ORDER — ISOSORBIDE MONONITRATE 30 MG/1
TABLET, EXTENDED RELEASE ORAL
Qty: 90 TABLET | Refills: 0 | Status: SHIPPED | OUTPATIENT
Start: 2019-11-08 | End: 2020-02-05

## 2019-11-08 RX ORDER — CYCLOBENZAPRINE HCL 10 MG
TABLET ORAL
Qty: 60 TABLET | Refills: 0 | Status: SHIPPED | OUTPATIENT
Start: 2019-11-08 | End: 2019-12-08 | Stop reason: SDUPTHER

## 2019-11-08 RX ORDER — METOPROLOL SUCCINATE 50 MG/1
TABLET, EXTENDED RELEASE ORAL
Qty: 90 TABLET | Refills: 0 | Status: SHIPPED | OUTPATIENT
Start: 2019-11-08 | End: 2020-02-05

## 2019-11-13 DIAGNOSIS — J30.9 ALLERGIC SINUSITIS: ICD-10-CM

## 2019-11-13 DIAGNOSIS — M50.30 DEGENERATIVE DISC DISEASE, CERVICAL: ICD-10-CM

## 2019-11-13 DIAGNOSIS — M79.605 LEG PAIN, LEFT: ICD-10-CM

## 2019-11-13 DIAGNOSIS — I25.10 CORONARY ARTERY DISEASE INVOLVING NATIVE CORONARY ARTERY OF NATIVE HEART WITHOUT ANGINA PECTORIS: ICD-10-CM

## 2019-11-13 DIAGNOSIS — F41.9 ANXIETY: ICD-10-CM

## 2019-11-13 RX ORDER — NITROGLYCERIN 0.4 MG/1
TABLET SUBLINGUAL
Qty: 25 TABLET | Refills: 0 | Status: SHIPPED | OUTPATIENT
Start: 2019-11-13 | End: 2019-12-11 | Stop reason: SDUPTHER

## 2019-11-13 RX ORDER — PSEUDOEPHED/ACETAMINOPH/DIPHEN 30MG-500MG
TABLET ORAL
Qty: 120 TABLET | Refills: 0 | Status: SHIPPED | OUTPATIENT
Start: 2019-11-13 | End: 2019-12-10 | Stop reason: SDUPTHER

## 2019-11-13 RX ORDER — MULTIVITAMIN
TABLET ORAL
Qty: 90 TABLET | Refills: 0 | Status: SHIPPED | OUTPATIENT
Start: 2019-11-13 | End: 2020-02-13

## 2019-11-13 RX ORDER — CETIRIZINE HYDROCHLORIDE 10 MG/1
10 TABLET ORAL DAILY
Qty: 30 TABLET | Refills: 0 | Status: SHIPPED | OUTPATIENT
Start: 2019-11-13 | End: 2019-12-10 | Stop reason: SDUPTHER

## 2019-11-13 RX ORDER — FLUOXETINE HYDROCHLORIDE 20 MG/1
CAPSULE ORAL
Qty: 90 CAPSULE | Refills: 0 | Status: SHIPPED | OUTPATIENT
Start: 2019-11-13 | End: 2019-12-10 | Stop reason: SDUPTHER

## 2019-12-08 DIAGNOSIS — M19.019 OSTEOARTHRITIS OF SHOULDER, UNSPECIFIED LATERALITY, UNSPECIFIED OSTEOARTHRITIS TYPE: ICD-10-CM

## 2019-12-08 DIAGNOSIS — M50.30 DEGENERATIVE DISC DISEASE, CERVICAL: ICD-10-CM

## 2019-12-08 DIAGNOSIS — I10 ESSENTIAL HYPERTENSION: ICD-10-CM

## 2019-12-09 RX ORDER — ENALAPRIL MALEATE 10 MG/1
TABLET ORAL
Qty: 270 TABLET | Refills: 0 | Status: SHIPPED | OUTPATIENT
Start: 2019-12-09 | End: 2020-06-29

## 2019-12-09 RX ORDER — MELOXICAM 15 MG/1
TABLET ORAL
Qty: 60 TABLET | Refills: 0 | Status: SHIPPED | OUTPATIENT
Start: 2019-12-09 | End: 2020-02-05

## 2019-12-09 RX ORDER — ASPIRIN 81 MG/1
TABLET, COATED ORAL
Qty: 30 TABLET | Refills: 0 | Status: SHIPPED | OUTPATIENT
Start: 2019-12-09 | End: 2020-01-07

## 2019-12-09 RX ORDER — CYCLOBENZAPRINE HCL 10 MG
TABLET ORAL
Qty: 60 TABLET | Refills: 0 | Status: SHIPPED | OUTPATIENT
Start: 2019-12-09 | End: 2020-01-07

## 2019-12-10 DIAGNOSIS — M79.605 LEG PAIN, LEFT: ICD-10-CM

## 2019-12-10 DIAGNOSIS — I10 ESSENTIAL HYPERTENSION: ICD-10-CM

## 2019-12-10 DIAGNOSIS — J30.9 ALLERGIC SINUSITIS: ICD-10-CM

## 2019-12-10 DIAGNOSIS — F41.9 ANXIETY: ICD-10-CM

## 2019-12-10 RX ORDER — PSEUDOEPHED/ACETAMINOPH/DIPHEN 30MG-500MG
TABLET ORAL
Qty: 120 TABLET | Refills: 0 | Status: SHIPPED | OUTPATIENT
Start: 2019-12-10 | End: 2020-01-10

## 2019-12-10 RX ORDER — CETIRIZINE HYDROCHLORIDE 10 MG/1
10 TABLET ORAL DAILY
Qty: 30 TABLET | Refills: 0 | Status: SHIPPED | OUTPATIENT
Start: 2019-12-10 | End: 2020-01-10

## 2019-12-10 RX ORDER — FLUOXETINE HYDROCHLORIDE 20 MG/1
CAPSULE ORAL
Qty: 90 CAPSULE | Refills: 0 | Status: SHIPPED | OUTPATIENT
Start: 2019-12-10 | End: 2020-01-10

## 2019-12-10 RX ORDER — ENALAPRIL MALEATE 10 MG/1
TABLET ORAL
Qty: 90 TABLET | Refills: 0 | Status: SHIPPED | OUTPATIENT
Start: 2019-12-10 | End: 2020-01-07

## 2019-12-11 DIAGNOSIS — I25.10 CORONARY ARTERY DISEASE INVOLVING NATIVE CORONARY ARTERY OF NATIVE HEART WITHOUT ANGINA PECTORIS: ICD-10-CM

## 2019-12-12 RX ORDER — NITROGLYCERIN 0.4 MG/1
TABLET SUBLINGUAL
Qty: 25 TABLET | Refills: 0 | Status: SHIPPED | OUTPATIENT
Start: 2019-12-12 | End: 2020-04-24

## 2019-12-28 DIAGNOSIS — M51.36 DEGENERATIVE DISC DISEASE, LUMBAR: ICD-10-CM

## 2019-12-28 DIAGNOSIS — M16.10 HIP ARTHRITIS: ICD-10-CM

## 2019-12-30 RX ORDER — GABAPENTIN 300 MG/1
CAPSULE ORAL
Qty: 60 CAPSULE | Refills: 0 | Status: SHIPPED | OUTPATIENT
Start: 2019-12-30 | End: 2020-01-27

## 2020-01-07 RX ORDER — CYCLOBENZAPRINE HCL 10 MG
TABLET ORAL
Qty: 60 TABLET | Refills: 0 | Status: SHIPPED | OUTPATIENT
Start: 2020-01-07 | End: 2020-02-05

## 2020-01-07 RX ORDER — ASPIRIN 81 MG/1
TABLET ORAL
Qty: 30 TABLET | Refills: 0 | Status: SHIPPED | OUTPATIENT
Start: 2020-01-07 | End: 2020-02-05

## 2020-01-07 RX ORDER — ENALAPRIL MALEATE 10 MG/1
TABLET ORAL
Qty: 90 TABLET | Refills: 0 | Status: SHIPPED | OUTPATIENT
Start: 2020-01-07 | End: 2020-02-05

## 2020-01-10 RX ORDER — FLUOXETINE HYDROCHLORIDE 20 MG/1
CAPSULE ORAL
Qty: 90 CAPSULE | Refills: 0 | Status: SHIPPED | OUTPATIENT
Start: 2020-01-10 | End: 2020-02-06

## 2020-01-10 RX ORDER — CETIRIZINE HYDROCHLORIDE 10 MG/1
10 TABLET ORAL DAILY
Qty: 30 TABLET | Refills: 0 | Status: SHIPPED | OUTPATIENT
Start: 2020-01-10 | End: 2020-02-10

## 2020-01-10 RX ORDER — PSEUDOEPHED/ACETAMINOPH/DIPHEN 30MG-500MG
TABLET ORAL
Qty: 120 TABLET | Refills: 0 | Status: SHIPPED | OUTPATIENT
Start: 2020-01-10 | End: 2020-02-10

## 2020-01-27 RX ORDER — GABAPENTIN 300 MG/1
CAPSULE ORAL
Qty: 60 CAPSULE | Refills: 0 | Status: SHIPPED | OUTPATIENT
Start: 2020-01-27 | End: 2020-02-25

## 2020-01-29 ENCOUNTER — OFFICE VISIT (OUTPATIENT)
Dept: FAMILY MEDICINE CLINIC | Age: 60
End: 2020-01-29
Payer: MEDICARE

## 2020-01-29 VITALS
BODY MASS INDEX: 31.06 KG/M2 | SYSTOLIC BLOOD PRESSURE: 132 MMHG | WEIGHT: 168.8 LBS | OXYGEN SATURATION: 97 % | HEIGHT: 62 IN | DIASTOLIC BLOOD PRESSURE: 86 MMHG | HEART RATE: 80 BPM

## 2020-01-29 PROCEDURE — G8482 FLU IMMUNIZE ORDER/ADMIN: HCPCS | Performed by: FAMILY MEDICINE

## 2020-01-29 PROCEDURE — G8427 DOCREV CUR MEDS BY ELIG CLIN: HCPCS | Performed by: FAMILY MEDICINE

## 2020-01-29 PROCEDURE — 99214 OFFICE O/P EST MOD 30 MIN: CPT | Performed by: FAMILY MEDICINE

## 2020-01-29 PROCEDURE — 1036F TOBACCO NON-USER: CPT | Performed by: FAMILY MEDICINE

## 2020-01-29 PROCEDURE — 3017F COLORECTAL CA SCREEN DOC REV: CPT | Performed by: FAMILY MEDICINE

## 2020-01-29 PROCEDURE — G8417 CALC BMI ABV UP PARAM F/U: HCPCS | Performed by: FAMILY MEDICINE

## 2020-01-29 ASSESSMENT — ENCOUNTER SYMPTOMS
RHINORRHEA: 0
BACK PAIN: 1
WHEEZING: 0
BLOOD IN STOOL: 0
ANAL BLEEDING: 0
DIARRHEA: 0
ABDOMINAL PAIN: 0
COLOR CHANGE: 0
CHEST TIGHTNESS: 0
SHORTNESS OF BREATH: 0
COUGH: 0
ABDOMINAL DISTENTION: 0

## 2020-01-29 NOTE — PROGRESS NOTES
Component Date Value Ref Range Status    Glucose 03/26/2019 96  70 - 99 mg/dL Final    BUN 03/26/2019 11  6 - 20 mg/dL Final    CREATININE 03/26/2019 0.78  0.50 - 0.90 mg/dL Final    Bun/Cre Ratio 03/26/2019 NOT REPORTED  9 - 20 Final    Calcium 03/26/2019 9.7  8.6 - 10.4 mg/dL Final    Sodium 03/26/2019 140  135 - 144 mmol/L Final    Potassium 03/26/2019 4.1  3.7 - 5.3 mmol/L Final    Chloride 03/26/2019 98  98 - 107 mmol/L Final    CO2 03/26/2019 30  20 - 31 mmol/L Final    Anion Gap 03/26/2019 12  9 - 17 mmol/L Final    Alkaline Phosphatase 03/26/2019 72  35 - 104 U/L Final    ALT 03/26/2019 19  5 - 33 U/L Final    AST 03/26/2019 28  <32 U/L Final    Total Bilirubin 03/26/2019 0.41  0.3 - 1.2 mg/dL Final    Total Protein 03/26/2019 7.1  6.4 - 8.3 g/dL Final    Alb 03/26/2019 4.3  3.5 - 5.2 g/dL Final    Albumin/Globulin Ratio 03/26/2019 NOT REPORTED  1.0 - 2.5 Final    GFR Non- 03/26/2019 >60  >60 mL/min Final    GFR  03/26/2019 >60  >60 mL/min Final    GFR Comment 03/26/2019        Final    Comment: Average GFR for 52-63 years old:   80 mL/min/1.73sq m  Chronic Kidney Disease:   <60 mL/min/1.73sq m  Kidney failure:   <15 mL/min/1.73sq m              eGFR calculated using average adult body mass.  Additional eGFR calculator available at:        BevSpot.br            GFR Staging 03/26/2019 NOT REPORTED   Final    WBC 03/26/2019 8.4  3.5 - 11.0 k/uL Final    RBC 03/26/2019 4.55  4.0 - 5.2 m/uL Final    Hemoglobin 03/26/2019 14.8  12.0 - 16.0 g/dL Final    Hematocrit 03/26/2019 43.7  36 - 46 % Final    MCV 03/26/2019 96.1  80 - 100 fL Final    MCH 03/26/2019 32.5  26 - 34 pg Final    MCHC 03/26/2019 33.8  31 - 37 g/dL Final    RDW 03/26/2019 13.8  11.5 - 14.9 % Final    Platelets 96/27/5625 209  150 - 450 k/uL Final    MPV 03/26/2019 8.6  6.0 - 12.0 fL Final    NRBC Automated 03/26/2019 NOT REPORTED  per 100 WBC 03/26/2019     03/26/2019       @BRIEFLAB(NA,K,CL,CO2,BUN,CREATININE,GLUCOSE,CALCIUM)@     Lab Results   Component Value Date    ALT 19 03/26/2019    AST 28 03/26/2019    ALKPHOS 72 03/26/2019    BILITOT 0.41 03/26/2019       Lab Results   Component Value Date    TSH 2.18 10/04/2016       Lab Results   Component Value Date    CHOL 116 03/26/2019    CHOL 137 10/26/2017    CHOL 142 10/04/2016     Lab Results   Component Value Date    TRIG 91 03/26/2019    TRIG 108 10/26/2017    TRIG 111 10/04/2016     Lab Results   Component Value Date    HDL 51 03/26/2019    HDL 64 10/26/2017    HDL 56 10/04/2016     Lab Results   Component Value Date    LDLCHOLESTEROL 47 03/26/2019    LDLCHOLESTEROL 51 10/26/2017    LDLCHOLESTEROL 64 10/04/2016     Lab Results   Component Value Date    VLDL NOT REPORTED 03/26/2019    VLDL NOT REPORTED 10/26/2017    VLDL NOT REPORTED 10/04/2016     Lab Results   Component Value Date    CHOLHDLRATIO 2.3 03/26/2019    CHOLHDLRATIO 2.1 10/26/2017    CHOLHDLRATIO 2.5 10/04/2016       Lab Results   Component Value Date    LABA1C 5.3 10/04/2016       No results found for: [id number]    No results found for: FOLATE    No results found for: IRON, TIBC, FERRITIN    Lab Results   Component Value Date    VITD25 59.1 10/04/2016             Current Outpatient Medications   Medication Sig Dispense Refill    diclofenac (SOLARAZE) 3 % gel Apply topically 2 times daily as needed 1 Tube 3    gabapentin (NEURONTIN) 300 MG capsule TAKE 1 CAPSULE BY MOUTH TWICE DAILY 60 capsule 0    ACETAMINOPHEN EXTRA STRENGTH 500 MG tablet TAKE 1 TABLET BY MOUTH EVERY 6 HOURS AS NEEDED FOR PAIN 120 tablet 0    cetirizine (ZYRTEC) 10 MG tablet TAKE 1 TABLET BY MOUTH DAILY 30 tablet 0    FLUoxetine (PROZAC) 20 MG capsule TAKE 3 CAPSULES BY MOUTH EVERY DAY 90 capsule 0    cyclobenzaprine (FLEXERIL) 10 MG tablet TAKE 1 TABLET BY MOUTH TWICE DAILY AS NEEDED FOR MUSCLE SPASMS 60 tablet 0    ASPIRIN ADULT LOW STRENGTH 81 MG EC 3/25/2019 9/25/2018 4/16/2018   PHQ2 Score 0 0 0   PHQ9 Score 0 0 0     Interpretation of Total Score Depression Severity: 1-4 = Minimal depression, 5-9 = Mild depression, 10-14 = Moderate depression, 15-19 = Moderately severe depression, 20-27 = Severe depression    The patient'spast medical, surgical, social, and family history as well as her   current medications and allergies were reviewed as documented in today's encounter. Medications, labs, diagnostic studies, consultations andfollow-up as documented in this encounter. Return in about 3 months (around 4/29/2020). Patient wasseen with total face to face time of 25 minutes. More than 50% of this visit was counseling and education. Future Appointments   Date Time Provider Matt Chary   4/29/2020  2:45 PM Max Patton MD [address]     This note was completed by using the assistance of a speech-recognition program. However, inadvertent computerized transcription errors may be present. Althoughevery effort was made to ensure accuracy, no guarantees can be provided that every mistake has been identified and corrected by editing.   Electronically signed by Max Patton MD on 1/29/2020  3:35 PM

## 2020-02-06 RX ORDER — FLUOXETINE HYDROCHLORIDE 20 MG/1
CAPSULE ORAL
Qty: 90 CAPSULE | Refills: 0 | Status: SHIPPED | OUTPATIENT
Start: 2020-02-06 | End: 2020-03-09

## 2020-02-08 ENCOUNTER — HOSPITAL ENCOUNTER (OUTPATIENT)
Dept: MRI IMAGING | Age: 60
Discharge: HOME | End: 2020-02-10
Payer: MEDICARE

## 2020-02-08 PROCEDURE — 72148 MRI LUMBAR SPINE W/O DYE: CPT

## 2020-02-13 RX ORDER — MULTIVITAMIN
TABLET ORAL
Qty: 90 TABLET | Refills: 0 | Status: SHIPPED | OUTPATIENT
Start: 2020-02-13 | End: 2020-05-26 | Stop reason: SDUPTHER

## 2020-02-25 RX ORDER — GABAPENTIN 300 MG/1
CAPSULE ORAL
Qty: 60 CAPSULE | Refills: 0 | Status: SHIPPED | OUTPATIENT
Start: 2020-02-25 | End: 2020-03-23

## 2020-02-25 NOTE — TELEPHONE ENCOUNTER
Please Approve or Refuse.   Send to Pharmacy per Pt's Request:      Next Visit Date:  4/29/2020   Last Visit Date: 1/29/2020    Hemoglobin A1C (%)   Date Value   10/04/2016 5.3   08/24/2012 5.2             ( goal A1C is < 7)   BP Readings from Last 3 Encounters:   01/29/20 132/86   10/29/19 131/81   09/04/19 136/84          (goal 120/80)  BUN   Date Value Ref Range Status   03/26/2019 11 6 - 20 mg/dL Final     CREATININE   Date Value Ref Range Status   03/26/2019 0.78 0.50 - 0.90 mg/dL Final     Potassium   Date Value Ref Range Status   03/26/2019 4.1 3.7 - 5.3 mmol/L Final

## 2020-03-06 ENCOUNTER — TELEPHONE (OUTPATIENT)
Dept: FAMILY MEDICINE CLINIC | Age: 60
End: 2020-03-06

## 2020-03-06 ENCOUNTER — HOSPITAL ENCOUNTER (OUTPATIENT)
Dept: PAIN MANAGEMENT | Age: 60
Discharge: HOME | End: 2020-03-06
Payer: MEDICARE

## 2020-03-06 VITALS
HEIGHT: 62 IN | HEART RATE: 80 BPM | RESPIRATION RATE: 17 BRPM | BODY MASS INDEX: 30.91 KG/M2 | SYSTOLIC BLOOD PRESSURE: 164 MMHG | WEIGHT: 168 LBS | DIASTOLIC BLOOD PRESSURE: 92 MMHG | TEMPERATURE: 97.9 F

## 2020-03-06 PROCEDURE — 99204 OFFICE O/P NEW MOD 45 MIN: CPT

## 2020-03-06 PROCEDURE — 99214 OFFICE O/P EST MOD 30 MIN: CPT | Performed by: PAIN MEDICINE

## 2020-03-06 PROCEDURE — 80307 DRUG TEST PRSMV CHEM ANLYZR: CPT

## 2020-03-06 ASSESSMENT — PAIN DESCRIPTION - LOCATION: LOCATION: BACK;LEG;NECK

## 2020-03-06 ASSESSMENT — PAIN DESCRIPTION - ONSET: ONSET: ON-GOING

## 2020-03-06 ASSESSMENT — ENCOUNTER SYMPTOMS
SORE THROAT: 0
DIARRHEA: 0
ALLERGIC/IMMUNOLOGIC NEGATIVE: 1
WHEEZING: 0
EYE PAIN: 0
EYE DISCHARGE: 0
BACK PAIN: 1
NAUSEA: 0
GASTROINTESTINAL NEGATIVE: 1
SHORTNESS OF BREATH: 0
COUGH: 0
VOMITING: 0
RESPIRATORY NEGATIVE: 1
EYES NEGATIVE: 1
CONSTIPATION: 0

## 2020-03-06 ASSESSMENT — PAIN DESCRIPTION - FREQUENCY: FREQUENCY: INTERMITTENT

## 2020-03-06 ASSESSMENT — PAIN DESCRIPTION - PAIN TYPE: TYPE: CHRONIC PAIN

## 2020-03-06 ASSESSMENT — PAIN DESCRIPTION - PROGRESSION: CLINICAL_PROGRESSION: NOT CHANGED

## 2020-03-06 ASSESSMENT — PAIN - FUNCTIONAL ASSESSMENT: PAIN_FUNCTIONAL_ASSESSMENT: PREVENTS OR INTERFERES SOME ACTIVE ACTIVITIES AND ADLS

## 2020-03-06 NOTE — TELEPHONE ENCOUNTER
Received a fax from [facility] regarding permission to hold ASA 81 mg for 7 days. Please advise.

## 2020-03-09 LAB
6-ACETYLMORPHINE, UR: NOT DETECTED
7-AMINOCLONAZEPAM, URINE: NOT DETECTED
ALPHA-OH-ALPRAZ, URINE: NOT DETECTED
ALPRAZOLAM, URINE: NOT DETECTED
AMPHETAMINES, URINE: NOT DETECTED
BARBITURATES, URINE: NOT DETECTED
BENZOYLECGONINE, UR: NOT DETECTED
BUPRENORPHINE URINE: NOT DETECTED
CARISOPRODOL, UR: NOT DETECTED
CLONAZEPAM, URINE: NOT DETECTED
CODEINE, URINE: NOT DETECTED
CREATININE URINE: <20 MG/DL (ref 20–400)
DIAZEPAM, URINE: NOT DETECTED
DRUGS EXPECTED, UR: ABNORMAL
EER HI RES INTERP UR: ABNORMAL
ETHYL GLUCURONIDE UR: NOT DETECTED
FENTANYL URINE: NOT DETECTED
HYDROCODONE, URINE: NOT DETECTED
HYDROMORPHONE, URINE: NOT DETECTED
LORAZEPAM, URINE: NOT DETECTED
MARIJUANA METAB, UR: NOT DETECTED
MDA, UR: NOT DETECTED
MDEA, EVE, UR: NOT DETECTED
MDMA URINE: NOT DETECTED
MEPERIDINE METAB, UR: NOT DETECTED
METHADONE, URINE: NOT DETECTED
METHAMPHETAMINE, URINE: NOT DETECTED
METHYLPHENIDATE: NOT DETECTED
MIDAZOLAM, URINE: NOT DETECTED
MORPHINE URINE: NOT DETECTED
NORBUPRENORPHINE, URINE: NOT DETECTED
NORDIAZEPAM, URINE: NOT DETECTED
NORFENTANYL, URINE: NOT DETECTED
NORHYDROCODONE, URINE: NOT DETECTED
NOROXYCODONE, URINE: NOT DETECTED
NOROXYMORPHONE, URINE: NOT DETECTED
OXAZEPAM, URINE: NOT DETECTED
OXYCODONE URINE: NOT DETECTED
OXYMORPHONE, URINE: NOT DETECTED
PAIN MANAGEMENT DRUG PANEL INTERP, URINE: ABNORMAL
PAIN MGT DRUG PANEL, HI RES, UR: ABNORMAL
PCP,URINE: NOT DETECTED
PHENTERMINE, UR: NOT DETECTED
PROPOXYPHENE, URINE: NOT DETECTED
TAPENTADOL, URINE: NOT DETECTED
TAPENTADOL-O-SULFATE, URINE: NOT DETECTED
TEMAZEPAM, URINE: NOT DETECTED
TRAMADOL, URINE: NOT DETECTED
ZOLPIDEM, URINE: NOT DETECTED

## 2020-03-09 RX ORDER — PSEUDOEPHED/ACETAMINOPH/DIPHEN 30MG-500MG
TABLET ORAL
Qty: 120 TABLET | Refills: 3 | Status: SHIPPED | OUTPATIENT
Start: 2020-03-09 | End: 2020-07-01

## 2020-03-09 RX ORDER — FLUOXETINE HYDROCHLORIDE 20 MG/1
CAPSULE ORAL
Qty: 90 CAPSULE | Refills: 3 | Status: SHIPPED | OUTPATIENT
Start: 2020-03-09 | End: 2020-07-01

## 2020-03-23 RX ORDER — GABAPENTIN 300 MG/1
CAPSULE ORAL
Qty: 60 CAPSULE | Refills: 0 | Status: SHIPPED | OUTPATIENT
Start: 2020-03-23 | End: 2020-04-23

## 2020-03-23 NOTE — TELEPHONE ENCOUNTER
Please Approve or Refuse.   Send to Pharmacy per Pt's Request:      Next Visit Date:  4/29/2020   Last Visit Date: 1/29/2020    Hemoglobin A1C (%)   Date Value   10/04/2016 5.3   08/24/2012 5.2             ( goal A1C is < 7)   BP Readings from Last 3 Encounters:   03/06/20 (!) 164/92   01/29/20 132/86   10/29/19 131/81          (goal 120/80)  BUN   Date Value Ref Range Status   03/26/2019 11 6 - 20 mg/dL Final     CREATININE   Date Value Ref Range Status   03/26/2019 0.78 0.50 - 0.90 mg/dL Final     Potassium   Date Value Ref Range Status   03/26/2019 4.1 3.7 - 5.3 mmol/L Final

## 2020-04-23 RX ORDER — GABAPENTIN 300 MG/1
CAPSULE ORAL
Qty: 60 CAPSULE | Refills: 0 | Status: SHIPPED | OUTPATIENT
Start: 2020-04-23 | End: 2020-05-25 | Stop reason: SDUPTHER

## 2020-04-24 RX ORDER — NITROGLYCERIN 0.4 MG/1
TABLET SUBLINGUAL
Qty: 25 TABLET | Refills: 0 | Status: SHIPPED | OUTPATIENT
Start: 2020-04-24 | End: 2020-09-01 | Stop reason: SDUPTHER

## 2020-05-06 RX ORDER — CETIRIZINE HYDROCHLORIDE 10 MG/1
10 TABLET ORAL DAILY
Qty: 90 TABLET | Refills: 2 | Status: SHIPPED | OUTPATIENT
Start: 2020-05-06 | End: 2021-01-20 | Stop reason: SDUPTHER

## 2020-05-06 RX ORDER — ASPIRIN 81 MG/1
TABLET, COATED ORAL
Qty: 30 TABLET | Refills: 2 | Status: SHIPPED | OUTPATIENT
Start: 2020-05-06 | End: 2020-07-27 | Stop reason: SDUPTHER

## 2020-05-06 RX ORDER — CYCLOBENZAPRINE HCL 10 MG
TABLET ORAL
Qty: 60 TABLET | Refills: 0 | Status: SHIPPED | OUTPATIENT
Start: 2020-05-06 | End: 2020-06-02

## 2020-05-06 RX ORDER — ENALAPRIL MALEATE 10 MG/1
TABLET ORAL
Qty: 90 TABLET | Refills: 2 | Status: SHIPPED | OUTPATIENT
Start: 2020-05-06 | End: 2020-07-27 | Stop reason: SDUPTHER

## 2020-05-26 RX ORDER — CALCIUM CARBONATE/VITAMIN D3 600 MG-10
TABLET ORAL
Qty: 90 TABLET | Refills: 0 | Status: SHIPPED | OUTPATIENT
Start: 2020-05-26 | End: 2020-08-18

## 2020-05-26 NOTE — TELEPHONE ENCOUNTER
Please Approve or Refuse.   Send to Pharmacy per Pt's Request:     RX: Greg Schulte     Next Visit Date:  5/26/2020   Last Visit Date: 1/29/2020    Hemoglobin A1C (%)   Date Value   10/04/2016 5.3   08/24/2012 5.2             ( goal A1C is < 7)   BP Readings from Last 3 Encounters:   03/06/20 (!) 164/92   01/29/20 132/86   10/29/19 131/81          (goal 120/80)  BUN   Date Value Ref Range Status   03/26/2019 11 6 - 20 mg/dL Final     CREATININE   Date Value Ref Range Status   03/26/2019 0.78 0.50 - 0.90 mg/dL Final     Potassium   Date Value Ref Range Status   03/26/2019 4.1 3.7 - 5.3 mmol/L Final

## 2020-05-27 RX ORDER — GABAPENTIN 300 MG/1
CAPSULE ORAL
Qty: 60 CAPSULE | Refills: 0 | Status: SHIPPED | OUTPATIENT
Start: 2020-05-27 | End: 2020-06-23

## 2020-06-02 RX ORDER — CYCLOBENZAPRINE HCL 10 MG
TABLET ORAL
Qty: 60 TABLET | Refills: 0 | Status: SHIPPED | OUTPATIENT
Start: 2020-06-02 | End: 2020-07-01

## 2020-06-23 RX ORDER — GABAPENTIN 300 MG/1
CAPSULE ORAL
Qty: 60 CAPSULE | Refills: 0 | Status: SHIPPED | OUTPATIENT
Start: 2020-06-23 | End: 2020-07-20

## 2020-06-30 ENCOUNTER — OFFICE VISIT (OUTPATIENT)
Dept: FAMILY MEDICINE CLINIC | Age: 60
End: 2020-06-30
Payer: MEDICARE

## 2020-06-30 VITALS
SYSTOLIC BLOOD PRESSURE: 130 MMHG | WEIGHT: 176 LBS | BODY MASS INDEX: 32.39 KG/M2 | HEART RATE: 72 BPM | OXYGEN SATURATION: 97 % | HEIGHT: 62 IN | DIASTOLIC BLOOD PRESSURE: 70 MMHG

## 2020-06-30 PROBLEM — E66.811 CLASS 1 OBESITY DUE TO EXCESS CALORIES WITH SERIOUS COMORBIDITY AND BODY MASS INDEX (BMI) OF 32.0 TO 32.9 IN ADULT: Status: ACTIVE | Noted: 2020-06-30

## 2020-06-30 PROBLEM — E66.09 CLASS 1 OBESITY DUE TO EXCESS CALORIES WITH SERIOUS COMORBIDITY AND BODY MASS INDEX (BMI) OF 32.0 TO 32.9 IN ADULT: Status: ACTIVE | Noted: 2020-06-30

## 2020-06-30 PROCEDURE — 99214 OFFICE O/P EST MOD 30 MIN: CPT | Performed by: FAMILY MEDICINE

## 2020-06-30 PROCEDURE — G8427 DOCREV CUR MEDS BY ELIG CLIN: HCPCS | Performed by: FAMILY MEDICINE

## 2020-06-30 PROCEDURE — 1036F TOBACCO NON-USER: CPT | Performed by: FAMILY MEDICINE

## 2020-06-30 PROCEDURE — G8417 CALC BMI ABV UP PARAM F/U: HCPCS | Performed by: FAMILY MEDICINE

## 2020-06-30 PROCEDURE — 3017F COLORECTAL CA SCREEN DOC REV: CPT | Performed by: FAMILY MEDICINE

## 2020-06-30 RX ORDER — LIDOCAINE 4 G/G
1 PATCH TOPICAL DAILY
Qty: 30 PATCH | Refills: 0 | Status: SHIPPED | OUTPATIENT
Start: 2020-06-30 | End: 2020-07-27

## 2020-06-30 ASSESSMENT — ENCOUNTER SYMPTOMS
DIARRHEA: 0
NAUSEA: 0
VOMITING: 0
BACK PAIN: 1
ABDOMINAL PAIN: 0
ABDOMINAL DISTENTION: 0
SHORTNESS OF BREATH: 0
BLOOD IN STOOL: 0
RHINORRHEA: 0
SINUS PRESSURE: 0
COUGH: 0
CHEST TIGHTNESS: 0
FACIAL SWELLING: 0
WHEEZING: 0

## 2020-06-30 NOTE — PROGRESS NOTES
Visit Information    Have you changed or started any medications since your last visit including any over-the-counter medicines, vitamins, or herbal medicines? no   Are you having any side effects from any of your medications? -  no  Have you stopped taking any of your medications? Is so, why? -  no    Have you seen any other physician or provider since your last visit? No  Have you had any other diagnostic tests since your last visit? No  Have you been seen in the emergency room and/or had an admission to a hospital since we last saw you? No  Have you had your routine dental cleaning in the past 6 months? no    Have you activated your [employer] account? If not, what are your barriers?  Yes     Patient Care Team:  Abby Espinal MD as PCP - General (Family Medicine)  Abby Espinal MD as PCP - [facility]    Medical History Review  Past Medical, Family, and Social History reviewed and does contribute to the patient presenting condition    Health Maintenance   Topic Date Due    Lipid screen  03/26/2020    Potassium monitoring  03/26/2020    Creatinine monitoring  03/26/2020    Breast cancer screen  04/11/2021    Cervical cancer screen  04/16/2023    Colon cancer screen colonoscopy  05/06/2024    DTaP/Tdap/Td vaccine (2 - Td) 04/16/2028    Flu vaccine  Completed    Shingles Vaccine  Completed    Pneumococcal 0-64 years Vaccine  Completed    Hepatitis C screen  Completed    HIV screen  Completed    Hepatitis A vaccine  Aged Out    Hepatitis B vaccine  Aged Out    Hib vaccine  Aged Out    Meningococcal (ACWY) vaccine  Aged Out

## 2020-06-30 NOTE — PROGRESS NOTES
116 03/26/2019    CHOL 137 10/26/2017    CHOL 142 10/04/2016     Lab Results   Component Value Date    TRIG 91 03/26/2019    TRIG 108 10/26/2017    TRIG 111 10/04/2016     Lab Results   Component Value Date    HDL 51 03/26/2019    HDL 64 10/26/2017    HDL 56 10/04/2016     Lab Results   Component Value Date    LDLCHOLESTEROL 47 03/26/2019    LDLCHOLESTEROL 51 10/26/2017    LDLCHOLESTEROL 64 10/04/2016     Lab Results   Component Value Date    VLDL NOT REPORTED 03/26/2019    VLDL NOT REPORTED 10/26/2017    VLDL NOT REPORTED 10/04/2016     Lab Results   Component Value Date    CHOLHDLRATIO 2.3 03/26/2019    CHOLHDLRATIO 2.1 10/26/2017    CHOLHDLRATIO 2.5 10/04/2016       Lab Results   Component Value Date    LABA1C 5.3 10/04/2016       No results found for: [id number]    No results found for: FOLATE    No results found for: IRON, TIBC, FERRITIN    Lab Results   Component Value Date    VITD25 59.1 10/04/2016             Current Outpatient Medications   Medication Sig Dispense Refill    lidocaine 4 % external patch Place 1 patch onto the skin daily 30 patch 0    gabapentin (NEURONTIN) 300 MG capsule TAKE 1 CAPSULE BY MOUTH TWICE DAILY 60 capsule 0    cyclobenzaprine (FLEXERIL) 10 MG tablet TAKE 1 TABLET BY MOUTH TWICE DAILY AS NEEDED FOR MUSCLE SPASMS 60 tablet 0    Calcium Carb-Cholecalciferol (CALCIUM-VITAMIN D) 600-400 MG-UNIT TABS TAKE 1 TABLET BY MOUTH DAILY 90 tablet 0    cetirizine (ZYRTEC) 10 MG tablet TAKE 1 TABLET BY MOUTH DAILY 90 tablet 2    enalapril (VASOTEC) 10 MG tablet TAKE 2 TABLETS BY MOUTH EVERY MORNING AND 1 TABLET BY MOUTH EVERY EVENING 90 tablet 2    ASPIRIN LOW DOSE 81 MG EC tablet TAKE 1 TABLET BY MOUTH DAILY 30 tablet 2    nitroGLYCERIN (NITROSTAT) 0.4 MG SL tablet DISSOLVE 1 TABLET UNDER THE TONGUE EVERY 5 MINUTES AS NEEDED FOR CHEST PAIN.  IF NO RELIEF AFTER 1 DOSE, CALL 911 25 tablet 0    busPIRone (BUSPAR) 15 MG tablet TAKE 1 TABLET BY MOUTH EVERY 12 HOURS AS NEEDED 120 tablet 3    ACETAMINOPHEN EXTRA STRENGTH 500 MG tablet TAKE 1 TABLET BY MOUTH EVERY 6 HOURS AS NEEDED FOR PAIN 120 tablet 3    FLUoxetine (PROZAC) 20 MG capsule TAKE 3 CAPSULES BY MOUTH EVERY DAY 90 capsule 3    isosorbide mononitrate (IMDUR) 30 MG extended release tablet TAKE 1 TABLET BY MOUTH EVERY DAY 90 tablet 2    meloxicam (MOBIC) 15 MG tablet TAKE 1 TABLET BY MOUTH DAILY 120 tablet 2    metoprolol succinate (TOPROL XL) 50 MG extended release tablet TAKE 1 TABLET BY MOUTH DAILY 90 tablet 1    diclofenac (SOLARAZE) 3 % gel Apply topically 2 times daily as needed 1 Tube 3    albuterol sulfate  (90 Base) MCG/ACT inhaler INHALE 2 PUFFS INTO THE LUNGS EVERY 6 HOURS AS NEEDED FOR WHEEZING 36 g 0    lidocaine (LMX) 4 % cream Apply topically every 8 hrs as needed for pain 45 g 3    simvastatin (ZOCOR) 40 MG tablet TAKE 1 TABLET BY MOUTH EVERY NIGHT 90 tablet 3    Heat Wraps (SOFTHEAT HEATING WRAP ULTRA) MISC Use daily for neck pain 1 each 0     No current facility-administered medications for this visit.               Social History     Socioeconomic History    Marital status:      Spouse name: Not on file    Number of children: Not on file    Years of education: Not on file    Highest education level: Not on file   Occupational History    Not on file   Social Needs    Financial resource strain: Not on file    Food insecurity     Worry: Not on file     Inability: Not on file    Transportation needs     Medical: Not on file     Non-medical: Not on file   Tobacco Use    Smoking status: Former Smoker     Packs/day: 0.25     Years: 25.00     Pack years: 6.25     Types: Cigarettes     Last attempt to quit: 2009     Years since quittin.0    Smokeless tobacco: Never Used   Substance and Sexual Activity    Alcohol use: No     Alcohol/week: 0.0 standard drinks    Drug use: No    Sexual activity: Yes   Lifestyle    Physical activity     Days per week: Not on file     Minutes per session: Not on

## 2020-07-01 ENCOUNTER — HOSPITAL ENCOUNTER (OUTPATIENT)
Age: 60
Discharge: HOME | End: 2020-07-01
Payer: MEDICARE

## 2020-07-01 LAB
ALBUMIN SERPL-MCNC: 4.1 G/DL (ref 3.5–5.2)
ALBUMIN/GLOBULIN RATIO: ABNORMAL (ref 1–2.5)
ALP BLD-CCNC: 75 U/L (ref 35–104)
ALT SERPL-CCNC: 13 U/L (ref 5–33)
ANION GAP SERPL CALCULATED.3IONS-SCNC: 10 MMOL/L (ref 9–17)
AST SERPL-CCNC: 19 U/L
BILIRUB SERPL-MCNC: 0.38 MG/DL (ref 0.3–1.2)
BUN BLDV-MCNC: 15 MG/DL (ref 8–23)
BUN/CREAT BLD: ABNORMAL (ref 9–20)
CALCIUM SERPL-MCNC: 9.8 MG/DL (ref 8.6–10.4)
CHLORIDE BLD-SCNC: 102 MMOL/L (ref 98–107)
CHOLESTEROL, FASTING: 144 MG/DL
CHOLESTEROL/HDL RATIO: 2.4
CO2: 26 MMOL/L (ref 20–31)
CREAT SERPL-MCNC: 0.85 MG/DL (ref 0.5–0.9)
CREATININE URINE: 141.5 MG/DL (ref 28–217)
ESTIMATED AVERAGE GLUCOSE: 117 MG/DL
GFR AFRICAN AMERICAN: >60 ML/MIN
GFR NON-AFRICAN AMERICAN: >60 ML/MIN
GFR SERPL CREATININE-BSD FRML MDRD: ABNORMAL ML/MIN/{1.73_M2}
GFR SERPL CREATININE-BSD FRML MDRD: ABNORMAL ML/MIN/{1.73_M2}
GLUCOSE BLD-MCNC: 102 MG/DL (ref 70–99)
HBA1C MFR BLD: 5.7 % (ref 4–6)
HCT VFR BLD CALC: 46 % (ref 36–46)
HDLC SERPL-MCNC: 59 MG/DL
HEMOGLOBIN: 15.4 G/DL (ref 12–16)
LDL CHOLESTEROL: 67 MG/DL (ref 0–130)
MCH RBC QN AUTO: 32.4 PG (ref 26–34)
MCHC RBC AUTO-ENTMCNC: 33.4 G/DL (ref 31–37)
MCV RBC AUTO: 96.8 FL (ref 80–100)
MICROALBUMIN/CREAT 24H UR: 65 MG/L
MICROALBUMIN/CREAT UR-RTO: 46 MCG/MG CREAT
NRBC AUTOMATED: NORMAL PER 100 WBC
PDW BLD-RTO: 14.2 % (ref 11.5–14.9)
PLATELET # BLD: 211 K/UL (ref 150–450)
PMV BLD AUTO: 8.3 FL (ref 6–12)
POTASSIUM SERPL-SCNC: 5.2 MMOL/L (ref 3.7–5.3)
RBC # BLD: 4.75 M/UL (ref 4–5.2)
SODIUM BLD-SCNC: 138 MMOL/L (ref 135–144)
TOTAL PROTEIN: 7.5 G/DL (ref 6.4–8.3)
TRIGLYCERIDE, FASTING: 91 MG/DL
TSH SERPL DL<=0.05 MIU/L-ACNC: 3.25 MIU/L (ref 0.3–5)
VLDLC SERPL CALC-MCNC: NORMAL MG/DL (ref 1–30)
WBC # BLD: 6.8 K/UL (ref 3.5–11)

## 2020-07-01 PROCEDURE — 82043 UR ALBUMIN QUANTITATIVE: CPT

## 2020-07-01 PROCEDURE — 80061 LIPID PANEL: CPT

## 2020-07-01 PROCEDURE — 85027 COMPLETE CBC AUTOMATED: CPT

## 2020-07-01 PROCEDURE — 80053 COMPREHEN METABOLIC PANEL: CPT

## 2020-07-01 PROCEDURE — 36415 COLL VENOUS BLD VENIPUNCTURE: CPT

## 2020-07-01 PROCEDURE — 83036 HEMOGLOBIN GLYCOSYLATED A1C: CPT

## 2020-07-01 PROCEDURE — 84443 ASSAY THYROID STIM HORMONE: CPT

## 2020-07-01 PROCEDURE — 82570 ASSAY OF URINE CREATININE: CPT

## 2020-07-01 RX ORDER — FLUOXETINE HYDROCHLORIDE 20 MG/1
CAPSULE ORAL
Qty: 90 CAPSULE | Refills: 3 | Status: SHIPPED | OUTPATIENT
Start: 2020-07-01 | End: 2020-09-24 | Stop reason: SDUPTHER

## 2020-07-01 RX ORDER — PSEUDOEPHED/ACETAMINOPH/DIPHEN 30MG-500MG
TABLET ORAL
Qty: 120 TABLET | Refills: 3 | Status: SHIPPED | OUTPATIENT
Start: 2020-07-01 | End: 2020-10-21

## 2020-07-01 RX ORDER — CYCLOBENZAPRINE HCL 10 MG
TABLET ORAL
Qty: 60 TABLET | Refills: 0 | Status: SHIPPED | OUTPATIENT
Start: 2020-07-01 | End: 2020-07-27

## 2020-07-01 NOTE — TELEPHONE ENCOUNTER
Please Approve or Refuse.   Send to Pharmacy per Pt's Request:      Next Visit Date:  11/3/2020   Last Visit Date: 6/30/2020    Hemoglobin A1C (%)   Date Value   10/04/2016 5.3   08/24/2012 5.2             ( goal A1C is < 7)   BP Readings from Last 3 Encounters:   06/30/20 130/70   03/06/20 (!) 164/92   01/29/20 132/86          (goal 120/80)  BUN   Date Value Ref Range Status   03/26/2019 11 6 - 20 mg/dL Final     CREATININE   Date Value Ref Range Status   03/26/2019 0.78 0.50 - 0.90 mg/dL Final     Potassium   Date Value Ref Range Status   03/26/2019 4.1 3.7 - 5.3 mmol/L Final

## 2020-07-07 ASSESSMENT — ENCOUNTER SYMPTOMS
NAUSEA: 0
SORE THROAT: 0
ALLERGIC/IMMUNOLOGIC NEGATIVE: 1
EYES NEGATIVE: 1
VOMITING: 0
COUGH: 0
RESPIRATORY NEGATIVE: 1
EYE DISCHARGE: 0
EYE PAIN: 0
BACK PAIN: 1
DIARRHEA: 0
GASTROINTESTINAL NEGATIVE: 1
WHEEZING: 0
CONSTIPATION: 0
SHORTNESS OF BREATH: 0

## 2020-07-07 NOTE — PROGRESS NOTES
Leatha Liriano is a 61 y.o. female evaluated on 7/8/2020. Modality of virtual service provided -via Video+audio   Consent:  Patient and/or health care decision maker is aware that that patient may receive a bill for this telephone service, depending on one's insurance coverage, and has provided verbal consent to proceed: Yes    Patient identification was verified at the start of the visit: Yes    Chief complaint: Leatha Liriano is 61 y.o.,  female, with  No chief complaint on file. .    Patient is complaining of pain involving the low back area as well as in the cervical region. She reports she started physical therapy and since then she developed numbness in the lower extremities. Patient pain radiates from the low back to the right lower extremity. She is also has a right thigh numbness  Patient reports he tried physical therapy without much improvement. She is using lidocaine patch which is helping her pain to a certain extent. Back Pain   This is a chronic problem. The current episode started more than 1 year ago. The problem occurs constantly. The problem has been gradually worsening since onset. The pain is present in the lumbar spine and sacro-iliac. The quality of the pain is described as aching, shooting and stabbing. The pain radiates to the right thigh. The pain is at a severity of 6/10 (3-8). The pain is moderate. The pain is worse during the day. The symptoms are aggravated by bending, position, lying down, standing and sitting (walking, lifting, ADLs). Stiffness is present: numbness rt. thigh. Associated symptoms include headaches, numbness and tingling. Pertinent negatives include no chest pain, dysuria or fever. (Mostly in the right thigh mostly in the right thigh) Risk factors include lack of exercise and sedentary lifestyle (COPD, smoking).         Alleviating factors:heat and rest   Lifestyle changes experienced with pain: Prevents or limits ADLs, Increases w/prolonged sitting/standing/walking  Mood changes,none  Patient currently unemployed. Physical therapy did not help the pain. Are you under psychological counseling at present: No  Goals for treatment include:  Decrease in pain  Enjoy daily and recreational activities, return to previous status. Patient relates current medications are helping the pain. Patient reports taking pain medications as prescribed, denies obtaining medications from different sources and denies use of illegal drugs. Patient denies side effects from medications like nausea, vomiting, constipation or drowsiness. Patient reports current activities of daily living ar possible due to medications and would like to continue them. ADVERSE MEDICATION EFFECTS:   Nausea and vomiting: no   Constipation: no-Undercontrol-: yes  Dizziness/drowsy/sleepy--no  Urinary Retention: no    ACTIVITY/SOCIAL/EMOTIONAL:  Sleep Pattern: 8 hours per night.  generally restful sleep  Home Exercises: -streaching daily   Activity:decreased  Emotional Issues: normal.   Currently seeing a Psychiatrist or Psychologist:  No      ABERRANT BEHAVIORS SINCE LAST VISIT  Lost rx/pills:------------------------------------------ no  Taking  medication as prescribed: ----------- yes  Urine Drug Screen ---------------------------------  yes  Recent ER visits: -------------------------------------No  Pill count is appropriate: ---------------------------yes   Refills for prescriptions appropriate:---------- yes      Past Medical History:   Diagnosis Date    ARIADNE (acute kidney injury) ([facility] [address] 9/4/2019    Anxiety     Asthma     CAD (coronary artery disease)     Class 1 obesity due to excess calories with serious comorbidity and body mass index (BMI) of 32.0 to 32.9 in adult 6/30/2020    Depression     Examination of participant in clinical trial 6/23/11    End date 2/2015    HTN (hypertension)     Lumbar radiculopathy     Mixed hyperlipidemia 1/15/2018    OA (osteoarthritis)     PVD (peripheral vascular disease) with claudication ([facility] [address] 2015       Past Surgical History:   Procedure Laterality Date     SECTION      COLONOSCOPY N/A 2019    COLORECTAL CANCER SCREENING, NOT HIGH RISK performed by Ifeoma Gonzales MD at [address]      2 stents    DILATION AND CURETTAGE OF UTERUS N/A     uterine polyp       Family History   Problem Relation Age of Onset    Emphysema Mother        Social History     Socioeconomic History    Marital status:      Spouse name: None    Number of children: None    Years of education: None    Highest education level: None   Occupational History    None   Social Needs    Financial resource strain: None    Food insecurity     Worry: None     Inability: None    Transportation needs     Medical: None     Non-medical: None   Tobacco Use    Smoking status: Former Smoker     Packs/day: 0.25     Years: 25.00     Pack years: 6.25     Types: Cigarettes     Last attempt to quit: 2009     Years since quittin.1    Smokeless tobacco: Never Used   Substance and Sexual Activity    Alcohol use: No     Alcohol/week: 0.0 standard drinks    Drug use: No    Sexual activity: Yes   Lifestyle    Physical activity     Days per week: None     Minutes per session: None    Stress: None   Relationships    Social connections     Talks on phone: None     Gets together: None     Attends Bahai service: None     Active member of club or organization: None     Attends meetings of clubs or organizations: None     Relationship status: None    Intimate partner violence     Fear of current or ex partner: None     Emotionally abused: None     Physically abused: None     Forced sexual activity: None   Other Topics Concern    None   Social History Narrative    None       Allergies   Allergen Reactions    Claritin [Loratadine]      2010 Heart palpitations  2010 Heart palpitations    Codeine Nausea Only       Current Outpatient Medications on File Prior to Encounter   Medication Sig Dispense Refill    ACETAMINOPHEN EXTRA STRENGTH 500 MG tablet TAKE 1 TABLET BY MOUTH EVERY 6 HOURS AS NEEDED FOR PAIN 120 tablet 3    cyclobenzaprine (FLEXERIL) 10 MG tablet TAKE 1 TABLET BY MOUTH TWICE DAILY AS NEEDED FOR MUSCLE SPASMS 60 tablet 0    FLUoxetine (PROZAC) 20 MG capsule TAKE 3 CAPSULES BY MOUTH EVERY DAY 90 capsule 3    lidocaine 4 % external patch Place 1 patch onto the skin daily 30 patch 0    gabapentin (NEURONTIN) 300 MG capsule TAKE 1 CAPSULE BY MOUTH TWICE DAILY 60 capsule 0    Calcium Carb-Cholecalciferol (CALCIUM-VITAMIN D) 600-400 MG-UNIT TABS TAKE 1 TABLET BY MOUTH DAILY 90 tablet 0    cetirizine (ZYRTEC) 10 MG tablet TAKE 1 TABLET BY MOUTH DAILY 90 tablet 2    enalapril (VASOTEC) 10 MG tablet TAKE 2 TABLETS BY MOUTH EVERY MORNING AND 1 TABLET BY MOUTH EVERY EVENING 90 tablet 2    ASPIRIN LOW DOSE 81 MG EC tablet TAKE 1 TABLET BY MOUTH DAILY 30 tablet 2    nitroGLYCERIN (NITROSTAT) 0.4 MG SL tablet DISSOLVE 1 TABLET UNDER THE TONGUE EVERY 5 MINUTES AS NEEDED FOR CHEST PAIN.  IF NO RELIEF AFTER 1 DOSE, CALL 911 25 tablet 0    busPIRone (BUSPAR) 15 MG tablet TAKE 1 TABLET BY MOUTH EVERY 12 HOURS AS NEEDED 120 tablet 3    isosorbide mononitrate (IMDUR) 30 MG extended release tablet TAKE 1 TABLET BY MOUTH EVERY DAY 90 tablet 2    meloxicam (MOBIC) 15 MG tablet TAKE 1 TABLET BY MOUTH DAILY 120 tablet 2    metoprolol succinate (TOPROL XL) 50 MG extended release tablet TAKE 1 TABLET BY MOUTH DAILY 90 tablet 1    diclofenac (SOLARAZE) 3 % gel Apply topically 2 times daily as needed 1 Tube 3    albuterol sulfate  (90 Base) MCG/ACT inhaler INHALE 2 PUFFS INTO THE LUNGS EVERY 6 HOURS AS NEEDED FOR WHEEZING 36 g 0    lidocaine (LMX) 4 % cream Apply topically every 8 hrs as needed for pain 45 g 3    simvastatin (ZOCOR) 40 MG tablet TAKE 1 TABLET BY MOUTH EVERY NIGHT 90 tablet 3    Heat Wraps (SOFTHEAT HEATING WRAP ULTRA) MISC Use daily for neck pain 1 each 0     No current facility-administered medications on file prior to encounter. Review of Systems   Constitutional: Negative. Negative for activity change, chills and fever. HENT: Negative. Negative for dental problem, hearing loss and sore throat. Eyes: Negative. Negative for pain and discharge. Respiratory: Negative. Negative for cough, shortness of breath and wheezing. ASthms  SOB on exertion   Cardiovascular: Positive for leg swelling. Negative for chest pain and palpitations. H/ [id number] has a stent   Gastrointestinal: Negative. Negative for constipation, diarrhea, nausea and vomiting. Endocrine: Negative. Negative for heat intolerance and polydipsia. Genitourinary: Negative. Negative for dysuria, frequency and urgency. Musculoskeletal: Positive for back pain and neck pain. Negative for neck stiffness. Skin: Negative. Negative for rash and wound. Allergic/Immunologic: Negative. Negative for food allergies. Neurological: Positive for tingling, numbness and headaches. Negative for tremors and seizures. Hematological: Bruises/bleeds easily. Psychiatric/Behavioral: Negative for self-injury and suicidal ideas. The patient is nervous/anxious. Physical Exam  Skin:         Neurological:      Mental Status: She is alert and oriented to person, place, and time.    Psychiatric:         Mood and Affect: Mood normal.            DATA:  LAB.:  7/1/2020 12:38 PM - Jaime, Vamshi Incoming Lab Results From [employer]     Component Value Ref Range & Units Status Collected Lab   Glucose 102High   70 - 99 mg/dL Final 07/01/2020 11:48 AM [facility]   BUN 15  8 - 23 mg/dL Final 07/01/2020 11:48 AM [facility]   CREATININE 0.85  0.50 - 0.90 mg/dL Final 07/01/2020 11:48 AM [facility]   Bun/Cre Ratio NOT REPORTED  9 - 20 Final 07/01/2020 11:48 AM [facility]   Calcium 9.8 8.6 - 10.4 mg/dL Final 07/01/2020 11:48 AM [facility]   Sodium 138  135 - 144 mmol/L Final 07/01/2020 11:48 AM [facility]   Potassium 5.2  3.7 - 5.3 mmol/L Final 07/01/2020 11:48 AM [facility]   Chloride 102  98 - 107 mmol/L Final 07/01/2020 11:48 AM [facility]   CO2 26  20 - 31 mmol/L Final 07/01/2020 11:48 AM [facility]   Anion Gap 10  9 - 17 mmol/L Final 07/01/2020 11:48 AM [facility]   Alkaline Phosphatase 75  35 - 104 U/L Final 07/01/2020 11:48 AM [facility]   ALT 13  5 - 33 U/L Final 07/01/2020 11:48 AM [facility]   AST 19  <32 U/L Final 07/01/2020 11:48 AM [facility]   Total Bilirubin 0.38  0.3 - 1.2 mg/dL Final 07/01/2020 11:48 AM [facility]   Total Protein 7.5  6.4 - 8.3 g/dL Final 07/01/2020 11:48 AM [facility]   Alb 4.1  3.5 - 5.2 g/dL Final 07/01/2020 11:48 AM [facility]   Albumin/Globulin Ratio NOT REPORTED  1.0 - 2.5 Final 07/01/2020 11:48 AM [facility]   GFR Non-African American >60  >60 mL/min Final 07/01/2020 11:48 AM [facility]   GFR African American >60  >60 mL/min Final 07/01/2020 11:48 AM [facility]   GFR Comment        Final 07/01/2020 11:48 AM Mert Contreras        X-Ray reports:  EXAMINATION:   MRI OF THE LUMBAR SPINE WITHOUT CONTRAST, 2/8/2020 2:30 pm       TECHNIQUE:   Multiplanar multisequence MRI of the lumbar spine was performed without the   administration of intravenous contrast.       COMPARISON:   None       HISTORY:   ORDERING SYSTEM PROVIDED HISTORY: Lumbar degenerative disc disease   TECHNOLOGIST PROVIDED HISTORY:   lower back pain   Reason for Exam: Pt c/o low back pain and right leg pain and numbness on   outside of leg x 6 months.  States did fall Sept 2019.       FINDINGS:   BONES/ALIGNMENT: There is normal alignment of the spine. The vertebral body   heights are maintained. The bone marrow signal appears unremarkable.  No   acute fracture is identified.       The S1 vertebral body is transitional and lumbarized.       SPINAL CORD: The conus terminates normally.       SOFT TISSUES: No paraspinal mass identified.       L1-L2: There is no significant disc herniation, spinal canal stenosis or   neural foraminal narrowing.       L2-L3: Moderate-sized right foraminal disc protrusion is identified resulting   in moderate right foraminal stenosis.       L3-L4: Annular disc bulge and facet arthropathy are identified resulting in   mild central canal stenosis and mild bilateral foraminal stenosis.       L4-L5: Moderate central canal stenosis identified due to shallow disc bulge,   facet arthropathy and ligamentum flavum hypertrophy.  The right foramina is   mildly stenotic.       L5-S1: Broad disc osteophyte complex and facet arthropathy results in severe   bilateral foraminal stenosis and moderate central canal stenosis.         Impression   Transitional S1 vertebral body which is lumbarized.       Moderate-sized right foraminal disc protrusion at L2-L3 resulting in moderate   right foraminal stenosis.       Moderate central canal stenosis and severe bilateral foraminal stenosis at   L5-S1.       Moderate central canal stenosis and mild right foraminal stenosis at L4-L5.       Mild central canal stenosis and mild bilateral foraminal stenosis at L3-L4. Clinical  impression:  1. Lumbar radiculopathy    2. DDD (degenerative disc disease), lumbar    3. Lumbosacral spondylosis without myelopathy    4. Arthropathy of lumbar facet joint    5. Degenerative lumbar spinal stenosis        Plan of care: The following treatment plan was developed after discussion with patient:    We discussed Lumbar Epidural steroid Injections x 1  at L4 - L5.     Patient tried and failed NSAIDS,Home exercises, Physical Therapy, Chiropractic manipulations without relief. Patient exhibited signs of radiculopathy on right    Patient has not had prior Lumbar Surgery. We will see the patient in 2 weeks after the procedures and re-evaluate symptoms. PDMP Monitoring:    Last PDMP Benitez Felix as Reviewed [facility]):  Review User Review Instant Review Result   Justine Alert 7/7/2020  5:31 AM Reviewed PDMP [1]     Counselling/Preventive measures for pain  Control:    [x]  Spine strengthening exercises are discussed with patient in detail. Orders Placed This Encounter   Procedures    Lumbar Epidural Steroid Injection/Caudal     Standing Status:   Future     Standing Expiration Date:   7/8/2021   Delmy Revel For Surgical Procedures     Standing Status:   Future     Standing Expiration Date:   7/8/2021    Saline lock IV     Standing Status:   Future     Standing Expiration Date:   1/8/2022       Decision Making Process : Patient's health history and referral records thoroughly reviewed before focused physical examination and discussion with patient. I have spent 25 mins. Over 50% of today's visit is spent on examining the patient and counseling and coordinating the care. Level of complexity of date to be reviewed is Moderate. The chart date reviewed include the following: Imaging Reports. Summary of Care. Time spent reviewing with patient the below reports:   Medication safety, Treatment options. Level of diagnosis and management options of this case is multiple: involving the following management options: Interventions as needed, medication management as appropriate, future visits, activity modification, heat/ice as needed, Urine drug screen as required. [x]The patient's questions were answered to the best of my abilities. This note was created using voice recognition software. There may be inaccuracies of transcription  that are inadvertently overlooked prior to the signature.   There is any questions about the transcription please contact me.    Return in  4 weeks  with physician / CNP  for further plan of treatment. Due to the COVID-19 pandemic and the appropriate interventions by [address], our non-urgent pain management patients will not be seen in the office at this time for their protection and the protection of our staff. To offer continuity of care, their prescriptions will be escribed this month after a careful chart review and review of their OARRS report  Pursuant to the emergency declaration under the [employer] and [facility], 1135 waiver authority and the [employer] and [employer] Act, this Virtual Visit was conducted, with patient's consent, to reduce the patient's risk of exposure to COVID-19 and provide continuity of care for an established patient. Services were provided through a video synchronous discussion virtually to substitute for in-person appointment. \"  Documentation:  I communicated with the patient and/or health care decision maker about plan of care  Details of this discussion including any medical advice provided: Total Time: minutes: 21-30 minutes    I affirm this is a Patient Initiated Episode with an Established Patient who has not had a related appointment within my department in the past 7 days or scheduled within the next 24 hours.     Electronically signed by Sena Valderrama MD on 7/8/2020 at 7:30 PM

## 2020-07-08 ENCOUNTER — HOSPITAL ENCOUNTER (OUTPATIENT)
Dept: PAIN MANAGEMENT | Age: 60
Discharge: HOME | End: 2020-07-08
Payer: MEDICARE

## 2020-07-08 PROCEDURE — 99213 OFFICE O/P EST LOW 20 MIN: CPT

## 2020-07-08 PROCEDURE — 99214 OFFICE O/P EST MOD 30 MIN: CPT | Performed by: PAIN MEDICINE

## 2020-07-08 PROCEDURE — G0463 HOSPITAL OUTPT CLINIC VISIT: HCPCS

## 2020-07-17 ENCOUNTER — HOSPITAL ENCOUNTER (OUTPATIENT)
Age: 60
Discharge: HOME | End: 2020-07-21
Payer: MEDICARE

## 2020-07-17 PROCEDURE — U0003 INFECTIOUS AGENT DETECTION BY NUCLEIC ACID (DNA OR RNA); SEVERE ACUTE RESPIRATORY SYNDROME CORONAVIRUS 2 (SARS-COV-2) (CORONAVIRUS DISEASE [COVID-19]), AMPLIFIED PROBE TECHNIQUE, MAKING USE OF HIGH THROUGHPUT TECHNOLOGIES AS DESCRIBED BY CMS-2020-01-R: HCPCS

## 2020-07-20 LAB
SARS-COV-2, PCR: NORMAL
SARS-COV-2, RAPID: NORMAL
SARS-COV-2: NOT DETECTED
SOURCE: NORMAL

## 2020-07-20 RX ORDER — GABAPENTIN 300 MG/1
CAPSULE ORAL
Qty: 60 CAPSULE | Refills: 0 | Status: SHIPPED | OUTPATIENT
Start: 2020-07-20 | End: 2020-07-22

## 2020-07-21 ENCOUNTER — HOSPITAL ENCOUNTER (OUTPATIENT)
Dept: GENERAL RADIOLOGY | Age: 60
Discharge: HOME | End: 2020-07-23
Payer: MEDICARE

## 2020-07-21 ENCOUNTER — HOSPITAL ENCOUNTER (OUTPATIENT)
Dept: PAIN MANAGEMENT | Age: 60
Discharge: HOME | End: 2020-07-21
Payer: MEDICARE

## 2020-07-21 VITALS
OXYGEN SATURATION: 96 % | HEART RATE: 71 BPM | DIASTOLIC BLOOD PRESSURE: 71 MMHG | TEMPERATURE: 98.8 F | BODY MASS INDEX: 32.2 KG/M2 | HEIGHT: 62 IN | SYSTOLIC BLOOD PRESSURE: 120 MMHG | WEIGHT: 175 LBS | RESPIRATION RATE: 18 BRPM

## 2020-07-21 PROCEDURE — 62323 NJX INTERLAMINAR LMBR/SAC: CPT

## 2020-07-21 PROCEDURE — 62323 NJX INTERLAMINAR LMBR/SAC: CPT | Performed by: PAIN MEDICINE

## 2020-07-21 PROCEDURE — 6360000004 HC RX CONTRAST MEDICATION: Performed by: PAIN MEDICINE

## 2020-07-21 PROCEDURE — 6360000002 HC RX W HCPCS: Performed by: PAIN MEDICINE

## 2020-07-21 PROCEDURE — 3209999900 FLUORO FOR SURGICAL PROCEDURES

## 2020-07-21 RX ORDER — MIDAZOLAM HYDROCHLORIDE 1 MG/ML
INJECTION INTRAMUSCULAR; INTRAVENOUS
Status: COMPLETED | OUTPATIENT
Start: 2020-07-21 | End: 2020-07-21

## 2020-07-21 RX ORDER — TRIAMCINOLONE ACETONIDE 40 MG/ML
INJECTION, SUSPENSION INTRA-ARTICULAR; INTRAMUSCULAR
Status: COMPLETED | OUTPATIENT
Start: 2020-07-21 | End: 2020-07-21

## 2020-07-21 RX ADMIN — IOHEXOL 2 ML: 180 INJECTION INTRAVENOUS at 12:14

## 2020-07-21 RX ADMIN — MIDAZOLAM 2 MG: 1 INJECTION INTRAMUSCULAR; INTRAVENOUS at 12:10

## 2020-07-21 RX ADMIN — TRIAMCINOLONE ACETONIDE 80 MG: 40 INJECTION, SUSPENSION INTRA-ARTICULAR; INTRAMUSCULAR at 12:13

## 2020-07-21 ASSESSMENT — PAIN DESCRIPTION - ORIENTATION: ORIENTATION: LOWER;RIGHT;LEFT

## 2020-07-21 ASSESSMENT — PAIN DESCRIPTION - PAIN TYPE: TYPE: CHRONIC PAIN

## 2020-07-21 ASSESSMENT — PAIN DESCRIPTION - PROGRESSION: CLINICAL_PROGRESSION: GRADUALLY WORSENING

## 2020-07-21 ASSESSMENT — PAIN SCALES - GENERAL: PAINLEVEL_OUTOF10: 2

## 2020-07-21 ASSESSMENT — PAIN DESCRIPTION - FREQUENCY: FREQUENCY: INTERMITTENT

## 2020-07-21 ASSESSMENT — PAIN DESCRIPTION - DESCRIPTORS: DESCRIPTORS: ACHING;DULL

## 2020-07-21 ASSESSMENT — PAIN - FUNCTIONAL ASSESSMENT
PAIN_FUNCTIONAL_ASSESSMENT: 0-10
PAIN_FUNCTIONAL_ASSESSMENT: PREVENTS OR INTERFERES SOME ACTIVE ACTIVITIES AND ADLS
PAIN_FUNCTIONAL_ASSESSMENT: 0-10

## 2020-07-21 ASSESSMENT — PAIN DESCRIPTION - LOCATION: LOCATION: BACK

## 2020-07-21 ASSESSMENT — PAIN DESCRIPTION - ONSET: ONSET: ON-GOING

## 2020-07-21 NOTE — PROGRESS NOTES
Discharge criteria met. Patient alert and oriented x3  Post procedure dressing dry and intact. Sensory and motor function intact as per pre-procedure. Instructions and follow up reviewed with pt at patient at discharge. Patient discharged by wheelchair with staff @ 12:45.  to drive pt home and care for her today. Steady gait.

## 2020-07-21 NOTE — PROCEDURES
Pre-Procedure Note    Patient Name: Lena Garcia   YOB: 1960  Room/Bed: Room/bed info not found  Medical Record Number: [id number]  Date: 2020       Indication:    1. Lumbar radiculopathy    2. DDD (degenerative disc disease), lumbar    3. Lumbosacral spondylosis without myelopathy    4. Arthropathy of lumbar facet joint    5. Degenerative lumbar spinal stenosis        Consent: On file. Vital Signs:   Vitals:    20 1220   BP: 134/76   Pulse: 79   Resp: 18   Temp:    SpO2: 96%       Past Medical History:   has a past medical history of ARIADNE (acute kidney injury) ([facility], [place] [address] Anxiety, Asthma, CAD (coronary artery disease), Class 1 obesity due to excess calories with serious comorbidity and body mass index (BMI) of 32.0 to 32.9 in adult, Depression, Examination of participant in clinical trial, HTN (hypertension), Lumbar radiculopathy, Mixed hyperlipidemia, OA (osteoarthritis), and PVD (peripheral vascular disease) with claudication ([facility], [place] [address] Past Surgical History:   has a past surgical history that includes  section; Dilation and curettage of uterus (N/A); Colonoscopy (N/A, 2019); and Coronary angioplasty (). Pre-Sedation Documentation and Exam:   Vital signs have been reviewed (see flow sheet for vitals). Mallampati Airway Assessment:  normal    ASA Classification:  Class 3 - A patient with severe systemic disease that limits activity but is not incapacitating    Sedation/ Anesthesia Plan:   intravenous sedation   as needed. Medications Planned:   midazolam (Versed) / Fentanyl  Intravenously  as needed. Patient is an appropriate candidate for plan of sedation: yes  Patient's History and Physical examination was reviewed and there is no change. Electronically signed by Geovanni Garnett MD on 2020 at 12:21 PM        Preoperative Diagnosis:    1. Lumbar radiculopathy    2. DDD (degenerative disc disease), lumbar    3.  Lumbosacral spondylosis without myelopathy 4. Arthropathy of lumbar facet joint    5. Degenerative lumbar spinal stenosis        Postoperative Diagnosis:    1. Lumbar radiculopathy    2. DDD (degenerative disc disease), lumbar    3. Lumbosacral spondylosis without myelopathy    4. Arthropathy of lumbar facet joint    5. Degenerative lumbar spinal stenosis        Procedure Performed:  Lumbar epidural steroid injection under fluoroscopy guidance with IV sedation. Indication for the Procedure: The patient failed conservative management  for pain in the low back radiating to lower extremities. As the patient is not responding to conservative management and it is interfering with activities of daily living we decided to proceed with lumbar epidural steroid injection. The procedure and risks were discussed with the patient and an informed consent was obtained  Current Pain Assessment  Pain Assessment  Pain Assessment: 0-10  Pain Level: 1  Patient's Stated Pain Goal: No pain  Pain Type: Chronic pain  Pain Location: Back  Pain Orientation: Lower, Right, Left(worse right)  Pain Radiating Towards: none  Pain Descriptors: Aching, Dull  Pain Frequency: Intermittent  Pain Onset: On-going  Clinical Progression: Gradually worsening  Functional Pain Assessment: Prevents or interferes some active activities and ADLs  Non-Pharmaceutical Pain Intervention(s): Rest, Heat applied  POSS Score (Patient Ctrl Analgesia): 1     Procedure:    After starting an IV, the patient was sedated with 2 mg of Midazolam and 0 mcg of Fentanyl Intravenously by the RN under my direct supervision. Patient's vital signs including BP, EKG and SaO2 were monitored by the RN and they remained stable during the procedure. A meaningful communication was kept up with the patient throughout  the procedure. The patient is placed in prone position. Skin over the back was prepped and draped in sterile manner.  Then using fluoroscopy the L4, L5 interspace was observed and the skin and deep tissues in the right paramedian area were infiltrated with 4 ml of 1% lidocaine. The #20-gauge, 3-1/2 inch Tuohy needle was inserted through the skin wheal and the epidural space was identified using loss of resistance technique with normal saline. This was confirmed with AP and lateral views using fluoroscopy after injecting about 2 ml of Omnipaque-180 and observing the spread of the contrast in the epidural space. Then after negative aspiration a total of 80 mg of triamcinolone with 8 ml of normal saline was injected into the epidural space. The needle is removed and a Band-Aid was placed over the needle insertion site. Patient's vital signs remained stable and the patient tolerated the procedure well. The patient was discharged home in stable condition and will be followed in the pain clinic in the next few weeks for further planning.     Electronically signed by James Meyer MD on 7/21/2020 at 12:21 PM

## 2020-07-22 RX ORDER — GABAPENTIN 300 MG/1
CAPSULE ORAL
Qty: 60 CAPSULE | Refills: 2 | Status: SHIPPED | OUTPATIENT
Start: 2020-07-22 | End: 2020-10-15

## 2020-07-23 ENCOUNTER — TELEPHONE (OUTPATIENT)
Dept: PAIN MANAGEMENT | Age: 60
End: 2020-07-23

## 2020-07-27 RX ORDER — METOPROLOL SUCCINATE 50 MG/1
TABLET, EXTENDED RELEASE ORAL
Qty: 90 TABLET | Refills: 1 | Status: SHIPPED | OUTPATIENT
Start: 2020-07-27 | End: 2021-01-13 | Stop reason: SDUPTHER

## 2020-07-27 RX ORDER — LIDOCAINE 246 MG/1
PATCH TOPICAL
Qty: 30 PATCH | Refills: 0 | Status: SHIPPED | OUTPATIENT
Start: 2020-07-27 | End: 2020-08-25

## 2020-07-27 RX ORDER — ENALAPRIL MALEATE 10 MG/1
TABLET ORAL
Qty: 90 TABLET | Refills: 2 | Status: SHIPPED | OUTPATIENT
Start: 2020-07-27 | End: 2020-09-25 | Stop reason: SDUPTHER

## 2020-07-27 RX ORDER — SIMVASTATIN 40 MG
TABLET ORAL
Qty: 90 TABLET | Refills: 3 | Status: SHIPPED | OUTPATIENT
Start: 2020-07-27 | End: 2021-08-03

## 2020-07-27 RX ORDER — ASPIRIN 81 MG/1
TABLET ORAL
Qty: 30 TABLET | Refills: 2 | Status: SHIPPED | OUTPATIENT
Start: 2020-07-27 | End: 2020-10-21

## 2020-07-27 RX ORDER — CYCLOBENZAPRINE HCL 10 MG
TABLET ORAL
Qty: 60 TABLET | Refills: 0 | Status: SHIPPED | OUTPATIENT
Start: 2020-07-27 | End: 2020-08-25

## 2020-07-27 NOTE — TELEPHONE ENCOUNTER
Please Approve or Refuse.   Send to Pharmacy per Pt's Request:      Next Visit Date:  11/3/2020   Last Visit Date: 6/30/2020    Hemoglobin A1C (%)   Date Value   07/01/2020 5.7   10/04/2016 5.3   08/24/2012 5.2             ( goal A1C is < 7)   BP Readings from Last 3 Encounters:   07/21/20 120/71   06/30/20 130/70   03/06/20 (!) 164/92          (goal 120/80)  BUN   Date Value Ref Range Status   07/01/2020 15 8 - 23 mg/dL Final     CREATININE   Date Value Ref Range Status   07/01/2020 0.85 0.50 - 0.90 mg/dL Final     Potassium   Date Value Ref Range Status   07/01/2020 5.2 3.7 - 5.3 mmol/L Final

## 2020-08-04 ENCOUNTER — HOSPITAL ENCOUNTER (OUTPATIENT)
Dept: PAIN MANAGEMENT | Age: 60
Discharge: HOME | End: 2020-08-04
Payer: MEDICARE

## 2020-08-04 PROCEDURE — 99213 OFFICE O/P EST LOW 20 MIN: CPT

## 2020-08-04 PROCEDURE — 99213 OFFICE O/P EST LOW 20 MIN: CPT | Performed by: NURSE PRACTITIONER

## 2020-08-04 ASSESSMENT — ENCOUNTER SYMPTOMS
HEARTBURN: 1
BACK PAIN: 1

## 2020-08-04 NOTE — PROGRESS NOTES
[address] PROGRESS NOTE      Patient video visit to review Medication Agreement    Chief Complaint: back pain    She had lumbar epidural injection L4, L5 on 7- with 75% relief. No side effects. No history of lumbar surgery,She is a little more active, it is easier to get out of bed in the am. She states till has numbness in her right groing and thigh, which she has had, she is on gabapentin. No Ed visits. Back Pain   This is a chronic problem. The problem occurs intermittently. The pain is present in the lumbar spine. The quality of the pain is described as aching (dull). The pain does not radiate. The pain is at a severity of 1/10. The pain is the same all the time. Stiffness is present: activity. Associated symptoms include numbness. (Numbness right thigh and groin) Risk factors include menopause. She has tried bed rest for the symptoms. The treatment provided mild relief. Patient denies any new neurological symptoms. No bowel or bladder incontinence, no weakness, and no falling. Any new diagnostic workup: [x] no  [] yes [] Xray [] CT scan [] MRI [] DEXA scan     [] Other                    Treatment goals:  Functional status: to get rid of numbness right thigh and groin      Aberrancy:   Any alcoholic beverages       Any illegal drugs no        Analgesia:1      Adverse  Effects :n/a    ADL;s :active        Pill count: appropriate  n/a  Morphine equivalent dose as reported on [place]ARRS: n/a     Review ofOARRS does not show any aberrant prescription behavior. Medication is helping the patient stay active. Patient denies any side effects and reports adequate analgesia. No sign of misuse/abuse.         When was thelast UDS:      n/a       Was the UDS appropriate:      Record/Diagnostics Review:      As above, I did review the imaging          Past Medical History:   Diagnosis Date    ARIADNE (acute kidney injury) ([facility] [address] 9/4/2019    Anxiety     Asthma     CAD (coronary artery disease)     Class 1 obesity due to excess calories with serious comorbidity and body mass index (BMI) of 32.0 to 32.9 in adult 2020    Depression     Examination of participant in clinical trial 11    End date 2015    HTN (hypertension)     Lumbar radiculopathy     Mixed hyperlipidemia 1/15/2018    OA (osteoarthritis)     PVD (peripheral vascular disease) with claudication ([facility] [address] 2015       Past Surgical History:   Procedure Laterality Date     SECTION      COLONOSCOPY N/A 2019    COLORECTAL CANCER SCREENING, NOT HIGH RISK performed by Humera Franz MD at [address]      2 stents    DILATION AND CURETTAGE OF UTERUS N/A     uterine polyp       Allergies   Allergen Reactions    Claritin [Loratadine]      2010 Heart palpitations  2010 Heart palpitations    Codeine Nausea Only         Current Outpatient Medications:     simvastatin (ZOCOR) 40 MG tablet, TAKE 1 TABLET BY MOUTH EVERY NIGHT, Disp: 90 tablet, Rfl: 3    metoprolol succinate (TOPROL XL) 50 MG extended release tablet, TAKE 1 TABLET BY MOUTH DAILY, Disp: 90 tablet, Rfl: 1    enalapril (VASOTEC) 10 MG tablet, TAKE 2 TABLETS BY MOUTH EVERY MORNING AND 1 TABLET BY MOUTH EVERY EVENING, Disp: 90 tablet, Rfl: 2    aspirin (ASPIRIN LOW DOSE) 81 MG EC tablet, TAKE 1 TABLET BY MOUTH DAILY, Disp: 30 tablet, Rfl: 2    gabapentin (NEURONTIN) 300 MG capsule, TAKE 1 CAPSULE BY MOUTH TWICE DAILY, Disp: 60 capsule, Rfl: 2    FLUoxetine (PROZAC) 20 MG capsule, TAKE 3 CAPSULES BY MOUTH EVERY DAY, Disp: 90 capsule, Rfl: 3    Calcium Carb-Cholecalciferol (CALCIUM-VITAMIN D) 600-400 MG-UNIT TABS, TAKE 1 TABLET BY MOUTH DAILY, Disp: 90 tablet, Rfl: 0    cetirizine (ZYRTEC) 10 MG tablet, TAKE 1 TABLET BY MOUTH DAILY, Disp: 90 tablet, Rfl: 2    busPIRone (BUSPAR) 15 MG tablet, TAKE 1 TABLET BY MOUTH EVERY 12 HOURS AS NEEDED, Disp: 120 tablet, Rfl: 3    isosorbide mononitrate (IMDUR) 30 MG extended release tablet, TAKE 1 TABLET BY MOUTH EVERY DAY, Disp: 90 tablet, Rfl: 2    meloxicam (MOBIC) 15 MG tablet, TAKE 1 TABLET BY MOUTH DAILY, Disp: 120 tablet, Rfl: 2    cyclobenzaprine (FLEXERIL) 10 MG tablet, TAKE 1 TABLET BY MOUTH TWICE DAILY AS NEEDED FOR MUSCLE SPASMS, Disp: 60 tablet, Rfl: 0    ASPERCREME LIDOCAINE 4 % external patch, APPLY 1 PATCH EXTERNALLY TO THE SKIN DAILY, Disp: 30 patch, Rfl: 0    ACETAMINOPHEN EXTRA STRENGTH 500 MG tablet, TAKE 1 TABLET BY MOUTH EVERY 6 HOURS AS NEEDED FOR PAIN, Disp: 120 tablet, Rfl: 3    nitroGLYCERIN (NITROSTAT) 0.4 MG SL tablet, DISSOLVE 1 TABLET UNDER THE TONGUE EVERY 5 MINUTES AS NEEDED FOR CHEST PAIN.  IF NO RELIEF AFTER 1 DOSE, CALL 911, Disp: 25 tablet, Rfl: 0    diclofenac (SOLARAZE) 3 % gel, Apply topically 2 times daily as needed, Disp: 1 Tube, Rfl: 3    albuterol sulfate  (90 Base) MCG/ACT inhaler, INHALE 2 PUFFS INTO THE LUNGS EVERY 6 HOURS AS NEEDED FOR WHEEZING, Disp: 36 g, Rfl: 0    lidocaine (LMX) 4 % cream, Apply topically every 8 hrs as needed for pain, Disp: 45 g, Rfl: 3    Heat Wraps (SOFTHEAT HEATING WRAP ULTRA) MISC, Use daily for neck pain, Disp: 1 each, Rfl: 0    Family History   Problem Relation Age of Onset    Emphysema Mother        Social History     Socioeconomic History    Marital status:      Spouse name: Not on file    Number of children: Not on file    Years of education: Not on file    Highest education level: Not on file   Occupational History    Not on file   Social Needs    Financial resource strain: Not on file    Food insecurity     Worry: Not on file     Inability: Not on file    Transportation needs     Medical: Not on file     Non-medical: Not on file   Tobacco Use    Smoking status: Former Smoker     Packs/day: 0.25     Years: 25.00     Pack years: 6.25     Types: Cigarettes     Last attempt to quit: 2009     Years since quittin.1    Smokeless tobacco: Never Used   Substance and Sexual Activity    Alcohol use: No     Alcohol/week: 0.0 standard drinks    Drug use: No    Sexual activity: Yes   Lifestyle    Physical activity     Days per week: Not on file     Minutes per session: Not on file    Stress: Not on file   Relationships    Social connections     Talks on phone: Not on file     Gets together: Not on file     Attends Restorationism service: Not on file     Active member of club or organization: Not on file     Attends meetings of clubs or organizations: Not on file     Relationship status: Not on file    Intimate partner violence     Fear of current or ex partner: Not on file     Emotionally abused: Not on file     Physically abused: Not on file     Forced sexual activity: Not on file   Other Topics Concern    Not on file   Social History Narrative    Not on file         Review of Systems:  Review of Systems   Constitution: Negative. HENT: Negative. Eyes:        Glasses   Cardiovascular: Negative. Endocrine: Negative. Hematologic/Lymphatic: Bruises/bleeds easily. Skin: Negative. Musculoskeletal: Positive for back pain and joint pain. Knees and right hip   Gastrointestinal: Positive for heartburn. Genitourinary: Negative. Neurological: Positive for numbness. Psychiatric/Behavioral: Negative. Physical Exam:  [facility] 11/07/2012     Physical Exam  HENT:      Head: Normocephalic. Pulmonary:      Effort: Pulmonary effort is normal.   Skin:         Neurological:      Mental Status: She is alert. Assessment:    Problem List Items Addressed This Visit     Lumbar radiculopathy - Primary    Lumbar degenerative disc disease              Treatment Plan:  DISCUSSION: Treatment options discussed withpatient and all questions answered to patient's satisfaction.           .    Location:  patient  at     ,provider working from home  Discussed talking to her PCP about numbness right thigh and groin, possibly seeing a neurologist      TREATMENT OPTIONS: See as needed

## 2020-08-18 RX ORDER — CALCIUM CARBONATE/VITAMIN D3 600 MG-10
TABLET ORAL
Qty: 90 TABLET | Refills: 0 | Status: SHIPPED | OUTPATIENT
Start: 2020-08-18 | End: 2020-11-13

## 2020-08-25 RX ORDER — CYCLOBENZAPRINE HCL 10 MG
TABLET ORAL
Qty: 60 TABLET | Refills: 0 | Status: SHIPPED | OUTPATIENT
Start: 2020-08-25 | End: 2020-09-23

## 2020-08-25 RX ORDER — LIDOCAINE 246 MG/1
PATCH TOPICAL
Qty: 30 PATCH | Refills: 0 | Status: SHIPPED | OUTPATIENT
Start: 2020-08-25 | End: 2021-02-08 | Stop reason: SDUPTHER

## 2020-08-27 RX ORDER — ALBUTEROL SULFATE 90 UG/1
AEROSOL, METERED RESPIRATORY (INHALATION)
Qty: 36 G | Refills: 0 | Status: SHIPPED | OUTPATIENT
Start: 2020-08-27 | End: 2020-10-12

## 2020-09-01 RX ORDER — NITROGLYCERIN 0.4 MG/1
TABLET SUBLINGUAL
Qty: 25 TABLET | Refills: 0 | Status: SHIPPED | OUTPATIENT
Start: 2020-09-01 | End: 2021-02-18

## 2020-09-23 RX ORDER — CYCLOBENZAPRINE HCL 10 MG
TABLET ORAL
Qty: 60 TABLET | Refills: 0 | Status: SHIPPED | OUTPATIENT
Start: 2020-09-23 | End: 2020-10-14

## 2020-09-25 RX ORDER — FLUOXETINE HYDROCHLORIDE 20 MG/1
CAPSULE ORAL
Qty: 90 CAPSULE | Refills: 3 | Status: SHIPPED | OUTPATIENT
Start: 2020-09-25 | End: 2021-02-17

## 2020-09-25 RX ORDER — ISOSORBIDE MONONITRATE 30 MG/1
TABLET, EXTENDED RELEASE ORAL
Qty: 90 TABLET | Refills: 2 | Status: SHIPPED | OUTPATIENT
Start: 2020-09-25 | End: 2021-07-01

## 2020-09-25 RX ORDER — ENALAPRIL MALEATE 10 MG/1
TABLET ORAL
Qty: 90 TABLET | Refills: 2 | Status: SHIPPED | OUTPATIENT
Start: 2020-09-25 | End: 2021-01-13 | Stop reason: SDUPTHER

## 2020-10-08 ENCOUNTER — HOSPITAL ENCOUNTER (OUTPATIENT)
Dept: WOMENS IMAGING | Age: 60
Discharge: HOME | End: 2020-10-10
Payer: MEDICARE

## 2020-10-08 PROCEDURE — 77063 BREAST TOMOSYNTHESIS BI: CPT

## 2020-10-12 RX ORDER — ALBUTEROL SULFATE 90 UG/1
AEROSOL, METERED RESPIRATORY (INHALATION)
Qty: 36 G | Refills: 3 | Status: SHIPPED | OUTPATIENT
Start: 2020-10-12 | End: 2021-04-28

## 2020-10-14 RX ORDER — CYCLOBENZAPRINE HCL 10 MG
TABLET ORAL
Qty: 60 TABLET | Refills: 0 | Status: SHIPPED | OUTPATIENT
Start: 2020-10-14 | End: 2020-11-13

## 2020-10-15 RX ORDER — GABAPENTIN 300 MG/1
CAPSULE ORAL
Qty: 60 CAPSULE | Refills: 2 | Status: SHIPPED | OUTPATIENT
Start: 2020-10-15 | End: 2020-11-05 | Stop reason: ALTCHOICE

## 2020-10-21 RX ORDER — PSEUDOEPHED/ACETAMINOPH/DIPHEN 30MG-500MG
TABLET ORAL
Qty: 120 TABLET | Refills: 3 | Status: SHIPPED | OUTPATIENT
Start: 2020-10-21 | End: 2021-02-09

## 2020-10-21 RX ORDER — ASPIRIN 81 MG/1
TABLET ORAL
Qty: 30 TABLET | Refills: 2 | Status: SHIPPED | OUTPATIENT
Start: 2020-10-21 | End: 2021-01-13 | Stop reason: SDUPTHER

## 2020-11-05 ENCOUNTER — OFFICE VISIT (OUTPATIENT)
Dept: FAMILY MEDICINE CLINIC | Age: 60
End: 2020-11-05
Payer: MEDICARE

## 2020-11-05 VITALS
SYSTOLIC BLOOD PRESSURE: 132 MMHG | TEMPERATURE: 97.3 F | HEART RATE: 88 BPM | WEIGHT: 179 LBS | BODY MASS INDEX: 32.94 KG/M2 | OXYGEN SATURATION: 96 % | HEIGHT: 62 IN | DIASTOLIC BLOOD PRESSURE: 88 MMHG

## 2020-11-05 PROBLEM — K52.9 COLITIS: Status: RESOLVED | Noted: 2019-09-04 | Resolved: 2020-11-05

## 2020-11-05 PROCEDURE — 1036F TOBACCO NON-USER: CPT | Performed by: FAMILY MEDICINE

## 2020-11-05 PROCEDURE — G8484 FLU IMMUNIZE NO ADMIN: HCPCS | Performed by: FAMILY MEDICINE

## 2020-11-05 PROCEDURE — 3017F COLORECTAL CA SCREEN DOC REV: CPT | Performed by: FAMILY MEDICINE

## 2020-11-05 PROCEDURE — G8417 CALC BMI ABV UP PARAM F/U: HCPCS | Performed by: FAMILY MEDICINE

## 2020-11-05 PROCEDURE — 99214 OFFICE O/P EST MOD 30 MIN: CPT | Performed by: FAMILY MEDICINE

## 2020-11-05 PROCEDURE — G8427 DOCREV CUR MEDS BY ELIG CLIN: HCPCS | Performed by: FAMILY MEDICINE

## 2020-11-05 RX ORDER — PREGABALIN 75 MG/1
75 CAPSULE ORAL 2 TIMES DAILY
Qty: 60 CAPSULE | Refills: 0 | Status: SHIPPED | OUTPATIENT
Start: 2020-11-05 | End: 2020-12-03

## 2020-11-05 ASSESSMENT — ENCOUNTER SYMPTOMS
COLOR CHANGE: 0
ABDOMINAL PAIN: 0
WHEEZING: 0
SINUS PRESSURE: 0
BACK PAIN: 1
ABDOMINAL DISTENTION: 0
CONSTIPATION: 0
FACIAL SWELLING: 0
RHINORRHEA: 0
VOMITING: 0
PHOTOPHOBIA: 0
SHORTNESS OF BREATH: 0
BLOOD IN STOOL: 0
COUGH: 0
NAUSEA: 0
APNEA: 0

## 2020-11-05 NOTE — PROGRESS NOTES
Chief Complaint   Patient presents with    Hypertension    Hyperlipidemia    Discuss Labs         Jhoan Javier  here today for follow up on chronic medical problems, go over labs and/or diagnostic studies, and medication refills. Hypertension; Hyperlipidemia; and Discuss Labs      HPI: Patient is here for follow-up on hypertension hyperlipidemia. Hypertension controlled, patient denies any chest pain shortness of breath. Hyperlipidemia stable on current treatment. Patient is up-to-date on blood work. Denies any side effects from statins. Patient has history of peripheral vascular disease follows with [facility] vascular surgeon, has recent fever done which was normal.  She had one stent placed in the left leg. Patient has lumbar radiculopathy, with L2-L3 disc protrusions, with lumbar spinal stenosis reports pain is not under control. She still complains of pain 5-6 on scale radiating to both lower extremities associated with numbness in her bottom area and also in thighs. She was referred to pain management had 1 epidural that relieved her pain. Patient reports pain is again getting worse and pain management recommended to see neurologist.  Patient is currently on muscle relaxant, Neurontin not sure if Neurontin is helping. Obesity stable, looks on diet and exercise. History of gallstones denies any abdominal pain, reports her pain is stable, denies any abdominal distention. /88   Pulse 88   Temp 97.3 °F (36.3 °C)   Ht 5' 2\" (1.575 m)   Wt 179 lb (81.2 kg)   LMP 11/07/2012   SpO2 96%   BMI 32.74 kg/m²    Body mass index is 32.74 kg/m². Wt Readings from Last 3 Encounters:   11/05/20 179 lb (81.2 kg)   07/21/20 175 lb (79.4 kg)   06/30/20 176 lb (79.8 kg)        [x]Negative depression screening.   PHQ Scores 3/25/2019 9/25/2018 4/16/2018   PHQ2 Score 0 0 0   PHQ9 Score 0 0 0      []1-4 = Minimal depression   []5-9 = Milddepression   []10-14 = Moderate depression   []15-19 = Moderately severe depression   []20-27 = Severe depression    Discussed testing with the patient and all questions fully answered. Hospital Outpatient Visit on 07/17/2020   Component Date Value Ref Range Status    SARS-CoV-2 07/17/2020 Not Detected  Not Detected Final    Comment:       The specimen is NEGATIVE for SARS-CoV-2, the novel coronavirus associated with COVID-19. A negative result does not rule out COVID-19. This test has been authorized by the FDA under an Emergency Use Authorization (EUA) for use   by authorized laboratories. Fact sheet for Healthcare Providers: [employer]Her.es  Fact sheet for Patients: [employer] Experience[employer].es        METHODOLOGY: RT-PCR      SARS-CoV-2, Rapid 07/17/2020        Final    Source 07/17/2020 . NASOPHARYNGEAL SWAB   Final    SARS-CoV-2, PCR 07/17/2020        Final         Most recent labs reviewed:     Lab Results   Component Value Date    WBC 6.8 07/01/2020    HGB 15.4 07/01/2020    HCT 46.0 07/01/2020    MCV 96.8 07/01/2020     07/01/2020       @BRIEFLAB(NA,K,CL,CO2,BUN,CREATININE,GLUCOSE,CALCIUM)@     Lab Results   Component Value Date    ALT 13 07/01/2020    AST 19 07/01/2020    ALKPHOS 75 07/01/2020    BILITOT 0.38 07/01/2020       Lab Results   Component Value Date    TSH 3.25 07/01/2020       Lab Results   Component Value Date    CHOL 116 03/26/2019    CHOL 137 10/26/2017    CHOL 142 10/04/2016     Lab Results   Component Value Date    TRIG 91 03/26/2019    TRIG 108 10/26/2017    TRIG 111 10/04/2016     Lab Results   Component Value Date    HDL 59 07/01/2020    HDL 51 03/26/2019    HDL 64 10/26/2017     Lab Results   Component Value Date    LDLCHOLESTEROL 67 07/01/2020    LDLCHOLESTEROL 47 03/26/2019    LDLCHOLESTEROL 51 10/26/2017     Lab Results   Component Value Date    VLDL NOT REPORTED 07/01/2020    VLDL NOT REPORTED 03/26/2019    VLDL NOT REPORTED 10/26/2017     Lab TABLET BY MOUTH DAILY 90 tablet 1    cetirizine (ZYRTEC) 10 MG tablet TAKE 1 TABLET BY MOUTH DAILY 90 tablet 2    busPIRone (BUSPAR) 15 MG tablet TAKE 1 TABLET BY MOUTH EVERY 12 HOURS AS NEEDED 120 tablet 3    meloxicam (MOBIC) 15 MG tablet TAKE 1 TABLET BY MOUTH DAILY 120 tablet 2    diclofenac (SOLARAZE) 3 % gel Apply topically 2 times daily as needed 1 Tube 3    lidocaine (LMX) 4 % cream Apply topically every 8 hrs as needed for pain 45 g 3    Heat Wraps (SOFTHEAT HEATING WRAP ULTRA) MISC Use daily for neck pain 1 each 0     No current facility-administered medications for this visit.               Social History     Socioeconomic History    Marital status:      Spouse name: Not on file    Number of children: Not on file    Years of education: Not on file    Highest education level: Not on file   Occupational History    Not on file   Social Needs    Financial resource strain: Not on file    Food insecurity     Worry: Not on file     Inability: Not on file    Transportation needs     Medical: Not on file     Non-medical: Not on file   Tobacco Use    Smoking status: Former Smoker     Packs/day: 0.25     Years: 25.00     Pack years: 6.25     Types: Cigarettes     Last attempt to quit: 2009     Years since quittin.4    Smokeless tobacco: Never Used   Substance and Sexual Activity    Alcohol use: No     Alcohol/week: 0.0 standard drinks    Drug use: No    Sexual activity: Yes   Lifestyle    Physical activity     Days per week: Not on file     Minutes per session: Not on file    Stress: Not on file   Relationships    Social connections     Talks on phone: Not on file     Gets together: Not on file     Attends [employer] service: Not on file     Active member of club or organization: Not on file     Attends meetings of clubs or organizations: Not on file     Relationship status: Not on file    Intimate partner violence     Fear of current or ex partner: Not on file     Emotionally abused: Not on file     Physically abused: Not on file     Forced sexual activity: Not on file   Other Topics Concern    Not on file   Social History Narrative    Not on file     Counseling given: Not Answered        Family History   Problem Relation Age of Onset    Emphysema Mother              -rest of complaints with corresponding details per ROS    The patient's past medical, surgical, social, and family history as well as her current medications and allergies were reviewed as documented intoday's encounter. Review of Systems   Constitutional: Positive for activity change. Negative for appetite change, diaphoresis, fatigue and unexpected weight change. HENT: Negative for congestion, ear discharge, ear pain, facial swelling, postnasal drip, rhinorrhea and sinus pressure. Eyes: Negative for photophobia and visual disturbance. Respiratory: Negative for apnea, cough, shortness of breath and wheezing. Cardiovascular: Negative for chest pain, palpitations and leg swelling. Gastrointestinal: Negative for abdominal distention, abdominal pain, blood in stool, constipation, nausea and vomiting. Genitourinary: Negative for decreased urine volume, difficulty urinating, flank pain, frequency, hematuria and urgency. Musculoskeletal: Positive for arthralgias, back pain and gait problem. Negative for joint swelling, myalgias, neck pain and neck stiffness. Skin: Negative for color change, rash and wound. Neurological: Positive for weakness and numbness. Negative for dizziness, tremors, speech difficulty and headaches. Psychiatric/Behavioral: Negative for agitation, decreased concentration, dysphoric mood, sleep disturbance and suicidal ideas. The patient is nervous/anxious. Physical Exam  Vitals signs and nursing note reviewed. Constitutional:       Appearance: Normal appearance. She is obese. HENT:      Head: Normocephalic and atraumatic.       Nose: Nose normal.   Eyes: Pupils: Pupils are equal, round, and reactive to light. Neck:      Musculoskeletal: Normal range of motion and neck supple. Cardiovascular:      Rate and Rhythm: Normal rate and regular rhythm. Pulmonary:      Effort: Pulmonary effort is normal.      Breath sounds: Normal breath sounds. Abdominal:      General: Bowel sounds are normal.      Palpations: Abdomen is soft. There is no mass. Tenderness: There is no abdominal tenderness. Musculoskeletal:      Lumbar back: She exhibits decreased range of motion, tenderness, pain and spasm. She exhibits no bony tenderness, no swelling, no edema, no deformity and normal pulse. Lymphadenopathy:      Cervical: No cervical adenopathy. Neurological:      Mental Status: She is alert and oriented to person, place, and time. Cranial Nerves: Cranial nerves are intact. No cranial nerve deficit. Sensory: Sensory deficit present. Motor: Motor function is intact. Coordination: Coordination is intact. Gait: Gait is intact. Psychiatric:         Attention and Perception: Attention and perception normal.         Mood and Affect: Mood and affect normal.         Speech: Speech normal.         Behavior: Behavior normal.         Thought Content: Thought content normal.             ASSESSMENT AND PLAN      1. Essential hypertension  -Controlled continue same medications. 2. Lumbar degenerative disc disease  Discontinue Neurontin start on Lyrica 75 mg twice daily. Will sign contract and next appointment  - pregabalin (LYRICA) 75 MG capsule; Take 1 capsule by mouth 2 times daily for 30 days. Dispense: 60 capsule; Refill: 0  - Gennaro Villalobos MD, Neurology, [facility]  - diclofenac sodium (VOLTAREN) 1 % GEL; Apply 2 g topically 4 times daily  Dispense: 2 Tube; Refill: 1    3. Sprain of sacroiliac ligament, subsequent encounter  -Neurology referral placed discussed with patient to schedule appointment with pain management.   - diclofenac sodium (VOLTAREN) 1 % GEL; Apply 2 g topically 4 times daily  Dispense: 2 Tube; Refill: 1    4. PVD (peripheral vascular disease) with claudication [facility])  -Patient has been recently evaluated by vascular surgeon. Notes are in epic    5. Protruded lumbar disc    - pregabalin (LYRICA) 75 MG capsule; Take 1 capsule by mouth 2 times daily for 30 days. Dispense: 60 capsule; Refill: 0  - Leon Burch MD, Neurology, [facility]  - diclofenac sodium (VOLTAREN) 1 % GEL; Apply 2 g topically 4 times daily  Dispense: 2 Tube; Refill: 1    6. Mixed hyperlipidemia  Stable continue same medications    7. Class 1 obesity due to excess calories with serious comorbidity and body mass index (BMI) of 32.0 to 32.9 in adult  Stable continue to work on diet and exercise    8. Calculus of gallbladder without cholecystitis without obstruction  Asymptomatic continue to monitor      Orders Placed This Encounter   Procedures   Leon Burch MD, Neurology, [facility]     Referral Priority:   Routine     Referral Type:   Eval and Treat     Referral Reason:   Specialty Services Required     Referred to Provider:   Simpson Lundborg, MD     Requested Specialty:   Neurology     Number of Visits Requested:   1         Medications Discontinued During This Encounter   Medication Reason    gabapentin (NEURONTIN) 300 MG capsule Alternate therapy       Christina received counseling on the following healthy behaviors: nutrition, exercise and medication adherence  Reviewed prior labs and health maintenance  Continue current medications, diet and exercise. Discussed use, benefit, and side effects of prescribed medications. Barriers to medication compliance addressed. Patient given educational materials - see patient instructions  Was a self-tracking handout given in paper form or via [employer]?  Yes    Requested Prescriptions     Signed Prescriptions Disp Refills    pregabalin (LYRICA) 75 MG capsule 60 capsule 0 Sig: Take 1 capsule by mouth 2 times daily for 30 days.  diclofenac sodium (VOLTAREN) 1 % GEL 2 Tube 1     Sig: Apply 2 g topically 4 times daily       All patient questions answered. Patient voiced understanding. Quality Measures    Body mass index is 32.74 kg/m². Elevated. Weight control planned discussed daily exercise regimen and Healthy diet and regular exercise. BP: 132/88 Blood pressure is normal. Treatment plan consists of Weight Reduction, DASH Eating Plan, Dietary Sodium Restriction, Increased Physical Activity and No treatment change needed. Lab Results   Component Value Date    LDLCHOLESTEROL 67 07/01/2020    (goal LDL reduction with dx if diabetes is 50% LDL reduction)      PHQ Scores 3/25/2019 9/25/2018 4/16/2018   PHQ2 Score 0 0 0   PHQ9 Score 0 0 0     Interpretation of Total Score Depression Severity: 1-4 = Minimal depression, 5-9 = Mild depression, 10-14 = Moderate depression, 15-19 = Moderately severe depression, 20-27 = Severe depression    The patient'spast medical, surgical, social, and family history as well as her   current medications and allergies were reviewed as documented in today's encounter. Medications, labs, diagnostic studies, consultations andfollow-up as documented in this encounter. Return in about 3 months (around 2/5/2021). Patient wasseen with total face to face time of 25 minutes. More than 50% of this visit was counseling and education. Future Appointments   Date Time Provider Matt Diez   2/8/2021  2:15 PM MD nicholas Gonzalez     This note was completed by using the assistance of a speech-recognition program. However, inadvertent computerized transcription errors may be present. Althoughevery effort was made to ensure accuracy, no guarantees can be provided that every mistake has been identified and corrected by editing.   Electronically signed by Ryan Hair MD on 11/5/2020  1:54 PM

## 2020-11-10 ENCOUNTER — OFFICE VISIT (OUTPATIENT)
Dept: NEUROLOGY | Age: 60
End: 2020-11-10
Payer: MEDICARE

## 2020-11-10 VITALS
TEMPERATURE: 97.4 F | SYSTOLIC BLOOD PRESSURE: 171 MMHG | HEART RATE: 73 BPM | DIASTOLIC BLOOD PRESSURE: 102 MMHG | RESPIRATION RATE: 16 BRPM

## 2020-11-10 PROCEDURE — G8427 DOCREV CUR MEDS BY ELIG CLIN: HCPCS | Performed by: STUDENT IN AN ORGANIZED HEALTH CARE EDUCATION/TRAINING PROGRAM

## 2020-11-10 PROCEDURE — 3017F COLORECTAL CA SCREEN DOC REV: CPT | Performed by: STUDENT IN AN ORGANIZED HEALTH CARE EDUCATION/TRAINING PROGRAM

## 2020-11-10 PROCEDURE — G8417 CALC BMI ABV UP PARAM F/U: HCPCS | Performed by: STUDENT IN AN ORGANIZED HEALTH CARE EDUCATION/TRAINING PROGRAM

## 2020-11-10 PROCEDURE — 99204 OFFICE O/P NEW MOD 45 MIN: CPT | Performed by: STUDENT IN AN ORGANIZED HEALTH CARE EDUCATION/TRAINING PROGRAM

## 2020-11-10 PROCEDURE — 1036F TOBACCO NON-USER: CPT | Performed by: STUDENT IN AN ORGANIZED HEALTH CARE EDUCATION/TRAINING PROGRAM

## 2020-11-10 PROCEDURE — G8484 FLU IMMUNIZE NO ADMIN: HCPCS | Performed by: STUDENT IN AN ORGANIZED HEALTH CARE EDUCATION/TRAINING PROGRAM

## 2020-11-10 ASSESSMENT — ENCOUNTER SYMPTOMS
VOMITING: 0
BACK PAIN: 0
COUGH: 0
EYE DISCHARGE: 0
CHEST TIGHTNESS: 0
DIARRHEA: 0
WHEEZING: 0
ABDOMINAL PAIN: 0
SORE THROAT: 0
SHORTNESS OF BREATH: 0
NAUSEA: 0

## 2020-11-10 NOTE — PROGRESS NOTES
[address] [place] [address],  [address], [facility], [address] [facility], [address] [place] [address]  P: [phone]  F: [phone]    NEUROLOGY CLINIC NOTE     PATIENT NAME: Jana Stout  PATIENT MRN: [id number]  PRIMARY CARE PHYSICIAN: Adriana Morales MD    HPI:      Jana Stout is a 61 y.o. right handed  female with PMH significant for  was seen in the clinic for numbness on the right groin for almost a year     History obtained from Patient    70-year-old female with complex medical history, lumbar radiculopathy L2-L3 disc protrusion, HTN, ARIADNE, CKD, CAD, presented with chief complaint of lower back pain times resolved, numbness on the right groin and lateral side of the thigh. Intermittent paresthesia no numbness area. This has been going for over a period of 1 year. Patient also complained of backache radiating back to both legs down to the ankle, intermittent shooting pain. Which started in 3 to 4 weeks. Patient states pain is worse early in the morning. Patient denies any weakness, change in bowel bladder habit, and tingling, paresthesias, neck pain, headache. Medication:   Aspirin, BuSpar, Flexeril, Mobic, Lyrica 75, Zocor 40       Off note:   Patient follows with vascular. At [address] [place] [address]  the patient  History of right lower extremity stent. Previous TERRI exam 7/11/2019: Bilateral: Normal lower extremity TERRI examination at rest.  Vascular recommended follow-up arterial study in 1 year. Patient has lumbar radiculopathy, with L2-L3 disc protrusions, with lumbar spinal stenosis reports pain is not under control. She still complains of pain 5-6 on scale radiating to both lower extremities associated with numbness in her bottom area and also in thighs. She was referred to pain management had 1 epidural that relieved her pain.   Patient reports pain is again getting worse and pain management recommended to see neurologist.  Patient is currently on muscle relaxant, Neurontin not sure if Neurontin is helping.     PATIENT HISTORY:     Past Medical History:   Diagnosis Date    [facility]    Anxiety     Asthma     CAD (coronary artery disease)     Class 1 obesity due to excess calories with serious comorbidity and body mass index (BMI) of 32.0 to 32.9 in adult 2020    Depression     Examination of participant in clinical trial 11    End date 2015    HTN (hypertension)     Lumbar radiculopathy     Mixed hyperlipidemia 1/15/2018    OA (osteoarthritis)     PVD (peripheral vascular disease) with claudication ([facility] 2015        Past Surgical History:   Procedure Laterality Date     SECTION      COLONOSCOPY N/A 2019    COLORECTAL CANCER SCREENING, NOT HIGH RISK performed by Salma Niño MD at [address]      2 stents    DILATION AND CURETTAGE OF UTERUS N/A     uterine polyp        Social History     Socioeconomic History    Marital status:      Spouse name: Not on file    Number of children: Not on file    Years of education: Not on file    Highest education level: Not on file   Occupational History    Not on file   Social Needs    Financial resource strain: Not on file    Food insecurity     Worry: Not on file     Inability: Not on file    Transportation needs     Medical: Not on file     Non-medical: Not on file   Tobacco Use    Smoking status: Former Smoker     Packs/day: 0.25     Years: 25.00     Pack years: 6.25     Types: Cigarettes     Last attempt to quit: 2009     Years since quittin.4    Smokeless tobacco: Never Used   Substance and Sexual Activity    Alcohol use: No     Alcohol/week: 0.0 standard drinks    Drug use: No    Sexual activity: Yes   Lifestyle    Physical activity     Days per week: Not on file     Minutes per session: Not on file    Stress: Not on file   Relationships    Social connections     Talks on phone: Not on file     Gets together: Not on file     Attends Shinto service: Not on file     Active member of club or organization: Not on file     Attends meetings of clubs or organizations: Not on file     Relationship status: Not on file    Intimate partner violence     Fear of current or ex partner: Not on file     Emotionally abused: Not on file     Physically abused: Not on file     Forced sexual activity: Not on file   Other Topics Concern    Not on file   Social History Narrative    Not on file        Current Outpatient Medications   Medication Sig Dispense Refill    pregabalin (LYRICA) 75 MG capsule Take 1 capsule by mouth 2 times daily for 30 days. 60 capsule 0    diclofenac sodium (VOLTAREN) 1 % GEL Apply 2 g topically 4 times daily 2 Tube 1    ACETAMINOPHEN EXTRA STRENGTH 500 MG tablet TAKE 1 TABLET BY MOUTH EVERY 6 HOURS AS NEEDED FOR PAIN 120 tablet 3    aspirin (ASPIRIN LOW DOSE) 81 MG EC tablet TAKE 1 TABLET BY MOUTH DAILY 30 tablet 2    cyclobenzaprine (FLEXERIL) 10 MG tablet TAKE 1 TABLET BY MOUTH TWICE DAILY AS NEEDED FOR MUSCLE SPASMS 60 tablet 0    albuterol sulfate  (90 Base) MCG/ACT inhaler INHALE 2 PUFFS INTO THE LUNGS EVERY 6 HOURS AS NEEDED FOR WHEEZING 36 g 3    isosorbide mononitrate (IMDUR) 30 MG extended release tablet TAKE 1 TABLET BY MOUTH EVERY DAY 90 tablet 2    enalapril (VASOTEC) 10 MG tablet TAKE 2 TABLETS BY MOUTH EVERY MORNING AND 1 TABLET BY MOUTH EVERY EVENING 90 tablet 2    FLUoxetine (PROZAC) 20 MG capsule TAKE 3 CAPSULES BY MOUTH EVERY DAY 90 capsule 3    nitroGLYCERIN (NITROSTAT) 0.4 MG SL tablet DISSOLVE 1 TABLET UNDER THE TONGUE EVERY 5 MINUTES AS NEEDED FOR CHEST PAIN.  IF NO RELIEF AFTER 1 DOSE, CALL 911 25 tablet 0    ASPERCREME LIDOCAINE 4 % external patch APPLY 1 PATCH EXTERNALLY TO THE SKIN DAILY 30 patch 0    Calcium Carb-Cholecalciferol (CALCIUM-VITAMIN D) 600-400 MG-UNIT TABS TAKE 1 TABLET BY MOUTH DAILY 90 tablet 0    simvastatin (ZOCOR) 40 MG tablet TAKE 1 TABLET BY MOUTH EVERY NIGHT 90 tablet 3    metoprolol succinate (TOPROL XL) 50 MG extended release tablet TAKE 1 TABLET BY MOUTH DAILY 90 tablet 1    cetirizine (ZYRTEC) 10 MG tablet TAKE 1 TABLET BY MOUTH DAILY 90 tablet 2    busPIRone (BUSPAR) 15 MG tablet TAKE 1 TABLET BY MOUTH EVERY 12 HOURS AS NEEDED 120 tablet 3    meloxicam (MOBIC) 15 MG tablet TAKE 1 TABLET BY MOUTH DAILY 120 tablet 2    diclofenac (SOLARAZE) 3 % gel Apply topically 2 times daily as needed 1 Tube 3    lidocaine (LMX) 4 % cream Apply topically every 8 hrs as needed for pain 45 g 3    Heat Wraps (SOFTHEAT HEATING WRAP ULTRA) MISC Use daily for neck pain 1 each 0     No current facility-administered medications for this visit. Allergies   Allergen Reactions    Claritin [Loratadine]      02/2010 Heart palpitations  02/2010 Heart palpitations    Codeine Nausea Only        REVIEW OF SYSTEMS:     Review of Systems   Constitutional: Negative for activity change, chills, fever and unexpected weight change. HENT: Negative for congestion, hearing loss and sore throat. Eyes: Negative for discharge and visual disturbance. Respiratory: Negative for cough, chest tightness, shortness of breath and wheezing. Cardiovascular: Negative for chest pain, palpitations and leg swelling. Gastrointestinal: Negative for abdominal pain, diarrhea, nausea and vomiting. Endocrine: Negative for polydipsia and polyphagia. Genitourinary: Negative for decreased urine volume, difficulty urinating, dysuria, frequency and vaginal pain. Musculoskeletal: Negative for arthralgias, back pain, joint swelling and neck stiffness. Skin: Negative for rash. Allergic/Immunologic: Negative for environmental allergies. Neurological: Positive for numbness (right groin and thigh ). Negative for dizziness, tremors, seizures, syncope, facial asymmetry, speech difficulty, weakness, light-headedness and headaches. Hematological: Negative for adenopathy. Psychiatric/Behavioral: Negative for agitation, confusion, hallucinations and suicidal ideas. VITALS  BP (!) 171/102   Pulse 73   Temp 97.4 °F (36.3 °C)   Resp 16   LMP 11/07/2012      PHYSICAL EXAMINATION:     Physical Exam     Constitutional: Well developed, well nourished and in no acute distress. Head:  normocephalic, atraumatic. Neck: supple, no carotid bruits, thyroid not palpable  Respiratory: Clear to auscultation bilaterally with no use of accessory muscles during respiration. Cardiovascular: normal rate, regular rhythm, no murmur, gallop, rub. Abdomen: Soft, nontender, nondistended, normal bowel sounds, no hepatomegaly or splenomegaly  Extremities:  peripheral pulses palpable, no pedal edema or calf pain with palpation  Psych: normal affect      NEUROLOGICAL EXAMINATION:     Mental status   Alert and oriented; intact memory with no confusion, speech or language problems; no hallucinations or delusions     Cranial nerves   II - visual fields intact to confrontation                                                III, IV, VI - extra-ocular muscles full: no pupillary defect; no JEFFREY, no nystagmus, no ptosis   V - normal facial sensation                                                               VII - normal facial symmetry                                                             VIII - intact hearing                                                                             IX, X - symmetrical palate                                                                  XI - symmetrical shoulder shrug                                                       XII - midline tongue without atrophy or fasciculation     Motor function  Normal muscle bulk and tone  Muscle strength: normal power 5/5  fine motor movements     Sensory function Intact to touch, pin prick, except in the right groin and thigh    Cerebellar Intact fine motor movement.  No involuntary movements or tremors     Reflex function Intact 2+ DTR and symmetric. 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         Moderate-sized right foraminal disc protrusion at L2-L3 resulting in moderate    right foraminal stenosis.         Moderate central canal stenosis and severe bilateral foraminal stenosis at    L5-S1.         Moderate central canal stenosis and mild right foraminal stenosis at L4-L5.         Mild central canal stenosis and mild bilateral foraminal stenosis at L3-L4. ASSESSMENT / PLAN:     -Lumbar radiculopathy  -Numbness in the right groin   -Hypertension  -Hyperlipidemia        PLAN:   - MRI lumbar spine wo   - Don't bend at the back,   - Continue home medication   - Follow up in the clinic in 6-8 weeks   - Instructed patient to call the clinic if symptoms worsen or develop any new symptoms. Ms. Basia Choe received counseling on the following healthy behaviors: medical compliance, smoking cessation, blood pressure control, regular follow up with primary doctor.         Electronically signed by Garry Hackett MD on 11/10/2020 at 4:33 PM

## 2020-11-13 RX ORDER — CYCLOBENZAPRINE HCL 10 MG
TABLET ORAL
Qty: 60 TABLET | Refills: 0 | Status: SHIPPED | OUTPATIENT
Start: 2020-11-13 | End: 2020-12-11

## 2020-11-13 RX ORDER — MULTIVITAMIN
TABLET ORAL
Qty: 90 TABLET | Refills: 0 | Status: SHIPPED | OUTPATIENT
Start: 2020-11-13 | End: 2021-02-09

## 2020-11-13 NOTE — TELEPHONE ENCOUNTER
Please Approve or Refuse.   Send to Pharmacy per Pt's Request:      Next Visit Date:  2/8/2021   Last Visit Date: 11/5/2020    Hemoglobin A1C (%)   Date Value   07/01/2020 5.7   10/04/2016 5.3   08/24/2012 5.2             ( goal A1C is < 7)   BP Readings from Last 3 Encounters:   11/10/20 (!) 171/102   11/05/20 132/88   07/21/20 120/71          (goal 120/80)  BUN   Date Value Ref Range Status   07/01/2020 15 8 - 23 mg/dL Final     CREATININE   Date Value Ref Range Status   07/01/2020 0.85 0.50 - 0.90 mg/dL Final     Potassium   Date Value Ref Range Status   07/01/2020 5.2 3.7 - 5.3 mmol/L Final

## 2020-11-18 RX ORDER — BUSPIRONE HYDROCHLORIDE 15 MG/1
TABLET ORAL
Qty: 120 TABLET | Refills: 3 | Status: SHIPPED | OUTPATIENT
Start: 2020-11-18 | End: 2021-06-07

## 2020-11-24 ENCOUNTER — HOSPITAL ENCOUNTER (OUTPATIENT)
Dept: MRI IMAGING | Age: 60
Discharge: HOME | End: 2020-11-26
Payer: MEDICARE

## 2020-11-24 PROCEDURE — 72148 MRI LUMBAR SPINE W/O DYE: CPT

## 2020-12-03 ENCOUNTER — HOSPITAL ENCOUNTER (OUTPATIENT)
Dept: PAIN MANAGEMENT | Age: 60
Discharge: HOME | End: 2020-12-03
Payer: MEDICARE

## 2020-12-03 PROCEDURE — 99213 OFFICE O/P EST LOW 20 MIN: CPT

## 2020-12-03 PROCEDURE — 99214 OFFICE O/P EST MOD 30 MIN: CPT | Performed by: NURSE PRACTITIONER

## 2020-12-03 RX ORDER — PREGABALIN 75 MG/1
CAPSULE ORAL
Qty: 60 CAPSULE | Refills: 0 | Status: SHIPPED | OUTPATIENT
Start: 2020-12-03 | End: 2020-12-27

## 2020-12-03 ASSESSMENT — ENCOUNTER SYMPTOMS
BACK PAIN: 1
CONSTIPATION: 0
SHORTNESS OF BREATH: 0
COUGH: 0

## 2020-12-03 NOTE — PROGRESS NOTES
UNDER THE TONGUE EVERY 5 MINUTES AS NEEDED FOR CHEST PAIN.  IF NO RELIEF AFTER 1 DOSE, CALL 911, Disp: 25 tablet, Rfl: 0    ASPERCREME LIDOCAINE 4 % external patch, APPLY 1 PATCH EXTERNALLY TO THE SKIN DAILY, Disp: 30 patch, Rfl: 0    simvastatin (ZOCOR) 40 MG tablet, TAKE 1 TABLET BY MOUTH EVERY NIGHT, Disp: 90 tablet, Rfl: 3    metoprolol succinate (TOPROL XL) 50 MG extended release tablet, TAKE 1 TABLET BY MOUTH DAILY, Disp: 90 tablet, Rfl: 1    cetirizine (ZYRTEC) 10 MG tablet, TAKE 1 TABLET BY MOUTH DAILY, Disp: 90 tablet, Rfl: 2    meloxicam (MOBIC) 15 MG tablet, TAKE 1 TABLET BY MOUTH DAILY, Disp: 120 tablet, Rfl: 2    diclofenac (SOLARAZE) 3 % gel, Apply topically 2 times daily as needed, Disp: 1 Tube, Rfl: 3    lidocaine (LMX) 4 % cream, Apply topically every 8 hrs as needed for pain, Disp: 45 g, Rfl: 3    Heat Wraps (SOFTHEAT HEATING WRAP ULTRA) MISC, Use daily for neck pain, Disp: 1 each, Rfl: 0    Family History   Problem Relation Age of Onset    Emphysema Mother        Social History     Socioeconomic History    Marital status:      Spouse name: Not on file    Number of children: Not on file    Years of education: Not on file    Highest education level: Not on file   Occupational History    Not on file   Social Needs    Financial resource strain: Not on file    Food insecurity     Worry: Not on file     Inability: Not on file    Transportation needs     Medical: Not on file     Non-medical: Not on file   Tobacco Use    Smoking status: Former Smoker     Packs/day: 0.25     Years: 25.00     Pack years: 6.25     Types: Cigarettes     Last attempt to quit: 2009     Years since quittin.5    Smokeless tobacco: Never Used   Substance and Sexual Activity    Alcohol use: No     Alcohol/week: 0.0 standard drinks    Drug use: No    Sexual activity: Yes   Lifestyle    Physical activity     Days per week: Not on file     Minutes per session: Not on file    Stress: Not on file Relationships    Social connections     Talks on phone: Not on file     Gets together: Not on file     Attends Uatsdin service: Not on file     Active member of club or organization: Not on file     Attends meetings of clubs or organizations: Not on file     Relationship status: Not on file    Intimate partner violence     Fear of current or ex partner: Not on file     Emotionally abused: Not on file     Physically abused: Not on file     Forced sexual activity: Not on file   Other Topics Concern    Not on file   Social History Narrative    Not on file       Review of Systems:  Review of Systems   Constitution: Negative for chills and fever. Cardiovascular: Negative for chest pain and palpitations. Respiratory: Negative for cough and shortness of breath. Musculoskeletal: Positive for back pain. Gastrointestinal: Negative for constipation. Neurological: Positive for loss of balance and weakness. Negative for numbness, paresthesias and tingling. Physical Exam:  [facility] 11/07/2012     Physical Exam  Neurological:      Mental Status: She is alert. Psychiatric:         Mood and Affect: Mood normal.         Record/Diagnostics Review:    1. 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    2. Increased severe L5-S1 degenerative disc disease. 3. Severe L4-5 and L5-S1 and moderate L3-4 spinal canal stenosis. 4. Multilevel neural foraminal narrowing as detailed above and greatest    involving the L5 neural foramina where it is moderate bilaterally.           Assessment:  Problem List Items Addressed This Visit     Lumbar degenerative disc disease    Relevant Orders    Natasha Javed DO, Neurosurgery, [facility]    Lumbar radiculopathy    Relevant Orders    Maria Dolores Gutierrez DO, Neurosurgery, [facility]      Other Visit Diagnoses     Spinal stenosis of lumbar region, unspecified whether neurogenic claudication present    -  Primary    Relevant Orders    [facility] Phelps DO, Neurosurgery, [facility]             Treatment Plan:  Reviewed MRi with patient  Discussed different treatment options including continued conservative care such as physical therapy, chiropractic care, acupuncture. Discussed different interventional options such as epidural steroids or medial branch blocks. Also discussed surgical evaluation. At this point with the rapidly worsening leg pain, instability and severe stenosis on MRI, we will have her touch base with NS for evaluation. Follow up with pain clinic [facility]    Teena Montoya is a 61 y.o. female being evaluated by a Virtual Visit (video visit) encounter to address concerns as mentioned above. A caregiver was present when appropriate. Due to this being a TeleHealth encounter (During PUL-32 public health emergency), evaluation of the following organ systems was limited: Vitals/Constitutional/EENT/Resp/CV/GI//MS/Neuro/Skin/Heme-Lymph-Imm. Pursuant to the emergency declaration under the [address] [place] [address] authority and the [employer] Act, this Virtual Visit was conducted with patient's (and/or legal guardian's) consent, to reduce the patient's risk of exposure to COVID-19 and provide necessary medical care. The patient (and/or legal guardian) has also been advised to contact this office for worsening conditions or problems, and seek emergency medical treatment and/or call 911 if deemed necessary. Patient identification was verified at the start of the visit: Yes    Total time spent for this encounter: Not billed by time    Services were provided through a video synchronous discussion virtually to substitute for in-person clinic visit. Patient and provider were located at their individual homes. --Yanique Crowley, APRN - CNP on 12/3/2020 at 3:33 PM    An electronic signature was used to authenticate this note.

## 2020-12-11 RX ORDER — CYCLOBENZAPRINE HCL 10 MG
TABLET ORAL
Qty: 60 TABLET | Refills: 0 | Status: SHIPPED | OUTPATIENT
Start: 2020-12-11 | End: 2021-01-13 | Stop reason: SDUPTHER

## 2020-12-11 NOTE — TELEPHONE ENCOUNTER
Last visit: 11/05/2020  Last Med refill: 11/13/2020  Does patient have enough medication for 72 hours: No:     Next Visit Date:  Future Appointments   Date Time Provider Matt Rodriguezi   1/5/2021  4:00 PM Natasha Crowley MD [facility]   2/8/2021  2:15 PM Ibeth Wilson MD fp [address] Maintenance   Topic Date Due    A1C test (Diabetic or Prediabetic)  07/01/2021    Lipid screen  07/01/2021    Potassium monitoring  07/01/2021    Creatinine monitoring  07/01/2021    Breast cancer screen  10/08/2022    Cervical cancer screen  04/16/2023    Colon cancer screen colonoscopy  05/06/2024    DTaP/Tdap/Td vaccine (2 - Td) 04/16/2028    Flu vaccine  Completed    Shingles Vaccine  Completed    Hepatitis C screen  Completed    HIV screen  Completed    Hepatitis A vaccine  Aged Out    Hepatitis B vaccine  Aged Out    Hib vaccine  Aged Out    Meningococcal (ACWY) vaccine  Aged Out    Pneumococcal 0-64 years Vaccine  Aged Out       Hemoglobin A1C (%)   Date Value   07/01/2020 5.7   10/04/2016 5.3   08/24/2012 5.2             ( goal A1C is < 7)   Microalb/Crt.  Ratio (mcg/mg creat)   Date Value   07/01/2020 46 (H)     LDL Cholesterol (mg/dL)   Date Value   07/01/2020 67   03/26/2019 47       (goal LDL is <100)   AST (U/L)   Date Value   07/01/2020 19     ALT (U/L)   Date Value   07/01/2020 13     BUN (mg/dL)   Date Value   07/01/2020 15     BP Readings from Last 3 Encounters:   11/10/20 (!) 171/102   11/05/20 132/88   07/21/20 120/71          (goal 120/80)    All Future Testing planned in [facility]  Lab Frequency Next Occurrence   POCT FECAL IMMUNOCHEMICAL TEST (FIT) Once 12/25/2021   Lumbar Epidural Steroid Injection/Caudal Once 09/06/2020   Fluoro For Surgical Procedures Once 09/06/2020   Saline lock IV Once 01/08/2021   Lumbar Epidural Steroid Injection/Caudal Once 07/08/2020               Patient Active Problem List:     Essential hypertension     CAD (coronary artery disease)     OA (osteoarthritis)     Anxiety     Asthma     Depression     Endometrial polyp     PVD (peripheral vascular disease) with claudication (HCC)     Degenerative disc disease, cervical     Mild intermittent asthma without complication     Mixed hyperlipidemia     Polyp of colon     Hip arthritis     Lumbar degenerative disc disease     Sprain of sacroiliac ligament     Slow transit constipation     Calculus of gallbladder without cholecystitis without obstruction     Lumbar radiculopathy     Class 1 obesity due to excess calories with serious comorbidity and body mass index (BMI) of 32.0 to 32.9 in adult     Spinal stenosis of lumbar region

## 2020-12-28 RX ORDER — PREGABALIN 75 MG/1
CAPSULE ORAL
Qty: 60 CAPSULE | Refills: 0 | Status: SHIPPED | OUTPATIENT
Start: 2020-12-28 | End: 2021-02-02

## 2021-01-11 ENCOUNTER — OFFICE VISIT (OUTPATIENT)
Dept: NEUROSURGERY | Age: 61
End: 2021-01-11
Payer: MEDICARE

## 2021-01-11 VITALS
HEART RATE: 81 BPM | SYSTOLIC BLOOD PRESSURE: 135 MMHG | WEIGHT: 179 LBS | HEIGHT: 62 IN | OXYGEN SATURATION: 97 % | BODY MASS INDEX: 32.94 KG/M2 | DIASTOLIC BLOOD PRESSURE: 84 MMHG

## 2021-01-11 DIAGNOSIS — M48.062 LUMBAR STENOSIS WITH NEUROGENIC CLAUDICATION: Primary | ICD-10-CM

## 2021-01-11 PROCEDURE — 99213 OFFICE O/P EST LOW 20 MIN: CPT | Performed by: NURSE PRACTITIONER

## 2021-01-11 PROCEDURE — G8484 FLU IMMUNIZE NO ADMIN: HCPCS | Performed by: NURSE PRACTITIONER

## 2021-01-11 PROCEDURE — 3017F COLORECTAL CA SCREEN DOC REV: CPT | Performed by: NURSE PRACTITIONER

## 2021-01-11 PROCEDURE — 1036F TOBACCO NON-USER: CPT | Performed by: NURSE PRACTITIONER

## 2021-01-11 PROCEDURE — G8427 DOCREV CUR MEDS BY ELIG CLIN: HCPCS | Performed by: NURSE PRACTITIONER

## 2021-01-11 PROCEDURE — G8417 CALC BMI ABV UP PARAM F/U: HCPCS | Performed by: NURSE PRACTITIONER

## 2021-01-11 NOTE — PROGRESS NOTES
[place]  [address] [facility]  MOB [id number]  [id number]  Dept: [phone]    Patient:  Hair Gaytan  YOB: 1960  Date: 21    The patient is a 61 y.o. female who presents today for consult of the following problems:     Chief Complaint   Patient presents with    New Patient     spinal stenosis of lumbar region         HPI:     Hair Gaytan is a 61 y.o. female on whom neurosurgical consultation was requested by Gomez Johnson MD for management of back and leg pain. Pt has had low back pain on and off for the last year or so. Pain is 4/10 on average, described as dull, aching, sometimes sharp/shooting. Pain is typically in the lower back, sometimes radiating down the posterior aspect of bilateral thighs and calves. This occurs with ambulation at times, and sometimes at rest. Does have some persistent numbness to upper right thigh, and lateral left shin. Pain is worsened with position changing, walking. Has had multiple falls, including yesterday. Left knee will abruptly give out and she will fall. Did complete physical therapy just over a year ago through [facility], this did not provide any lasting relief from symptoms. Has been following with pain specialist, has received recent L4-5 KALPANA, with improvement in symptoms for several months however they have returned. Does feel as though symptoms are progressing, do interfere with activities on a daily basis. Groin pain: No  Saddle anesthesia: No  Hip Pain: Yes  Bowel/bladder incontinence: No  Constipation or Urinary retention: No    LIMITATIONS    Pain significantly limiting from a daily standpoint.    Assistive devices: cane  Daily pharmacologic pain control include: tylenol; mobic, lyrica  Back versus le/60    MANAGEMENT     Prior Surgery: No  Prior to 1yr ago: PT  In the last year:    Physical Therapy: No   Chiropractic Interventions: No Injections: Yes  Improvement: near complete resolution for several months    Types of injections/responses:   2020: L4-L5 KALPANA-near complete resolution for several months    History:     Past Medical History:   Diagnosis Date    ARIADNE (acute kidney injury) ([facility] [address] 2019    Anxiety     Asthma     CAD (coronary artery disease)     Class 1 obesity due to excess calories with serious comorbidity and body mass index (BMI) of 32.0 to 32.9 in adult 2020    Depression     Examination of participant in clinical trial 11    End date 2015    HTN (hypertension)     Lumbar radiculopathy     Mixed hyperlipidemia 1/15/2018    OA (osteoarthritis)     PVD (peripheral vascular disease) with claudication ([facility] [address] 2015     Past Surgical History:   Procedure Laterality Date     SECTION      COLONOSCOPY N/A 2019    COLORECTAL CANCER SCREENING, NOT HIGH RISK performed by Willy Meza MD at [address]      2 stents    DILATION AND CURETTAGE OF UTERUS N/A     uterine polyp     Family History   Problem Relation Age of Onset    Emphysema Mother      Current Outpatient Medications on File Prior to Visit   Medication Sig Dispense Refill    diclofenac sodium (VOLTAREN) 1 % GEL APPLY 2 GRAMS EXTERNALLY TO THE AFFECTED AREA FOUR TIMES DAILY 200 g 0    pregabalin (LYRICA) 75 MG capsule TAKE 1 CAPSULE BY MOUTH TWICE DAILY 60 capsule 0    cyclobenzaprine (FLEXERIL) 10 MG tablet TAKE 1 TABLET BY MOUTH TWICE DAILY AS NEEDED FOR MUSCLE SPASMS 60 tablet 0    busPIRone (BUSPAR) 15 MG tablet TAKE 1 TABLET BY MOUTH EVERY 12 HOURS AS NEEDED 120 tablet 3    CALCIUM 600+D3 600-400 MG-UNIT TABS per tab TAKE 1 TABLET BY MOUTH DAILY 90 tablet 0    ACETAMINOPHEN EXTRA STRENGTH 500 MG tablet TAKE 1 TABLET BY MOUTH EVERY 6 HOURS AS NEEDED FOR PAIN 120 tablet 3    aspirin (ASPIRIN LOW DOSE) 81 MG EC tablet TAKE 1 TABLET BY MOUTH DAILY 30 tablet 2  albuterol sulfate  (90 Base) MCG/ACT inhaler INHALE 2 PUFFS INTO THE LUNGS EVERY 6 HOURS AS NEEDED FOR WHEEZING 36 g 3    isosorbide mononitrate (IMDUR) 30 MG extended release tablet TAKE 1 TABLET BY MOUTH EVERY DAY 90 tablet 2    enalapril (VASOTEC) 10 MG tablet TAKE 2 TABLETS BY MOUTH EVERY MORNING AND 1 TABLET BY MOUTH EVERY EVENING 90 tablet 2    FLUoxetine (PROZAC) 20 MG capsule TAKE 3 CAPSULES BY MOUTH EVERY DAY 90 capsule 3    nitroGLYCERIN (NITROSTAT) 0.4 MG SL tablet DISSOLVE 1 TABLET UNDER THE TONGUE EVERY 5 MINUTES AS NEEDED FOR CHEST PAIN. IF NO RELIEF AFTER 1 DOSE, CALL 911 25 tablet 0    simvastatin (ZOCOR) 40 MG tablet TAKE 1 TABLET BY MOUTH EVERY NIGHT 90 tablet 3    metoprolol succinate (TOPROL XL) 50 MG extended release tablet TAKE 1 TABLET BY MOUTH DAILY 90 tablet 1    cetirizine (ZYRTEC) 10 MG tablet TAKE 1 TABLET BY MOUTH DAILY 90 tablet 2    meloxicam (MOBIC) 15 MG tablet TAKE 1 TABLET BY MOUTH DAILY 120 tablet 2    lidocaine (LMX) 4 % cream Apply topically every 8 hrs as needed for pain 45 g 3    Heat Wraps (SOFTHEAT HEATING WRAP ULTRA) MISC Use daily for neck pain 1 each 0    ASPERCREME LIDOCAINE 4 % external patch APPLY 1 PATCH EXTERNALLY TO THE SKIN DAILY (Patient not taking: Reported on 2021) 30 patch 0    diclofenac (SOLARAZE) 3 % gel Apply topically 2 times daily as needed (Patient not taking: Reported on 2021) 1 Tube 3     No current facility-administered medications on file prior to visit.       Social History     Tobacco Use    Smoking status: Former Smoker     Packs/day: 0.25     Years: 25.00     Pack years: 6.25     Types: Cigarettes     Quit date: 2009     Years since quittin.6    Smokeless tobacco: Never Used   Substance Use Topics    Alcohol use: No     Alcohol/week: 0.0 standard drinks    Drug use: No       Allergies   Allergen Reactions    Claritin [Loratadine]      2010 Heart palpitations  2010 Heart palpitations  Codeine Nausea Only       Review of Systems  Constitutional: Negative for activity change and appetite change. HENT: Negative for ear pain and facial swelling. Eyes: Negative for discharge and itching. Respiratory: Negative for choking and chest tightness. Cardiovascular: Negative for chest pain and leg swelling. Gastrointestinal: Negative for nausea and abdominal pain. Endocrine: Negative for cold intolerance and heat intolerance. Genitourinary: Negative for frequency and flank pain. Musculoskeletal: Negative for myalgias and joint swelling. Skin: Negative for rash and wound. Allergic/Immunologic: Negative for environmental allergies and food allergies. Hematological: Negative for adenopathy. Does not bruise/bleed easily. Psychiatric/Behavioral: Negative for self-injury. The patient is not nervous/anxious. Physical Exam:      /84   Pulse 81   Ht 5' 2\" (1.575 m)   Wt 179 lb (81.2 kg)   LMP 11/07/2012   SpO2 97%   BMI 32.74 kg/m²   Estimated body mass index is 32.74 kg/m² as calculated from the following:    Height as of this encounter: 5' 2\" (1.575 m). Weight as of this encounter: 179 lb (81.2 kg). General:  Shilo Mahmood is a 61y.o. year old female who appears her stated age. HEENT: Normocephalic atraumatic. Neck supple. Chest: regular rate; pulses equal  Abdomen: Soft nontender nondistended.    Ext: DP and PT pulses 2+, good cap refill  Neuro    Mentation  Appropriate affect  Registration intact  Orientation intact    Cranial Nerves:   Pupils equal and reactive to light  Extraocular motion intact  Face and shrug symmetric  Tongue midline  No dysarthria  v1-3 sensation symmetric, masseter tone symmetric  Hearing symmetric and intact    Sensation: Decreased to upper anterior right thigh, as well as lateral lower left leg    Motor  L deltoid 5/5; R deltoid 5/5  L biceps 5/5; R biceps 5/5  L triceps 5/5; R triceps 5/5 L wrist extension 5/5; R wrist extension 5/5  L intrinsics 5/5; R intrinsics 5/5     L iliopsoas 4/5 , R iliopsoas 5/5  L quadriceps 4-/5; R quadriceps 5/5  L Dorsiflexion 4/5; R dorsiflexion 5/5  L Plantarflexion 5/5; R plantarflexion 5/5  L EHL 5/5; R EHL 5/5    Reflexes  L Brachioradialis 2+/4; R brachioradialis 2+/4  L Biceps 2+/4; R Biceps 2+/4  L Triceps 2+/4; R Triceps 2+/4  L Patellar 2+/4: R Patellar 2+/4  L Achilles 2+/4; R Achilles 2+/4    hoffmans L: neg  hoffmans R: neg  Clonus L: neg  Clonus R: neg  Babinski L: neg  Babinski R: neg    ZORAIDA: Negative  Straight leg raise: Positive  SI joint tenderness: Positive left side    Studies Review:     MRI lumbar spine 11/24/2020:  FINDINGS:   BONES/ALIGNMENT: There is transitional lumbosacral anatomy with partial   lumbarization of S1 and a partially formed S1-2 disc.  Vertebral body heights   are maintained.  There is unchanged 3 mm degenerate anterolisthesis of L4 on   L5 secondary to facet arthropathy.  Alignment is otherwise normal.  Next   edematous, sclerotic, and fatty degenerative endplate changes at A2-R0 have   increased since the prior study.  No focal suspect osseous lesion is evident.       SPINAL CORD: The conus terminates normally at the L2 level.  The visualized   spinal cord has normal signal and morphology.  No evidence of mass or   abnormal fluid collection within the spinal canal.       SOFT TISSUES: Paraspinal soft tissues are unremarkable.       L1-L2:  Disc height and signal maintained.  No neural foraminal narrowing or   spinal canal stenosis.       L2-L3: Disc height and signal maintained.  No left neural foraminal   narrowing.  Mild right neural foraminal narrowing secondary to a right   foraminal disc protrusion.  No spinal canal stenosis.       L3-L4: Mild disc height loss and desiccation.  Mild bilateral neural   foraminal narrowing and moderate spinal canal stenosis secondary to disc   bulge.     L4-L5: Mild disc height loss and desiccation.  Mild bilateral neural   foraminal narrowing secondary to disc bulge and facet hypertrophy.  Severe   spinal canal stenosis secondary to disc bulge, facet and ligamentum flavum   hypertrophy, and epidural lipomatosis.       L5-S1: Increased severe disc height loss and desiccation with intradiscal   vacuum phenomenon.  Moderate bilateral neural foraminal narrowing secondary   to disc bulge and facet hypertrophy.  Severe spinal canal stenosis secondary   to disc bulge, facet and ligamentum flavum hypertrophy, and epidural   lipomatosis. Assessment and Plan:      1. Lumbar stenosis with neurogenic claudication          Plan: We will obtain flexion-extension imaging, as well as scoliosis x-rays. Patient to return to the office for evaluation by surgeon for further recommendations. Discussed the importance of safety measures to avoid any injuries secondary to falls. Monitor for any new or worsening symptoms including saddle anesthesia, loss of bowel or bladder function, weakness. Followup: Return in about 4 weeks (around 2/8/2021), or if symptoms worsen or fail to improve. Prescriptions Ordered:  No orders of the defined types were placed in this encounter.        Orders Placed:  Orders Placed This Encounter   Procedures    XR LUMBAR SPINE FLEXION AND EXTENSION ONLY     Standing lateral Flexion, Neutral, extension  Include both femoral heads on neutral imaging     Standing Status:   Future     Standing Expiration Date:   1/11/2022     Order Specific Question:   Reason for exam:     Answer:   evaluate for instability    XR SPINE ENTIRE (2-3 VIEWS)     Standing Status:   Future     Standing Expiration Date:   1/11/2022     Order Specific Question:   Reason for exam:     Answer:   evaluate curvature/alignment        Electronically signed by ADELA Sunshine CNP on 1/12/2021 at 8:42 AM Please note that this chart was generated using voice recognition Dragon dictation software. Although every effort was made to ensure the accuracy of this automated transcription, some errors in transcription may have occurred.

## 2021-01-13 DIAGNOSIS — I10 ESSENTIAL HYPERTENSION: ICD-10-CM

## 2021-01-13 DIAGNOSIS — M19.019 OSTEOARTHRITIS OF SHOULDER, UNSPECIFIED LATERALITY, UNSPECIFIED OSTEOARTHRITIS TYPE: ICD-10-CM

## 2021-01-13 DIAGNOSIS — M50.30 DEGENERATIVE DISC DISEASE, CERVICAL: ICD-10-CM

## 2021-01-13 RX ORDER — METOPROLOL SUCCINATE 50 MG/1
TABLET, EXTENDED RELEASE ORAL
Qty: 90 TABLET | Refills: 2 | Status: SHIPPED | OUTPATIENT
Start: 2021-01-13 | End: 2021-08-30

## 2021-01-13 RX ORDER — ENALAPRIL MALEATE 10 MG/1
TABLET ORAL
Qty: 90 TABLET | Refills: 2 | Status: SHIPPED | OUTPATIENT
Start: 2021-01-13 | End: 2021-04-08

## 2021-01-13 RX ORDER — ASPIRIN 81 MG/1
TABLET ORAL
Qty: 90 TABLET | Refills: 2 | Status: SHIPPED | OUTPATIENT
Start: 2021-01-13 | End: 2021-12-22

## 2021-01-13 RX ORDER — CYCLOBENZAPRINE HCL 10 MG
TABLET ORAL
Qty: 60 TABLET | Refills: 0 | Status: SHIPPED | OUTPATIENT
Start: 2021-01-13 | End: 2021-02-10 | Stop reason: SDUPTHER

## 2021-01-13 RX ORDER — MELOXICAM 15 MG/1
TABLET ORAL
Qty: 90 TABLET | Refills: 2 | Status: SHIPPED | OUTPATIENT
Start: 2021-01-13 | End: 2021-08-30

## 2021-01-13 NOTE — TELEPHONE ENCOUNTER
Please Approve or Refuse.   Send to Pharmacy per Pt's Request:      Next Visit Date:  2/8/2021   Last Visit Date: 11/5/2020    Hemoglobin A1C (%)   Date Value   07/01/2020 5.7   10/04/2016 5.3   08/24/2012 5.2             ( goal A1C is < 7)   BP Readings from Last 3 Encounters:   01/11/21 135/84   11/10/20 (!) 171/102   11/05/20 132/88          (goal 120/80)  BUN   Date Value Ref Range Status   07/01/2020 15 8 - 23 mg/dL Final     CREATININE   Date Value Ref Range Status   07/01/2020 0.85 0.50 - 0.90 mg/dL Final     Potassium   Date Value Ref Range Status   07/01/2020 5.2 3.7 - 5.3 mmol/L Final

## 2021-01-19 DIAGNOSIS — J30.9 ALLERGIC SINUSITIS: ICD-10-CM

## 2021-01-20 RX ORDER — CETIRIZINE HYDROCHLORIDE 10 MG/1
10 TABLET ORAL DAILY
Qty: 90 TABLET | Refills: 2 | Status: SHIPPED | OUTPATIENT
Start: 2021-01-20 | End: 2021-12-22

## 2021-01-25 DIAGNOSIS — M51.26 PROTRUDED LUMBAR DISC: ICD-10-CM

## 2021-01-25 DIAGNOSIS — M51.36 LUMBAR DEGENERATIVE DISC DISEASE: ICD-10-CM

## 2021-01-25 DIAGNOSIS — S33.6XXD SPRAIN OF SACROILIAC LIGAMENT, SUBSEQUENT ENCOUNTER: ICD-10-CM

## 2021-02-01 ENCOUNTER — HOSPITAL ENCOUNTER (OUTPATIENT)
Dept: GENERAL RADIOLOGY | Age: 61
Discharge: HOME | End: 2021-02-03
Payer: MEDICARE

## 2021-02-01 ENCOUNTER — HOSPITAL ENCOUNTER (OUTPATIENT)
Age: 61
Discharge: HOME | End: 2021-02-03
Payer: MEDICARE

## 2021-02-01 DIAGNOSIS — M48.062 LUMBAR STENOSIS WITH NEUROGENIC CLAUDICATION: ICD-10-CM

## 2021-02-01 PROCEDURE — 72082 X-RAY EXAM ENTIRE SPI 2/3 VW: CPT

## 2021-02-01 PROCEDURE — 72120 X-RAY BEND ONLY L-S SPINE: CPT

## 2021-02-02 DIAGNOSIS — M51.26 PROTRUDED LUMBAR DISC: ICD-10-CM

## 2021-02-02 DIAGNOSIS — M51.36 LUMBAR DEGENERATIVE DISC DISEASE: ICD-10-CM

## 2021-02-02 RX ORDER — PREGABALIN 75 MG/1
CAPSULE ORAL
Qty: 60 CAPSULE | Refills: 0 | Status: SHIPPED | OUTPATIENT
Start: 2021-02-02 | End: 2021-02-08 | Stop reason: DRUGHIGH

## 2021-02-08 ENCOUNTER — OFFICE VISIT (OUTPATIENT)
Dept: FAMILY MEDICINE CLINIC | Age: 61
End: 2021-02-08
Payer: MEDICARE

## 2021-02-08 VITALS
SYSTOLIC BLOOD PRESSURE: 139 MMHG | RESPIRATION RATE: 14 BRPM | OXYGEN SATURATION: 98 % | HEART RATE: 79 BPM | WEIGHT: 174 LBS | TEMPERATURE: 97.2 F | BODY MASS INDEX: 32.02 KG/M2 | HEIGHT: 62 IN | DIASTOLIC BLOOD PRESSURE: 85 MMHG

## 2021-02-08 DIAGNOSIS — I10 ESSENTIAL HYPERTENSION: Primary | ICD-10-CM

## 2021-02-08 DIAGNOSIS — M51.36 LUMBAR DEGENERATIVE DISC DISEASE: ICD-10-CM

## 2021-02-08 DIAGNOSIS — M54.16 LUMBAR RADICULOPATHY: ICD-10-CM

## 2021-02-08 DIAGNOSIS — M48.061 SPINAL STENOSIS OF LUMBAR REGION WITHOUT NEUROGENIC CLAUDICATION: ICD-10-CM

## 2021-02-08 DIAGNOSIS — E78.2 MIXED HYPERLIPIDEMIA: ICD-10-CM

## 2021-02-08 DIAGNOSIS — F32.4 MAJOR DEPRESSIVE DISORDER WITH SINGLE EPISODE, IN PARTIAL REMISSION (HCC): ICD-10-CM

## 2021-02-08 DIAGNOSIS — M51.26 PROTRUDED LUMBAR DISC: ICD-10-CM

## 2021-02-08 DIAGNOSIS — E66.09 CLASS 1 OBESITY DUE TO EXCESS CALORIES WITH SERIOUS COMORBIDITY AND BODY MASS INDEX (BMI) OF 32.0 TO 32.9 IN ADULT: ICD-10-CM

## 2021-02-08 DIAGNOSIS — I25.10 CORONARY ARTERY DISEASE INVOLVING NATIVE CORONARY ARTERY OF NATIVE HEART WITHOUT ANGINA PECTORIS: ICD-10-CM

## 2021-02-08 DIAGNOSIS — J45.20 MILD INTERMITTENT ASTHMA WITHOUT COMPLICATION: ICD-10-CM

## 2021-02-08 PROCEDURE — 99214 OFFICE O/P EST MOD 30 MIN: CPT | Performed by: FAMILY MEDICINE

## 2021-02-08 PROCEDURE — 3017F COLORECTAL CA SCREEN DOC REV: CPT | Performed by: FAMILY MEDICINE

## 2021-02-08 PROCEDURE — G8482 FLU IMMUNIZE ORDER/ADMIN: HCPCS | Performed by: FAMILY MEDICINE

## 2021-02-08 PROCEDURE — 1036F TOBACCO NON-USER: CPT | Performed by: FAMILY MEDICINE

## 2021-02-08 PROCEDURE — G8427 DOCREV CUR MEDS BY ELIG CLIN: HCPCS | Performed by: FAMILY MEDICINE

## 2021-02-08 PROCEDURE — G8417 CALC BMI ABV UP PARAM F/U: HCPCS | Performed by: FAMILY MEDICINE

## 2021-02-08 RX ORDER — PREGABALIN 150 MG/1
100 CAPSULE ORAL 2 TIMES DAILY
Qty: 60 CAPSULE | Refills: 0 | Status: SHIPPED | OUTPATIENT
Start: 2021-02-17 | End: 2021-03-09

## 2021-02-08 SDOH — ECONOMIC STABILITY: TRANSPORTATION INSECURITY
IN THE PAST 12 MONTHS, HAS LACK OF TRANSPORTATION KEPT YOU FROM MEETINGS, WORK, OR FROM GETTING THINGS NEEDED FOR DAILY LIVING?: NO

## 2021-02-08 SDOH — ECONOMIC STABILITY: FOOD INSECURITY: WITHIN THE PAST 12 MONTHS, YOU WORRIED THAT YOUR FOOD WOULD RUN OUT BEFORE YOU GOT MONEY TO BUY MORE.: NEVER TRUE

## 2021-02-08 ASSESSMENT — ENCOUNTER SYMPTOMS
RHINORRHEA: 0
CHEST TIGHTNESS: 0
BACK PAIN: 1
VOMITING: 0
CONSTIPATION: 0
SINUS PRESSURE: 0
NAUSEA: 0
DIARRHEA: 0
WHEEZING: 0
SHORTNESS OF BREATH: 0
COUGH: 0
PHOTOPHOBIA: 0
ABDOMINAL DISTENTION: 0
COLOR CHANGE: 0
ABDOMINAL PAIN: 0

## 2021-02-08 ASSESSMENT — PATIENT HEALTH QUESTIONNAIRE - PHQ9
1. LITTLE INTEREST OR PLEASURE IN DOING THINGS: 0
SUM OF ALL RESPONSES TO PHQ QUESTIONS 1-9: 0
SUM OF ALL RESPONSES TO PHQ9 QUESTIONS 1 & 2: 0
SUM OF ALL RESPONSES TO PHQ QUESTIONS 1-9: 0

## 2021-02-08 NOTE — PROGRESS NOTES
Chronic Disease Visit Information    BP Readings from Last 3 Encounters:   02/08/21 139/85   01/11/21 135/84   11/10/20 (!) 171/102          Hemoglobin A1C (%)   Date Value   07/01/2020 5.7   10/04/2016 5.3   08/24/2012 5.2     Microalb/Crt. Ratio (mcg/mg creat)   Date Value   07/01/2020 46 (H)     LDL Cholesterol (mg/dL)   Date Value   07/01/2020 67     HDL (mg/dL)   Date Value   07/01/2020 59     BUN (mg/dL)   Date Value   07/01/2020 15     CREATININE (mg/dL)   Date Value   07/01/2020 0.85     Glucose (mg/dL)   Date Value   07/01/2020 102 (H)            Have you changed or started any medications since your last visit including any over-the-counter medicines, vitamins, or herbal medicines? no   Are you having any side effects from any of your medications? -  no  Have you stopped taking any of your medications? Is so, why? -  no    Have you seen any other physician or provider since your last visit? Yes - Records Obtained  Have you had any other diagnostic tests since your last visit? Yes - Records Obtained  Have you been seen in the emergency room and/or had an admission to a hospital since we last saw you? Yes - Records Obtained  Have you had your annual diabetic retinal (eye) exam? No  Have you had your routine dental cleaning in the past 6 months? no    Have you activated your [employer] account? If not, what are your barriers?  Yes     Patient Care Team:  Anna Berger MD as PCP - General (Family Medicine)  Anna Berger MD as PCP - [facility]         Medical History Review  Past Medical, Family, and Social History reviewed and does contribute to the patient presenting condition    Health Maintenance   Topic Date Due    A1C test (Diabetic or Prediabetic)  07/01/2021    Lipid screen  07/01/2021    Potassium monitoring  07/01/2021    Creatinine monitoring  07/01/2021    Breast cancer screen  10/08/2022    Cervical cancer screen  04/16/2023    Colon cancer screen colonoscopy  05/06/2024  DTaP/Tdap/Td vaccine (2 - Td) 04/16/2028    Flu vaccine  Completed    Shingles Vaccine  Completed    Pneumococcal 0-64 years Vaccine  Completed    Hepatitis C screen  Completed    HIV screen  Completed    Hepatitis A vaccine  Aged Out    Hepatitis B vaccine  Aged Out    Hib vaccine  Aged Out    Meningococcal (ACWY) vaccine  Aged Out

## 2021-02-08 NOTE — PROGRESS NOTES
Chief Complaint   Patient presents with    Hypertension    3 Month 69 Micaela Kauffman  here today for follow up on chronic medical problems, go over labs and/or diagnostic studies, and medication refills. Hypertension and 3 Month Follow-Up      HPI: Patient is here for chronic medical problems, for hypertension controlled denies any chest pain shortness of breath. Hyperlipidemia stable on current treatment. Patient has spinal stenosis with lumbar radiculopathy was seen by pain management reports she was referred to neurosurgeon before doing any further treatment currently patient is on muscle relaxant and Lyrica reports her pain is getting worse again. She had x-rays done and has appointment with neurosurgeon tomorrow. Patient reports Lyrica is helping but she needs to increase dose. Asthma stable on current treatment denies any flareups. Obesity stable, patient is on diet control cannot do exercise due to back pain. Depression stable on current treatment      Coronary artery disease, patient follows with cardiologist every 6 months. /85 (Site: Left Upper Arm, Position: Sitting, Cuff Size: Medium Adult)   Pulse 79   Temp 97.2 °F (36.2 °C) (Temporal)   Resp 14   Ht 5' 2\" (1.575 m)   Wt 174 lb (78.9 kg)   LMP 11/07/2012   SpO2 98%   BMI 31.83 kg/m²    Body mass index is 31.83 kg/m². Wt Readings from Last 3 Encounters:   02/08/21 174 lb (78.9 kg)   01/11/21 179 lb (81.2 kg)   11/05/20 179 lb (81.2 kg)        [x]Negative depression screening. PHQ Scores 2/8/2021 3/25/2019 9/25/2018 4/16/2018   PHQ2 Score 0 0 0 0   PHQ9 Score 0 0 0 0      []1-4 = Minimal depression   []5-9 = Milddepression   []10-14 = Moderate depression   []15-19 = Moderately severe depression   []20-27 = Severe depression    Discussed testing with the patient and all questions fully answered.     Hospital Outpatient Visit on 07/17/2020   Component Date Value Ref Range Status  SARS-CoV-2 07/17/2020 Not Detected  Not Detected Final    Comment:       The specimen is NEGATIVE for SARS-CoV-2, the novel coronavirus associated with COVID-19. A negative result does not rule out COVID-19. This test has been authorized by the FDA under an Emergency Use Authorization (EUA) for use   by authorized laboratories. Fact sheet for Healthcare Providers: BuildHer.es  Fact sheet for Patients: BuildHer.es        METHODOLOGY: RT-PCR      SARS-CoV-2, Rapid 07/17/2020        Final    Source 07/17/2020 . NASOPHARYNGEAL SWAB   Final    SARS-CoV-2, PCR 07/17/2020        Final         Most recent labs reviewed:     Lab Results   Component Value Date    WBC 6.8 07/01/2020    HGB 15.4 07/01/2020    HCT 46.0 07/01/2020    MCV 96.8 07/01/2020     07/01/2020       @BRIEFLAB(NA,K,CL,CO2,BUN,CREATININE,GLUCOSE,CALCIUM)@     Lab Results   Component Value Date    ALT 13 07/01/2020    AST 19 07/01/2020    ALKPHOS 75 07/01/2020    BILITOT 0.38 07/01/2020       Lab Results   Component Value Date    TSH 3.25 07/01/2020       Lab Results   Component Value Date    CHOL 116 03/26/2019    CHOL 137 10/26/2017    CHOL 142 10/04/2016     Lab Results   Component Value Date    TRIG 91 03/26/2019    TRIG 108 10/26/2017    TRIG 111 10/04/2016     Lab Results   Component Value Date    HDL 59 07/01/2020    HDL 51 03/26/2019    HDL 64 10/26/2017     Lab Results   Component Value Date    LDLCHOLESTEROL 67 07/01/2020    LDLCHOLESTEROL 47 03/26/2019    LDLCHOLESTEROL 51 10/26/2017     Lab Results   Component Value Date    VLDL NOT REPORTED 07/01/2020    VLDL NOT REPORTED 03/26/2019    VLDL NOT REPORTED 10/26/2017     Lab Results   Component Value Date    CHOLHDLRATIO 2.4 07/01/2020    CHOLHDLRATIO 2.3 03/26/2019    CHOLHDLRATIO 2.1 10/26/2017       Lab Results   Component Value Date    LABA1C 5.7 07/01/2020       No results found for: [id number]  simvastatin (ZOCOR) 40 MG tablet TAKE 1 TABLET BY MOUTH EVERY NIGHT 90 tablet 3    diclofenac (SOLARAZE) 3 % gel Apply topically 2 times daily as needed 1 Tube 3    lidocaine (LMX) 4 % cream Apply topically every 8 hrs as needed for pain 45 g 3    Heat Wraps (SOFTHEAT HEATING WRAP ULTRA) MISC Use daily for neck pain 1 each 0     No current facility-administered medications for this visit.               Social History     Socioeconomic History    Marital status:      Spouse name: Not on file    Number of children: Not on file    Years of education: Not on file    Highest education level: Not on file   Occupational History    Not on file   Social Needs    Financial resource strain: Not hard at all    Food insecurity     Worry: Never true     Inability: Never true   [employer] needs     Medical: No     Non-medical: No   Tobacco Use    Smoking status: Former Smoker     Packs/day: 0.25     Years: 25.00     Pack years: 6.25     Types: Cigarettes     Quit date: 2009     Years since quittin.6    Smokeless tobacco: Never Used   Substance and Sexual Activity    Alcohol use: No     Alcohol/week: 0.0 standard drinks    Drug use: No    Sexual activity: Yes   Lifestyle    Physical activity     Days per week: Not on file     Minutes per session: Not on file    Stress: Not on file   Relationships    Social connections     Talks on phone: Not on file     Gets together: Not on file     Attends Mandaeism service: Not on file     Active member of club or organization: Not on file     Attends meetings of clubs or organizations: Not on file     Relationship status: Not on file    Intimate partner violence     Fear of current or ex partner: Not on file     Emotionally abused: Not on file     Physically abused: Not on file     Forced sexual activity: Not on file   Other Topics Concern    Not on file   Social History Narrative    Not on file     Counseling given: Not Answered Family History   Problem Relation Age of Onset    Emphysema Mother              -rest of complaints with corresponding details per ROS    The patient's past medical, surgical, social, and family history as well as her current medications and allergies were reviewed as documented intoday's encounter. Review of Systems   Constitutional: Positive for activity change. Negative for appetite change, fatigue, fever and unexpected weight change. HENT: Negative for congestion, hearing loss, mouth sores, postnasal drip, rhinorrhea and sinus pressure. Eyes: Negative for photophobia and visual disturbance. Respiratory: Negative for cough, chest tightness, shortness of breath and wheezing. Cardiovascular: Negative for chest pain and palpitations. Gastrointestinal: Negative for abdominal distention, abdominal pain, constipation, diarrhea, nausea and vomiting. Endocrine: Negative for polyuria. Genitourinary: Negative for difficulty urinating, flank pain and frequency. Musculoskeletal: Positive for arthralgias, back pain and gait problem. Negative for joint swelling, myalgias and neck stiffness. Skin: Negative for color change and wound. Neurological: Positive for weakness and numbness. Negative for dizziness, speech difficulty, light-headedness and headaches. Psychiatric/Behavioral: Negative for agitation, decreased concentration, hallucinations and sleep disturbance. The patient is nervous/anxious. Physical Exam  Vitals signs and nursing note reviewed. Constitutional:       Appearance: Normal appearance. She is obese. HENT:      Head: Atraumatic. Nose: Nose normal.      Mouth/Throat:      Mouth: Mucous membranes are moist.   Eyes:      Pupils: Pupils are equal, round, and reactive to light. Cardiovascular:      Rate and Rhythm: Normal rate and regular rhythm. Heart sounds: Normal heart sounds.    Pulmonary:      Effort: Pulmonary effort is normal. Breath sounds: Normal breath sounds. No wheezing. Abdominal:      General: Bowel sounds are normal. There is no distension. Palpations: Abdomen is soft. Tenderness: There is no abdominal tenderness. Musculoskeletal:      Lumbar back: She exhibits decreased range of motion, tenderness, deformity, pain and spasm. Comments: SLR test positive on right side   Lymphadenopathy:      Cervical: No cervical adenopathy. Right cervical: No superficial cervical adenopathy. Neurological:      Mental Status: She is alert and oriented to person, place, and time. Cranial Nerves: No cranial nerve deficit. Sensory: Sensory deficit present. Motor: Weakness present. Gait: Gait abnormal.   Psychiatric:         Attention and Perception: Attention and perception normal.         Mood and Affect: Affect normal. Mood is anxious. Behavior: Behavior normal.         Thought Content: Thought content normal.         Cognition and Memory: Cognition normal.             ASSESSMENT AND PLAN      1. Essential hypertension  Controlled continue same medications    2. Spinal stenosis of lumbar region without neurogenic claudication  Increase Lyrica to 150 mg, patient has recently refilled discussed. After 2 weeks. Start physical therapy. Follow-up with neurosurgeon  - pregabalin (LYRICA) 150 MG capsule; Take 1 capsule by mouth 2 times daily for 30 days. Dispense: 60 capsule; Refill: 0  - [address]    3. Mixed hyperlipidemia  Stable continue same medications    4. Lumbar radiculopathy  Increase Lyrica start physical therapy  - pregabalin (LYRICA) 150 MG capsule; Take 1 capsule by mouth 2 times daily for 30 days. Dispense: 60 capsule; Refill: 0  - [address]    5. Protruded lumbar disc  Follow-up with neurosurgeon    6.  Mild intermittent asthma without complication  Stable continue same inhalers 7. Class 1 obesity due to excess calories with serious comorbidity and body mass index (BMI) of 32.0 to 32.9 in adult  Stable continue to watch her diet    8. Major depressive disorder with single episode, in partial remission (HCC)  Stable on current treatment    9. Lumbar degenerative disc disease    - pregabalin (LYRICA) 150 MG capsule; Take 1 capsule by mouth 2 times daily for 30 days. Dispense: 60 capsule; Refill: 0    10. Coronary artery disease involving native coronary artery of native heart without angina pectoris  Stable follows with cardiologist      Orders Placed This Encounter   Procedures   Elaine Delgado 81.     Referral Priority:   Routine     Referral Type:   Eval and Treat     Referral Reason:   Specialty Services Required     Requested Specialty:   Physical Therapy     Number of Visits Requested:   1         Medications Discontinued During This Encounter   Medication Reason    pregabalin (LYRICA) 75 MG capsule DOSE ADJUSTMENT       Christina received counseling on the following healthy behaviors: nutrition, exercise and medication adherence  Reviewed prior labs and health maintenance  Continue current medications, diet and exercise. Discussed use, benefit, and side effects of prescribed medications. Barriers to medication compliance addressed. Patient given educational materials - see patient instructions  Was a self-tracking handout given in paper form or via Net[employer]? Yes    Requested Prescriptions     Signed Prescriptions Disp Refills    pregabalin (LYRICA) 150 MG capsule 60 capsule 0     Sig: Take 1 capsule by mouth 2 times daily for 30 days. All patient questions answered. Patient voiced understanding. Quality Measures    Body mass index is 31.83 kg/m². Elevated. Weight control planned discussed daily exercise regimen and Healthy diet and regular exercise. BP: 139/85 Blood pressure is normal. Treatment plan consists of Weight Reduction, DASH Eating Plan, Dietary Sodium Restriction, Increased Physical Activity and No treatment change needed. Lab Results   Component Value Date    LDLCHOLESTEROL 67 07/01/2020    (goal LDL reduction with dx if diabetes is 50% LDL reduction)      PHQ Scores 2/8/2021 3/25/2019 9/25/2018 4/16/2018   PHQ2 Score 0 0 0 0   PHQ9 Score 0 0 0 0     Interpretation of Total Score Depression Severity: 1-4 = Minimal depression, 5-9 = Mild depression, 10-14 = Moderate depression, 15-19 = Moderately severe depression, 20-27 = Severe depression    The patient'spast medical, surgical, social, and family history as well as her   current medications and allergies were reviewed as documented in today's encounter. Medications, labs, diagnostic studies, consultations andfollow-up as documented in this encounter. Return in about 3 months (around 5/8/2021). Patient wasseen with total face to face time of 30 minutes. More than 50% of this visit was counseling and education. Future Appointments   Date Time Provider Matt Diez   2/10/2021  2:00 PM Fidel Martínez Neuro [address] [facility]   2/16/2021  4:00 PM Prasad Knox MD [facility] [address] [facility]   5/10/2021 12:00 PM Rasheed gNuyen MD [email or web address]     This note was completed by using the assistance of a speech-recognition program. However, inadvertent computerized transcription errors may be present. Althoughevery effort was made to ensure accuracy, no guarantees can be provided that every mistake has been identified and corrected by editing.   Electronically signed by Rasheed Nguyen MD on 2/8/2021  3:59 PM

## 2021-02-09 DIAGNOSIS — M79.605 LEG PAIN, LEFT: ICD-10-CM

## 2021-02-09 DIAGNOSIS — M50.30 DEGENERATIVE DISC DISEASE, CERVICAL: ICD-10-CM

## 2021-02-09 DIAGNOSIS — M19.019 OSTEOARTHRITIS OF SHOULDER, UNSPECIFIED LATERALITY, UNSPECIFIED OSTEOARTHRITIS TYPE: ICD-10-CM

## 2021-02-09 RX ORDER — MULTIVITAMIN
TABLET ORAL
Qty: 90 TABLET | Refills: 0 | Status: SHIPPED | OUTPATIENT
Start: 2021-02-09 | End: 2021-05-03

## 2021-02-09 RX ORDER — LIDOCAINE 4 G/G
1 PATCH TOPICAL DAILY
Qty: 30 PATCH | Refills: 0 | Status: SHIPPED | OUTPATIENT
Start: 2021-02-09 | End: 2021-12-22

## 2021-02-09 RX ORDER — PSEUDOEPHED/ACETAMINOPH/DIPHEN 30MG-500MG
TABLET ORAL
Qty: 120 TABLET | Refills: 3 | Status: SHIPPED | OUTPATIENT
Start: 2021-02-09 | End: 2021-06-01

## 2021-02-09 NOTE — TELEPHONE ENCOUNTER
Please Approve or Refuse.   Send to Pharmacy per Pt's Request:    Next Visit Date:  2/9/2021   Last Visit Date: 2/8/2021    Hemoglobin A1C (%)   Date Value   07/01/2020 5.7   10/04/2016 5.3   08/24/2012 5.2             ( goal A1C is < 7)   BP Readings from Last 3 Encounters:   02/08/21 139/85   01/11/21 135/84   11/10/20 (!) 171/102          (goal 120/80)  BUN   Date Value Ref Range Status   07/01/2020 15 8 - 23 mg/dL Final     CREATININE   Date Value Ref Range Status   07/01/2020 0.85 0.50 - 0.90 mg/dL Final     Potassium   Date Value Ref Range Status   07/01/2020 5.2 3.7 - 5.3 mmol/L Final

## 2021-02-09 NOTE — TELEPHONE ENCOUNTER
Please Approve or Refuse.   Send to Pharmacy per Pt's Request:      Next Visit Date:  5/10/2021   Last Visit Date: 2/8/2021    Hemoglobin A1C (%)   Date Value   07/01/2020 5.7   10/04/2016 5.3   08/24/2012 5.2             ( goal A1C is < 7)   BP Readings from Last 3 Encounters:   02/08/21 139/85   01/11/21 135/84   11/10/20 (!) 171/102          (goal 120/80)  BUN   Date Value Ref Range Status   07/01/2020 15 8 - 23 mg/dL Final     CREATININE   Date Value Ref Range Status   07/01/2020 0.85 0.50 - 0.90 mg/dL Final     Potassium   Date Value Ref Range Status   07/01/2020 5.2 3.7 - 5.3 mmol/L Final

## 2021-02-10 ENCOUNTER — HOSPITAL ENCOUNTER (OUTPATIENT)
Age: 61
Discharge: HOME | End: 2021-02-12
Payer: MEDICARE

## 2021-02-10 ENCOUNTER — OFFICE VISIT (OUTPATIENT)
Dept: NEUROSURGERY | Age: 61
End: 2021-02-10
Payer: MEDICARE

## 2021-02-10 ENCOUNTER — TELEPHONE (OUTPATIENT)
Dept: PAIN MANAGEMENT | Age: 61
End: 2021-02-10

## 2021-02-10 ENCOUNTER — HOSPITAL ENCOUNTER (OUTPATIENT)
Dept: GENERAL RADIOLOGY | Age: 61
Discharge: HOME | End: 2021-02-12
Payer: MEDICARE

## 2021-02-10 VITALS
DIASTOLIC BLOOD PRESSURE: 78 MMHG | HEART RATE: 79 BPM | SYSTOLIC BLOOD PRESSURE: 153 MMHG | OXYGEN SATURATION: 98 % | BODY MASS INDEX: 32.02 KG/M2 | HEIGHT: 62 IN | WEIGHT: 174 LBS

## 2021-02-10 DIAGNOSIS — M48.062 LUMBAR STENOSIS WITH NEUROGENIC CLAUDICATION: ICD-10-CM

## 2021-02-10 DIAGNOSIS — M48.062 LUMBAR STENOSIS WITH NEUROGENIC CLAUDICATION: Primary | ICD-10-CM

## 2021-02-10 PROCEDURE — G8417 CALC BMI ABV UP PARAM F/U: HCPCS | Performed by: NEUROLOGICAL SURGERY

## 2021-02-10 PROCEDURE — 99214 OFFICE O/P EST MOD 30 MIN: CPT | Performed by: NEUROLOGICAL SURGERY

## 2021-02-10 PROCEDURE — G8482 FLU IMMUNIZE ORDER/ADMIN: HCPCS | Performed by: NEUROLOGICAL SURGERY

## 2021-02-10 PROCEDURE — 72082 X-RAY EXAM ENTIRE SPI 2/3 VW: CPT

## 2021-02-10 PROCEDURE — 1036F TOBACCO NON-USER: CPT | Performed by: NEUROLOGICAL SURGERY

## 2021-02-10 PROCEDURE — G8428 CUR MEDS NOT DOCUMENT: HCPCS | Performed by: NEUROLOGICAL SURGERY

## 2021-02-10 PROCEDURE — 3017F COLORECTAL CA SCREEN DOC REV: CPT | Performed by: NEUROLOGICAL SURGERY

## 2021-02-10 RX ORDER — CYCLOBENZAPRINE HCL 10 MG
TABLET ORAL
Qty: 60 TABLET | Refills: 0 | Status: SHIPPED | OUTPATIENT
Start: 2021-02-10 | End: 2021-03-09

## 2021-02-10 RX ORDER — PREDNISONE 20 MG/1
TABLET ORAL
Qty: 30 TABLET | Refills: 0 | Status: SHIPPED | OUTPATIENT
Start: 2021-02-10 | End: 2021-02-25

## 2021-02-10 NOTE — PROGRESS NOTES
Dm Espino  [facility] [address] [facility] [address]  Dept: [phone]    Patient:  Anne Sinha  YOB: 1960  Date: 2/10/21    The patient is a 61 y.o. female who presents today for consult of the following problems:     Chief Complaint   Patient presents with    Follow-up     x-ray             HPI:     Anne Sinha is a 61 y.o. female on whom neurosurgical consultation was requested by Gayle Bernard MD for management of lumbar stenosis and claudication with radiculopathy. Patient has had ongoing longstanding back pain and did have physical therapy greater than 1 year ago with significant improvement at that time. Also had a lumbar KALPANA approximately last July. The injection did help her back pain significant but she did not have any radiculopathy time. As of now she has equal lumbar spondylotic achy spasmodic pain with bilateral radiation right-sided approximately L1-L2 distribution in the inguinal region just below the inguinal region on the right side as well as left side approximately L4 distribution lateral aspect of the thigh and the medial aspect of the lower leg into the dorsum of the foot with no distinct dermatomal pattern in the toes. Symptoms appear to be consistent with the legs are acting up when she changes position or sitting to standing. No usage of either ice or heat or cream right now but is scheduled to start PT. No bowel bladder incontinence or saddle anesthesia. Domonique Neal       History:     Past Medical History:   Diagnosis Date    ARIADNE (acute kidney injury) ([facility] [address] 9/4/2019    Anxiety     Asthma     CAD (coronary artery disease)     Class 1 obesity due to excess calories with serious comorbidity and body mass index (BMI) of 32.0 to 32.9 in adult 6/30/2020    Depression     Examination of participant in clinical trial 6/23/11    End date 2/2015    HTN (hypertension)     Lumbar radiculopathy  Mixed hyperlipidemia 1/15/2018    OA (osteoarthritis)     PVD (peripheral vascular disease) with claudication ([facility] [address] 2015     Past Surgical History:   Procedure Laterality Date     SECTION      COLONOSCOPY N/A 2019    COLORECTAL CANCER SCREENING, NOT HIGH RISK performed by Shyann Atkinson MD at [address]      2 stents    DILATION AND CURETTAGE OF UTERUS N/A     uterine polyp     Family History   Problem Relation Age of Onset    Emphysema Mother      Current Outpatient Medications on File Prior to Visit   Medication Sig Dispense Refill    cyclobenzaprine (FLEXERIL) 10 MG tablet TAKE 1 TABLET BY MOUTH TWICE DAILY AS NEEDED FOR MUSCLE SPASMS 60 tablet 0    lidocaine (ASPERCREME LIDOCAINE) 4 % external patch Apply 1 patch topically daily APPLY 1 PATCH EXTERNALLY TO THE SKIN DAILY 30 patch 0    CALCIUM 600+D3 600-400 MG-UNIT TABS per tab TAKE 1 TABLET BY MOUTH DAILY 90 tablet 0    ACETAMINOPHEN EXTRA STRENGTH 500 MG tablet TAKE 1 TABLET BY MOUTH EVERY 6 HOURS AS NEEDED FOR PAIN 120 tablet 3    [START ON 2021] pregabalin (LYRICA) 150 MG capsule Take 1 capsule by mouth 2 times daily for 30 days.  60 capsule 0    diclofenac sodium (VOLTAREN) 1 % GEL APPLY 2 GRAMS EXTERNALLY TO THE AFFECTED AREA FOUR TIMES DAILY 200 g 1    cetirizine (ZYRTEC) 10 MG tablet Take 1 tablet by mouth daily 90 tablet 2    enalapril (VASOTEC) 10 MG tablet TAKE 2 TABLETS BY MOUTH EVERY MORNING AND 1 TABLET BY MOUTH EVERY EVENING 90 tablet 2    aspirin (ASPIRIN LOW DOSE) 81 MG EC tablet TAKE 1 TABLET BY MOUTH DAILY 90 tablet 2    metoprolol succinate (TOPROL XL) 50 MG extended release tablet Take one tablet by mouth daily 90 tablet 2    meloxicam (MOBIC) 15 MG tablet TAKE 1 TABLET BY MOUTH DAILY 90 tablet 2    busPIRone (BUSPAR) 15 MG tablet TAKE 1 TABLET BY MOUTH EVERY 12 HOURS AS NEEDED 120 tablet 3 Skin: Negative for rash and wound. Allergic/Immunologic: Negative for environmental allergies and food allergies. Hematological: Negative for adenopathy. Does not bruise/bleed easily. Psychiatric/Behavioral: Negative for self-injury. The patient is not nervous/anxious. Physical Exam:      BP (!) 153/78 (Site: Right Upper Arm, Position: Sitting, Cuff Size: Medium Adult)   Pulse 79   Ht 5' 2\" (1.575 m)   Wt 174 lb (78.9 kg)   LMP 11/07/2012   SpO2 98%   BMI 31.83 kg/m²   Estimated body mass index is 31.83 kg/m² as calculated from the following:    Height as of this encounter: 5' 2\" (1.575 m). Weight as of this encounter: 174 lb (78.9 kg). General:  Margarita Degroot is a 61y.o. year old female who appears her stated age. HEENT: Normocephalic atraumatic. Neck supple. Chest: regular rate; pulses equal  Abdomen: Soft nontender nondistended. Normoactive bowel sounds.   Ext: DP and PT pulses 2+, good cap refill  Neuro    Mentation  Appropriate affect  Registration intact  Orientation intact  3 item recall intact  Judgement intact to situation    Cranial Nerves:   Pupils equal and reactive to light  Extraocular motion intact  Face and shrug symmetric  Tongue midline  No dysarthria  v1-3 sensation symmetric, masseter tone symmetric  Hearing symmetric and intact to finger rub    Sensation:   Diminished sensation right L2 as well as some left L4    Motor  L deltoid 5/5; R deltoid 5/5  L biceps 5/5; R biceps 5/5  L triceps 5/5; R triceps 5/5  L wrist extension 5/5; R wrist extension 5/5  L intrinsics 5/5; R intrinsics 5/5     L iliopsoas 5/5 , R iliopsoas 5/5  L quadriceps 5/5; R quadriceps 5/5  L Dorsiflexion 5/5; R dorsiflexion 5/5  L Plantarflexion 5/5; R plantarflexion 5/5  L EHL 5/5; R EHL 5/5    Reflexes  L Brachioradialis 2+/4; R brachioradialis 2+/4  L Biceps 2+/4; R Biceps 2+/4  L Triceps 2+/4; R Triceps 2+/4  L Patellar 2+/4: R Patellar 2+/4  L Achilles 2+/4; R Achilles 2+/4    hoffwilbers L: neg hoffmans R: neg  Clonus L: neg  Clonus R: neg  Babinski L: up  Babinski R; up    Straight leg raise negative Rekha negative trochanteric tenderness. Negative SI joint palpation. Studies Review:     MRI lumbar spine with multilevel significant stenosis at L3-4 all the way down to L4-5. Flexion-extension x-rays with grade 1 anterolisthesis L3-4 that is nondynamic on flexion-extension but does appear to be worsened from supine film     X-ray with only 6 mm SVA. Overall apparently aligned but scan does not appear to be a long cassette. Assessment and Plan:      1. Lumbar stenosis with neurogenic claudication          Plan: We obtain long cassette scoliosis films. Considering that the patient did not previously have any radiculopathy and had some good improvement with the ICSI I have recommended continued conservative measures including physical therapy, prednisone taper, repeat epidural injection. Followup: No follow-ups on file. Prescriptions Ordered:  No orders of the defined types were placed in this encounter. Orders Placed:  No orders of the defined types were placed in this encounter. Electronically signed by Ginger Miller DO on 2/10/2021 at 3:05 PM    Please note that this chart was generated using voice recognition Dragon dictation software. Although every effort was made to ensure the accuracy of this automated transcription, some errors in transcription may have occurred.

## 2021-02-10 NOTE — TELEPHONE ENCOUNTER
Left message on patient's VM to schedule an appointment with Dr Micheal Dunlap to discuss Madhuri Rajan.

## 2021-02-15 ENCOUNTER — HOSPITAL ENCOUNTER (OUTPATIENT)
Dept: PAIN MANAGEMENT | Age: 61
Discharge: HOME | End: 2021-02-15
Payer: MEDICARE

## 2021-02-15 DIAGNOSIS — M47.817 LUMBOSACRAL SPONDYLOSIS WITHOUT MYELOPATHY: ICD-10-CM

## 2021-02-15 DIAGNOSIS — M47.816 ARTHROPATHY OF LUMBAR FACET JOINT: ICD-10-CM

## 2021-02-15 DIAGNOSIS — M51.36 DDD (DEGENERATIVE DISC DISEASE), LUMBAR: ICD-10-CM

## 2021-02-15 DIAGNOSIS — M48.061 SPINAL STENOSIS OF LUMBAR REGION, UNSPECIFIED WHETHER NEUROGENIC CLAUDICATION PRESENT: Primary | ICD-10-CM

## 2021-02-15 DIAGNOSIS — M54.16 LUMBAR RADICULOPATHY: ICD-10-CM

## 2021-02-15 DIAGNOSIS — M51.36 LUMBAR DEGENERATIVE DISC DISEASE: ICD-10-CM

## 2021-02-15 PROCEDURE — 99214 OFFICE O/P EST MOD 30 MIN: CPT | Performed by: PAIN MEDICINE

## 2021-02-15 PROCEDURE — 99213 OFFICE O/P EST LOW 20 MIN: CPT

## 2021-02-15 ASSESSMENT — ENCOUNTER SYMPTOMS
BACK PAIN: 1
GASTROINTESTINAL NEGATIVE: 1
COUGH: 0
EYES NEGATIVE: 1
EYE PAIN: 0
VOMITING: 0
SHORTNESS OF BREATH: 0
DIARRHEA: 0
WHEEZING: 0
NAUSEA: 0
ALLERGIC/IMMUNOLOGIC NEGATIVE: 1
SORE THROAT: 0
RESPIRATORY NEGATIVE: 1
CONSTIPATION: 0
EYE DISCHARGE: 0

## 2021-02-15 NOTE — PROGRESS NOTES
Tamiko Thrasher is a 61 y.o. female evaluated on 2/15/2021. Modality of virtual service provided -via Video+audio   Consent:  Patient and/or health care decision maker is aware that that patient may receive a bill for this telephone service, depending on one's insurance coverage, and has provided verbal consent to proceed: Yes    Patient identification was verified at the start of the visit: Yes    Chief complaint: Tamiko Thrasher is 61 y.o.,  female, with  No chief complaint on file. .    Patient is a 57-year-old female being seen in the pain clinic for pain involving the low back as well as in the neck. Patient was trying physical therapy for pain in the low back and developed numbness in the lower extremities. She she reports she is taking Medrol Dosepak which is helping the pain to a certain extent. At present she is rating her pain at 2. She also reports she is going to start physical therapy on Thursday. Patient is still having numbness in the groin and in the right thigh to the knee. She reports she was seen by neurosurgeon as her symptoms were severe for the last 2 to 3 months and she is advised by a surgeon to try conservative measures of possible injections and therapy. Patient reports at present her pain is very well controlled with oral steroids. She rates her pain a 2-3 on a scale of 0-10.     Back Pain This is a chronic problem. The current episode started more than 1 year ago. The problem occurs constantly. The problem has been gradually worsening since onset. The pain is present in the lumbar spine and sacro-iliac. The quality of the pain is described as aching, shooting and stabbing. The pain radiates to the right thigh. The pain is at a severity of 2/10 (38). Pain severity now: Mild to moderate. The pain is worse during the day. The symptoms are aggravated by bending, position, lying down, standing and sitting (walking, lifting, ADLs). Stiffness is present: numbness rt. thigh. Associated symptoms include headaches, numbness and tingling. Pertinent negatives include no abdominal pain, chest pain, dysuria, fever or weakness. (Mostly in the right thigh mostly in the right thigh) Risk factors include lack of exercise and sedentary lifestyle (COPD, smoking). Alleviating factors:heat, rest and stretching  Lifestyle changes experienced with pain: Prevents or limits ADLs, Increases w/activity. Increases w/prolonged sitting/standing/walking  Mood changes, history of depression and anxiety  Patient currently unemployed. Physical therapypatient is going to start physical therapy. Are you under psychological counseling at present: No  Goals for treatment include:  Decrease in pain  Enjoy daily and recreational activities, return to previous status. Last procedure was LESI 7/21/2020 lasted for 4 to 5 months. Patient relates current medications are helping the pain. Patient reports taking pain medications as prescribed, denies obtaining medications from different sources and denies use of illegal drugs. Patient denies side effects from medications like nausea, vomiting, constipation or drowsiness. Patient reports current activities of daily living ar possible due to medications and would like to continue them. ACTIVITY/SOCIAL/EMOTIONAL:  Sleep Pattern: 6 hours per night.  generally restful sleep Home Exercises:  no regular exercise  Activity:unchanged  Emotional Issues: normal.   Currently seeing a Psychiatrist or Psychologist:  No      Past Medical History:   Diagnosis Date    ARIADNE (acute kidney injury) ([facility] [address] 2019    Anxiety     Asthma     CAD (coronary artery disease)     Class 1 obesity due to excess calories with serious comorbidity and body mass index (BMI) of 32.0 to 32.9 in adult 2020    Depression     Examination of participant in clinical trial 11    End date 2015    HTN (hypertension)     Lumbar radiculopathy     Mixed hyperlipidemia 1/15/2018    OA (osteoarthritis)     PVD (peripheral vascular disease) with claudication ([facility] [address] 2015       Past Surgical History:   Procedure Laterality Date     SECTION      COLONOSCOPY N/A 2019    COLORECTAL CANCER SCREENING, NOT HIGH RISK performed by Willy Meza MD at [address]      2 stents    DILATION AND CURETTAGE OF UTERUS N/A     uterine polyp       Family History   Problem Relation Age of Onset    Emphysema Mother        Social History     Socioeconomic History    Marital status:      Spouse name: None    Number of children: None    Years of education: None    Highest education level: None   Occupational History    None   Social Needs    Financial resource strain: Not hard at all   Origin[employer]il insecurity     Worry: Never true     Inability: Never true    Transportation needs     Medical: No     Non-medical: No   Tobacco Use    Smoking status: Former Smoker     Packs/day: 0.25     Years: 25.00     Pack years: 6.25     Types: Cigarettes     Quit date: 2009     Years since quittin.7    Smokeless tobacco: Never Used   Substance and Sexual Activity    Alcohol use: No     Alcohol/week: 0.0 standard drinks    Drug use: No    Sexual activity: Yes   Lifestyle    Physical activity     Days per week: None     Minutes per session: None    Stress: None Relationships    Social connections     Talks on phone: None     Gets together: None     Attends Evangelical service: None     Active member of club or organization: None     Attends meetings of clubs or organizations: None     Relationship status: None    Intimate partner violence     Fear of current or ex partner: None     Emotionally abused: None     Physically abused: None     Forced sexual activity: None   Other Topics Concern    None   Social History Narrative    None       Allergies   Allergen Reactions    Claritin [Loratadine]      02/2010 Heart palpitations  02/2010 Heart palpitations    Codeine Nausea Only       Current Outpatient Medications on File Prior to Encounter   Medication Sig Dispense Refill    cyclobenzaprine (FLEXERIL) 10 MG tablet TAKE 1 TABLET BY MOUTH TWICE DAILY AS NEEDED FOR MUSCLE SPASMS 60 tablet 0    predniSONE (DELTASONE) 20 MG tablet Take 3 tablets PO daily for first 5 days, then take 2 tablets PO daily for next 5 days, then take 1 tablet PO daily for last 5 days 30 tablet 0    lidocaine (ASPERCREME LIDOCAINE) 4 % external patch Apply 1 patch topically daily APPLY 1 PATCH EXTERNALLY TO THE SKIN DAILY 30 patch 0    CALCIUM 600+D3 600-400 MG-UNIT TABS per tab TAKE 1 TABLET BY MOUTH DAILY 90 tablet 0    ACETAMINOPHEN EXTRA STRENGTH 500 MG tablet TAKE 1 TABLET BY MOUTH EVERY 6 HOURS AS NEEDED FOR PAIN 120 tablet 3    [START ON 2/17/2021] pregabalin (LYRICA) 150 MG capsule Take 1 capsule by mouth 2 times daily for 30 days.  60 capsule 0    diclofenac sodium (VOLTAREN) 1 % GEL APPLY 2 GRAMS EXTERNALLY TO THE AFFECTED AREA FOUR TIMES DAILY 200 g 1    cetirizine (ZYRTEC) 10 MG tablet Take 1 tablet by mouth daily 90 tablet 2    enalapril (VASOTEC) 10 MG tablet TAKE 2 TABLETS BY MOUTH EVERY MORNING AND 1 TABLET BY MOUTH EVERY EVENING 90 tablet 2    aspirin (ASPIRIN LOW DOSE) 81 MG EC tablet TAKE 1 TABLET BY MOUTH DAILY 90 tablet 2  metoprolol succinate (TOPROL XL) 50 MG extended release tablet Take one tablet by mouth daily 90 tablet 2    meloxicam (MOBIC) 15 MG tablet TAKE 1 TABLET BY MOUTH DAILY 90 tablet 2    busPIRone (BUSPAR) 15 MG tablet TAKE 1 TABLET BY MOUTH EVERY 12 HOURS AS NEEDED 120 tablet 3    albuterol sulfate  (90 Base) MCG/ACT inhaler INHALE 2 PUFFS INTO THE LUNGS EVERY 6 HOURS AS NEEDED FOR WHEEZING 36 g 3    isosorbide mononitrate (IMDUR) 30 MG extended release tablet TAKE 1 TABLET BY MOUTH EVERY DAY 90 tablet 2    FLUoxetine (PROZAC) 20 MG capsule TAKE 3 CAPSULES BY MOUTH EVERY DAY 90 capsule 3    nitroGLYCERIN (NITROSTAT) 0.4 MG SL tablet DISSOLVE 1 TABLET UNDER THE TONGUE EVERY 5 MINUTES AS NEEDED FOR CHEST PAIN. IF NO RELIEF AFTER 1 DOSE, CALL 911 25 tablet 0    simvastatin (ZOCOR) 40 MG tablet TAKE 1 TABLET BY MOUTH EVERY NIGHT 90 tablet 3    diclofenac (SOLARAZE) 3 % gel Apply topically 2 times daily as needed 1 Tube 3    lidocaine (LMX) 4 % cream Apply topically every 8 hrs as needed for pain 45 g 3    Heat Wraps (SOFTHEAT HEATING WRAP ULTRA) MISC Use daily for neck pain 1 each 0     No current facility-administered medications on file prior to encounter. Review of Systems   Constitutional: Negative. Negative for activity change, chills, fever and unexpected weight change. HENT: Negative. Negative for congestion, dental problem, hearing loss and sore throat. Eyes: Negative. Negative for pain, discharge and visual disturbance. Respiratory: Negative. Negative for cough, shortness of breath and wheezing. History of asthma with shortness of breath on exertion at times   Cardiovascular: Positive for leg swelling. Negative for chest pain and palpitations. H/ [id number] has a stent   Gastrointestinal: Negative. Negative for abdominal pain, constipation, diarrhea, nausea and vomiting. Endocrine: Negative. Negative for heat intolerance and polydipsia. Genitourinary: Negative. Negative for dysuria, frequency and urgency. Musculoskeletal: Positive for back pain and neck pain. Negative for neck stiffness. Skin: Negative. Negative for rash and wound. Allergic/Immunologic: Negative. Negative for food allergies. Neurological: Positive for tingling, numbness and headaches. Negative for tremors, seizures and weakness. Hematological: Bruises/bleeds easily. Psychiatric/Behavioral: Negative for self-injury and suicidal ideas. The patient is nervous/anxious. The patient is not hyperactive. Physical Exam  Skin:         Neurological:      Mental Status: She is alert and oriented to person, place, and time.    Psychiatric:         Mood and Affect: Mood normal.        DATA:  LAB.:  7/1/2020 12:38 PM - Jaime, Vamshi Incoming Lab Results From [employer]    Component Value Ref Range & Units Status Collected Lab   Glucose 102High   70 - 99 mg/dL Final 07/01/2020 11:48 AM [facility] [address] Lab   BUN 15  8 - 23 mg/dL Final 07/01/2020 11:48 AM [facility] [address] Lab   CREATININE 0.85  0.50 - 0.90 mg/dL Final 07/01/2020 11:48 AM [facility] [address] Lab   Bun/Cre Ratio NOT REPORTED  9 - 20 Final 07/01/2020 11:48 AM [facility] [address] Lab   Calcium 9.8  8.6 - 10.4 mg/dL Final 07/01/2020 11:48 AM [facility] [address] Lab   Sodium 138  135 - 144 mmol/L Final 07/01/2020 11:48 AM [facility] [address] Lab   Potassium 5.2  3.7 - 5.3 mmol/L Final 07/01/2020 11:48 AM [facility] [address] Lab   Chloride 102  98 - 107 mmol/L Final 07/01/2020 11:48 AM [facility] [address] Lab   CO2 26  20 - 31 mmol/L Final 07/01/2020 11:48 AM [facility] [address] Lab   Anion Gap 10  9 - 17 mmol/L Final 07/01/2020 11:48 AM [facility] [address] Lab   Alkaline Phosphatase 75  35 - 104 U/L Final 07/01/2020 11:48 AM [facility] [address] Lab   ALT 13  5 - 33 U/L Final 07/01/2020 11:48 AM [facility] [address] Lab AST 19  <32 U/L Final 07/01/2020 11:48 AM [facility] [address] Lab   Total Bilirubin 0.38  0.3 - 1.2 mg/dL Final 07/01/2020 11:48 AM [facility] [address] Lab   Total Protein 7.5  6.4 - 8.3 g/dL Final 07/01/2020 11:48 AM [facility] [address] Lab   Albumin 4.1  3.5 - 5.2 g/dL Final 07/01/2020 11:48 AM [facility] [address] Lab   Albumin/Globulin Ratio NOT REPORTED  1.0 - 2.5 Final 07/01/2020 11:48 AM [facility] [address] Lab   GFR Non-African American >60  >60 mL/min Final 07/01/2020 11:48 AM [facility] [address] Lab   GFR African American >60  >60 mL/min Final 07/01/2020 11:48 AM [facility] [address] Lab   GFR Comment        Final 07/01/2020         X-Ray reports:  EXAMINATION:   MRI OF THE LUMBAR SPINE WITHOUT CONTRAST, 2/8/2020 2:30 pm       TECHNIQUE:   Multiplanar multisequence MRI of the lumbar spine was performed without the   administration of intravenous contrast.       COMPARISON:   None       HISTORY:   ORDERING SYSTEM PROVIDED HISTORY: Lumbar degenerative disc disease   TECHNOLOGIST PROVIDED HISTORY:   lower back pain   Reason for Exam: Pt c/o low back pain and right leg pain and numbness on   outside of leg x 6 months. States did fall Sept 2019. FINDINGS:   BONES/ALIGNMENT: There is normal alignment of the spine. The vertebral body   heights are maintained. The bone marrow signal appears unremarkable. No   acute fracture is identified. The S1 vertebral body is transitional and lumbarized. SPINAL CORD: The conus terminates normally. SOFT TISSUES: No paraspinal mass identified. L1-L2: There is no significant disc herniation, spinal canal stenosis or   neural foraminal narrowing. L2-L3: Moderate-sized right foraminal disc protrusion is identified resulting   in moderate right foraminal stenosis.        L3-L4: Annular disc bulge and facet arthropathy are identified resulting in mild central canal stenosis and mild bilateral foraminal stenosis. L4-L5: Moderate central canal stenosis identified due to shallow disc bulge,   facet arthropathy and ligamentum flavum hypertrophy. The right foramina is   mildly stenotic. L5-S1: Broad disc osteophyte complex and facet arthropathy results in severe   bilateral foraminal stenosis and moderate central canal stenosis. Impression   Transitional S1 vertebral body which is lumbarized. Moderate-sized right foraminal disc protrusion at L2-L3 resulting in moderate   right foraminal stenosis. Moderate central canal stenosis and severe bilateral foraminal stenosis at   L5-S1. Moderate central canal stenosis and mild right foraminal stenosis at L4-L5. Mild central canal stenosis and mild bilateral foraminal stenosis at L3-L4. HISTORY:   ORDERING SYSTEM PROVIDED HISTORY: Lumbar degenerative disc disease   TECHNOLOGIST PROVIDED HISTORY:   lower back pain   Reason for Exam: Pt c/o low back pain and right leg pain and numbness on   outside of leg x 6 months. States did fall Sept 2019. FINDINGS:   BONES/ALIGNMENT: There is normal alignment of the spine. The vertebral body   heights are maintained. The bone marrow signal appears unremarkable. No   acute fracture is identified. The S1 vertebral body is transitional and lumbarized. SPINAL CORD: The conus terminates normally. SOFT TISSUES: No paraspinal mass identified. L1-L2: There is no significant disc herniation, spinal canal stenosis or   neural foraminal narrowing. L2-L3: Moderate-sized right foraminal disc protrusion is identified resulting   in moderate right foraminal stenosis. L3-L4: Annular disc bulge and facet arthropathy are identified resulting in   mild central canal stenosis and mild bilateral foraminal stenosis.        L4-L5: Moderate central canal stenosis identified due to shallow disc bulge, facet arthropathy and ligamentum flavum hypertrophy. The right foramina is   mildly stenotic. L5-S1: Broad disc osteophyte complex and facet arthropathy results in severe   bilateral foraminal stenosis and moderate central canal stenosis. Impression   Transitional S1 vertebral body which is lumbarized. Moderate-sized right foraminal disc protrusion at L2-L3 resulting in moderate   right foraminal stenosis. Moderate central canal stenosis and severe bilateral foraminal stenosis at   L5-S1. Moderate central canal stenosis and mild right foraminal stenosis at L4-L5. Mild central canal stenosis and mild bilateral foraminal stenosis at L3-L4. Clinical  impression:  1. Spinal stenosis of lumbar region, unspecified whether neurogenic claudication present    2. Lumbar degenerative disc disease    3. Lumbar radiculopathy    4. DDD (degenerative disc disease), lumbar    5. Lumbosacral spondylosis without myelopathy    6. Arthropathy of lumbar facet joint        Plan of care:    Patient is currently taking Medrol Dosepak and feeling somewhat better. She is going to start physical therapy prednisone. Patient is advised to work on physical therapy and if the pain does not get better then we will do the epidural steroid injection. Patient is told to call the pain clinic if the pain gets worse after she stops the Medrol Dosepak. The importance of exercise core strengthening diet and weight loss were discussed at length with the patient. Patient agrees with the treatment plan. PDMP Monitoring:    Last PDMP [facility] SYSTEM as Reviewed [facility]):  Review User Review Instant Review Result   ALTAGRACIA ARGUELLO 2/8/2021  2:24 PM Reviewed PDMP [1]     Counselling/Preventive measures for pain  Control:    [x]  Spine strengthening exercises are discussed with patient in detail.        Orders Placed This Encounter   Procedures    Lumbar Epidural Steroid Injection/Caudal     Standing Status:   Future Pursuant to the emergency declaration under the [address] and [facility] waiver authority and the [employer] and [employer] Act, this Virtual Visit was conducted, with patient's consent, to reduce the patient's risk of exposure to COVID-19 and provide continuity of care for an established patient. Services were provided through a video synchronous discussion virtually to substitute for in-person appointment. \"  Documentation:  I communicated with the patient and/or health care decision maker about plan of care  Details of this discussion including any medical advice provided: Total Time: minutes: 21-30 minutes    I affirm this is a Patient Initiated Episode with an Established Patient who has not had a related appointment within my department in the past 7 days or scheduled within the next 24 hours.     Electronically signed by Hakan Saldaña MD on 2/16/2021 at 6:11 AM

## 2021-02-16 ASSESSMENT — ENCOUNTER SYMPTOMS: ABDOMINAL PAIN: 0

## 2021-02-17 DIAGNOSIS — F41.9 ANXIETY: ICD-10-CM

## 2021-02-17 RX ORDER — FLUOXETINE HYDROCHLORIDE 20 MG/1
CAPSULE ORAL
Qty: 90 CAPSULE | Refills: 3 | Status: SHIPPED | OUTPATIENT
Start: 2021-02-17 | End: 2021-06-11

## 2021-02-17 NOTE — TELEPHONE ENCOUNTER
Please Approve or Refuse.   Send to Pharmacy per Pt's Request:      Next Visit Date:  5/10/2021   Last Visit Date: 2/8/2021    Hemoglobin A1C (%)   Date Value   07/01/2020 5.7   10/04/2016 5.3   08/24/2012 5.2             ( goal A1C is < 7)   BP Readings from Last 3 Encounters:   02/10/21 (!) 153/78   02/08/21 139/85   01/11/21 135/84          (goal 120/80)  BUN   Date Value Ref Range Status   07/01/2020 15 8 - 23 mg/dL Final     CREATININE   Date Value Ref Range Status   07/01/2020 0.85 0.50 - 0.90 mg/dL Final     Potassium   Date Value Ref Range Status   07/01/2020 5.2 3.7 - 5.3 mmol/L Final

## 2021-02-18 ENCOUNTER — HOSPITAL ENCOUNTER (OUTPATIENT)
Dept: PHYSICAL THERAPY | Age: 61
Setting detail: THERAPIES SERIES
Discharge: HOME | End: 2021-02-18
Payer: MEDICARE

## 2021-02-18 DIAGNOSIS — I25.10 CORONARY ARTERY DISEASE INVOLVING NATIVE CORONARY ARTERY OF NATIVE HEART WITHOUT ANGINA PECTORIS: ICD-10-CM

## 2021-02-18 PROCEDURE — 97110 THERAPEUTIC EXERCISES: CPT

## 2021-02-18 PROCEDURE — 97161 PT EVAL LOW COMPLEX 20 MIN: CPT

## 2021-02-18 RX ORDER — NITROGLYCERIN 0.4 MG/1
TABLET SUBLINGUAL
Qty: 25 TABLET | Refills: 0 | Status: SHIPPED | OUTPATIENT
Start: 2021-02-18 | End: 2021-05-03

## 2021-02-18 NOTE — CONSULTS
[address] [facility]  [address] [facility]         Phone: [phone]       Fax: [phone]    Physical Therapy General Evaluation    Date:  2021  Patient: Hitesh Rivera  : 1960  MRN: [id number]  Physician: Ki Crocker MD     Insurance: [place] [employer]   # of visits allowed/remainin  Medical Diagnosis: M48.061 (ICD-10-CM) - Spinal stenosis of lumbar region without neurogenic claudication; M54.16 (ICD-10-CM) - Lumbar radiculopathy    Rehab Codes: M 54.41, M 54.42   Onset Date:   2021                                Next 's appt: 3/09/2021    Subjective:   CC: Pt is a pleasant 62 yo female reporting chronic low back pain with acute exacerbation beginning in 2020. Pt reports low back pain currently 2/10, described as dull and aching. Frequent claudication present in B LE present from hip to calves. Due to cramping pt now ambulates with SPC d/t frequent falls caused by legs giving out underneath her. Pt's standing and walking tolerance is currently 10-15 minutes d/t pain. Pain worsens with bending, twisting, long durations of standing/sitting, or increased activity up to 10/10 pain. Pain interrupts sleep 2-3x/night. Pt reports constant N/T in R groin, inferior to inguinal ligament, present for over a year. Pain improves with rest, heat, stretches, and use of Lyrica.      HPI: (onset date) 2021 referral     PMHx: [] Unremarkable [] Diabetes [x] HTN  [] Pacemaker   [] MI/Heart Problems [] Cancer [] Arthritis [] Asthma                         [] refer to full medical chart  In EPIC  [x] Other: Depression, CAD        Comorbidities:   [x] Obesity [] Dialysis  [] Other:   [x] Asthma [] Dementia [] Other:   [] Stroke [] Sleep apnea [] Other:   [] Vascular disease [] Rheumatic disease [] Other:     Tests: [x] X-Ray: [x] MRI:  [] Other:   2/10/2021 XR SPINE ENTIRE  Mild thoracolumbar scoliosis     2021 XR LUMBAR SPINE FLEXION AND EXTENSION  FINDINGS:   Lumbar vertebral body height is maintained.  6 mm L3-L4 anterolisthesis.       Severe disc space narrowing and endplate degenerative changes at L4-L5. Moderate disc space narrowing and endplate degenerative changes at L5-S1.       No instability with flexion or extension. 11/10/2020 MRI LUMBAR SPINE WO CONTRAST   Impression   1. 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   2. Increased severe L5-S1 degenerative disc disease. 3. Severe L4-5 and L5-S1 and moderate L3-4 spinal canal stenosis. 4. Multilevel neural foraminal narrowing as detailed above and greatest   involving the L5 neural foramina where it is moderate bilaterally.      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       ADL/IADL Previous level of function Current level of function Who currently assists the patient with task   Bathing  [x] Independent  [] Assist [x] Independent  [] Assist    Dress/grooming [x] Independent  [] Assist [x] Independent  [] Assist    Transfer/mobility [x] Independent  [] Assist [x] Independent  [] Assist    Feeding [x] Independent  [] Assist [x] Independent  [] Assist    Toileting [x] Independent  [] Assist [x] Independent  [] Assist    Driving [x] Independent  [] Assist [x] Independent  [] Assist    Housekeeping [x] Independent  [] Assist [x] Independent  [] Assist With difficulty    Grocery shop/meal prep [x] Independent  [] Assist [x] Independent  [] Assist With difficulty      Gait Prior level of function Current level of function    [x] Independent  [] Assist [x] Independent  [] Assist   Device: [x] Independent [x] Independent    [] Straight Cane [] Quad cane [x] Straight Cane for previous three months  [] Quad cane    [] Standard walker [] Rolling walker   [] 4 wheeled walker [] Standard walker [] Rolling walker   [] 4 wheeled walker    [] Wheelchair [] Wheelchair     Pain:  [x] Yes  [] No Location: low back   Average pain: 3/10 dull ache  Max pain: 10/10 cramping     Symptoms:  [] Improving [x] Worsening [] Same  Better:  [] AM    [] PM    [x] Sit    [] Rise/Sit    []Stand    [] Walk    [] Lying    [x] Other:heat or rest  Worse: [] AM    [] PM    [] Sit    [] Rise/Sit    [x]Stand    [x] Walk    [] Lying    [] Bend                      [] Valsalva    [x] Other: bending/twisting   Sleep: [] OK    [x] Disturbed      Objective:      Strength  ROM    Left Right        Flexion wnl   Hip Flex 4- 4- Extension 25% limited *    Ext 3 3 Rotation L wnl R wnl   Abd 3 3 Sidebend L 50% limited * R 50% limited *    Knee Flex 4+ 4+      Ext 5 5      Ankle DF 5 5      PF 5 5      * = pain present     OBSERVATION No Deficit Deficit Not Tested Comments   Posture       Forward Head [] [x] []    Rounded Shoulders [] [x] []    Kyphosis [x] [] []    Lordosis [x] [] []    Leg Length Discrp [x] [] []    Slumped Sitting [] [x] []    Palpation [] [x] [] TTP paraspinals   Sensation [] [x] [] R groin inferior to inguinal ligament    Edema [x] [] []    Neurological [x] [] []        BALANCE Left Right   Tandem Stance (Eyes Open) LOB 5 sec   Tandem Stance (Eyes Closed) unable unable   Single Leg (Eyes Open) 3 sec 1 sec   Single Leg (Eyes Closed unable unable   Other:                                         [facility] [place] Fall Risk Assessment    Patient Name:  Sanjana Hendrix  : 1960    Risk Factor Scale  Score   History of Falls [x] Yes  [] No 25  0 25   Secondary Diagnosis [x] Yes  [] No 15  0 15   Ambulatory Aid [] Furniture  [x] Crutches/cane/walker  [] None/bedrest/wheelchair/nurse 30  15  0 15   IV/Heparin Lock [] Yes [x] No 20  0 0   Gait/Transferring [] Impaired  [x] Weak  [] Normal/bedrest/immobile 20  10  0 10   Mental Status [] Forgets limitations  [x] Oriented to own ability 15  0 0      Total: 65     Based on the Assessment score: check the appropriate box. []  No intervention needed   Low =   Score of 0-24    []  Use standard prevention interventions Moderate =  Score of 24-44   [] Give patient handout and discuss fall prevention strategies   [] Establish goal of education for patient/family RE: fall prevention strategies    [x]  Use high risk prevention interventions High = Score of 45 and higher   [x] Give patient handout and discuss fall prevention strategies   [x] Establish goal of education for patient/family Re: fall prevention strategies   [] Discuss lifeline / other resources    Electronically signed by:   Regina Urena, PT  Date: 2/18/2021    Functional Impairment: Modified Oswestry 40% impairment (20/50)    Assessment:  Patient would benefit from skilled physical therapy services in order to: decrease pain to perform to ADLs, increase strength, improve balance to decrease risk of fall, education in HEP, maximize function         STG: (to be met in 6 treatments)  1. ? Pain: Decrease pain to 2/10 to improve standing tolerance to 20 minutes to perform ADLs  2. ? ROM: Improve lumbar mobility to 25% limited or better   3. ? Strength: Improve B LE strength to 4-/5 or higher to return to PLOF   4. Pt will improve balance to 6 seconds with SLS eyes open to decrease risk of falls   5. Independent with Home Exercise Programs  6. Demonstrate Knowledge of fall prevention    LTG: (to be met in 12 treatments)  1. Pt will report improve pain to 2/10 or less with 1 hour of standing and walking to return to PLOF  2. Pt will demonstrate improved balance evident by SLS B with eyes open 10 seconds, eyes closed for 5 seconds  3.  Pt will demonstrate improved functional impairment to 20% or less     Patient goals: postpone decompression surgery         Evaluation Complexity:  History (Personal factors, comorbidities) [] 0 [x] 1-2 [] 3+   Exam (limitations, restrictions) [x] 1-2 [] 3 [] 4+   Clinical presentation (progression) [x] Stable [] Evolving  [] Unstable   Decision Making [x] Low [] Moderate [] High    [x] Low Complexity [] Moderate Complexity [] High Complexity     Rehab Potential:  [x] Good  [] Fair  [] Poor   Suggested Professional Referral:  [x] No  [] Yes:  Barriers to Goal Achievement[de-identified]  [x] No  [] Yes:  Domestic Concerns:  [x] No  [] Yes:    Pt. Education:  [x] Plans/Goals, Risks/Benefits discussed  [x] Home exercise program  Method of Education: [x] Verbal  [x] Demo  [x] Written  Comprehension of Education:  [x] Verbalizes understanding. [] Demonstrates understanding. [x] Needs Review. [] Demonstrates/verbalizes understanding of HEP/Ed previously given.     Treatment Plan:  [x] Therapeutic Exercise   95352  [] Iontophoresis: 4 mg/mL Dexamethasone Sodium Phosphate  mAmin  93402   [] Therapeutic Activity  55344 [] Vasopneumatic cold with compression  51782    [] Gait Training   73189 [] Ultrasound   28795   [] Neuromuscular Re-education  30358 [] Electrical Stimulation Unattended  43597   [x] Manual Therapy  60341 [] Electrical Stimulation Attended  71463   [x] Instruction in HEP  [] Lumbar/Cervical Traction  83539   [] Aquatic Therapy   49718 [] Cold/hotpack    [] Massage   50707      [] Dry Needling, 1 or 2 muscles  31683   [] Biofeedback, first 15 minutes   88302  [] Biofeedback, additional 15 minutes   21728 [] Dry Needling, 3 or more muscles  33185     Frequency:  2 x/week for 12 visits    Todays Treatment:  Modalities:   Exercises:  Precautions: PT IS FALL RISK - USE GAIT BELT   Exercise Reps/ Time Weight/ Level Comments   SLS eyes open 10\"x2  W/ 1 UE support and chair behind pt    sidelying hip abd 10x2     SLR 10x2     bridges 20x     Sitting hamstring stretch  3x30\"           Other:     Specific Instructions for next treatment: progress hip/core strenghthening (supine marches, 3 way hip, etc.), progress balance (NBOS, tandem stance, SLS, etc), HP and possible hypervolt to decrease paraspinal guarding       Treatment Charges: Mins Units   [x] Evaluation       [x]  Low       []  Moderate       []  High 30 1   []  Modalities     [x]  Ther Exercise 15 1   []  Manual Therapy     []  Ther Activities     []  Aquatics     []  Neuromuscular     []  Gait Training     []  Dry Needling           1-2 muscles     []  Dry Needling           3 or more muscles     [] Vasocompression     []  Other       TOTAL TREATMENT TIME: 45      Time in: 1:40    Time out: 2:25     Electronically signed by: Bradley Potter PT        Physician Signature:________________________________Date:__________________  By signing above or cosigning this note, I have reviewed this plan of care and certify a need for medically necessary rehabilitation services.      *PLEASE SIGN ABOVE AND FAX BACK ALL PAGES*

## 2021-02-23 ENCOUNTER — HOSPITAL ENCOUNTER (OUTPATIENT)
Dept: PHYSICAL THERAPY | Age: 61
Setting detail: THERAPIES SERIES
Discharge: HOME | End: 2021-02-23
Payer: MEDICARE

## 2021-02-23 PROCEDURE — 97110 THERAPEUTIC EXERCISES: CPT

## 2021-02-23 NOTE — FLOWSHEET NOTE
[facility]   [address]   Phone: [phone]   Fax: [phone]    Physical Therapy Daily Treatment Note      Date:  2021  Patient Name:  Juana Kovacs    :  1960  MRN: [id number]  Physician: Swati Church MD       Insurance: [place] [employer]   # of visits allowed/remainin  Diagnosis: Medical Diagnosis: M48.061 (ICD-10-CM) - Spinal stenosis of lumbar region without neurogenic claudication; M54.16 (ICD-10-CM) - Lumbar radiculopathy                    Rehab Codes: M 54.41, M 54.42    Onset Date: 2021   Next Dr. Kamara Shira: 3/09/2021  Visit# / total visits:   Cancels/No Shows: 0/0    Subjective: Pt states her HEP has helped with dec pain. Pt reports a dec in muscle spasms and inc in standing and activity tolerance. Pain:  [x] Yes  [] No Location: Low back Pain Rating: (0-10 scale) 2/10  Pain altered Tx:  [x] No  [] Yes  Action:  Comments:    Objective:  Modalities:   Precautions: PT IS FALL RISK - USE GAIT BELT   Exercise Reps/ Time Weight/ Level Comments Completed 2021     SLS eyes open 10\"x2   W/ 1 UE support and chair behind pt     sidelying hip abd 10x2     x   SLR 10x2     x   bridges 20x     x   Sitting hamstring stretch  3x30\"     x    LTR 10x 5\" holds     x   SKFO 10x  VC to engage core and proper technique. x   Supine Marching 10x2  VC to engage core and proper technique. x   Other:       Specific Instructions for next treatment:      Assessment: [x] Progressing toward goals. [] No change. [] Other:     [x] Patient would continue to benefit from skilled physical therapy services in order to: decrease pain to perform to ADLs, increase strength, improve balance to decrease risk of fall, education in HEP, maximize function        2021: Focus this session on engaging the core during activities and B Hip strengthening. Added LTR, SKFO and supine marching with engaging core.   Pt tolerated exercises well and was provided an HEP this session. Gait: Pt amb into therapy clinic holding SPC but not properly using it. Pt stating \"I only need it sometimes when I feel like my knees are going to give out\". Continue with BLE strengthening and balance as pt tolerates. STG: (to be met in 6 treatments)  1. ? Pain: Decrease pain to 2/10 to improve standing tolerance to 20 minutes to perform ADLs  2. ? ROM: Improve lumbar mobility to 25% limited or better   3. ? Strength: Improve B LE strength to 4-/5 or higher to return to PLOF   4. Pt will improve balance to 6 seconds with SLS eyes open to decrease risk of falls   5. Independent with Home Exercise Programs  6. Demonstrate Knowledge of fall prevention     LTG: (to be met in 12 treatments)  1. Pt will report improve pain to 2/10 or less with 1 hour of standing and walking to return to PLOF  2. Pt will demonstrate improved balance evident by SLS B with eyes open 10 seconds, eyes closed for 5 seconds  3. Pt will demonstrate improved functional impairment to 20% or less     Pt. Education:  [x] Yes  [] No  [x] Reviewed Prior HEP/Ed  Method of Education: [x] Verbal  [] Demo  [] Written  Comprehension of Education:  [x] Verbalizes understanding. [] Demonstrates understanding. [] Needs review. [] Demonstrates/verbalizes HEP/Ed previously given. Plan: [x] Continue per plan of care.    [] Other:      Treatment Charges: Mins Units   []  Modalities     [x]  Ther Exercise 45 3   []  Manual Therapy     []  Ther Activities     []  Aquatics     []  Neuromuscular     [] Vasocompression     [] Gait Training     [] Dry needling        [] 1 or 2 muscles        [] 3 or more muscles     []  Other     Total Treatment time 45 3     Time In: 8961           Time Out: 1630    Electronically signed by:  Manuel Chris PTA

## 2021-02-25 ENCOUNTER — HOSPITAL ENCOUNTER (OUTPATIENT)
Dept: PHYSICAL THERAPY | Age: 61
Setting detail: THERAPIES SERIES
Discharge: HOME | End: 2021-02-25
Payer: MEDICARE

## 2021-02-25 PROCEDURE — 97110 THERAPEUTIC EXERCISES: CPT

## 2021-02-25 PROCEDURE — 97140 MANUAL THERAPY 1/> REGIONS: CPT

## 2021-02-25 NOTE — FLOWSHEET NOTE
[address]   [address]   Phone: [phone]   Fax: [phone]    Physical Therapy Daily Treatment Note      Date:  2021  Patient Name:  Miranda Heath    :  1960  MRN: [id number]  Physician: Ellen Guardado MD       Insurance: [place] [employer]   # of visits allowed/remainin  Diagnosis: Medical Diagnosis: M48.061 (ICD-10-CM) - Spinal stenosis of lumbar region without neurogenic claudication; M54.16 (ICD-10-CM) - Lumbar radiculopathy                    Rehab Codes: M 54.41, M 54.42    Onset Date: 2021   Next Dr. Abhay Villaat: 3/09/2021  Visit# / total visits: 3/12  Cancels/No Shows: 0/0    Subjective: Pt states she had a hard time walking after getting up this morning but reports stiffness and pain is less than when she first started coming to therapy. Pain:  [x] Yes  [] No Location: Low back Pain Rating: (0-10 scale) 2/10  Pain altered Tx:  [x] No  [] Yes  Action:  Comments:    Objective:  Modalities:   Precautions: PT IS FALL RISK - USE GAIT BELT   Exercise Reps/ Time Weight/ Level Comments Completed 2021     Quad/hip flexor stretch over EOM 3x30\"   x   SLS eyes open 10\"x2   W/ 1 UE support and chair behind pt     sidelying hip abd 10x2        SLR 10x2        bridges 10x 3\" holds     x   Sitting hamstring stretch  3x30\"         LTR 10x 5\" holds     x   SKFO 10x2  VC to engage core and proper technique. x   Supine Marching 10x2  VC to engage core and proper technique. x   Mini squats x   x   Standing 3 way hip 10x   x   Other:   MANUAL: Hypervolt to B quads, IT bands and Piriformis to dec tightness and pain. Stretching quads over EOM 3x 30\"    Specific Instructions for next treatment:      Assessment: [x] Progressing toward goals. [] No change.      [] Other:     [x] Patient would continue to benefit from skilled physical therapy services in order to: decrease pain to perform to ADLs, increase strength, improve balance to

## 2021-03-02 ENCOUNTER — HOSPITAL ENCOUNTER (OUTPATIENT)
Dept: PHYSICAL THERAPY | Age: 61
Setting detail: THERAPIES SERIES
Discharge: HOME | End: 2021-03-02
Payer: MEDICARE

## 2021-03-02 PROCEDURE — 97140 MANUAL THERAPY 1/> REGIONS: CPT

## 2021-03-02 PROCEDURE — 97110 THERAPEUTIC EXERCISES: CPT

## 2021-03-02 NOTE — FLOWSHEET NOTE
[facility]   [address]   Phone: [phone]   Fax: [phone]    Physical Therapy Daily Treatment Note      Date:  3/2/2021  Patient Name:  Siena Watts    :  1960  MRN: [id number]  Physician: Teodora Saint, MD       Insurance: [place] [employer]   # of visits allowed/remainin  Diagnosis: Medical Diagnosis: M48.061 (ICD-10-CM) - Spinal stenosis of lumbar region without neurogenic claudication; M54.16 (ICD-10-CM) - Lumbar radiculopathy                    Rehab Codes: M 54.41, M 54.42    Onset Date: 2021   Next Dr. Champagne Ice: 3/09/2021  Visit# / total visits:   Cancels/No Shows: 0/0    Subjective: Pt states her R side thigh,hastring and groin are more tight than left side this date. Pain:  [x] Yes  [] No Location: Low back Pain Rating: (0-10 scale) \"just tight\" maybe 2/10  Pain altered Tx:  [x] No  [] Yes  Action:  Comments:    Objective:  Modalities:   Precautions: PT IS FALL RISK - USE GAIT BELT   Exercise Reps/ Time Weight/ Level Comments Completed 3/2/2021     Quad/hip flexor stretch over EOM 3x30\"   x   SLS eyes open 10\"x2   W/ 1 UE support and chair behind pt  x   sidelying hip abd 10x2     x   SLR 10x2     x   bridges 10x 3\" holds     x   Sitting hamstring stretch  3x30\"         LTR 10x 5\" holds     x   SKFO 10x2  VC to engage core and proper technique. x   Supine Marching 10x2  VC to engage core and proper technique. x   Mini squats 10x   x   Standing 3 way hip 10x   x   Hamstring stretch at step 30\"x2 6'  x   Tandem stance  30\"x2   (B) x   Standing calf stretch  30\"x3   x   Nustep  5' lvl  3 Attempt to have less hip/groin tightness  x          Other:   MANUAL: Hypervolt to B quads, IT bands and Piriformis to dec tightness and pain   Stretching quads over EOM 3x 30\"- Held stretch 3/2/21    Specific Instructions for next treatment:      Assessment: [x] Progressing toward goals. [] No change.      [] Other:     [x]

## 2021-03-04 ENCOUNTER — HOSPITAL ENCOUNTER (OUTPATIENT)
Dept: PHYSICAL THERAPY | Age: 61
Setting detail: THERAPIES SERIES
Discharge: HOME | End: 2021-03-04
Payer: MEDICARE

## 2021-03-04 PROCEDURE — 97110 THERAPEUTIC EXERCISES: CPT

## 2021-03-04 NOTE — PROGRESS NOTES
[address] [facility]   [address]   Phone: [phone]   Fax: [phone]    Physical Therapy Progress Note      Date:  3/4/2021  Patient Name:  Minnie Kam    :  1960  MRN: [id number]  Physician: Julia Luis MD       Insurance: [place] [employer]   # of visits allowed/remainin  Diagnosis: Medical Diagnosis: M48.061 (ICD-10-CM) - Spinal stenosis of lumbar region without neurogenic claudication; M54.16 (ICD-10-CM) - Lumbar radiculopathy                    Rehab Codes: M 54.41, M 54.42    Onset Date: 2021   Next Dr. Kenia Mayen: 3/09/2021  Visit# / total visits:   Cancels/No Shows: 0/0    Subjective:   Pain:  [x] Yes  [] No Location: Low back Pain Rating: (0-10 scale) \"just tight\" maybe 2/10  Pain altered Tx:  [x] No  [] Yes  Action:  Comments: Pt reports minimal low back pain this date but woke up with posterior thigh tightness. cx    Objective:  Modalities:   Precautions: PT IS FALL RISK - USE GAIT BELT   Exercise Reps/ Time Weight/ Level Comments Completed 3/4/2021     Quad/hip flexor stretch over EOM 3x30\"   X   sidelying hip abd 10x2     X   SLR 10x2     \   bridges 10x 3\" holds     X   Supine hamstring stretch  3x30\"   W/ belt X    LTR 10x 5\" holds        SKFO 10x2  VC to engage core and proper technique. Supine Marching 10x2  VC to engage core and proper technique.            Standing marches 20x   X   Backwards walking 4x in //   X   SLS EO 2x20\"  2 finger support X   Mini squats 10x   X   Standing 3 way hip 10x   X   Hamstring stretch at step 30\"x2 6'     Tandem stance  30\"x2   (B) X   Standing calf stretch  30\"x3   X          Nustep  5' lvl  4 Attempt to have less hip/groin tightness  X          Other:   MANUAL: Hypervolt to B quads, IT bands and Piriformis to dec tightness and pain   Stretching quads over EOM 3x 30\"- Held stretch 3/2/21    Specific Instructions for next treatment:    Objective Measurements: 3/4 taken by Benny Cisneros Esteban, PT               Strength   ROM     Left Right             Flexion wnl   Hip Flex 4 4 Extension 25% limited *    Ext 3 3 Rotation L wnl R wnl   Abd 3 3 Sidebend L 25% limited * R 25% limited *    Knee Flex 4+ 4+         Ext 5 5         Ankle DF 5 5         PF 5 5         * = pain present     BALANCE Left Right   Tandem Stance (Eyes Open) 20 20   Single Leg (Eyes Open) 3 20   Single Leg (Eyes Closed     Other:         Assessment: [x] Progressing toward goals. Pt has completed 5/12 visits so re-assessment warranted this date. Pt demonstrates improved balance, strength, standing tolerance, and pain assessment. Pt able to complete charted exercises with progression of balance. Pt demonstrates decreased trunk sway and improved posture with SLS and tandem stance. Pt advised to perform HEP 2-3x/day. [] No change. [] Other:     [x] Patient would continue to benefit from skilled physical therapy services in order to: decrease pain to perform to ADLs, increase strength, improve balance to decrease risk of fall, education in HEP, maximize function        STG: (to be met in 6 treatments)  1. ? Pain: Decrease pain to 2/10 to improve standing tolerance to 20 minutes to perform ADLs  - MET standing for 20 minutes to perform exercises   2. ? ROM: Improve lumbar mobility to 25% limited or better   - MET B SB improved to 25% limitation   3. ? Strength: Improve B LE strength to 4-/5 or higher to return to PLOF   - MET  4. Pt will improve balance to 6 seconds with SLS eyes open to decrease risk of falls   - PROGRESSED   5. Independent with Home Exercise Programs  - MET   6. Demonstrate Knowledge of fall prevention     LTG: (to be met in 12 treatments)  1. Pt will report improve pain to 2/10 or less with 1 hour of standing and walking to return to PLOF  2. Pt will demonstrate improved balance evident by SLS B with eyes open 10 seconds, eyes closed for 5 seconds  3.  Pt will demonstrate improved functional impairment to 20% or less     Pt. Education:  [x] Yes  [] No  [x] Reviewed Prior HEP/Ed  Method of Education: [x] Verbal  [] Demo  [] Written  Comprehension of Education:  [x] Verbalizes understanding. [] Demonstrates understanding. [x] Needs review. [] Demonstrates/verbalizes HEP/Ed previously given. Plan: [x] Continue per plan of care.    [] Other:      Treatment Charges: Mins Units   []  Modalities     [x]  Ther Exercise 45 3   []  Manual Therapy     []  Ther Activities     []  Aquatics     []  Neuromuscular     [] Vasocompression     [] Gait Training     [] Dry needling        [] 1 or 2 muscles        [] 3 or more muscles     []  Other     Total Treatment time 45 3     Time In: 230pm           Time Out: 315pm    Electronically signed by:  J Luis Bethea PT

## 2021-03-08 ENCOUNTER — HOSPITAL ENCOUNTER (OUTPATIENT)
Dept: PHYSICAL THERAPY | Age: 61
Setting detail: THERAPIES SERIES
Discharge: HOME | End: 2021-03-08
Payer: MEDICARE

## 2021-03-08 PROCEDURE — 97140 MANUAL THERAPY 1/> REGIONS: CPT

## 2021-03-08 PROCEDURE — 97110 THERAPEUTIC EXERCISES: CPT

## 2021-03-08 NOTE — FLOWSHEET NOTE
[facility]   [address]   Phone: [phone]   Fax: [phone]    Physical Therapy Progress Note      Date:  3/8/2021  Patient Name:  Derrek Gross    :  1960  MRN: [id number]  Physician: Savannah Perdue MD       Insurance: [place] [employer]   # of visits allowed/remainin  Diagnosis: Medical Diagnosis: M48.061 (ICD-10-CM) - Spinal stenosis of lumbar region without neurogenic claudication; M54.16 (ICD-10-CM) - Lumbar radiculopathy                    Rehab Codes: M 54.41, M 54.42    Onset Date: 2021   Next Dr. Doll Sames: 3/09/2021  Visit# / total visits:   Cancels/No Shows: 0/0    Subjective:   Pain:  [x] Yes  [] No Location: Low back Pain Rating: (0-10 scale) \"just tight\" maybe 3/10  Pain altered Tx:  [x] No  [] Yes  Action:  Comments: Pt reports legs feel \"wobbly\" today. States no matter what she does LEs seem to tighten up while she is sleeping. Objective:  Modalities:   Precautions: PT IS FALL RISK - USE GAIT BELT   Exercise Reps/ Time Weight/ Level Comments Completed 3/8/2021     Nustep 5' Lvl 4  x   Quad/hip flexor stretch over EOM 3x30\"   X   sidelying hip abd 10x2     X   SLR 10x2     \   bridges 10x 3\" holds     X   Supine hamstring stretch  3x30\"   W/ belt X    LTR 10x 5\" holds        SKFO 10x2  VC to engage core and proper technique. Supine Marching 10x2  VC to engage core and proper technique.            Standing marches 20x   X   Backwards walking 4x in //   X   SLS EO 2x20\"  2 finger support X   Mini squats 10x   X   Standing 3 way hip 10x   X   Hamstring stretch at step 30\"x2 6'     Tandem stance  30\"x2   (B) X   Standing calf stretch  30\"x3   X          Nustep  5' lvl  4 Attempt to have less hip/groin tightness  X          Other:   MANUAL: Hypervolt to B quads, IT bands and Piriformis to dec tightness and pain   Stretching quads over EOM 3x 30\"- Held stretch 3/8/21    Specific Instructions for next treatment: Progress hip strengthening and balance exercises next visit. Assessment: [x] Progressing toward goals. Tolerated exercises well with minimal pain throughout treatment. More unsteady with turns and tandem balance with RLE in back. Continued with more difficulty with balance on LLE. Better gait when exiting therapy today. [] No change. [] Other:     [x] Patient would continue to benefit from skilled physical therapy services in order to: decrease pain to perform to ADLs, increase strength, improve balance to decrease risk of fall, education in HEP, maximize function        STG: (to be met in 6 treatments)  1. ? Pain: Decrease pain to 2/10 to improve standing tolerance to 20 minutes to perform ADLs  - MET standing for 20 minutes to perform exercises   2. ? ROM: Improve lumbar mobility to 25% limited or better   - MET B SB improved to 25% limitation   3. ? Strength: Improve B LE strength to 4-/5 or higher to return to PLOF   - MET  4. Pt will improve balance to 6 seconds with SLS eyes open to decrease risk of falls   - PROGRESSED   5. Independent with Home Exercise Programs  - MET   6. Demonstrate Knowledge of fall prevention     LTG: (to be met in 12 treatments)  1. Pt will report improve pain to 2/10 or less with 1 hour of standing and walking to return to PLOF  2. Pt will demonstrate improved balance evident by SLS B with eyes open 10 seconds, eyes closed for 5 seconds  3. Pt will demonstrate improved functional impairment to 20% or less     Pt. Education:  [x] Yes  [] No  [x] Reviewed Prior HEP/Ed  Method of Education: [x] Verbal  [] Demo  [] Written  Comprehension of Education:  [x] Verbalizes understanding. [] Demonstrates understanding. [x] Needs review. [] Demonstrates/verbalizes HEP/Ed previously given. Plan: [x] Continue per plan of care.    [] Other:      Treatment Charges: Mins Units   []  Modalities     [x]  Ther Exercise 30 2   [x]  Manual Therapy 10 1   []  Ther Activities     [] Aquatics     []  Neuromuscular     [] Vasocompression     [] Gait Training     [] Dry needling        [] 1 or 2 muscles        [] 3 or more muscles     []  Other     Total Treatment time 40 3     Time In: 1:01 pm           Time Out: 1:44 PM    Electronically signed by:  Regina Urena PTA

## 2021-03-09 ENCOUNTER — OFFICE VISIT (OUTPATIENT)
Dept: NEUROLOGY | Age: 61
End: 2021-03-09
Payer: MEDICARE

## 2021-03-09 VITALS
HEART RATE: 78 BPM | OXYGEN SATURATION: 97 % | BODY MASS INDEX: 32.02 KG/M2 | WEIGHT: 174 LBS | DIASTOLIC BLOOD PRESSURE: 80 MMHG | HEIGHT: 62 IN | SYSTOLIC BLOOD PRESSURE: 133 MMHG

## 2021-03-09 DIAGNOSIS — I10 ESSENTIAL HYPERTENSION: ICD-10-CM

## 2021-03-09 DIAGNOSIS — R20.0 LOWER EXTREMITY NUMBNESS: Primary | ICD-10-CM

## 2021-03-09 DIAGNOSIS — M50.30 DEGENERATIVE DISC DISEASE, CERVICAL: ICD-10-CM

## 2021-03-09 DIAGNOSIS — M48.061 SPINAL STENOSIS OF LUMBAR REGION WITHOUT NEUROGENIC CLAUDICATION: ICD-10-CM

## 2021-03-09 DIAGNOSIS — M19.019 OSTEOARTHRITIS OF SHOULDER, UNSPECIFIED LATERALITY, UNSPECIFIED OSTEOARTHRITIS TYPE: ICD-10-CM

## 2021-03-09 DIAGNOSIS — M54.16 LUMBAR RADICULOPATHY: ICD-10-CM

## 2021-03-09 DIAGNOSIS — M51.36 LUMBAR DEGENERATIVE DISC DISEASE: ICD-10-CM

## 2021-03-09 PROCEDURE — 1036F TOBACCO NON-USER: CPT | Performed by: STUDENT IN AN ORGANIZED HEALTH CARE EDUCATION/TRAINING PROGRAM

## 2021-03-09 PROCEDURE — 99214 OFFICE O/P EST MOD 30 MIN: CPT | Performed by: STUDENT IN AN ORGANIZED HEALTH CARE EDUCATION/TRAINING PROGRAM

## 2021-03-09 PROCEDURE — G8482 FLU IMMUNIZE ORDER/ADMIN: HCPCS | Performed by: STUDENT IN AN ORGANIZED HEALTH CARE EDUCATION/TRAINING PROGRAM

## 2021-03-09 PROCEDURE — 3017F COLORECTAL CA SCREEN DOC REV: CPT | Performed by: STUDENT IN AN ORGANIZED HEALTH CARE EDUCATION/TRAINING PROGRAM

## 2021-03-09 PROCEDURE — G8427 DOCREV CUR MEDS BY ELIG CLIN: HCPCS | Performed by: STUDENT IN AN ORGANIZED HEALTH CARE EDUCATION/TRAINING PROGRAM

## 2021-03-09 PROCEDURE — G8417 CALC BMI ABV UP PARAM F/U: HCPCS | Performed by: STUDENT IN AN ORGANIZED HEALTH CARE EDUCATION/TRAINING PROGRAM

## 2021-03-09 RX ORDER — CYCLOBENZAPRINE HCL 10 MG
TABLET ORAL
Qty: 60 TABLET | Refills: 0 | Status: SHIPPED | OUTPATIENT
Start: 2021-03-09 | End: 2021-04-05

## 2021-03-09 RX ORDER — PREGABALIN 150 MG/1
150 CAPSULE ORAL 3 TIMES DAILY
Qty: 90 CAPSULE | Refills: 1 | Status: SHIPPED | OUTPATIENT
Start: 2021-03-09 | End: 2021-05-10 | Stop reason: SDUPTHER

## 2021-03-09 RX ORDER — PREGABALIN 150 MG/1
150 CAPSULE ORAL 3 TIMES DAILY
Qty: 60 CAPSULE | Refills: 1 | Status: SHIPPED | OUTPATIENT
Start: 2021-03-09 | End: 2021-03-09

## 2021-03-09 ASSESSMENT — ENCOUNTER SYMPTOMS
EYE DISCHARGE: 0
NAUSEA: 0
COUGH: 0
BACK PAIN: 0
DIARRHEA: 0
SHORTNESS OF BREATH: 0
WHEEZING: 0
ABDOMINAL PAIN: 0
VOMITING: 0
SORE THROAT: 0
CHEST TIGHTNESS: 0

## 2021-03-09 NOTE — PROGRESS NOTES
Attending Physician Statement:    I have discussed the case of Julio César Malin, including pertinent history and exam findings with the resident. I have seen and examined the patient and the key elements of the encounter have been performed by me. I have reviewed medications, clinical laboratory, imaging and other diagnostic tests with the residents. I agree with the assessment, plan and orders as documented by the resident with changes made to the note as needed. Interval History: 3/9/2021   Patient was seen in the clinic for back pain and right leg numbness. She was last seen in the clinic with neurology resident and attending Dr. Osiel Chang on 11/10/2020    Patient has a history of chronic back pain which started around a year ago, associated with radicular symptoms going down from the right groin onto the anterolateral aspect of the right thigh with tingling sensation. Initially her pain was mild but then she has started having shooting pain from the back going down in her both lower extremities, especially getting worse on waking up in the morning or on walking for prolonged period of time. She had MRI lumbar spine done in February 2020 which showed multilevel degenerative changes at L2-L3, L4-L5 and L5-S1. During the last clinic visit repeat MRI of the lumbar spine was ordered   and she was continued on Lyrica 150 mg twice a day. Since last clinic visit, patient still continues to complain of pain in the right groin radiating to the right lower extremity. Endorses some improvement in her symptoms with Lyrica. Started following up with the pain physician, which is given prednisone taper, endorses improvement in the symptoms with that. May consider epidural injections if needed. Denies any other new neurologic concerns during this visit. MRI lumbar spine 11/24/2020  Impression   1.  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   2. Increased severe L5-S1 degenerative disc disease. 3. Severe L4-5 and L5-S1 and moderate L3-4 spinal canal stenosis. 4. Multilevel neural foraminal narrowing as detailed above and greatest   involving the L5 neural foramina where it is moderate bilaterally. Patient was evaluated by neurosurgery on 2/10/2021 he recommended long cassette scoliosis film. And recommend continuing conservative measures including physical therapy, prednisone taper and repeat epidural injection. Impression and Plan:     Chronic back pain with radicular symptoms in bilateral lower extremity  Lumbar radiculopathy  Lumbar degenerative disc disease  Hypertension  CAD  Hyperlipidemia  Asthma  History of depression        Plan  -We will increase the dose of Lyrica to 150 mg 3 times a day. Patient denies any side effects of Lyrica.  -Continue physical therapy  -Continue to follow-up with the pain management, patient was prescribed prednisone taper. May consider epidural steroid injections if her pain does not improve with above treatment options. Patient was also told about core strengthening exercises. -Follow-up with neurosurgery as scheduled  - Follow up in the clinic with the resident in 3 to 4 months  - Instructed patient to call the clinic if symptoms worsen or develop any new symptoms.        Angie Glasgow MD 3/9/2021 3:43 PM    Neurology

## 2021-03-09 NOTE — PROGRESS NOTES
[address] [place] [address],  [address], [facility] [address], [address] [facility], [address] [place] [address]  P: [phone]  F: [phone]    NEUROLOGY CLINIC NOTE     PATIENT NAME: Isabell Read  PATIENT MRN: [id number]  PRIMARY CARE PHYSICIAN: Sherice Regan MD    HPI:      Isabell Read is a 26[address] [facility] y.o. right handed  female with PMH significant for  was seen in the clinic for follow up visit     History obtained from Patient    Comes in clinic for follow-up hospital visit. Patient continues to have right groin numbness, denies any worsening of the numbness. Patient denies any headache, dizziness, vision changes, weakness. Patient follow-up with pain management clinic. Patient completed prednisone taper. Patient saw neurosurgery on 2/10/2021: For lumbar stenosis neurogenic claudication the recommended to continue conservative management; physical therapy, prednisone taper, and repeat epidural injection. Location: Lyrica 150 mg twice daily. Off note:     She was seen in clinic on 11/10/2020:     [address] [facility]  female with a history of diabetes, hypertension dyslipidemia coming in for right leg numbness and back pain. Patient reports both her symptoms started approximately a year ago. She reports disc constant sensation and numbness on her right groin down to the anterolateral right thigh, with occasional tingling sensations with no clear triggers. Pain severity is mild, approximately 4/10. She also has aching lower back pain that have actually gone away after she had epidural steroid injections with pain management in July. However, about 4 weeks ago she started having recurrence of shooting back pain that is now radiating down both her legs, typically worse first thing in the morning. Pain is also aggravated by prolonged standing or walking. Patient reports the symptoms are new, and different from her initial back pain a year ago. She denies any changes in bowel or bladder habits.   She did have an MRI of the lumbar spine in February which showed multilevel degenerative changes at L2-3, L4-5 and L5-S1. She is on a Lyrica.        PATIENT HISTORY:     Past Medical History:   Diagnosis Date    ARIADNE [facility]acute kidney [facility]) [facility] [address] 2019    Anxiety     Asthma     CAD (coronary artery disease)     Class 1 obesity due to excess calories with serious comorbidity and body mass index (BMI) of 32.0 to 32.9 in adult 2020    Depression     Examination of participant in clinical trial 11    End date 2015    HTN (hypertension)     Lumbar radiculopathy     Mixed hyperlipidemia 1/15/2018    OA (osteoarthritis)     PVD (peripheral vascular disease) with claudication ([facility] [address] 2015        Past Surgical History:   Procedure Laterality Date     SECTION      COLONOSCOPY N/A 2019    COLORECTAL CANCER SCREENING, NOT HIGH RISK performed by Renata Sepulveda MD at [address]      2 stents    DILATION AND CURETTAGE OF UTERUS N/A     uterine polyp        Social History     Socioeconomic History    Marital status:      Spouse name: Not on file    Number of children: Not on file    Years of education: Not on file    Highest education level: Not on file   Occupational History    Not on file   Social Needs    Financial resource strain: Not hard at all   [employer] insecurity     Worry: Never true     Inability: Never true   [employer] needs     Medical: No     Non-medical: No   Tobacco Use    Smoking status: Former Smoker     Packs/day: 0.25     Years: 25.00     Pack years: 6.25     Types: Cigarettes     Quit date: 2009     Years since quittin.7    Smokeless tobacco: Never Used   Substance and Sexual Activity    Alcohol use: No     Alcohol/week: 0.0 standard drinks    Drug use: No    Sexual activity: Yes   Lifestyle    Physical activity     Days per week: Not on file     Minutes per session: Not on file    Stress: Not on file Relationships    Social connections     Talks on phone: Not on file     Gets together: Not on file     Attends Confucianist service: Not on file     Active member of club or organization: Not on file     Attends meetings of clubs or organizations: Not on file     Relationship status: Not on file    Intimate partner violence     Fear of current or ex partner: Not on file     Emotionally abused: Not on file     Physically abused: Not on file     Forced sexual activity: Not on file   Other Topics Concern    Not on file   Social History Narrative    Not on file        Current Outpatient Medications   Medication Sig Dispense Refill    cyclobenzaprine (FLEXERIL) 10 MG tablet TAKE 1 TABLET BY MOUTH TWICE DAILY AS NEEDED FOR MUSCLE SPASMS 60 tablet 0    nitroGLYCERIN (NITROSTAT) 0.4 MG SL tablet DISSOLVE 1 TABLET UNDER THE TONGUE EVERY 5 MINUTES AS NEEDED FOR CHEST PAIN. IF NO RELIEF AFTER 1 DOSE, CALL 911 25 tablet 0    FLUoxetine (PROZAC) 20 MG capsule TAKE 3 CAPSULES BY MOUTH EVERY DAY 90 capsule 3    lidocaine (ASPERCREME LIDOCAINE) 4 % external patch Apply 1 patch topically daily APPLY 1 PATCH EXTERNALLY TO THE SKIN DAILY 30 patch 0    CALCIUM 600+D3 600-400 MG-UNIT TABS per tab TAKE 1 TABLET BY MOUTH DAILY 90 tablet 0    ACETAMINOPHEN EXTRA STRENGTH 500 MG tablet TAKE 1 TABLET BY MOUTH EVERY 6 HOURS AS NEEDED FOR PAIN 120 tablet 3    pregabalin (LYRICA) 150 MG capsule Take 1 capsule by mouth 2 times daily for 30 days.  60 capsule 0    diclofenac sodium (VOLTAREN) 1 % GEL APPLY 2 GRAMS EXTERNALLY TO THE AFFECTED AREA FOUR TIMES DAILY 200 g 1    cetirizine (ZYRTEC) 10 MG tablet Take 1 tablet by mouth daily 90 tablet 2    enalapril (VASOTEC) 10 MG tablet TAKE 2 TABLETS BY MOUTH EVERY MORNING AND 1 TABLET BY MOUTH EVERY EVENING 90 tablet 2    aspirin (ASPIRIN LOW DOSE) 81 MG EC tablet TAKE 1 TABLET BY MOUTH DAILY 90 tablet 2    metoprolol succinate (TOPROL XL) 50 MG extended release tablet Take one tablet by mouth daily 90 tablet 2    meloxicam (MOBIC) 15 MG tablet TAKE 1 TABLET BY MOUTH DAILY 90 tablet 2    busPIRone (BUSPAR) 15 MG tablet TAKE 1 TABLET BY MOUTH EVERY 12 HOURS AS NEEDED 120 tablet 3    albuterol sulfate  (90 Base) MCG/ACT inhaler INHALE 2 PUFFS INTO THE LUNGS EVERY 6 HOURS AS NEEDED FOR WHEEZING 36 g 3    isosorbide mononitrate (IMDUR) 30 MG extended release tablet TAKE 1 TABLET BY MOUTH EVERY DAY 90 tablet 2    simvastatin (ZOCOR) 40 MG tablet TAKE 1 TABLET BY MOUTH EVERY NIGHT 90 tablet 3    diclofenac (SOLARAZE) 3 % gel Apply topically 2 times daily as needed 1 Tube 3    lidocaine (LMX) 4 % cream Apply topically every 8 hrs as needed for pain 45 g 3    Heat Wraps (SOFTHEAT HEATING WRAP ULTRA) MISC Use daily for neck pain 1 each 0     No current facility-administered medications for this visit. Allergies   Allergen Reactions    Claritin [Loratadine]      02/2010 Heart palpitations  02/2010 Heart palpitations    Codeine Nausea Only        REVIEW OF SYSTEMS:     Review of Systems   Constitutional: Negative for activity change, chills, fever and unexpected weight change. HENT: Negative for congestion, hearing loss and sore throat. Eyes: Negative for discharge and visual disturbance. Respiratory: Negative for cough, chest tightness, shortness of breath and wheezing. Cardiovascular: Negative for chest pain, palpitations and leg swelling. Gastrointestinal: Negative for abdominal pain, diarrhea, nausea and vomiting. Endocrine: Negative for polydipsia and polyphagia. Genitourinary: Negative for decreased urine volume, difficulty urinating, dysuria, frequency and vaginal pain. Musculoskeletal: Negative for arthralgias, back pain, joint swelling and neck stiffness. Skin: Negative for rash. Allergic/Immunologic: Negative for environmental allergies.    Neurological: Negative for dizziness, tremors, seizures, syncope, facial asymmetry, speech difficulty, weakness, light-headedness, numbness and headaches. Hematological: Negative for adenopathy. Psychiatric/Behavioral: Negative for agitation, confusion, hallucinations and suicidal ideas. VITALS  LMP 11/07/2012      PHYSICAL EXAMINATION:     Physical Exam     Constitutional: Well developed, well nourished and in no acute distress. Head:  normocephalic, atraumatic. Neck: supple, no carotid bruits, thyroid not palpable  Respiratory: Clear to auscultation bilaterally with no use of accessory muscles during respiration. Cardiovascular: normal rate, regular rhythm, no murmur, gallop, rub. Abdomen: Soft, nontender, nondistended, normal bowel sounds, no hepatomegaly or splenomegaly  Extremities:  peripheral pulses palpable, no pedal edema or calf pain with palpation  Psych: normal affect      NEUROLOGICAL EXAMINATION:     Mental status   Alert and oriented; intact memory with no confusion, speech or language problems; no hallucinations or delusions     Cranial nerves   II - visual fields intact to confrontation                                                III, IV, VI  extra-ocular muscles full: no pupillary defect; no JEFFREY, no nystagmus, no ptosis   V - normal facial sensation                                                               VII - normal facial symmetry                                                             VIII - intact hearing                                                                             IX, X - symmetrical palate                                                                  XI - symmetrical shoulder shrug                                                       XII - midline tongue without atrophy or fasciculation     Motor function  Normal muscle bulk and tone  Muscle strength: normal power 5/5  fine motor movements     Sensory function Intact to touch,  bilateral upper and lower extremities except right groin. Cerebellar Intact fine motor movement.  No involuntary movements or tremors     Reflex function Intact 2+ DTR and symmetric. Negative Babinski     Gait                  Normal station and gait           PRIOR TESTS AND IMAGING: Following images and Labs were reviewed by the examiner       MRI lumbar spine: 11/24/2020      Impression   1. 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   2. Increased severe L5-S1 degenerative disc disease. 3. Severe L4-5 and L5-S1 and moderate L3-4 spinal canal stenosis. 4. Multilevel neural foraminal narrowing as detailed above and greatest   involving the L5 neural foramina where it is moderate bilaterally.               X-ray spine entire: 2/10/2021:     Impression   Mild thoracolumbar scoliosis. X-ray lumbar spine lection extension    Impression   L3-L4 anterolisthesis without instability.                 ASSESSMENT / PLAN:     -Lumbar radiculopathy  -Numbness in the right groin  -Hypertension  -Hyperlipidemia      PLAN:   -[place] dose increased 150 x 3 times daily  -PT/OT  -Pain management  -Follow-up with neurosurgery  - Follow up in the clinic in 6 months  - Instructed patient to call the clinic if symptoms worsen or develop any new symptoms. Ms. Jigna Ritchie received counseling on the following healthy behaviors: medical compliance, smoking cessation, blood pressure control, regular follow up with primary doctor.               Electronically signed by Rah Pope MD on 3/9/2021 at 3:30 PM

## 2021-03-10 ENCOUNTER — HOSPITAL ENCOUNTER (OUTPATIENT)
Dept: PHYSICAL THERAPY | Age: 61
Setting detail: THERAPIES SERIES
Discharge: HOME | End: 2021-03-10
Payer: MEDICARE

## 2021-03-10 PROCEDURE — 97110 THERAPEUTIC EXERCISES: CPT

## 2021-03-10 NOTE — FLOWSHEET NOTE
[facility] Out[facility]   [address]   Phone: [phone]   Fax: [phone]    Physical Therapy Progress Note      Date:  3/10/2021  Patient Name:  Azeb Escobedo    :  1960  MRN: [id number]  Physician: Radha Hussein MD       Insurance: [employer]   # of visits allowed/remainin  Diagnosis: Medical Diagnosis: M48.061 (ICD-10-CM) - Spinal stenosis of lumbar region without neurogenic claudication; M54.16 (ICD-10-CM) - Lumbar radiculopathy                    Rehab Codes: M 54.41, M 54.42    Onset Date: 2021   Next Dr. Uma Hoffmannel: 3/09/2021  Visit# / total visits:   Cancels/No Shows: 0/0    Subjective: Pt states she saw neurologist yesterday and they upped her dosage of Lyrica from 2x to 3x/day. Pt states no change in activities and almost went to pain clinic this morning for another steroid injection because of pain. Pain:  [x] Yes  [] No Location: Low back Pain Rating: (0-10 scale)cramping 4/10  Pain altered Tx:  [x] No  [] Yes  Action:  Comments:     Objective:  Modalities:   Precautions: PT IS FALL RISK - USE GAIT BELT   Exercise Reps/ Time Weight/ Level Comments Completed 3/10/2021     Nustep 5' Lvl 4  x   Quad/hip flexor stretch over EOM 3x30\"      sidelying hip abd 10x2        SLR 10x2     x   bridges 10x 3\" holds        Supine hamstring stretch  3x30\"   W/ belt x    LTR 10x 5\" holds     x   SKFO 10x2  VC to engage core and proper technique. Supine Marching 10x2  VC to engage core and proper technique.  x          Standing marches 20x      Backwards walking 4x in //      SLS EO 2x20\"  2 finger support    Mini squats 10x   x   Standing 3 way hip 10x  3/10/2021 dded orange TB x   Hamstring stretch at step 30\"x2 6'  x   Tandem stance  30\"x2   (B)    Standing calf stretch  30\"x3   x   Supine piriformis stretch 3x 30\" holds   x   Seated with ball OH and marching 10x2   x          Other:   MANUAL: Hypervolt to B quads, IT bands and less     Pt. Education:  [x] Yes  [] No  [x] Reviewed Prior HEP/Ed  Method of Education: [x] Verbal  [] Demo  [] Written  Comprehension of Education:  [x] Verbalizes understanding. [] Demonstrates understanding. [x] Needs review. [] Demonstrates/verbalizes HEP/Ed previously given. Plan: [x] Continue per plan of care.    [] Other:      Treatment Charges: Mins Units   []  Modalities     [x]  Ther Exercise 45 3   [x]  Manual Therapy     []  Ther Activities     []  Aquatics     []  Neuromuscular     [] Vasocompression     [] Gait Training     [] Dry needling        [] 1 or 2 muscles        [] 3 or more muscles     []  Other     Total Treatment time 45 3     Time In: 1300          Time Out: 9890    Electronically signed by:  Kyara Nunez PTA

## 2021-03-13 DIAGNOSIS — S33.6XXD SPRAIN OF SACROILIAC LIGAMENT, SUBSEQUENT ENCOUNTER: ICD-10-CM

## 2021-03-13 DIAGNOSIS — M51.36 LUMBAR DEGENERATIVE DISC DISEASE: ICD-10-CM

## 2021-03-13 DIAGNOSIS — M51.26 PROTRUDED LUMBAR DISC: ICD-10-CM

## 2021-03-15 ENCOUNTER — HOSPITAL ENCOUNTER (OUTPATIENT)
Dept: PHYSICAL THERAPY | Age: 61
Setting detail: THERAPIES SERIES
Discharge: HOME | End: 2021-03-15
Payer: MEDICARE

## 2021-03-15 PROCEDURE — 97110 THERAPEUTIC EXERCISES: CPT

## 2021-03-15 NOTE — FLOWSHEET NOTE
[facility]   [address]   Phone: [phone]   Fax: [phone]    Physical Therapy Progress Note      Date:  3/15/2021  Patient Name:  Chris Cano    :  1960  MRN: [id number]  Physician: Ry Larsen MD       Insurance: [place] [employer]   # of visits allowed/remainin  Diagnosis: Medical Diagnosis: M48.061 (ICD-10-CM) - Spinal stenosis of lumbar region without neurogenic claudication; M54.16 (ICD-10-CM) - Lumbar radiculopathy                    Rehab Codes: M 54.41, M 54.42    Onset Date: 2021   Next Dr. Aguilar Marissa: 3/09/2021  Visit# / total visits:   Cancels/No Shows: 0/0    Subjective: Pt states some increased soreness after last visit but no increased pain. Still notes increased pain first thing in the morning and decreases after up and moving for 15 minutes. Pain continues to starting lumbar back and radiating into buttocks along sacrum/SI joint. Pain:  [x] Yes  [] No Location: Low back Pain Rating: (0-10 scale)cramping 4/10  Pain altered Tx:  [x] No  [] Yes  Action:  Comments:     Objective:  Modalities:   Precautions: PT IS FALL RISK - USE GAIT BELT   Exercise Reps/ Time Weight/ Level Comments Completed 3/15/2021     Nustep 5' Lvl 4  x   Quad/hip flexor stretch over EOM 3x30\"      sidelying hip abd 10x2        SLR 10x2     x   bridges 10x 3\" holds        Supine hamstring stretch  3x30\"   W/ belt x    LTR 10x 5\" holds     x   SKFO 10x2  VC to engage core and proper technique. Supine Marching 10x2  VC to engage core and proper technique.  x          Standing marches 20x      Backwards walking 4x in //      SLS EO 2x20\"  2 finger support    Mini squats 10x   x   Standing 3 way hip 10x  3/10/2021 added orange TB x   Hamstring stretch at step 30\"x2 6'  x   Tandem stance  30\"x2   (B) x   Standing calf stretch  30\"x3   x   Supine piriformis stretch 3x 30\" holds   x   Seated with ball OH and marching 10x2 Other: MANUAL: Hypervolt to B quads, IT bands and Piriformis to dec tightness and pain   Stretching quads over EOM 3x 30\"- Held stretch 3/15/2021    Specific Instructions for next treatment: Progress hip strengthening and balance exercises next visit. Assessment: [x] Progressing toward goals. [] No change. [] Other:     [x] Patient would continue to benefit from skilled physical therapy services in order to: decrease pain to perform to ADLs, increase strength, improve balance to decrease risk of fall, education in HEP, maximize function      3/15/2021: Continued with exercises above for core strengthening and decrease pain. hot pack with supine stretches and exercises. Denies increased pain at end of treatment today. STG: (to be met in 6 treatments)  1. ? Pain: Decrease pain to 2/10 to improve standing tolerance to 20 minutes to perform ADLs  - MET standing for 20 minutes to perform exercises   2. ? ROM: Improve lumbar mobility to 25% limited or better   - MET B SB improved to 25% limitation   3. ? Strength: Improve B LE strength to 4-/5 or higher to return to PLOF   - MET  4. Pt will improve balance to 6 seconds with SLS eyes open to decrease risk of falls   - PROGRESSED   5. Independent with Home Exercise Programs  - MET   6. Demonstrate Knowledge of fall prevention     LTG: (to be met in 12 treatments)  1. Pt will report improve pain to 2/10 or less with 1 hour of standing and walking to return to PLOF  2. Pt will demonstrate improved balance evident by SLS B with eyes open 10 seconds, eyes closed for 5 seconds  3. Pt will demonstrate improved functional impairment to 20% or less     Pt. Education:  [x] Yes  [] No  [x] Reviewed Prior HEP/Ed  Method of Education: [x] Verbal  [] Demo  [] Written  Comprehension of Education:  [x] Verbalizes understanding. [] Demonstrates understanding. [x] Needs review. [] Demonstrates/verbalizes HEP/Ed previously given.      Plan: [x] Continue

## 2021-03-15 NOTE — TELEPHONE ENCOUNTER
Please Approve or Refuse.   Send to Pharmacy per Pt's Request:     Next Visit Date:  5/10/2021   Last Visit Date: 2/8/2021    Hemoglobin A1C (%)   Date Value   07/01/2020 5.7   10/04/2016 5.3   08/24/2012 5.2             ( goal A1C is < 7)   BP Readings from Last 3 Encounters:   03/09/21 133/80   02/10/21 (!) 153/78   02/08/21 139/85          (goal 120/80)  BUN   Date Value Ref Range Status   07/01/2020 15 8 - 23 mg/dL Final     CREATININE   Date Value Ref Range Status   07/01/2020 0.85 0.50 - 0.90 mg/dL Final     Potassium   Date Value Ref Range Status   07/01/2020 5.2 3.7 - 5.3 mmol/L Final

## 2021-03-17 ENCOUNTER — HOSPITAL ENCOUNTER (OUTPATIENT)
Dept: PHYSICAL THERAPY | Age: 61
Setting detail: THERAPIES SERIES
Discharge: HOME | End: 2021-03-17
Payer: MEDICARE

## 2021-03-17 PROCEDURE — 97110 THERAPEUTIC EXERCISES: CPT

## 2021-03-17 NOTE — FLOWSHEET NOTE
[facility]   [address] [place] [address] [address]   Phone: [phone]   Fax: [phone]    Physical Therapy Progress Note      Date:  3/17/2021  Patient Name:  Vimal Mclaughlin    :  1960  MRN: [id number]  Physician: Riley Hickman MD       Insurance: [place] [employer]   # of visits allowed/remainin  Diagnosis: Medical Diagnosis: M48.061 (ICD-10-CM) - Spinal stenosis of lumbar region without neurogenic claudication; M54.16 (ICD-10-CM) - Lumbar radiculopathy                    Rehab Codes: M 54.41, M 54.42    Onset Date: 2021   Next Dr. Monteiro Nestor: 3/09/2021  Visit# / total visits:   Cancels/No Shows: 0/0    Subjective: Pt states she is getting around okay but it is slow going. Pt reports she needs a lot of rest breaks when performing daily routine activities. Pain:  [x] Yes  [] No Location: Low back Pain Rating: (0-10 scale)tight, dull ache 4/10  Pain altered Tx:  [x] No  [] Yes  Action:  Comments:     Objective:  Modalities:   Precautions: PT IS FALL RISK - USE GAIT BELT   Exercise Reps/ Time Weight/ Level Comments Completed 3/17/2021     Nustep 5' Lvl 4 3/17 Inc time to 8' and dec resistance to lvl 3 x   Quad/hip flexor stretch over EOM 3x30\"      sidelying hip abd 10x2        SLR 10x2        bridges 10x 3\" holds        Supine hamstring stretch  3x30\"   W/ belt x    LTR 10x 5\" holds    5x each side. x   SKFO 10x2  VC to engage core and proper technique. x   Supine Marching 10x2  VC to engage core and proper technique.            Standing marches 20x      Backwards walking 4x in //      SLS EO 2x20\"  2 finger support    Mini squats 10x      Standing 3 way hip 10x ORange  x   Hamstring stretch at step 30\"x2 6'     Tandem stance  30\"x2   (B)    Standing calf stretch  30\"x3      Supine piriformis stretch 3x 30\" holds   x   Seated with ball OH and marching 10x2      Supine posterior pelvic tilt 10x wit 3\" holds   x   Other:   MANUAL: Hypervolt to B quads, IT Written  Comprehension of Education:  [x] Verbalizes understanding. [] Demonstrates understanding. [x] Needs review. [] Demonstrates/verbalizes HEP/Ed previously given. Plan: [x] Continue per plan of care.    [] Other:      Treatment Charges: Mins Units   []  Modalities     [x]  Ther Exercise 45 3   []  Manual Therapy     []  Ther Activities     []  Aquatics     []  Neuromuscular     [] Vasocompression     [] Gait Training     [] Dry needling        [] 1 or 2 muscles        [] 3 or more muscles     []  Other     Total Treatment time 45 3     Time In: 5734          Time Out: 1600    Electronically signed by:  Hillary June PTA

## 2021-03-18 ENCOUNTER — TELEPHONE (OUTPATIENT)
Dept: PAIN MANAGEMENT | Age: 61
End: 2021-03-18

## 2021-03-18 NOTE — TELEPHONE ENCOUNTER
I returned patient's call; spoke with her. Patient states she is almost done with PT and would like to schedule LESI. Per Dr's last office note, he did order LESI. Patient states she takes Mobic and 81mg ASA; Dr. True Baker prescribes the aspirin. I advised I will need to send Dr Anselmo Ledezma a fax requesting permission to hold it. I told the patient as soon as I get the ok, I will call her to schedule the injection. Patient agrees.

## 2021-03-23 ENCOUNTER — HOSPITAL ENCOUNTER (OUTPATIENT)
Dept: PHYSICAL THERAPY | Age: 61
Setting detail: THERAPIES SERIES
Discharge: HOME | End: 2021-03-23
Payer: MEDICARE

## 2021-03-23 ENCOUNTER — TELEPHONE (OUTPATIENT)
Dept: PAIN MANAGEMENT | Age: 61
End: 2021-03-23

## 2021-03-23 PROCEDURE — 97110 THERAPEUTIC EXERCISES: CPT

## 2021-03-23 NOTE — FLOWSHEET NOTE
Other: MANUAL: Hypervolt to B quads, IT bands and Piriformis to dec tightness and pain   Stretching quads over EOM 3x 30\"- Held stretch 3/15/2021    Specific Instructions for next treatment: Progress hip strengthening and balance exercises next visit. Assessment: [x] Progressing toward goals. [] No change. [] Other:     [x] Patient would continue to benefit from skilled physical therapy services in order to: decrease pain to perform to ADLs, increase strength, improve balance to decrease risk of fall, education in HEP, maximize function      3/23/2021:  Pt amb into therapy clinic demos antalgic gait. Added step ups this session fwd/lat with focus on trunk and hip support to inc strength and control of movements. VC/TC needed throughout session to correct technique and posture as needed. LLE fatigue noted with standing marches. Fatigue and mild pain in low back noted at the end of treatment. Pt amb out of clinic with improve gait with dec pain. STG: (to be met in 6 treatments)  1. ? Pain: Decrease pain to 2/10 to improve standing tolerance to 20 minutes to perform ADLs  - MET standing for 20 minutes to perform exercises   2. ? ROM: Improve lumbar mobility to 25% limited or better   - MET B SB improved to 25% limitation   3. ? Strength: Improve B LE strength to 4-/5 or higher to return to PLOF   - MET  4. Pt will improve balance to 6 seconds with SLS eyes open to decrease risk of falls   - PROGRESSED   5. Independent with Home Exercise Programs  - MET   6. Demonstrate Knowledge of fall prevention     LTG: (to be met in 12 treatments)  1. Pt will report improve pain to 2/10 or less with 1 hour of standing and walking to return to PLOF  2. Pt will demonstrate improved balance evident by SLS B with eyes open 10 seconds, eyes closed for 5 seconds  3. Pt will demonstrate improved functional impairment to 20% or less     Pt.  Education:  [x] Yes  [] No  [x] Reviewed Prior HEP/Ed  Method of Education: [x] Verbal  [] Demo  [] Written  Comprehension of Education:  [x] Verbalizes understanding. [] Demonstrates understanding. [x] Needs review. [] Demonstrates/verbalizes HEP/Ed previously given. Plan: [x] Continue per plan of care.    [] Other:      Treatment Charges: Mins Units   []  Modalities     [x]  Ther Exercise 45 3   []  Manual Therapy     []  Ther Activities     []  Aquatics     []  Neuromuscular     [] Vasocompression     [] Gait Training     [] Dry needling        [] 1 or 2 muscles        [] 3 or more muscles     []  Other     Total Treatment time 45 3     Time In: 7788          Time Out: 1600    Electronically signed by:  Otoniel Silverio PTA

## 2021-03-25 ENCOUNTER — HOSPITAL ENCOUNTER (OUTPATIENT)
Dept: PHYSICAL THERAPY | Age: 61
Setting detail: THERAPIES SERIES
Discharge: HOME | End: 2021-03-25
Payer: MEDICARE

## 2021-03-25 ENCOUNTER — TELEPHONE (OUTPATIENT)
Dept: PAIN MANAGEMENT | Age: 61
End: 2021-03-25

## 2021-03-25 PROCEDURE — 97112 NEUROMUSCULAR REEDUCATION: CPT

## 2021-03-25 PROCEDURE — 97110 THERAPEUTIC EXERCISES: CPT

## 2021-03-25 NOTE — FLOWSHEET NOTE
[facility] Out[facility]   [facility]   Phone: [phone]   Fax: [phone]    Physical Therapy Progress Note      Date:  3/25/2021  Patient Name:  Siena Watts    :  1960  MRN: [id number]  Physician: Teodora Saint, MD       Insurance: [place] [employer]   # of visits allowed/remainin  Diagnosis: Medical Diagnosis: M48.061 (ICD-10-CM) - Spinal stenosis of lumbar region without neurogenic claudication; M54.16 (ICD-10-CM) - Lumbar radiculopathy                    Rehab Codes: M 54.41, M 54.42    Onset Date: 2021   Next Dr. Champagne Ice: 3/09/2021  Visit# / total visits: 10/12  Cancels/No Shows: 0/0    Subjective: Pt states she is tired. Pain:  [x] Yes  [] No Location: Low back Pain Rating: (0-10 scale)tight, dull ache 3/10  Pain altered Tx:  [x] No  [] Yes  Action:  Comments:     Objective:  Modalities:   Precautions: PT IS FALL RISK - USE GAIT BELT   Exercise Reps/ Time Weight/ Level Comments Completed 3/25/2021     Nustep 8' 4 3/25: pt could only tolerate 6' x   Quad/hip flexor stretch over EOM 3x30\"      sidelying hip abd 10x2        SLR 10x2        bridges 10x 3\" holds        Supine hamstring stretch  3x30\"   W/ belt     LTR 10x 5\" holds    5x each side. SKFO 10x2  VC to engage core and proper technique. Supine Marching 10x2  VC to engage core and proper technique. Heel/toe raises 20x  Added 3/25 with finger tip support as needed.  x   Standing marches 20x  3/25 added foam x   Heel toe walking fwd/retro, side stepping 3x   x   SLS EO 2x20\"  2 finger support x   Mini squats 10x   x   Standing 3 way hip 10x [address] to lime with hip ABD and Flexion; hip extension performed without band x   Hamstring stretch at step 30\"x3      Tandem stance  30\"x2   (B) x   Standing calf stretch  30\"x3   x   Supine piriformis stretch 3x 30\" holds      Seated with ball OH and marching 10x2      Supine posterior pelvic tilt 10x wit 3\" holds      Step ups fwd/lat 10x  8\" step x   FWD bend stretch seated in chair 3x 15\"      Other:   MANUAL: Hypervolt to B quads, IT bands and Piriformis to dec tightness and pain   Stretching quads over EOM 3x 30\"- Held stretch 3/15/2021    Specific Instructions for next treatment: Progress hip strengthening and balance exercises next visit. Assessment: [x] Progressing toward goals. [] No change. [] Other:     [x] Patient would continue to benefit from skilled physical therapy services in order to: decrease pain to perform to ADLs, increase strength, improve balance to decrease risk of fall, education in HEP, maximize function      3/25/2021: VC needed to keep core engaged throughout all activities. Pt had c/o pain with hip extension with TB; TB was removed and was able to complete hip extension without difficulty. Pt was fatigued at the end of therapy session but tolerated all exercises well. No significant LOB noted with balance activities this session, however, pt utilizes finger tips for steadying assistance as needed. STG: (to be met in 6 treatments)  1. ? Pain: Decrease pain to 2/10 to improve standing tolerance to 20 minutes to perform ADLs  - MET standing for 20 minutes to perform exercises   2. ? ROM: Improve lumbar mobility to 25% limited or better   - MET B SB improved to 25% limitation   3. ? Strength: Improve B LE strength to 4-/5 or higher to return to PLOF   - MET  4. Pt will improve balance to 6 seconds with SLS eyes open to decrease risk of falls   - PROGRESSED   5. Independent with Home Exercise Programs  - MET   6. Demonstrate Knowledge of fall prevention     LTG: (to be met in 12 treatments)  1. Pt will report improve pain to 2/10 or less with 1 hour of standing and walking to return to PLOF  2. Pt will demonstrate improved balance evident by SLS B with eyes open 10 seconds, eyes closed for 5 seconds  3. Pt will demonstrate improved functional impairment to 20% or less     Pt. Education:  [x] Yes  [] No  [x] Reviewed Prior HEP/Ed  Method of Education: [x] Verbal  [] Demo  [] Written  Comprehension of Education:  [x] Verbalizes understanding. [] Demonstrates understanding. [x] Needs review. [] Demonstrates/verbalizes HEP/Ed previously given. Plan: [x] Continue per plan of care.    [] Other:      Treatment Charges: Mins Units   []  Modalities     [x]  Ther Exercise 30 2   []  Manual Therapy     []  Ther Activities     []  Aquatics     [x]  Neuromuscular 15 1   [] Vasocompression     [] Gait Training     [] Dry needling        [] 1 or 2 muscles        [] 3 or more muscles     []  Other     Total Treatment time 45 3     Time In: 1430          Time Out: 4099    Electronically signed by:  Nataliya Chávez PTA

## 2021-03-25 NOTE — TELEPHONE ENCOUNTER
Called patient to schedule  an injection appointment. Chart reviewed along with medication list. Pre procedure instructions given. Covid-19 screening questions asked. Information given where to be dropped off, a person to remain in the car and which door to enter in. Temp must be taken. Patient advised to avoid vaccines 2 weeks prior to Injection. Patient acknowledges an understanding. Patient states she had the Moderna Vaccine 3/18 and will be getting her second vaccine on April 15th. Will discuss with Dr. Luisa Mendoza. Naman Maya if this patient may be scheduled on 3/29/2021    Per Dr. Celena Chowdary he would prefer not to interrupt the vaccines.  Patient scheduled 4/27/2021 at 740am

## 2021-03-30 ENCOUNTER — HOSPITAL ENCOUNTER (OUTPATIENT)
Dept: PHYSICAL THERAPY | Age: 61
Setting detail: THERAPIES SERIES
Discharge: HOME | End: 2021-03-30
Payer: MEDICARE

## 2021-03-30 PROCEDURE — 97110 THERAPEUTIC EXERCISES: CPT

## 2021-03-30 PROCEDURE — 97112 NEUROMUSCULAR REEDUCATION: CPT

## 2021-03-30 NOTE — FLOWSHEET NOTE
[address] [facility]   [address] [facility] [address]   Phone: [phone]   Fax: [phone]    Physical Therapy Progress Note      Date:  3/30/2021  Patient Name:  Jocelin Caba    :  1960  MRN: [id number]  Physician: Mina Ponce MD       Insurance: [place] [employer]   # of visits allowed/remainin  Diagnosis: Medical Diagnosis: M48.061 (ICD-10-CM) - Spinal stenosis of lumbar region without neurogenic claudication; M54.16 (ICD-10-CM) - Lumbar radiculopathy                    Rehab Codes: M 54.41, M 54.42    Onset Date: 2021   Next Dr. Brittany Cosby: 3/09/2021  Visit# / total visits:   Cancels/No Shows: 0/0    Subjective: Patient reports today that she had an unexplained moment of sharp pain earlier today that has since decreased. Pain:  [x] Yes  [] No Location: Low back Pain Rating: (0-10 scale)tight, dull ache 2/10  Pain altered Tx:  [x] No  [] Yes  Action:  Comments:     Objective:  Modalities:   Precautions: PT IS FALL RISK - USE GAIT BELT   Exercise Reps/ Time Weight/ Level Comments Completed 3/30/2021     Nustep 6' 4  x   Quad/hip flexor stretch over EOM 3x30\"      sidelying hip abd 10x2        SLR 10x2        bridges 10x 3\" holds        Supine hamstring stretch  3x30\"   W/ belt     LTR 10x 5\" holds    5x each side. SKFO 10x2  VC to engage core and proper technique. Supine Marching 10x2  VC to engage core and proper technique. Heel/toe raises 20x  Added 3/25 with finger tip support as needed.  x   Standing marches 20x Foam  x   Heel toe walking fwd/retro, side stepping 3x      SLS EO 3x20\"  Occasional support x   Mini squats 10x Foam  x   Standing 3 way hip 10x Orange  x   Hamstring stretch at step 30\"x3   x   Tandem stance  30\"x2   (B) x   Standing calf stretch  30\"x3   x   Supine piriformis stretch 3x 30\" holds      Seated with ball OH and marching 10x2   x   Supine posterior pelvic tilt 10x wit 3\" holds      Step ups fwd/lat 10x  8\" step x FWD bend stretch seated in chair 3x 15\"   x   Other:   MANUAL: Hypervolt to B quads, IT bands and Piriformis to dec tightness and pain   Stretching quads over EOM 3x 30\"- Held manual today    Specific Instructions for next treatment: Progress hip strengthening and balance exercises next visit. Assessment: [x] Progressing toward goals. [] No change. [] Other:     [x] Patient would continue to benefit from skilled physical therapy services in order to: decrease pain to perform to ADLs, increase strength, improve balance to decrease risk of fall, education in HEP, maximize function      Continued with exercises per chart with focus on improving hip strength and balance. Increased reps with SLS to further challenge balance. Patient reported decreased pain post exercises today. STG: (to be met in 6 treatments)  1. ? Pain: Decrease pain to 2/10 to improve standing tolerance to 20 minutes to perform ADLs  - MET standing for 20 minutes to perform exercises   2. ? ROM: Improve lumbar mobility to 25% limited or better   - MET B SB improved to 25% limitation   3. ? Strength: Improve B LE strength to 4-/5 or higher to return to PLOF   - MET  4. Pt will improve balance to 6 seconds with SLS eyes open to decrease risk of falls   - PROGRESSED   5. Independent with Home Exercise Programs  - MET   6. Demonstrate Knowledge of fall prevention     LTG: (to be met in 12 treatments)  1. Pt will report improve pain to 2/10 or less with 1 hour of standing and walking to return to PLOF  2. Pt will demonstrate improved balance evident by SLS B with eyes open 10 seconds, eyes closed for 5 seconds  3. Pt will demonstrate improved functional impairment to 20% or less     Pt. Education:  [x] Yes  [] No  [x] Reviewed Prior HEP/Ed  Method of Education: [x] Verbal  [] Demo  [] Written  Comprehension of Education:  [x] Verbalizes understanding. [] Demonstrates understanding. [x] Needs review.   [] Demonstrates/verbalizes HEP/Ed previously given. Plan: [x] Continue per plan of care.    [] Other:      Treatment Charges: Mins Units   []  Modalities     [x]  Ther Exercise 28 2   []  Manual Therapy     []  Ther Activities     []  Aquatics     [x]  Neuromuscular 10 1   [] Vasocompression     [] Gait Training     [] Dry needling        [] 1 or 2 muscles        [] 3 or more muscles     []  Other     Total Treatment time 38 3     Time In: 1430          Time Out: 6649    Electronically signed by:  Maureen Erwin PTA

## 2021-04-05 DIAGNOSIS — M50.30 DEGENERATIVE DISC DISEASE, CERVICAL: ICD-10-CM

## 2021-04-05 DIAGNOSIS — M19.019 OSTEOARTHRITIS OF SHOULDER, UNSPECIFIED LATERALITY, UNSPECIFIED OSTEOARTHRITIS TYPE: ICD-10-CM

## 2021-04-05 RX ORDER — CYCLOBENZAPRINE HCL 10 MG
TABLET ORAL
Qty: 60 TABLET | Refills: 0 | Status: SHIPPED | OUTPATIENT
Start: 2021-04-05 | End: 2021-05-03

## 2021-04-06 ENCOUNTER — APPOINTMENT (OUTPATIENT)
Dept: PHYSICAL THERAPY | Age: 61
End: 2021-04-06
Payer: MEDICARE

## 2021-04-08 ENCOUNTER — HOSPITAL ENCOUNTER (OUTPATIENT)
Dept: PHYSICAL THERAPY | Age: 61
Setting detail: THERAPIES SERIES
Discharge: HOME | End: 2021-04-08
Payer: MEDICARE

## 2021-04-08 DIAGNOSIS — I10 ESSENTIAL HYPERTENSION: ICD-10-CM

## 2021-04-08 PROCEDURE — 97110 THERAPEUTIC EXERCISES: CPT

## 2021-04-08 RX ORDER — ENALAPRIL MALEATE 10 MG/1
TABLET ORAL
Qty: 90 TABLET | Refills: 2 | Status: SHIPPED | OUTPATIENT
Start: 2021-04-08 | End: 2021-06-26

## 2021-04-08 NOTE — FLOWSHEET NOTE
509 [facility] Outpatient Physical Therapy   [address]   Phone: [phone]   Fax: [phone]    Physical Therapy Progress Note      Date:  2021  Patient Name:  Lamberto Chan    :  1960  MRN: [id number]  Physician: Bay Kerr MD       Insurance: [place] [employer]   # of visits allowed/remainin  Diagnosis: Medical Diagnosis: M48.061 (ICD-10-CM) - Spinal stenosis of lumbar region without neurogenic claudication; M54.16 (ICD-10-CM) - Lumbar radiculopathy                    Rehab Codes: M 54.41, M 54.42    Onset Date: 2021   Next Dr. Shena Loraman: 3/09/2021  Visit# / total visits:   Cancels/No Shows: 0/0    Subjective: Pt reports swelling in BLEs that has not been subsiding overnight. Pt states she still has pain first thing in the morning but has been able to sleep better at night. Pain:  [x] Yes  [] No Location: Low back Pain Rating: (0-10 scale)tight, dull ache 2/10  Pain altered Tx:  [x] No  [] Yes  Action:  Comments:     Objective:  Modalities:   Precautions: PT IS FALL RISK - USE GAIT BELT   Exercise Reps/ Time Weight/ Level Comments Completed 2021     Nustep 6' 4  x   Quad/hip flexor stretch over EOM 3x30\"      sidelying hip abd 10x2        SLR 10x2        bridges 10x 3\" holds        Supine hamstring stretch  3x30\"   W/ belt     LTR 10x 5\" holds    5x each side. SKFO 10x2  VC to engage core and proper technique. Supine Marching 10x2  VC to engage core and proper technique. Heel/toe raises 20x  Added 3/25 with finger tip support as needed.  x   Standing marches 20x Foam  x   Heel toe walking fwd/retro, side stepping 3x   x   SLS EO 3x20\"  Occasional support    Mini squats 10x Foam  x   Standing 3 way hip 10x [place]  x   Hamstring stretch at step 30\"x3      Tandem stance  30\"x2   (B)    Standing calf stretch  30\"x3   x   Supine piriformis stretch 3x 30\" holds      Seated with ball OH and marching 10x2      Supine posterior pelvic tilt 10x wit 3\" holds      Step ups fwd/lat 10x  8\" step x   FWD bend stretch seated in chair 3x 15\"      Seated HS stretch 3x 30\"   x   HSC in standing  20x 2#  x   Other:   MANUAL: Hypervolt to B quads, IT bands and Piriformis to dec tightness and pain   Stretching quads over EOM 3x 30\"- Held manual today    Specific Instructions for next treatment: Progress hip strengthening and balance exercises next visit. Assessment: [x] Progressing toward goals. [] No change. [] Other:     [x] Patient would continue to benefit from skilled physical therapy services in order to: decrease pain to perform to ADLs, increase strength, improve balance to decrease risk of fall, education in HEP, maximize function      4/8/2021:  Pt has difficulty walking/performing heel/toe walking and side stepping for long periods of time d/t fatigue and pain in low back. VC/TC needed to correct posture and encourage core engagement throughout session. Pt c/o inc tightness with step up and rest break needed between fwd and side step up. Pt needed finger tip assistance for stability throughout session. Pt c/o mild tightness in LLE: HS, Glut, Hip post exercises but was able to dec pain with seated HS stretch. Pt would benefit from continued hip/core strengthening to dec pain and inc activity tolerance. STG: (to be met in 6 treatments)  1. ? Pain: Decrease pain to 2/10 to improve standing tolerance to 20 minutes to perform ADLs  - MET standing for 20 minutes to perform exercises   2. ? ROM: Improve lumbar mobility to 25% limited or better   - MET B SB improved to 25% limitation   3. ? Strength: Improve B LE strength to 4-/5 or higher to return to PLOF   - MET  4. Pt will improve balance to 6 seconds with SLS eyes open to decrease risk of falls   - PROGRESSED   5. Independent with Home Exercise Programs  - MET   6. Demonstrate Knowledge of fall prevention     LTG: (to be met in 16 treatments)  1.  Pt will report improve pain to 2/10 or less with 1 hour of standing and walking to return to PLOF  2. Pt will demonstrate improved balance evident by SLS B with eyes open 10 seconds, eyes closed for 5 seconds  3. Pt will demonstrate improved functional impairment to 20% or less     Pt. Education:  [x] Yes  [] No  [x] Reviewed Prior HEP/Ed  Method of Education: [x] Verbal  [] Demo  [] Written  Comprehension of Education:  [x] Verbalizes understanding. [] Demonstrates understanding. [x] Needs review. [] Demonstrates/verbalizes HEP/Ed previously given. Plan: [x] Continue per plan of care.    [] Other:      Treatment Charges: Mins Units   []  Modalities     [x]  Ther Exercise 45 3   []  Manual Therapy     []  Ther Activities     []  Aquatics     []  Neuromuscular     [] Vasocompression     [] Gait Training     [] Dry needling        [] 1 or 2 muscles        [] 3 or more muscles     []  Other     Total Treatment time 45 3     Time In: 4926        Time Out: 4145    Electronically signed by:  [employer], PTA

## 2021-04-12 ENCOUNTER — HOSPITAL ENCOUNTER (OUTPATIENT)
Dept: PHYSICAL THERAPY | Age: 61
Setting detail: THERAPIES SERIES
Discharge: HOME | End: 2021-04-12
Payer: MEDICARE

## 2021-04-12 PROCEDURE — 97110 THERAPEUTIC EXERCISES: CPT

## 2021-04-12 NOTE — FLOWSHEET NOTE
Standing calf stretch  30\"x3   x   Supine piriformis stretch 3x 30\" holds   x   Seated with ball OH and marching 10x2      Supine posterior pelvic tilt 10x wit 3\" holds      Step ups fwd/lat 10x  8\" step x   FWD bend stretch seated in chair 3x 15\"      Seated HS stretch 3x 30\"      [address] in standing  20x 2#  x   Other:   MANUAL: Hypervolt to B quads, IT bands and Piriformis to dec tightness and pain   Stretching quads over EOM 3x 30\"- Held manual today    Specific Instructions for next treatment: Progress hip strengthening and balance exercises next visit. Assessment: [x] Progressing toward goals. Initiated session with increased time spent on nu step followed by standing stretches to increase ms flexibility. Able to progress resistance for 3 way hip this date. Cues to engage core and for proper positioning with exercises, pt with good carryover following cues. Slight discomfort noted with resisted hip extension, vc's to complete with smaller ROM. Added SLS on foam and pt required 1 finger support to maintain balance. Addition of foam to heel toe walking and side stepping in // bars to further challenge balance. One rest break between standing exercises this date d/t ms fatigue. Ended with supine piriformis stretch and LTR to reduce tension in LB. Good tolerance to progressions made this date. Pt with ms soreness present post treatment, however, denies any pain. [] No change.      [] Other:     [x] Patient would continue to benefit from skilled physical therapy services in order to: decrease pain to perform to ADLs, increase strength, improve balance to decrease risk of fall, education in HEP, maximize function          STG: (to be met in 6 treatments)  1. ? Pain: Decrease pain to 2/10 to improve standing tolerance to 20 minutes to perform ADLs  - MET standing for 20 minutes to perform exercises   2. ? ROM: Improve lumbar mobility to 25% limited or better   - MET B SB improved to 25% limitation 3. ? Strength: Improve B LE strength to 4-/5 or higher to return to PLOF   - MET  4. Pt will improve balance to 6 seconds with SLS eyes open to decrease risk of falls   - PROGRESSED   5. Independent with Home Exercise Programs  - MET   6. Demonstrate Knowledge of fall prevention     LTG: (to be met in 16 treatments)  1. Pt will report improve pain to 2/10 or less with 1 hour of standing and walking to return to PLOF  2. Pt will demonstrate improved balance evident by SLS B with eyes open 10 seconds, eyes closed for 5 seconds  3. Pt will demonstrate improved functional impairment to 20% or less     Pt. Education:  [x] Yes  [] No  [x] Reviewed Prior HEP/Ed  Method of Education: [x] Verbal  [] Demo  [] Written  Comprehension of Education:  [x] Verbalizes understanding. [] Demonstrates understanding. [x] Needs review. [] Demonstrates/verbalizes HEP/Ed previously given. Plan: [x] Continue per plan of care.    [] Other:      Treatment Charges: Mins Units   []  Modalities     [x]  Ther Exercise 50 3   []  Manual Therapy     []  Ther Activities     []  Aquatics     []  Neuromuscular     [] Vasocompression     [] Gait Training     [] Dry needling        [] 1 or 2 muscles        [] 3 or more muscles     []  Other     Total Treatment time 50 3     Time In: 415   pm    Time Out: 505 pm    Electronically signed by:  Pino Figueroa PTA

## 2021-04-20 ENCOUNTER — HOSPITAL ENCOUNTER (OUTPATIENT)
Dept: PHYSICAL THERAPY | Age: 61
Setting detail: THERAPIES SERIES
Discharge: HOME | End: 2021-04-20
Payer: MEDICARE

## 2021-04-20 PROCEDURE — 97110 THERAPEUTIC EXERCISES: CPT

## 2021-04-20 NOTE — FLOWSHEET NOTE
Standing 3 way hip 10x lime Progressed resistance 4/12 X   Hamstring stretch at step 30\"x3      Tandem stance  30\"x2   (B) X   Standing calf stretch  30\"x3   X          Supine piriformis stretch 3x 30\" holds   X   Seated with ball OH and marching 10x2      Supine posterior pelvic tilt 10x wit 3\" holds             Step ups fwd/lat 10x  8\" step    FWD bend stretch seated in chair 3x 15\"      Seated HS stretch 3x 30\"      [facility] in standing  20x 2#     Other:   MANUAL: Hypervolt to B quads, IT bands and Piriformis to dec tightness and pain   Stretching quads over EOM 3x 30\"- Held manual today    Specific Instructions for next treatment: Progress hip strengthening and balance exercises next visit. Assessment: [x] Progressing toward goals. Pt able to complete charted exercises with no reports of pain. Pt reports daily adherence to HEP with improvement of symptoms after complete. Pt required min cues for improvement of form and continued activation of core through standing activities. No standing rest break required this date. Pt able to progress balance exercises this date with demonstration of decreased trunk sway. [] No change. [] Other:     [x] Patient would continue to benefit from skilled physical therapy services in order to: decrease pain to perform to ADLs, increase strength, improve balance to decrease risk of fall, education in HEP, maximize function          STG: (to be met in 6 treatments)  1. ? Pain: Decrease pain to 2/10 to improve standing tolerance to 20 minutes to perform ADLs  - MET standing for 20 minutes to perform exercises   2. ? ROM: Improve lumbar mobility to 25% limited or better   - MET B SB improved to 25% limitation   3. ? Strength: Improve B LE strength to 4-/5 or higher to return to PLOF   - MET  4. Pt will improve balance to 6 seconds with SLS eyes open to decrease risk of falls   - PROGRESSED   5. Independent with Home Exercise Programs  - MET   6.  Demonstrate Knowledge of fall prevention     LTG: (to be met in 16 treatments)  1. Pt will report improve pain to 2/10 or less with 1 hour of standing and walking to return to PLOF  2. Pt will demonstrate improved balance evident by SLS B with eyes open 10 seconds, eyes closed for 5 seconds  3. Pt will demonstrate improved functional impairment to 20% or less     Pt. Education:  [x] Yes  [] No  [x] Reviewed Prior HEP/Ed  Method of Education: [x] Verbal  [] Demo  [] Written  Comprehension of Education:  [x] Verbalizes understanding. [] Demonstrates understanding. [] Needs review. [x] Demonstrates/verbalizes HEP/Ed previously given. Plan: [x] Continue per plan of care.    [] Other:      Treatment Charges: Mins Units   []  Modalities     [x]  Ther Exercise 45 3   []  Manual Therapy     []  Ther Activities     []  Aquatics     []  Neuromuscular     [] Vasocompression     [] Gait Training     [] Dry needling        [] 1 or 2 muscles        [] 3 or more muscles     []  Other     Total Treatment time 45 3     Time In: 3:35   pm    Time Out: 4:20 pm    Electronically signed by:  Anuradha Blackwell PT

## 2021-04-22 ENCOUNTER — HOSPITAL ENCOUNTER (OUTPATIENT)
Dept: PHYSICAL THERAPY | Age: 61
Setting detail: THERAPIES SERIES
Discharge: HOME | End: 2021-04-22
Payer: MEDICARE

## 2021-04-22 PROCEDURE — 97110 THERAPEUTIC EXERCISES: CPT

## 2021-04-22 PROCEDURE — 6370000000 HC RX 637 (ALT 250 FOR IP)

## 2021-04-22 NOTE — FLOWSHEET NOTE
[facility] Outpatient Physical Therapy   [address]   Phone: [phone]   Fax: [phone]    Physical Therapy Progress Note      Date:  2021  Patient Name:  Cyndee Rosa    :  1960  MRN: [id number]  Physician: Randolph Skinner MD       Insurance: [employer]   # of visits allowed/remainin  Diagnosis: Medical Diagnosis: M48.061 (ICD-10-CM) - Spinal stenosis of lumbar region without neurogenic claudication; M54.16 (ICD-10-CM) - Lumbar radiculopathy                    Rehab Codes: M 54.41, M 54.42    Onset Date: 2021   Next Dr. Pérez Hy: 3/09/2021  Visit# / total visits: 15/  Cancels/No Shows: 0/0    Subjective: 2021 Pt reports pain in B Hamstrings this session. Pain:  [x] Yes  [] No Location: Low back/Glutes/HS Pain Rating: (0-10 scale) 4/10  Pain altered Tx:  [x] No  [] Yes  Action:  Comments:     Objective:  Modalities:   Precautions: PT IS FALL RISK - USE GAIT BELT   Exercise Reps/ Time Weight/ Level Comments Completed 2021     Nustep 8' 4  x   Quad/hip flexor stretch over EOM 3x30\"      sidelying hip abd 10x2        SLR 10x2        bridges 10x 3\" holds        Supine hamstring stretch  3x30\"   W/ belt x    LTR 10x 5\" holds    5x each side. x   SKFO 10x2  VC to engage core and proper technique. Supine Marching 10x2  VC to engage core and proper technique. Heel/toe raises 20x  Added 3/25 with finger tip support as needed.  x   Standing marches 20x Foam  x   Near tandem stance: OH/chest press 10x2 foam Added  x   Heel toe walking fwd/retro, side stepping 3x Foam in // bars Added foam ; retro without foam    SLS EO 3x20\"  Occasional support    SLS on foam-EO 2x20\" ea  1 finger support; added     Mini squats 10x Foam     Standing 3 way hip 10x lime Progressed resistance     Hamstring stretch at step 30\"x3      Tandem stance  30\"x2   (B)    Standing calf stretch  30\"x3   x   Supine piriformis stretch 3x 30\" holds   x Seated with ball OH and marching 10x2      Supine posterior pelvic tilt 10x wit 3\" holds      Step ups fwd/lat 10x  8\" step x   FWD bend stretch seated in chair 3x 15\"      Seated HS stretch 3x 30\"      [address] in standing  20x 2#     Other:   MANUAL: Hypervolt to B quads, IT bands and Piriformis to dec tightness and pain   Stretching quads over EOM 3x 30\"- Held manual today    Specific Instructions for next treatment: Progress hip strengthening and balance exercises next visit. Assessment: [x] Progressing toward goals. [] No change. [] Other:     [x] Patient would continue to benefit from skilled physical therapy services in order to: decrease pain to perform to ADLs, increase strength, improve balance to decrease risk of fall, education in HEP, maximize function      4/22/2021: Pt utilizing straight cane when amb into clinic this session but did not use cane to amb within clinic. VC needed throughout session to maintain core engagement. Pt c/o muscle spasms inferior to gluts when performing stepping ups. Dec in pain noted after session pt stating \"it's loosened up\". STG: (to be met in 6 treatments)  1. ? Pain: Decrease pain to 2/10 to improve standing tolerance to 20 minutes to perform ADLs  - MET standing for 20 minutes to perform exercises   2. ? ROM: Improve lumbar mobility to 25% limited or better   - MET B SB improved to 25% limitation   3. ? Strength: Improve B LE strength to 4-/5 or higher to return to PLOF   - MET  4. Pt will improve balance to 6 seconds with SLS eyes open to decrease risk of falls   - PROGRESSED   5. Independent with Home Exercise Programs  - MET   6. Demonstrate Knowledge of fall prevention     LTG: (to be met in 16 treatments)  1. Pt will report improve pain to 2/10 or less with 1 hour of standing and walking to return to PLOF  2. Pt will demonstrate improved balance evident by SLS B with eyes open 10 seconds, eyes closed for 5 seconds  3.  Pt will demonstrate improved functional impairment to 20% or less     Pt. Education:  [x] Yes  [] No  [x] Reviewed Prior HEP/Ed  Method of Education: [x] Verbal  [] Demo  [] Written  Comprehension of Education:  [x] Verbalizes understanding. [] Demonstrates understanding. [x] Needs review. [] Demonstrates/verbalizes HEP/Ed previously given. Plan: [x] Continue per plan of care.    [] Other:      Treatment Charges: Mins Units   []  Modalities     [x]  Ther Exercise 45 3   []  Manual Therapy     []  Ther Activities     []  Aquatics     []  Neuromuscular     [] Vasocompression     [] Gait Training     [] Dry needling        [] 1 or 2 muscles        [] 3 or more muscles     []  Other     Total Treatment time 45 3     Time In: 1430    Time Out: 9378    Electronically signed by:  Mac Rahman PTA

## 2021-04-26 ENCOUNTER — TELEPHONE (OUTPATIENT)
Dept: PAIN MANAGEMENT | Age: 61
End: 2021-04-26

## 2021-04-28 ENCOUNTER — HOSPITAL ENCOUNTER (OUTPATIENT)
Dept: PHYSICAL THERAPY | Age: 61
Setting detail: THERAPIES SERIES
Discharge: HOME | End: 2021-04-28
Payer: MEDICARE

## 2021-04-28 PROCEDURE — 97110 THERAPEUTIC EXERCISES: CPT

## 2021-04-28 RX ORDER — ALBUTEROL SULFATE 90 UG/1
AEROSOL, METERED RESPIRATORY (INHALATION)
Qty: 36 G | Refills: 3 | Status: SHIPPED | OUTPATIENT
Start: 2021-04-28 | End: 2021-12-22

## 2021-04-28 NOTE — PROGRESS NOTES
509 [facility] Outpatient Physical Therapy   [address]   Phone: [phone]   Fax: [phone]    Physical Therapy Progress Note      Date:  2021  Patient Name:  Ricardo Goff    :  1960  MRN: [id number]  Physician: Bindu Katz MD       Insurance: [employer]   # of visits allowed/remainin  Diagnosis: Medical Diagnosis: M48.061 (ICD-10-CM) - Spinal stenosis of lumbar region without neurogenic claudication; M54.16 (ICD-10-CM) - Lumbar radiculopathy                    Rehab Codes: M 54.41, M 54.42    Onset Date: 2021   Next Dr. Vivien Acosta: 3/09/2021  Visit# / total visits:   Cancels/No Shows: 0/0    Subjective: Pt comes clinic holding [facility], but does not utilize. Pt reports decrease low back pain this date. Pt states she has not had any recent falls or recent knee buckling's. Pt is unsure on walking tolerance durations but estimates ~15 minutes. N/T in R groin still constant. Pt reports great improvement in ability to sleep through the night without waking d/t pain. Pain:  [x] Yes  [] No Location: Low back/Glutes/HS Pain Rating: (0-10 scale) 2/10  Pain altered Tx:  [x] No  [] Yes  Action:  Comments:     Objective:  Modalities:   Precautions: PT IS FALL RISK - USE GAIT BELT   Exercise Reps/ Time Weight/ Level Comments Completed 2021     Nustep 8' 4  X          Quad/hip flexor stretch over EOM 3x30\"      sidelying hip abd 10x2        SLR 10x2        bridges 10x 3\" holds        Supine hamstring stretch  3x30\"   W/ belt     LTR 10x 5\" holds    5x each side. X   SKFO 10x2  VC to engage core and proper technique. Supine Marching 10x2  VC to engage core and proper technique. Heel/toe raises 20x  Added 3/25 with finger tip support as needed.     SL hip abduction 10x2   X   Standing marches 20x Foam  X   Near tandem stance: OH/chest press 10x2 foam Added  X   Heel toe walking fwd/retro, side stepping 3x Foam in // bars Added foam ; retro without foam X   SLS EO 3x20\"  Occasional support X   SLS on foam-EO 2x20\" ea  1 finger support; added 4/12    Mini squats 10x Foam  X   Standing 3 way hip 10x lime Progressed resistance 4/12    Hamstring stretch at step 30\"x3      Tandem stance  30\"x2   (B)    Standing calf stretch  30\"x3             Supine piriformis stretch 3x 30\" holds   X   Seated with ball OH and marching 10x2      Supine posterior pelvic tilt 10x wit 3\" holds             Step ups fwd/lat 10x  8\" step    FWD bend stretch seated in chair 3x 15\"      Seated HS stretch 3x 30\"      [address] in standing  20x 2#     Other:   MANUAL: Hypervolt to B quads, IT bands and Piriformis to dec tightness and pain   Stretching quads over EOM 3x 30\"- Held manual today    Specific Instructions for next treatment: Progress hip strengthening and balance exercises next visit.                       Strength   ROM     Left Right             Flexion wnl   Hip Flex 4 4 Extension 25% limited *    Ext 3+ 3+ Rotation L wnl R wnl   Abd 3+ 3+ Sidebend L wnl R wnl    Knee Flex 4+ 4+         Ext 5 5         Ankle DF 5 5         PF 5 5         * = pain present      BALANCE Left Right   Tandem Stance (Eyes Open) 20 20   Single Leg (Eyes Open) 10 20   Single Leg (Eyes Closed 1  3   Other:             Assessment: [x] Progressing toward goals. Pt has completed 16/16 sessions so re-assessment warranted this date. Pt demonstrates great improvement in balance, pain intensity and frequency, as well as function. Pt demonstrates good improvement in LE strength but continues to demonstrate deficits in hip abduction and extension. Pt given new written HEP with emphasis on hip strength and lumbar mobility: bridges, clamshells, LTR, piriformis stretch, hip abduction SL, sitting hamstring stretch. Pt advised to continue HEP 2x/day as well as continue balance practice to maintain function. Pt will be discharged at this date. [] No change.      [] Other:     [] Patient would continue to benefit from skilled physical therapy services in order to: decrease pain to perform to ADLs, increase strength, improve balance to decrease risk of fall, education in HEP, maximize function        STG: (to be met in 6 treatments)  1. ? Pain: Decrease pain to 2/10 to improve standing tolerance to 20 minutes to perform ADLs  - MET standing for 20 minutes to perform exercises   2. ? ROM: Improve lumbar mobility to 25% limited or better   - MET B SB improved to 25% limitation   3. ? Strength: Improve B LE strength to 4-/5 or higher to return to PLOF   - MET  4. Pt will improve balance to 6 seconds with SLS eyes open to decrease risk of falls   - MET  5. Independent with Home Exercise Programs  - MET   6. Demonstrate Knowledge of fall prevention  - MET     LTG: (to be met in 16 treatments) - assessed 4/28 by Enedina Wilkins, PT  1. Pt will report improve pain to 2/10 or less with 1 hour of standing and walking to return to PLOF  - MET   2. Pt will demonstrate improved balance evident by SLS B with eyes open 10 seconds, eyes closed for 5 seconds  - PROGRESSED to 10 seconds or more with EO, 1-3 seconds with EC  3. Pt will demonstrate improved functional impairment to 30% or less via [place]   - PROGRESSED to 35%       Pt. Education:  [x] Yes  [] No  [x] Reviewed Prior HEP/Ed  Method of Education: [x] Verbal  [] Demo  [] Written  Comprehension of Education:  [x] Verbalizes understanding. [] Demonstrates understanding. [x] Needs review. [] Demonstrates/verbalizes HEP/Ed previously given. Plan: [x] Continue per plan of care.    [] Other:      Treatment Charges: Mins Units   []  Modalities     [x]  Ther Exercise 40 3   []  Manual Therapy     []  Ther Activities     []  Aquatics     []  Neuromuscular     [] Vasocompression     [] Gait Training     [] Dry needling        [] 1 or 2 muscles        [] 3 or more muscles     [x]  Other re-assessment 10 -   Total Treatment time 40 3     Time In: 5:15pm    Time Out: 6:05    Electronically signed by:  Pepito Hui, PT

## 2021-04-28 NOTE — DISCHARGE SUMMARY
compression  [id number]    [] Gait Training   [id number] [] Ultrasound   [id number]   [] Neuromuscular Re-education  [id number] [] Electrical Stimulation Unattended  75278   [x] Manual Therapy  58231 [] Electrical Stimulation Attended  [id number]   [x] Instruction in HEP  [] Lumbar/Cervical Traction  [id number]   [] Aquatic Therapy   [id number] [x] Cold/hotpack    [] Massage   37394      [] Dry Needling, 1 or 2 muscles  77017   [] Biofeedback, first 15 minutes   76629  [] Biofeedback, additional 15 minutes   05840 [] Dry Needling, 3 or more muscles  02876                If you have any questions or concerns regarding this patient's care, please contact us.    Thank you for your referral.      Electronically signed by: Ro Farmer, PT

## 2021-04-29 ENCOUNTER — HOSPITAL ENCOUNTER (OUTPATIENT)
Dept: GENERAL RADIOLOGY | Age: 61
Discharge: HOME | End: 2021-05-01
Payer: MEDICARE

## 2021-04-29 ENCOUNTER — HOSPITAL ENCOUNTER (OUTPATIENT)
Dept: PAIN MANAGEMENT | Age: 61
Discharge: HOME | End: 2021-04-29
Payer: MEDICARE

## 2021-04-29 VITALS
SYSTOLIC BLOOD PRESSURE: 136 MMHG | HEIGHT: 62 IN | DIASTOLIC BLOOD PRESSURE: 76 MMHG | WEIGHT: 175 LBS | BODY MASS INDEX: 32.2 KG/M2 | OXYGEN SATURATION: 99 % | RESPIRATION RATE: 18 BRPM | HEART RATE: 77 BPM | TEMPERATURE: 98.2 F

## 2021-04-29 DIAGNOSIS — M48.061 SPINAL STENOSIS OF LUMBAR REGION, UNSPECIFIED WHETHER NEUROGENIC CLAUDICATION PRESENT: ICD-10-CM

## 2021-04-29 DIAGNOSIS — M47.817 LUMBOSACRAL SPONDYLOSIS WITHOUT MYELOPATHY: ICD-10-CM

## 2021-04-29 DIAGNOSIS — M54.16 LUMBAR RADICULOPATHY: Primary | ICD-10-CM

## 2021-04-29 DIAGNOSIS — M47.816 ARTHROPATHY OF LUMBAR FACET JOINT: ICD-10-CM

## 2021-04-29 DIAGNOSIS — M51.36 DDD (DEGENERATIVE DISC DISEASE), LUMBAR: ICD-10-CM

## 2021-04-29 DIAGNOSIS — M51.36 LUMBAR DEGENERATIVE DISC DISEASE: ICD-10-CM

## 2021-04-29 PROCEDURE — 62323 NJX INTERLAMINAR LMBR/SAC: CPT | Performed by: PAIN MEDICINE

## 2021-04-29 PROCEDURE — 6360000004 HC RX CONTRAST MEDICATION: Performed by: PAIN MEDICINE

## 2021-04-29 PROCEDURE — 3209999900 FLUORO FOR SURGICAL PROCEDURES

## 2021-04-29 PROCEDURE — 6360000002 HC RX W HCPCS: Performed by: PAIN MEDICINE

## 2021-04-29 PROCEDURE — 62323 NJX INTERLAMINAR LMBR/SAC: CPT

## 2021-04-29 RX ORDER — TRIAMCINOLONE ACETONIDE 40 MG/ML
INJECTION, SUSPENSION INTRA-ARTICULAR; INTRAMUSCULAR
Status: COMPLETED | OUTPATIENT
Start: 2021-04-29 | End: 2021-04-29

## 2021-04-29 RX ORDER — FENTANYL CITRATE 50 UG/ML
INJECTION, SOLUTION INTRAMUSCULAR; INTRAVENOUS
Status: COMPLETED | OUTPATIENT
Start: 2021-04-29 | End: 2021-04-29

## 2021-04-29 RX ORDER — MIDAZOLAM HYDROCHLORIDE 1 MG/ML
INJECTION INTRAMUSCULAR; INTRAVENOUS
Status: COMPLETED | OUTPATIENT
Start: 2021-04-29 | End: 2021-04-29

## 2021-04-29 RX ADMIN — TRIAMCINOLONE ACETONIDE 80 MG: 40 INJECTION, SUSPENSION INTRA-ARTICULAR; INTRAMUSCULAR at 08:26

## 2021-04-29 RX ADMIN — IOHEXOL 2 ML: 180 INJECTION INTRAVENOUS at 08:25

## 2021-04-29 RX ADMIN — MIDAZOLAM 2 MG: 1 INJECTION INTRAMUSCULAR; INTRAVENOUS at 08:19

## 2021-04-29 RX ADMIN — Medication 50 MCG: at 08:24

## 2021-04-29 RX ADMIN — Medication 50 MCG: at 08:27

## 2021-04-29 ASSESSMENT — PAIN DESCRIPTION - ONSET: ONSET: ON-GOING

## 2021-04-29 ASSESSMENT — PAIN DESCRIPTION - FREQUENCY: FREQUENCY: INTERMITTENT

## 2021-04-29 ASSESSMENT — PAIN - FUNCTIONAL ASSESSMENT: PAIN_FUNCTIONAL_ASSESSMENT: PREVENTS OR INTERFERES SOME ACTIVE ACTIVITIES AND ADLS

## 2021-04-29 ASSESSMENT — PAIN DESCRIPTION - DESCRIPTORS: DESCRIPTORS: ACHING;DULL

## 2021-04-29 ASSESSMENT — PAIN SCALES - GENERAL
PAINLEVEL_OUTOF10: 4
PAINLEVEL_OUTOF10: 2
PAINLEVEL_OUTOF10: 2

## 2021-04-29 ASSESSMENT — PAIN DESCRIPTION - LOCATION: LOCATION: BACK

## 2021-04-29 ASSESSMENT — PAIN DESCRIPTION - PROGRESSION: CLINICAL_PROGRESSION: GRADUALLY WORSENING

## 2021-04-29 ASSESSMENT — PAIN DESCRIPTION - ORIENTATION: ORIENTATION: LEFT;RIGHT

## 2021-04-29 NOTE — PROGRESS NOTES
Patient complained of slight nausea after receiving Fentanyl. Patient is able to take fluids/ [employer]. Dr. Horton Lias in to see patient ok to discharge. Discharge criteria met. Post procedure dressing dry and intact. Sensory and motor function intact as per pre-procedure. Patient alert and oriented x3  Instructions and follow up reviewed with pt at patient at discharge. Discharged home transported by wheelchair, accompanied by family .   Discharged @ 2995

## 2021-04-29 NOTE — PROCEDURES
Pre-Procedure Note    Patient Name: Rayshawn Torrez   YOB: 1960  Room/Bed: Room/bed info not found  Medical Record Number: [id number]  Date: 2021       Indication:    1. Lumbar radiculopathy    2. Lumbar degenerative disc disease    3. DDD (degenerative disc disease), lumbar    4. Lumbosacral spondylosis without myelopathy    5. Spinal stenosis of lumbar region, unspecified whether neurogenic claudication present    6. Arthropathy of lumbar facet joint        Consent: On file. Vital Signs:   Vitals:    21 0827   BP: 135/84   Pulse: 77   Resp: 16   Temp:    SpO2: 99%       Past Medical History:   has a past medical history of ARIADNE (acute kidney injury) ([facility] [address] Anxiety, Asthma, CAD (coronary artery disease), Class 1 obesity due to excess calories with serious comorbidity and body mass index (BMI) of 32.0 to 32.9 in adult, Depression, Examination of participant in clinical trial, HTN (hypertension), Lumbar radiculopathy, Mixed hyperlipidemia, OA (osteoarthritis), and PVD (peripheral vascular disease) with claudication ([facility] [address] Past Surgical History:   has a past surgical history that includes  section; Dilation and curettage of uterus (N/A); Colonoscopy (N/A, 2019); and Coronary angioplasty (). Pre-Sedation Documentation and Exam:   Vital signs have been reviewed (see flow sheet for vitals). Mallampati Airway Assessment:  normal    ASA Classification:  Class 3 - A patient with severe systemic disease that limits activity but is not incapacitating    Sedation/ Anesthesia Plan:   intravenous sedation   as needed. Medications Planned:   midazolam (Versed) / Fentanyl  Intravenously  as needed. Patient is an appropriate candidate for plan of sedation: yes  Patient's History and Physical examination was reviewed and there is no change. Electronically signed by Melissa Melvin MD on 2021 at 8:37 AM        Preoperative Diagnosis:    1. Lumbar radiculopathy    2. RN under my direct supervision. Patient's vital signs including BP, EKG and SaO2 were monitored by the RN and they remained stable during the procedure. A meaningful communication was kept up with the patient throughout  the procedure. The patient is placed in prone position. Skin over the back was prepped and draped in sterile manner. Then using fluoroscopy the L4, L5 interspace was observed and the skin and deep tissues in the left paramedian area were infiltrated with 4 ml of 1% lidocaine. The #20-gauge, 3-1/2 inch Tuohy needle was inserted through the skin wheal and the epidural space was identified using loss of resistance technique with normal saline. This was confirmed with AP and lateral views using fluoroscopy after injecting about 2 ml of Omnipaque-180 and observing the spread of the contrast in the epidural space. Patient is complaining of severe pain while injecting the medication. hence the needle was removed and repositioned at a different location in the same interspace and again the tip of the needle was confirmed to be in the epidural space by injecting 1 cc of Omnipaque 180 through the needle and observing the spread of the contrast in the epidural space with AP and lateral views with fluoroscopy. Then after negative aspiration a total of 80 mg of triamcinolone with 8 ml of normal saline was injected into the epidural space. The needle is removed and a Band-Aid was placed over the needle insertion site. Patient's vital signs remained stable and the patient tolerated the procedure well. The patient was discharged home in stable condition and will be followed in the pain clinic in the next few weeks for further planning.     Electronically signed by Britney Moon MD on 4/29/2021 at 8:37 AM

## 2021-05-03 ENCOUNTER — TELEPHONE (OUTPATIENT)
Dept: PAIN MANAGEMENT | Age: 61
End: 2021-05-03

## 2021-05-03 DIAGNOSIS — M50.30 DEGENERATIVE DISC DISEASE, CERVICAL: ICD-10-CM

## 2021-05-03 DIAGNOSIS — M19.019 OSTEOARTHRITIS OF SHOULDER, UNSPECIFIED LATERALITY, UNSPECIFIED OSTEOARTHRITIS TYPE: ICD-10-CM

## 2021-05-03 DIAGNOSIS — I25.10 CORONARY ARTERY DISEASE INVOLVING NATIVE CORONARY ARTERY OF NATIVE HEART WITHOUT ANGINA PECTORIS: ICD-10-CM

## 2021-05-03 RX ORDER — NITROGLYCERIN 0.4 MG/1
TABLET SUBLINGUAL
Qty: 25 TABLET | Refills: 0 | Status: SHIPPED | OUTPATIENT
Start: 2021-05-03 | End: 2021-12-22

## 2021-05-03 RX ORDER — CYCLOBENZAPRINE HCL 10 MG
TABLET ORAL
Qty: 60 TABLET | Refills: 0 | Status: SHIPPED | OUTPATIENT
Start: 2021-05-03 | End: 2021-06-01

## 2021-05-03 RX ORDER — CALCIUM CARBONATE/VITAMIN D3 600 MG-10
TABLET ORAL
Qty: 90 TABLET | Refills: 0 | Status: SHIPPED | OUTPATIENT
Start: 2021-05-03 | End: 2021-07-26

## 2021-05-05 ENCOUNTER — OFFICE VISIT (OUTPATIENT)
Dept: NEUROSURGERY | Age: 61
End: 2021-05-05
Payer: MEDICARE

## 2021-05-05 VITALS
HEART RATE: 73 BPM | BODY MASS INDEX: 32.2 KG/M2 | HEIGHT: 62 IN | SYSTOLIC BLOOD PRESSURE: 154 MMHG | DIASTOLIC BLOOD PRESSURE: 84 MMHG | WEIGHT: 175 LBS

## 2021-05-05 DIAGNOSIS — M51.26 PROTRUDED LUMBAR DISC: ICD-10-CM

## 2021-05-05 DIAGNOSIS — M48.062 LUMBAR STENOSIS WITH NEUROGENIC CLAUDICATION: Primary | ICD-10-CM

## 2021-05-05 DIAGNOSIS — S33.6XXD SPRAIN OF SACROILIAC LIGAMENT, SUBSEQUENT ENCOUNTER: ICD-10-CM

## 2021-05-05 DIAGNOSIS — Q76.49 SPINAL DEFORMITY: ICD-10-CM

## 2021-05-05 DIAGNOSIS — M51.36 LUMBAR DEGENERATIVE DISC DISEASE: ICD-10-CM

## 2021-05-05 PROCEDURE — 1036F TOBACCO NON-USER: CPT | Performed by: NEUROLOGICAL SURGERY

## 2021-05-05 PROCEDURE — G8417 CALC BMI ABV UP PARAM F/U: HCPCS | Performed by: NEUROLOGICAL SURGERY

## 2021-05-05 PROCEDURE — 99213 OFFICE O/P EST LOW 20 MIN: CPT | Performed by: NEUROLOGICAL SURGERY

## 2021-05-05 PROCEDURE — G8427 DOCREV CUR MEDS BY ELIG CLIN: HCPCS | Performed by: NEUROLOGICAL SURGERY

## 2021-05-05 PROCEDURE — 3017F COLORECTAL CA SCREEN DOC REV: CPT | Performed by: NEUROLOGICAL SURGERY

## 2021-05-05 NOTE — PATIENT INSTRUCTIONS
Schedule a Vaccine  When you qualify to receive the vaccine, call the [facility]) COVID-19 Vaccination Hotline to schedule your appointment or to get additional information about the [facility]) locations which are offering the COVID-19 vaccine. To be 94% effective, it's important that you receive two doses of one of the COVID-19 vaccines. -If you are receiving the Lara Peter vaccine, your second shot will be scheduled as close to 21 days after the first shot as possible. -If you are receiving the Moderna vaccine, your second shot will be scheduled as close to 28 days after the first shot as possible. [facility]) COVID-19 Vaccination Hotline: [phone]    Links to [facility]) website and [facility] website:    [email or web address]    [email or web address]

## 2021-05-05 NOTE — PROGRESS NOTES
Dm  [address] [place] [address]  Dept: [phone]    Patient:  Cheyenne Collins  YOB: 1960  Date: 21    The patient is a 64 y.o. female who presents today for consult of the following problems:     Chief Complaint   Patient presents with    Follow-up     Lumbar stenosis with neurogenic claudication             HPI:     Cheyenne Collins is a 64 y.o. female on whom neurosurgical consultation was requested by Luiz Cabrales MD for management of lumbar spondylosis with radiculopathy. The patient states that since her injection 1 week ago she has had resolution of her lower extremity pain with only infrequent couple times per day 2 out of 10 discomfort that feels like cramping in the bilateral hamstring region. Denies any distal numbness saddle anesthesia bowel bladder incontinence. Overall axial back pain ranges from 0 all the way up to 4 out of 10 if she is having more strain full day or ambulating more so. Multimodal nonopioid control seems to be working well for her which includes Aspercreme, diclofenac, Mobic, Tylenol, Lyrica. Does use intermittent ice and heat as needed as well as daily stretches and exercises that she was taught in therapy.       History:     Past Medical History:   Diagnosis Date    ARIADNE (acute kidney injury) ([facility] [address] 2019    Anxiety     Asthma     CAD (coronary artery disease)     Class 1 obesity due to excess calories with serious comorbidity and body mass index (BMI) of 32.0 to 32.9 in adult 2020    Depression     Examination of participant in clinical trial 11    End date 2015    HTN (hypertension)     Lumbar radiculopathy     Mixed hyperlipidemia 1/15/2018    OA (osteoarthritis)     PVD (peripheral vascular disease) with claudication ([address] 2015     Past Surgical History:   Procedure Laterality Date     SECTION      COLONOSCOPY N/A 2019 MG extended release tablet TAKE 1 TABLET BY MOUTH EVERY DAY 90 tablet 2    simvastatin (ZOCOR) 40 MG tablet TAKE 1 TABLET BY MOUTH EVERY NIGHT 90 tablet 3    Heat Wraps (SOFTHEAT HEATING WRAP ULTRA) MISC Use daily for neck pain 1 each 0     No current facility-administered medications on file prior to visit. Social History     Tobacco Use    Smoking status: Former Smoker     Packs/day: 0.25     Years: 25.00     Pack years: 6.25     Types: Cigarettes     Quit date: 2009     Years since quittin.9    Smokeless tobacco: Never Used   Substance Use Topics    Alcohol use: No     Alcohol/week: 0.0 standard drinks    Drug use: No       Allergies   Allergen Reactions    Claritin [Loratadine]      2010 Heart palpitations  2010 Heart palpitations    Codeine Nausea Only       Review of Systems  Constitutional: Negative for activity change and appetite change. HENT: Negative for ear pain and facial swelling. Eyes: Negative for discharge and itching. Respiratory: Negative for choking and chest tightness. Cardiovascular: Negative for chest pain and leg swelling. Gastrointestinal: Negative for nausea and abdominal pain. Endocrine: Negative for cold intolerance and heat intolerance. Genitourinary: Negative for frequency and flank pain. Musculoskeletal: Negative for myalgias and joint swelling. Skin: Negative for rash and wound. Allergic/Immunologic: Negative for environmental allergies and food allergies. Hematological: Negative for adenopathy. Does not bruise/bleed easily. Psychiatric/Behavioral: Negative for self-injury. The patient is not nervous/anxious. Physical Exam:      BP (!) 154/84   Pulse 73   Ht 5' 2\" (1.575 m)   Wt 175 lb (79.4 kg)   LMP 2012   BMI 32.01 kg/m²   Estimated body mass index is 32.01 kg/m² as calculated from the following:    Height as of this encounter: 5' 2\" (1.575 m). Weight as of this encounter: 175 lb (79.4 kg).     General: Juli Reynoso is a 64y.o. year old female who appears her stated age. HEENT: Normocephalic atraumatic. Neck supple. Chest: regular rate; pulses equal  Abdomen: Soft nontender nondistended. Normoactive bowel sounds. Ext: DP and PT pulses 2+, good cap refill  Neuro    Mentation  Appropriate affect  Registration intact  Orientation intact  3 item recall intact  Judgement intact to situation    Cranial Nerves:   Pupils equal and reactive to light  Extraocular motion intact  Face and shrug symmetric  Tongue midline  No dysarthria  v1-3 sensation symmetric, masseter tone symmetric  Hearing symmetric and intact to finger rub    Sensation:   Intact    Motor  L deltoid 5/5; R deltoid 5/5  L biceps 5/5; R biceps 5/5  L triceps 5/5; R triceps 5/5  L wrist extension 5/5; R wrist extension 5/5  L intrinsics 5/5; R intrinsics 5/5     L iliopsoas 5/5 , R iliopsoas 5/5  L quadriceps 5/5; R quadriceps 5/5  L Dorsiflexion 5/5; R dorsiflexion 5/5  L Plantarflexion 5/5; R plantarflexion 5/5  L EHL 5/5; R EHL 5/5    Reflexes  L Brachioradialis 2+/4; R brachioradialis 2+/4  L Biceps 2+/4; R Biceps 2+/4  L Triceps 2+/4; R Triceps 2+/4  L Patellar 2+/4: R Patellar 2+/4  L Achilles 2+/4; R Achilles 2+/4    hoffmans L: neg  hoffmans R: neg  Clonus L: neg  Clonus R: neg  Babinski L: up  Babinski R; up    Studies Review:     Thoracic scoliosis films show a mild positive sagittal balance along with a mild levoscoliosis. Lumbar flexion-extension x-rays show evident listhesis grade 1 at L3-4 but it is not dynamic on imaging. Assessment and Plan:      1. Lumbar stenosis with neurogenic claudication    2. Spinal deformity          Plan: Patient with significant improvement in her overall axial as well as lumbar and radiculopathic symptoms.   Considering that she is had multimodal conservative measures including physical therapy, home stretching and exercise regimen, injections and oral steroids I believe that we can maintain the course with these conservative measures and less that she is refractory and fails in the future. She has a combination of spinal deformity with positive sagittal balance as well as significant central stenosis with radiculopathic pathology. Conservative measures working thus far. Defer surgical consideration unless relapses and refractory to these measures. Followup: No follow-ups on file. Prescriptions Ordered:  No orders of the defined types were placed in this encounter. Orders Placed:  No orders of the defined types were placed in this encounter. Electronically signed by Antonette Warren DO on 5/5/2021 at 3:03 PM    Please note that this chart was generated using voice recognition Dragon dictation software. Although every effort was made to ensure the accuracy of this automated transcription, some errors in transcription may have occurred.

## 2021-05-05 NOTE — TELEPHONE ENCOUNTER
Please Approve or Refuse.   Send to Pharmacy per Pt's Request:      Next Visit Date:  5/10/2021   Last Visit Date: 2/8/2021    Hemoglobin A1C (%)   Date Value   07/01/2020 5.7   10/04/2016 5.3   08/24/2012 5.2             ( goal A1C is < 7)   BP Readings from Last 3 Encounters:   04/29/21 136/76   03/09/21 133/80   02/10/21 (!) 153/78          (goal 120/80)  BUN   Date Value Ref Range Status   07/01/2020 15 8 - 23 mg/dL Final     CREATININE   Date Value Ref Range Status   07/01/2020 0.85 0.50 - 0.90 mg/dL Final     Potassium   Date Value Ref Range Status   07/01/2020 5.2 3.7 - 5.3 mmol/L Final

## 2021-05-10 ENCOUNTER — OFFICE VISIT (OUTPATIENT)
Dept: FAMILY MEDICINE CLINIC | Age: 61
End: 2021-05-10
Payer: MEDICARE

## 2021-05-10 VITALS
WEIGHT: 182.8 LBS | SYSTOLIC BLOOD PRESSURE: 132 MMHG | TEMPERATURE: 97.9 F | OXYGEN SATURATION: 96 % | HEIGHT: 62 IN | HEART RATE: 78 BPM | BODY MASS INDEX: 33.64 KG/M2 | DIASTOLIC BLOOD PRESSURE: 88 MMHG

## 2021-05-10 DIAGNOSIS — E66.09 CLASS 1 OBESITY DUE TO EXCESS CALORIES WITH SERIOUS COMORBIDITY AND BODY MASS INDEX (BMI) OF 32.0 TO 32.9 IN ADULT: ICD-10-CM

## 2021-05-10 DIAGNOSIS — M48.061 SPINAL STENOSIS OF LUMBAR REGION WITHOUT NEUROGENIC CLAUDICATION: ICD-10-CM

## 2021-05-10 DIAGNOSIS — M54.16 LUMBAR RADICULOPATHY: ICD-10-CM

## 2021-05-10 DIAGNOSIS — E78.2 MIXED HYPERLIPIDEMIA: ICD-10-CM

## 2021-05-10 DIAGNOSIS — J45.20 MILD INTERMITTENT ASTHMA WITHOUT COMPLICATION: ICD-10-CM

## 2021-05-10 DIAGNOSIS — I10 ESSENTIAL HYPERTENSION: Primary | ICD-10-CM

## 2021-05-10 PROCEDURE — G8427 DOCREV CUR MEDS BY ELIG CLIN: HCPCS | Performed by: FAMILY MEDICINE

## 2021-05-10 PROCEDURE — 3017F COLORECTAL CA SCREEN DOC REV: CPT | Performed by: FAMILY MEDICINE

## 2021-05-10 PROCEDURE — 99214 OFFICE O/P EST MOD 30 MIN: CPT | Performed by: FAMILY MEDICINE

## 2021-05-10 PROCEDURE — G8417 CALC BMI ABV UP PARAM F/U: HCPCS | Performed by: FAMILY MEDICINE

## 2021-05-10 PROCEDURE — 1036F TOBACCO NON-USER: CPT | Performed by: FAMILY MEDICINE

## 2021-05-10 RX ORDER — PREGABALIN 150 MG/1
150 CAPSULE ORAL 3 TIMES DAILY
Qty: 90 CAPSULE | Refills: 1 | Status: SHIPPED | OUTPATIENT
Start: 2021-05-10 | End: 2021-06-26

## 2021-05-10 ASSESSMENT — ENCOUNTER SYMPTOMS
SHORTNESS OF BREATH: 0
BACK PAIN: 1
BLOOD IN STOOL: 0
FACIAL SWELLING: 0
CHEST TIGHTNESS: 0
ANAL BLEEDING: 0
WHEEZING: 0
ABDOMINAL DISTENTION: 0
CONSTIPATION: 0
PHOTOPHOBIA: 0
VOMITING: 0
COUGH: 0
DIARRHEA: 0

## 2021-05-10 NOTE — LETTER
CONTROLLED SUBSTANCE MEDICATION AGREEMENT     Patient Name: Ariana Mcfarland  Patient YOB: 1960   I understand, that controlled substance medications may be used to help better manage my symptoms and to improve my ability to function at home, work and in social settings. However, I also understand that these medications do have risks, which have been discussed with me, including possible development of physical or psychological dependence. I understand that successful treatment requires mutual trust and honesty between me and my provider. I understand and agree that following this Medication Agreement is necessary in continuing my provider-patient relationship and the success of my treatment plan. Explanation from my Provider: Benefits and Goals of Controlled Substance Medications: There are two potential goals for your treatment: (1) decreased pain and suffering (2) improved daily life functions. There are many possible treatments for your chronic condition(s). Alternatives such as physical therapy, yoga, massage, home daily exercise, meditation, relaxation techniques, injections, chiropractic manipulations, surgery, cognitive therapy, hypnosis and many medications that are not habit-forming may be used. Use of controlled substance medications may be helpful, but they are unlikely to resolve all symptoms or restore all function. Explanation from my Provider: Risks of Controlled Substance Medications:  Opioid pain medications: These medications can lead to problems such as addiction/dependence, sedation, lightheadedness/dizziness, memory issues, falls, constipation, nausea, or vomiting. They may also impair the ability to drive or operate machinery. Additionally, these medications may lower testosterone levels, leading to loss of bone strength, stamina and sex drive.   They may cause problems with breathing, sleep apnea and reduced coughing, which is especially dangerous for patients with lung disease. Overdose or dangerous interactions with alcohol and other medications may occur, leading to death. Hyperalgesia may develop, which means patients receiving opioids for the treatment of pain may become more sensitive to certain painful stimuli, and in some cases, experience pain from ordinarily non-painful stimuli. Women between the ages of 14-53 who could become pregnant should carefully weigh the risks and benefits of opioids with their physicians, as these medications increase the risk of pregnancy complications, including miscarriage,  delivery and stillbirth. It is also possible for babies to be born addicted to opioids. Opioid dependence withdrawal symptoms may include; feelings of uneasiness, increased pain, irritability, belly pain, diarrhea, sweats and goose-flesh. Benzodiazepines and non-benzodiazepine sleep medications: These medications can lead to problems such as addiction/dependence, sedation, fatigue, lightheadedness, dizziness, incoordination, falls, depression, hallucinations, and impaired judgment, memory and concentration. The ability to drive and operate machinery may also be affected. Abnormal sleep-related behaviors have been reported, including sleepwalking, driving, making telephone calls, eating, or having sex while not fully awake. These medications can suppress breathing and worsen sleep apnea, particularly when combined with alcohol or other sedating medications, potentially leading to death. Dependence withdrawal symptoms may include tremors, anxiety, hallucinations and seizures. Stimulants:  Common adverse effects include addiction/dependence, increased blood  pressure and heart rate, decreased appetite, nausea, involuntary weight loss, insomnia,                                                                                                                     Initials:_______   irritability, and headaches.   These risks may increase when these medications are combined with other stimulants, such as caffeine pills or energy drinks, certain weight loss supplements and oral decongestants. Dependence withdrawal symptoms may include depressed mood, loss of interest, suicidal thoughts, anxiety, fatigue, appetite changes and agitation. Testosterone replacement therapy:  Potential side effects include increased risk of stroke and heart attack, blood clots, increased blood pressure, increased cholesterol, enlarged prostate, sleep apnea, irritability/aggression and other mood disorders, and decreased fertility. I agree and understand that I and my prescriber have the following rights and responsibilities regarding my treatment plan:     1. MY RIGHTS:  To be informed of my treatment and medication plan. To be an active participant in my health and wellbeing. 2. MY RESPONSIBILITY AND UNDERSTANDING FOR USE OF MEDICATIONS   I will take medications at the dose and frequency as directed. For my safety, I will not increase or change how I take my medications without the recommendation of my healthcare provider.  I will actively participate in any program recommended by my provider which may improve function, including social, physical, psychological programs.  I will not take my medications with alcohol or other drugs not prescribed to me. I understand that drinking alcohol with my medications increases the chances of side effects, including reduced breathing rate and could lead to personal injury when operating machinery.  I understand that if I have a history of substance use disorders, including alcohol or other illicit drugs, that I may be at increased risk of addiction to my medications.  I agree to notify my provider immediately if I should become pregnant so that my treatment plan can be adjusted.    I agree and understand that I shall only receive controlled substance medications from the prescriber that signed this agreement unless there is [email or web address]    5. MY RESPONSIBILITY WITH ILLEGAL DRUGS    I will not use illegal or street drugs or another person's prescription medications not prescribed to me.  If there are identified addiction type symptoms, then referral to a program may be provided by my provider and I agree to follow through with this recommendation. 6. MY RESPONSIBILITY FOR COOPERATION WITH INVESTIGATIONS   I understand that my provider will comply with any applicable law and may discuss my use and/or possible misuse/abuse of controlled substances and alcohol, as appropriate, with any health care provider involved in my care, pharmacist, or legal authority.  I authorize my provider and pharmacy to cooperate fully with law enforcement agencies (as permitted by law) in the investigation of any possible misuse, sale, or other diversion of my controlled substances.  I agree to waive any applicable privilege or right of privacy or confidentiality with respect to these authorizations. 7. PROVIDERS RIGHT TO MONITOR FOR SAFETY: PRESCRIPTION MONITORING / DRUG TESTING   I consent to drug/toxicology screening and will submit to a drug screen upon my providers request to assure I am only taking the prescribed drugs for my safety monitoring. I understand that a drug screen is a laboratory test in which a sample of my urine, blood or saliva is checked to see what drugs I have been taking. This may entail an observed urine specimen, which means that a nurse or other health care provider may watch me provide urine, and I will cooperate if I am asked to provide an observed specimen.  I understand that my provider will check a copy of my State Prescription Monitoring Program () Report in order to safely prescribe medications.  Pill Counts: I consent to pill counts when requested.   I may be asked to bring all my prescribed controlled substance medications, in their original bottles, to all of my scheduled appointments. In addition, my provider may ask me to come to the practice at any time for a random pill count. 8. TERMINATION OF THIS AGREEMENT  For my safety, my prescriber has the right to stop prescribing controlled substance medications and may end this agreement. Initials:_______   Conditions that may result in termination of this agreement:  a. I do not show any improvement in pain, or my activity has not improved. b. I develop rapid tolerance or loss of improvement, as described in my treatment plan.  c. I develop significant side effects from the medication. d. My behavior is not consistent with the responsibilities outlined above, thereby causing safety concerns to continue prescribing controlled substance medications. e. I fail to follow the terms of this agreement. f. Other:____________________________       UNDERSTANDING THIS MEDICATION AGREEMENT:    I have read the above and have had all my questions answered. For chronic disease management, I know that my symptoms can be managed with many types of treatments. A chronic medication trial may be part of my treatment, but I must be an active participant in my care. Medication therapy is only one part of my symptom management plan. In some cases, there may be limited scientific evidence to support the chronic use of certain medications to improve symptoms and daily function. Furthermore, in certain circumstances, there may be scientific information that suggests that the use of chronic controlled substances may worsen my symptoms and increase my risk of unintentional death directly related to this medication therapy. I know that if my provider feels my risk from controlled medications is greater than my benefit, I will have my controlled substance medication(s) compassionately lowered or removed altogether.      I further agree to allow this office to contact my HIPAA contact if there are concerns about my safety and use of the controlled medications. I have agreed to use the prescribed controlled substance medications to me as instructed by my provider and as stated in this Medication Agreement. My initial on each page and my signature below shows that I have read each page and I have had the opportunity to ask questions with answers provided by my provider.     Patient Name (Printed): _____________________________________  Patient Signature:  ______________________   Date: _____________    Prescriber Name (Printed): ___________________________________  Prescriber Signature: _____________________  Date: _____________

## 2021-05-10 NOTE — PROGRESS NOTES
Visit Information    Have you changed or started any medications since your last visit including any over-the-counter medicines, vitamins, or herbal medicines? no   Are you having any side effects from any of your medications? -  no  Have you stopped taking any of your medications? Is so, why? -  no    Have you seen any other physician or provider since your last visit? Yes - Records Obtained  Have you had any other diagnostic tests since your last visit? Yes - Records Obtained  Have you been seen in the emergency room and/or had an admission to a hospital since we last saw you? No  Have you had your routine dental cleaning in the past 6 months? yes -     Have you activated your [employer] account? If not, what are your barriers?  Yes     Patient Care Team:  Dafne Terrell MD as PCP - General (Family Medicine)  Dafne Terrell MD as PCP - [facility]    Medical History Review  Past Medical, Family, and Social History reviewed and does contribute to the patient presenting condition    Health Maintenance   Topic Date Due    A1C test (Diabetic or Prediabetic)  07/01/2021    Lipid screen  07/01/2021    Potassium monitoring  07/01/2021    Creatinine monitoring  07/01/2021    Breast cancer screen  10/08/2022    Cervical cancer screen  04/16/2023    Colon cancer screen colonoscopy  05/06/2024    DTaP/Tdap/Td vaccine (2 - Td) 04/16/2028    Flu vaccine  Completed    Shingles Vaccine  Completed    Pneumococcal 0-64 years Vaccine  Completed    COVID-19 Vaccine  Completed    Hepatitis C screen  Completed    HIV screen  Completed    Hepatitis A vaccine  Aged Out    Hepatitis B vaccine  Aged Out    Hib vaccine  Aged Out    Meningococcal (ACWY) vaccine  Aged Out

## 2021-05-10 NOTE — PROGRESS NOTES
Chief Complaint   Patient presents with    Hypertension         Ariana Click  here today for follow up on chronic medical problems, go over labs and/or diagnostic studies, and medication refills. Hypertension      HPI: Patient is here for follow-up on hypertension and and spinal stenosis. Hypertension controlled, patient denies any chest pain shortness of breath dyspnea on exertion. Patient has spinal stenosis follows with pain management, reports she had another epidural injection and her pain has decreased. Patient Lyrica was also increased to 3 times daily by neurologist.    Patient was referred to neurosurgeon for possible surgery. Patient reports he wants to wait onto pain gets that same year which she is not able to tolerate. Asthma stable denies any recent exacerbations. Obesity, patient has gained weight recently is on metoprolol and also on Lyrica which was recently increased. Patient reports her pain has improved and she will start daily walks. /88   Pulse 78   Temp 97.9 °F (36.6 °C) (Temporal)   Ht 5' 2\" (1.575 m)   Wt 182 lb 12.8 oz (82.9 kg)   LMP 11/07/2012   SpO2 96%   BMI 33.43 kg/m²    Body mass index is 33.43 kg/m². Wt Readings from Last 3 Encounters:   05/10/21 182 lb 12.8 oz (82.9 kg)   05/05/21 175 lb (79.4 kg)   04/29/21 175 lb (79.4 kg)        [x]Negative depression screening. PHQ Scores 2/8/2021 3/25/2019 9/25/2018 4/16/2018   PHQ2 Score 0 0 0 0   PHQ9 Score 0 0 0 0      []1-4 = Minimal depression   []5-9 = Milddepression   []10-14 = Moderate depression   []15-19 = Moderately severe depression   []20-27 = Severe depression    Discussed testing with the patient and all questions fully answered. Hospital Outpatient Visit on 07/17/2020   Component Date Value Ref Range Status    SARS-CoV-2 07/17/2020 Not Detected  Not Detected Final    Comment:       The specimen is NEGATIVE for SARS-CoV-2, the novel coronavirus associated with COVID-19.   A negative result does not rule out COVID-19. This test has been authorized by the FDA under an Emergency Use Authorization (EUA) for use   by authorized laboratories. Fact sheet for Healthcare Providers: kstattoo.com  Fact sheet for Patients: kstattoo.com        METHODOLOGY: RT-PCR      SARS-CoV-2, Rapid 07/17/2020        Final    Source 07/17/2020 . NASOPHARYNGEAL SWAB   Final    SARS-CoV-2, PCR 07/17/2020        Final         Most recent labs reviewed:     Lab Results   Component Value Date    WBC 6.8 07/01/2020    HGB 15.4 07/01/2020    HCT 46.0 07/01/2020    MCV 96.8 07/01/2020     07/01/2020       @BRIEFLAB(NA,K,CL,CO2,BUN,CREATININE,GLUCOSE,CALCIUM)@     Lab Results   Component Value Date    ALT 13 07/01/2020    AST 19 07/01/2020    ALKPHOS 75 07/01/2020    BILITOT 0.38 07/01/2020       Lab Results   Component Value Date    TSH 3.25 07/01/2020       Lab Results   Component Value Date    CHOL 116 03/26/2019    CHOL 137 10/26/2017    CHOL 142 10/04/2016     Lab Results   Component Value Date    TRIG 91 03/26/2019    TRIG 108 10/26/2017    TRIG 111 10/04/2016     Lab Results   Component Value Date    HDL 59 07/01/2020    HDL 51 03/26/2019    HDL 64 10/26/2017     Lab Results   Component Value Date    LDLCHOLESTEROL 67 07/01/2020    LDLCHOLESTEROL 47 03/26/2019    LDLCHOLESTEROL 51 10/26/2017     Lab Results   Component Value Date    VLDL NOT REPORTED 07/01/2020    VLDL NOT REPORTED 03/26/2019    VLDL NOT REPORTED 10/26/2017     Lab Results   Component Value Date    CHOLHDLRATIO 2.4 07/01/2020    CHOLHDLRATIO 2.3 03/26/2019    CHOLHDLRATIO 2.1 10/26/2017       Lab Results   Component Value Date    LABA1C 5.7 07/01/2020       No results found for: VEFKUVHI74    No results found for: FOLATE    No results found for: IRON, TIBC, FERRITIN    Lab Results   Component Value Date    VITD25 59.1 10/04/2016             Current Outpatient Medications   Medication Sig Dispense Refill    pregabalin (LYRICA) 150 MG capsule Take 1 capsule by mouth 3 times daily for 60 days. 90 capsule 1    diclofenac sodium (VOLTAREN) 1 % GEL APPLY 2 GRAMS EXTERNALLY TO THE AFFECTED AREA FOUR TIMES DAILY 200 g 1    Calcium Carb-Cholecalciferol (CALCIUM-VITAMIN D) 600-400 MG-UNIT TABS TAKE 1 TABLET BY MOUTH DAILY 90 tablet 0    cyclobenzaprine (FLEXERIL) 10 MG tablet TAKE 1 TABLET BY MOUTH TWICE DAILY AS NEEDED FOR MUSCLE SPASMS 60 tablet 0    nitroGLYCERIN (NITROSTAT) 0.4 MG SL tablet DISSOLVE 1 TABLET UNDER THE TONGUE EVERY 5 MINUTES AS NEEDED FOR CHEST PAIN.  IF NO RELIEF AFTER 1 DOSE, CALL 911 25 tablet 0    albuterol sulfate  (90 Base) MCG/ACT inhaler INHALE 2 PUFFS INTO THE LUNGS EVERY 6 HOURS AS NEEDED FOR WHEEZING 36 g 3    enalapril (VASOTEC) 10 MG tablet TAKE 2 TABLETS BY MOUTH EVERY MORNING AND 1 TABLET BY MOUTH EVERY EVENING 90 tablet 2    FLUoxetine (PROZAC) 20 MG capsule TAKE 3 CAPSULES BY MOUTH EVERY DAY 90 capsule 3    ACETAMINOPHEN EXTRA STRENGTH 500 MG tablet TAKE 1 TABLET BY MOUTH EVERY 6 HOURS AS NEEDED FOR PAIN 120 tablet 3    cetirizine (ZYRTEC) 10 MG tablet Take 1 tablet by mouth daily 90 tablet 2    aspirin (ASPIRIN LOW DOSE) 81 MG EC tablet TAKE 1 TABLET BY MOUTH DAILY 90 tablet 2    metoprolol succinate (TOPROL XL) 50 MG extended release tablet Take one tablet by mouth daily 90 tablet 2    meloxicam (MOBIC) 15 MG tablet TAKE 1 TABLET BY MOUTH DAILY 90 tablet 2    busPIRone (BUSPAR) 15 MG tablet TAKE 1 TABLET BY MOUTH EVERY 12 HOURS AS NEEDED 120 tablet 3    isosorbide mononitrate (IMDUR) 30 MG extended release tablet TAKE 1 TABLET BY MOUTH EVERY DAY 90 tablet 2    simvastatin (ZOCOR) 40 MG tablet TAKE 1 TABLET BY MOUTH EVERY NIGHT 90 tablet 3    lidocaine (ASPERCREME LIDOCAINE) 4 % external patch Apply 1 patch topically daily APPLY 1 PATCH EXTERNALLY TO THE SKIN DAILY (Patient not taking: Reported on 5/10/2021) 30 patch 0 No current facility-administered medications for this visit. Social History     Socioeconomic History    Marital status:      Spouse name: Not on file    Number of children: Not on file    Years of education: Not on file    Highest education level: Not on file   Occupational History    Not on file   Social Needs    Financial resource strain: Not hard at all    Food insecurity     Worry: Never true     Inability: Never true   [employer] needs     Medical: No     Non-medical: No   Tobacco Use    Smoking status: Former Smoker     Packs/day: 0.25     Years: 25.00     Pack years: 6.25     Types: Cigarettes     Quit date: 2009     Years since quittin.9    Smokeless tobacco: Never Used   Substance and Sexual Activity    Alcohol use: No     Alcohol/week: 0.0 standard drinks    Drug use: No    Sexual activity: Yes   Lifestyle    Physical activity     Days per week: Not on file     Minutes per session: Not on file    Stress: Not on file   Relationships    Social connections     Talks on phone: Not on file     Gets together: Not on file     Attends [employer] service: Not on file     Active member of club or organization: Not on file     Attends meetings of clubs or organizations: Not on file     Relationship status: Not on file    Intimate partner violence     Fear of current or ex partner: Not on file     Emotionally abused: Not on file     Physically abused: Not on file     Forced sexual activity: Not on file   Other Topics Concern    Not on file   Social History Narrative    Not on file     Counseling given: Yes        Family History   Problem Relation Age of Onset    Emphysema Mother              -rest of complaints with corresponding details per ROS    The patient's past medical, surgical, social, and family history as well as her current medications and allergies were reviewed as documented intoday's encounter.         Review of Systems   Constitutional: Negative for activity change, appetite change, chills, diaphoresis, fever and unexpected weight change. HENT: Negative for congestion, ear pain, facial swelling, nosebleeds and postnasal drip. Eyes: Negative for photophobia and visual disturbance. Respiratory: Negative for cough, chest tightness, shortness of breath and wheezing. Cardiovascular: Negative for chest pain, palpitations and leg swelling. Gastrointestinal: Negative for abdominal distention, anal bleeding, blood in stool, constipation, diarrhea and vomiting. Genitourinary: Negative for difficulty urinating. Musculoskeletal: Positive for arthralgias, back pain, gait problem and joint swelling. Negative for myalgias and neck pain. Neurological: Positive for numbness. Negative for dizziness, tremors, speech difficulty, light-headedness and headaches. Psychiatric/Behavioral: Positive for decreased concentration. Negative for agitation, behavioral problems, dysphoric mood, hallucinations and sleep disturbance. The patient is nervous/anxious. The patient is not hyperactive. Physical Exam  Vitals signs and nursing note reviewed. Constitutional:       Appearance: Normal appearance. She is obese. HENT:      Head: Normocephalic and atraumatic. Nose: Nose normal. No congestion or rhinorrhea. Eyes:      General:         Right eye: No discharge. Left eye: No discharge. Extraocular Movements: Extraocular movements intact. Pupils: Pupils are equal, round, and reactive to light. Neck:      Musculoskeletal: Normal range of motion. No neck rigidity or muscular tenderness. Cardiovascular:      Rate and Rhythm: Normal rate and regular rhythm. Heart sounds: Normal heart sounds. No murmur. Pulmonary:      Effort: Pulmonary effort is normal. No respiratory distress. Breath sounds: Normal breath sounds. No stridor. No wheezing or rhonchi. Abdominal:      General: Bowel sounds are normal. There is no distension. Palpations: Abdomen is soft. There is no mass. Tenderness: There is no abdominal tenderness. There is no guarding. Hernia: No hernia is present. Musculoskeletal:      Lumbar back: She exhibits decreased range of motion, deformity, pain and spasm. She exhibits no tenderness, no bony tenderness, no swelling and no edema. Neurological:      Mental Status: She is alert and oriented to person, place, and time. Cranial Nerves: No cranial nerve deficit or dysarthria. Sensory: Sensory deficit present. Motor: Weakness and abnormal muscle tone present. No tremor, atrophy or seizure activity. Coordination: Coordination is intact. Gait: Gait is intact. ASSESSMENT AND PLAN      1. Essential hypertension  Controlled continue same medications. Monitor blood pressure at home  - Comprehensive Metabolic Panel; Future  - CBC Auto Differential; Future    2. Spinal stenosis of lumbar region without neurogenic claudication  Stable, medication contract signed. Increase Lyrica 3 times daily follow-up with pain management  - pregabalin (LYRICA) 150 MG capsule; Take 1 capsule by mouth 3 times daily for 60 days. Dispense: 90 capsule; Refill: 1    3. Lumbar radiculopathy  Pain is under control continue NSAIDs muscle relaxant and follow-up with pain management  - pregabalin (LYRICA) 150 MG capsule; Take 1 capsule by mouth 3 times daily for 60 days. Dispense: 90 capsule; Refill: 1    4. Mild intermittent asthma without complication  Stable continue same medications    5. Mixed hyperlipidemia  Continue statins repeat lipid panel  - Lipid Panel; Future    6. Class 1 obesity due to excess calories with serious comorbidity and body mass index (BMI) of 32.0 to 32.9 in adult  Counseling education done discussed with patient to monitor and be physically more active  - Hemoglobin A1C; Future  - Vitamin D 25 Hydroxy; Future  - TSH without Reflex;  Future      Orders Placed This Encounter   Procedures  Comprehensive Metabolic Panel     Fasting 8 hrs     Standing Status:   Future     Standing Expiration Date:   5/10/2022    CBC Auto Differential     Standing Status:   Future     Standing Expiration Date:   5/11/2022    Hemoglobin A1C     Standing Status:   Future     Standing Expiration Date:   5/10/2022    Lipid Panel     Standing Status:   Future     Standing Expiration Date:   5/10/2022     Order Specific Question:   Is Patient Fasting?/# of Hours     Answer:   yes, 8-10 hours    Vitamin D 25 Hydroxy     Standing Status:   Future     Standing Expiration Date:   5/10/2022    TSH without Reflex     Standing Status:   Future     Standing Expiration Date:   5/10/2022         Medications Discontinued During This Encounter   Medication Reason    Heat Wraps (SOFTHEAT HEATING WRAP ULTRA) MISC     pregabalin (LYRICA) 150 MG capsule REORDER       Christina received counseling on the following healthy behaviors: nutrition, exercise and medication adherence  Reviewed prior labs and health maintenance  Continue current medications, diet and exercise. Discussed use, benefit, and side effects of prescribed medications. Barriers to medication compliance addressed. Patient given educational materials - see patient instructions  Was a self-tracking handout given in paper form or via [employer]? Yes    Requested Prescriptions     Signed Prescriptions Disp Refills    pregabalin (LYRICA) 150 MG capsule 90 capsule 1     Sig: Take 1 capsule by mouth 3 times daily for 60 days. All patient questions answered. Patient voiced understanding. Quality Measures    Body mass index is 33.43 kg/m². Elevated. Weight control planned discussed daily exercise regimen and Healthy diet and regular exercise. BP: 132/88 Blood pressure is normal. Treatment plan consists of Dietary Sodium Restriction, Increased Physical Activity and No treatment change needed.     Lab Results   Component Value Date    LDLCHOLESTEROL 67 07/01/2020 (goal LDL reduction with dx if diabetes is 50% LDL reduction)      PHQ Scores 2/8/2021 3/25/2019 9/25/2018 4/16/2018   PHQ2 Score 0 0 0 0   PHQ9 Score 0 0 0 0     Interpretation of Total Score Depression Severity: 1-4 = Minimal depression, 5-9 = Mild depression, 10-14 = Moderate depression, 15-19 = Moderately severe depression, 20-27 = Severe depression    The patient'spast medical, surgical, social, and family history as well as her   current medications and allergies were reviewed as documented in today's encounter. Medications, labs, diagnostic studies, consultations andfollow-up as documented in this encounter. Return in about 4 months (around 9/10/2021) for lab work. Patient wasseen with total face to face time of 30 minutes. More than 50% of this visit was counseling and education. Future Appointments   Date Time Provider Matt Diez   5/11/2021  3:00 PM ADELA Thorpe - CNP [facility] [id number]   9/7/2021  3:30 PM Brenna Johnson MD Neuro [facility]   9/13/2021  2:45 PM Vida Valverde MD fp sc [facility]     This note was completed by using the assistance of a speech-recognition program. However, inadvertent computerized transcription errors may be present. Althoughevery effort was made to ensure accuracy, no guarantees can be provided that every mistake has been identified and corrected by editing.   Electronically signed by Vida Valverde MD on 5/10/2021  1:42 PM

## 2021-05-11 ENCOUNTER — HOSPITAL ENCOUNTER (OUTPATIENT)
Dept: PAIN MANAGEMENT | Age: 61
Discharge: HOME | End: 2021-05-11
Payer: MEDICARE

## 2021-05-11 DIAGNOSIS — M54.16 LUMBAR RADICULOPATHY: ICD-10-CM

## 2021-05-11 DIAGNOSIS — M51.36 LUMBAR DEGENERATIVE DISC DISEASE: Primary | ICD-10-CM

## 2021-05-11 PROCEDURE — 99212 OFFICE O/P EST SF 10 MIN: CPT | Performed by: NURSE PRACTITIONER

## 2021-05-11 PROCEDURE — 99213 OFFICE O/P EST LOW 20 MIN: CPT

## 2021-05-11 ASSESSMENT — ENCOUNTER SYMPTOMS
BACK PAIN: 0
COUGH: 0
SHORTNESS OF BREATH: 0
CONSTIPATION: 0

## 2021-05-11 NOTE — PROGRESS NOTES
Patient completed a video visit today for follow up after procedure    Chief Complaint: back pain    [facility] Patient complains of low back pain. MRI with severe L4-5 and moderate L3-4 spinal canal stenosis. She has seen neurosurgeon who has recommended continuing conservative therapies unless she relapses or is refractory to these measures. She had Lumbar Epidural Steroid injection  and reports 95% relief. Back Pain  This is a chronic problem. The current episode started more than 1 year ago. The problem occurs rarely. The problem has been gradually improving since onset. The pain is present in the lumbar spine. The pain is at a severity of 0/10. The patient is experiencing no pain. The symptoms are aggravated by position and standing. Pertinent negatives include no chest pain or fever. Treatments tried: LESI. The treatment provided significant relief. Patient denies any new neurological symptoms. No bowel or bladder incontinence, no weakness, and no falling.       Past Medical History:   Diagnosis Date    ARIADNE (acute kidney injury) ([facility] [address] 2019    Anxiety     Asthma     CAD (coronary artery disease)     Class 1 obesity due to excess calories with serious comorbidity and body mass index (BMI) of 32.0 to 32.9 in adult 2020    Depression     Examination of participant in clinical trial 11    End date 2015    HTN (hypertension)     Lumbar radiculopathy     Mixed hyperlipidemia 1/15/2018    OA (osteoarthritis)     PVD (peripheral vascular disease) with claudication ([address] 2015       Past Surgical History:   Procedure Laterality Date     SECTION      COLONOSCOPY N/A 2019    COLORECTAL CANCER SCREENING, NOT HIGH RISK performed by Garrick Moss MD at [address]      2 stents    DILATION AND CURETTAGE OF UTERUS N/A     uterine polyp       Allergies   Allergen Reactions    Claritin [Loratadine]      2010 Heart palpitations  2010 Heart palpitations    Codeine Nausea Only         Current Outpatient Medications:     pregabalin (LYRICA) 150 MG capsule, Take 1 capsule by mouth 3 times daily for 60 days. , Disp: 90 capsule, Rfl: 1    diclofenac sodium (VOLTAREN) 1 % GEL, APPLY 2 GRAMS EXTERNALLY TO THE AFFECTED AREA FOUR TIMES DAILY, Disp: 200 g, Rfl: 1    Calcium Carb-Cholecalciferol (CALCIUM-VITAMIN D) 600-400 MG-UNIT TABS, TAKE 1 TABLET BY MOUTH DAILY, Disp: 90 tablet, Rfl: 0    cyclobenzaprine (FLEXERIL) 10 MG tablet, TAKE 1 TABLET BY MOUTH TWICE DAILY AS NEEDED FOR MUSCLE SPASMS, Disp: 60 tablet, Rfl: 0    nitroGLYCERIN (NITROSTAT) 0.4 MG SL tablet, DISSOLVE 1 TABLET UNDER THE TONGUE EVERY 5 MINUTES AS NEEDED FOR CHEST PAIN.  IF NO RELIEF AFTER 1 DOSE, CALL 911, Disp: 25 tablet, Rfl: 0    albuterol sulfate  (90 Base) MCG/ACT inhaler, INHALE 2 PUFFS INTO THE LUNGS EVERY 6 HOURS AS NEEDED FOR WHEEZING, Disp: 36 g, Rfl: 3    enalapril (VASOTEC) 10 MG tablet, TAKE 2 TABLETS BY MOUTH EVERY MORNING AND 1 TABLET BY MOUTH EVERY EVENING, Disp: 90 tablet, Rfl: 2    FLUoxetine (PROZAC) 20 MG capsule, TAKE 3 CAPSULES BY MOUTH EVERY DAY, Disp: 90 capsule, Rfl: 3    lidocaine (ASPERCREME LIDOCAINE) 4 % external patch, Apply 1 patch topically daily APPLY 1 PATCH EXTERNALLY TO THE SKIN DAILY, Disp: 30 patch, Rfl: 0    ACETAMINOPHEN EXTRA STRENGTH 500 MG tablet, TAKE 1 TABLET BY MOUTH EVERY 6 HOURS AS NEEDED FOR PAIN, Disp: 120 tablet, Rfl: 3    cetirizine (ZYRTEC) 10 MG tablet, Take 1 tablet by mouth daily, Disp: 90 tablet, Rfl: 2    aspirin (ASPIRIN LOW DOSE) 81 MG EC tablet, TAKE 1 TABLET BY MOUTH DAILY, Disp: 90 tablet, Rfl: 2    metoprolol succinate (TOPROL XL) 50 MG extended release tablet, Take one tablet by mouth daily, Disp: 90 tablet, Rfl: 2    meloxicam (MOBIC) 15 MG tablet, TAKE 1 TABLET BY MOUTH DAILY, Disp: 90 tablet, Rfl: 2    busPIRone (BUSPAR) 15 MG tablet, TAKE 1 TABLET BY MOUTH EVERY 12 HOURS AS NEEDED, Disp: 120 tablet, Rfl: 3    isosorbide mononitrate (IMDUR) 30 MG extended release tablet, TAKE 1 TABLET BY MOUTH EVERY DAY, Disp: 90 tablet, Rfl: 2    simvastatin (ZOCOR) 40 MG tablet, TAKE 1 TABLET BY MOUTH EVERY NIGHT, Disp: 90 tablet, Rfl: 3    Family History   Problem Relation Age of Onset    Emphysema Mother        Social History     Socioeconomic History    Marital status:      Spouse name: Not on file    Number of children: Not on file    Years of education: Not on file    Highest education level: Not on file   Occupational History    Not on file   Social Needs    Financial resource strain: Not hard at all   [employer] insecurity     Worry: Never true     Inability: Never true   [employer] needs     Medical: No     Non-medical: No   Tobacco Use    Smoking status: Former Smoker     Packs/day: 0.25     Years: 25.00     Pack years: 6.25     Types: Cigarettes     Quit date: 2009     Years since quittin.9    Smokeless tobacco: Never Used   Substance and Sexual Activity    Alcohol use: No     Alcohol/week: 0.0 standard drinks    Drug use: No    Sexual activity: Yes   Lifestyle    Physical activity     Days per week: Not on file     Minutes per session: Not on file    Stress: Not on file   Relationships    Social connections     Talks on phone: Not on file     Gets together: Not on file     Attends [employer] service: Not on file     Active member of club or organization: Not on file     Attends meetings of clubs or organizations: Not on file     Relationship status: Not on file    Intimate partner violence     Fear of current or ex partner: Not on file     Emotionally abused: Not on file     Physically abused: Not on file     Forced sexual activity: Not on file   Other Topics Concern    Not on file   Social History Narrative    Not on file       Review of Systems:  Review of Systems   Constitution: Negative for chills and fever. Cardiovascular: Negative for chest pain and palpitations. Respiratory: Negative for cough and shortness of breath. Musculoskeletal: Negative for back pain. Gastrointestinal: Negative for constipation. Neurological: Negative for disturbances in coordination and loss of balance. Physical Exam:  [facility] 11/07/2012     Physical Exam  HENT:      Head: Normocephalic. Pulmonary:      Effort: Pulmonary effort is normal.   Neurological:      Mental Status: She is alert. Psychiatric:         Mood and Affect: Mood normal.         Behavior: Behavior normal.         Record/Diagnostics Review:    As reviewed above    Assessment:  Problem List Items Addressed This Visit     Lumbar degenerative disc disease - Primary    Lumbar radiculopathy             Treatment Plan:  Significant relief following LESI - 95%  Follow up as needed    Juli Reynoso, was evaluated through a synchronous (real-time) audio-video encounter. The patient (or guardian if applicable) is aware that this is a billable service. Verbal consent to proceed has been obtained within the past 12 months. The visit was conducted pursuant to the emergency declaration under the [address] [place] [address], [address] [place] [address] authority and the [employer] and [employer] Act. Patient identification was verified, and a caregiver was present when appropriate. The patient was located in a state where the provider was credentialed to provide care. Total time spent for this encounter: Not billed by time    --ADELA Kevin CNP on 5/11/2021 at 2:46 PM    An electronic signature was used to authenticate this note.

## 2021-05-31 DIAGNOSIS — M79.605 LEG PAIN, LEFT: ICD-10-CM

## 2021-05-31 DIAGNOSIS — M50.30 DEGENERATIVE DISC DISEASE, CERVICAL: ICD-10-CM

## 2021-05-31 DIAGNOSIS — M19.019 OSTEOARTHRITIS OF SHOULDER, UNSPECIFIED LATERALITY, UNSPECIFIED OSTEOARTHRITIS TYPE: ICD-10-CM

## 2021-06-01 RX ORDER — CYCLOBENZAPRINE HCL 10 MG
TABLET ORAL
Qty: 60 TABLET | Refills: 0 | Status: SHIPPED | OUTPATIENT
Start: 2021-06-01 | End: 2021-06-26

## 2021-06-01 RX ORDER — PSEUDOEPHED/ACETAMINOPH/DIPHEN 30MG-500MG
TABLET ORAL
Qty: 120 TABLET | Refills: 3 | Status: SHIPPED | OUTPATIENT
Start: 2021-06-01 | End: 2021-12-22

## 2021-06-07 DIAGNOSIS — F41.9 ANXIETY: ICD-10-CM

## 2021-06-07 RX ORDER — BUSPIRONE HYDROCHLORIDE 15 MG/1
TABLET ORAL
Qty: 120 TABLET | Refills: 3 | Status: SHIPPED | OUTPATIENT
Start: 2021-06-07 | End: 2022-01-19

## 2021-06-07 NOTE — TELEPHONE ENCOUNTER
Please Approve or Refuse  Send to Pharmacy per Pt's Request:      Next Visit Date:  9/13/2021   Last Visit Date: 5/10/2021    Hemoglobin A1C (%)   Date Value   07/01/2020 5.7   10/04/2016 5.3   08/24/2012 5.2             ( goal A1C is < 7)   BP Readings from Last 3 Encounters:   05/10/21 132/88   05/05/21 (!) 154/84   04/29/21 136/76          (goal 120/80)  BUN   Date Value Ref Range Status   07/01/2020 15 8 - 23 mg/dL Final     CREATININE   Date Value Ref Range Status   07/01/2020 0.85 0.50 - 0.90 mg/dL Final     Potassium   Date Value Ref Range Status   07/01/2020 5.2 3.7 - 5.3 mmol/L Final

## 2021-06-11 DIAGNOSIS — F41.9 ANXIETY: ICD-10-CM

## 2021-06-11 RX ORDER — FLUOXETINE HYDROCHLORIDE 20 MG/1
CAPSULE ORAL
Qty: 90 CAPSULE | Refills: 3 | Status: SHIPPED | OUTPATIENT
Start: 2021-06-11 | End: 2021-09-13 | Stop reason: ALTCHOICE

## 2021-06-25 DIAGNOSIS — M51.36 LUMBAR DEGENERATIVE DISC DISEASE: ICD-10-CM

## 2021-06-25 DIAGNOSIS — M51.26 PROTRUDED LUMBAR DISC: ICD-10-CM

## 2021-06-25 DIAGNOSIS — M48.061 SPINAL STENOSIS OF LUMBAR REGION WITHOUT NEUROGENIC CLAUDICATION: ICD-10-CM

## 2021-06-25 DIAGNOSIS — M50.30 DEGENERATIVE DISC DISEASE, CERVICAL: ICD-10-CM

## 2021-06-25 DIAGNOSIS — M19.019 OSTEOARTHRITIS OF SHOULDER, UNSPECIFIED LATERALITY, UNSPECIFIED OSTEOARTHRITIS TYPE: ICD-10-CM

## 2021-06-25 DIAGNOSIS — M54.16 LUMBAR RADICULOPATHY: ICD-10-CM

## 2021-06-25 DIAGNOSIS — S33.6XXD SPRAIN OF SACROILIAC LIGAMENT, SUBSEQUENT ENCOUNTER: ICD-10-CM

## 2021-06-25 DIAGNOSIS — I10 ESSENTIAL HYPERTENSION: ICD-10-CM

## 2021-06-25 RX ORDER — ENALAPRIL MALEATE 10 MG/1
TABLET ORAL
Qty: 90 TABLET | Refills: 2 | Status: CANCELLED | OUTPATIENT
Start: 2021-06-25

## 2021-06-25 RX ORDER — CYCLOBENZAPRINE HCL 10 MG
TABLET ORAL
Qty: 60 TABLET | Refills: 0 | Status: CANCELLED | OUTPATIENT
Start: 2021-06-25

## 2021-06-25 NOTE — TELEPHONE ENCOUNTER
Please Approve or Refuse.   Send to Pharmacy per Pt's Request:      Next Visit Date:  9/13/2021   Last Visit Date: 5/10/2021    Hemoglobin A1C (%)   Date Value   07/01/2020 5.7   10/04/2016 5.3   08/24/2012 5.2             ( goal A1C is < 7)   BP Readings from Last 3 Encounters:   05/10/21 132/88   05/05/21 (!) 154/84   04/29/21 136/76          (goal 120/80)  BUN   Date Value Ref Range Status   07/01/2020 15 8 - 23 mg/dL Final     CREATININE   Date Value Ref Range Status   07/01/2020 0.85 0.50 - 0.90 mg/dL Final     Potassium   Date Value Ref Range Status   07/01/2020 5.2 3.7 - 5.3 mmol/L Final

## 2021-06-26 RX ORDER — CYCLOBENZAPRINE HCL 10 MG
TABLET ORAL
Qty: 60 TABLET | Refills: 0 | Status: SHIPPED | OUTPATIENT
Start: 2021-06-26 | End: 2021-08-09

## 2021-06-26 RX ORDER — ENALAPRIL MALEATE 10 MG/1
TABLET ORAL
Qty: 90 TABLET | Refills: 2 | Status: SHIPPED | OUTPATIENT
Start: 2021-06-26 | End: 2021-07-12

## 2021-06-26 RX ORDER — PREGABALIN 150 MG/1
CAPSULE ORAL
Qty: 90 CAPSULE | Refills: 0 | Status: SHIPPED | OUTPATIENT
Start: 2021-06-26 | End: 2021-08-03

## 2021-07-01 RX ORDER — ISOSORBIDE MONONITRATE 30 MG/1
TABLET, EXTENDED RELEASE ORAL
Qty: 90 TABLET | Refills: 2 | Status: SHIPPED | OUTPATIENT
Start: 2021-07-01 | End: 2022-03-17

## 2021-07-09 DIAGNOSIS — I10 ESSENTIAL HYPERTENSION: ICD-10-CM

## 2021-07-12 RX ORDER — ENALAPRIL MALEATE 10 MG/1
TABLET ORAL
Qty: 90 TABLET | Refills: 2 | Status: SHIPPED | OUTPATIENT
Start: 2021-07-12 | End: 2021-10-25

## 2021-07-16 ENCOUNTER — HOSPITAL ENCOUNTER (OUTPATIENT)
Dept: PAIN MANAGEMENT | Age: 61
Discharge: HOME | End: 2021-07-16
Payer: MEDICARE

## 2021-07-16 DIAGNOSIS — M48.061 SPINAL STENOSIS OF LUMBAR REGION, UNSPECIFIED WHETHER NEUROGENIC CLAUDICATION PRESENT: ICD-10-CM

## 2021-07-16 DIAGNOSIS — M51.36 LUMBAR DEGENERATIVE DISC DISEASE: ICD-10-CM

## 2021-07-16 DIAGNOSIS — M54.16 LUMBAR RADICULOPATHY: Primary | ICD-10-CM

## 2021-07-16 PROCEDURE — 99213 OFFICE O/P EST LOW 20 MIN: CPT

## 2021-07-16 PROCEDURE — 99213 OFFICE O/P EST LOW 20 MIN: CPT | Performed by: NURSE PRACTITIONER

## 2021-07-16 ASSESSMENT — ENCOUNTER SYMPTOMS
SHORTNESS OF BREATH: 0
CONSTIPATION: 0
BACK PAIN: 1
COUGH: 0

## 2021-07-16 NOTE — PROGRESS NOTES
Patient completed a video visit today to review medication contract. Chief Complaint: back pain    [facility] Patient complains of low back pain. MRI with severe L4-5 and moderate L3-4 spinal canal stenosis. She has seen neurosurgeon who has recommended continuing conservative therapies unless she relapses or is refractory to these measures. She had Lumbar Epidural Steroid injection  and reports 95% relief. She states the pain is gradually returning. She is requesting to repeat the injection. Back Pain  This is a chronic problem. The current episode started more than 1 year ago. The problem occurs constantly. The problem has been gradually worsening since onset. The pain is present in the lumbar spine. The quality of the pain is described as aching and cramping. Radiates to: down both legs to the calves. The pain is at a severity of 3/10. The pain is moderate. The symptoms are aggravated by position and standing (walking). Pertinent negatives include no chest pain, fever, numbness, paresthesias or tingling. She has tried analgesics and bed rest (LESI, stretching) for the symptoms. The treatment provided mild relief. Patient denies any new neurological symptoms. No bowel or bladder incontinence, no weakness, and no falling.     Past Medical History:   Diagnosis Date    ARIADNE (acute kidney injury) ([facility] [address] 2019    Anxiety     Asthma     CAD (coronary artery disease)     Class 1 obesity due to excess calories with serious comorbidity and body mass index (BMI) of 32.0 to 32.9 in adult 2020    Depression     Examination of participant in clinical trial 11    End date 2015    HTN (hypertension)     Lumbar radiculopathy     Mixed hyperlipidemia 1/15/2018    OA (osteoarthritis)     PVD (peripheral vascular disease) with claudication ([address] 2015       Past Surgical History:   Procedure Laterality Date     SECTION      COLONOSCOPY N/A 2019    COLORECTAL CANCER SCREENING, NOT HIGH RISK performed by Mala Heredia MD at [address]  2015    2 stents    DILATION AND CURETTAGE OF UTERUS N/A     uterine polyp       Allergies   Allergen Reactions    Claritin [Loratadine]      02/2010 Heart palpitations  02/2010 Heart palpitations    Codeine Nausea Only         Current Outpatient Medications:     enalapril (VASOTEC) 10 MG tablet, TAKE 2 TABLETS BY MOUTH EVERY MORNING, THEN 1 TABLET BY MOUTH EVERY EVENING, Disp: 90 tablet, Rfl: 2    isosorbide mononitrate (IMDUR) 30 MG extended release tablet, TAKE 1 TABLET BY MOUTH EVERY DAY, Disp: 90 tablet, Rfl: 2    diclofenac sodium (VOLTAREN) 1 % GEL, APPLY 2 GRAMS EXTERNALLY TO THE AFFECTED AREA FOUR TIMES DAILY, Disp: 200 g, Rfl: 1    pregabalin (LYRICA) 150 MG capsule, TAKE 1 CAPSULE BY MOUTH THREE TIMES DAILY, Disp: 90 capsule, Rfl: 0    cyclobenzaprine (FLEXERIL) 10 MG tablet, TAKE 1 TABLET BY MOUTH TWICE DAILY AS NEEDED FOR MUSCLE SPASMS, Disp: 60 tablet, Rfl: 0    FLUoxetine (PROZAC) 20 MG capsule, TAKE 3 CAPSULES BY MOUTH EVERY DAY, Disp: 90 capsule, Rfl: 3    busPIRone (BUSPAR) 15 MG tablet, TAKE 1 TABLET BY MOUTH EVERY 12 HOURS AS NEEDED, Disp: 120 tablet, Rfl: 3    ACETAMINOPHEN EXTRA STRENGTH 500 MG tablet, TAKE 1 TABLET BY MOUTH EVERY 6 HOURS AS NEEDED FOR PAIN, Disp: 120 tablet, Rfl: 3    Calcium Carb-Cholecalciferol (CALCIUM-VITAMIN D) 600-400 MG-UNIT TABS, TAKE 1 TABLET BY MOUTH DAILY, Disp: 90 tablet, Rfl: 0    nitroGLYCERIN (NITROSTAT) 0.4 MG SL tablet, DISSOLVE 1 TABLET UNDER THE TONGUE EVERY 5 MINUTES AS NEEDED FOR CHEST PAIN.  IF NO RELIEF AFTER 1 DOSE, CALL 911, Disp: 25 tablet, Rfl: 0    albuterol sulfate  (90 Base) MCG/ACT inhaler, INHALE 2 PUFFS INTO THE LUNGS EVERY 6 HOURS AS NEEDED FOR WHEEZING, Disp: 36 g, Rfl: 3    lidocaine (ASPERCREME LIDOCAINE) 4 % external patch, Apply 1 patch topically daily APPLY 1 PATCH EXTERNALLY TO THE SKIN DAILY, Disp: 30 patch, Rfl: 0   cetirizine (ZYRTEC) 10 MG tablet, Take 1 tablet by mouth daily, Disp: 90 tablet, Rfl: 2    aspirin (ASPIRIN LOW DOSE) 81 MG EC tablet, TAKE 1 TABLET BY MOUTH DAILY, Disp: 90 tablet, Rfl: 2    metoprolol succinate (TOPROL XL) 50 MG extended release tablet, Take one tablet by mouth daily, Disp: 90 tablet, Rfl: 2    meloxicam (MOBIC) 15 MG tablet, TAKE 1 TABLET BY MOUTH DAILY, Disp: 90 tablet, Rfl: 2    simvastatin (ZOCOR) 40 MG tablet, TAKE 1 TABLET BY MOUTH EVERY NIGHT, Disp: 90 tablet, Rfl: 3    Family History   Problem Relation Age of Onset    Emphysema Mother        Social History     Socioeconomic History    Marital status:      Spouse name: Not on file    Number of children: Not on file    Years of education: Not on file    Highest education level: Not on file   Occupational History    Not on file   Tobacco Use    Smoking status: Former Smoker     Packs/day: 0.25     Years: 25.00     Pack years: 6.25     Types: Cigarettes     Quit date: 2009     Years since quittin.1    Smokeless tobacco: Never Used   Vaping Use    Vaping Use: Never used   Substance and Sexual Activity    Alcohol use: No     Alcohol/week: 0.0 standard drinks    Drug use: No    Sexual activity: Yes   Other Topics Concern    Not on file   Social History Narrative    Not on file     Social Determinants of Health     Financial Resource Strain: Low Risk     Difficulty of Paying Living Expenses: Not hard at all   Food Insecurity: No Food Insecurity    Worried About Running Out of Food in the Last Year: Never true    Nabila of Food in the Last Year: Never true   Transportation Needs: No Transportation Needs    Lack of Transportation (Medical): No    Lack of Transportation (Non-Medical):  No   Physical Activity:     Days of Exercise per Week:     Minutes of Exercise per Session:    Stress:     Feeling of Stress :    Social Connections:     Frequency of Communication with Friends and Family:     Frequency of Social Gatherings with Friends and Family:     Attends [employer] Services:     Active Member of Clubs or Organizations:     Attends Club or Organization Meetings:     Marital Status:    Intimate Partner Violence:     Fear of Current or Ex-Partner:     Emotionally Abused:     Physically Abused:     Sexually Abused:        Review of Systems:  Review of Systems   Constitutional: Negative for chills and fever. Cardiovascular: Negative for chest pain and palpitations. Respiratory: Negative for cough and shortness of breath. Musculoskeletal: Positive for back pain. Gastrointestinal: Negative for constipation. Neurological: Negative for disturbances in coordination, loss of balance, numbness, paresthesias and tingling. Physical Exam:  LMP 11/07/2012     Physical Exam  HENT:      Head: Normocephalic. Pulmonary:      Effort: Pulmonary effort is normal.   Neurological:      Mental Status: She is alert. Psychiatric:         Mood and Affect: Mood normal.         Behavior: Behavior normal.         Record/Diagnostics Review:    As reviewed abovwe    Assessment:  Problem List Items Addressed This Visit     Lumbar degenerative disc disease    Relevant Orders    Lumbar Epidural Steroid Injection/Caudal    Saline lock IV    FLUORO FOR SURGICAL PROCEDURES    Lumbar radiculopathy - Primary    Relevant Orders    Lumbar Epidural Steroid Injection/Caudal    Saline lock IV    FLUORO FOR SURGICAL PROCEDURES    Spinal stenosis of lumbar region    Relevant Orders    Lumbar Epidural Steroid Injection/Caudal    Saline lock IV    FLUORO FOR SURGICAL PROCEDURES             Treatment Plan:  Pain in the low back and legs continues despite conservative measures  Significant benefit from epidural steroid injections in the past and requesting to repeat the procedure  LESI x1 L4, L5  Pre-procedural instructions are reviewed     Ernestine Garcia, was evaluated through a synchronous (real-time) audio-video encounter.  The patient (or guardian if applicable) is aware that this is a billable service. Verbal consent to proceed has been obtained within the past 12 months. The visit was conducted pursuant to the emergency declaration under the [address] [place] [address] and the [employer] and [employer] Act. Patient identification was verified, and a caregiver was present when appropriate. The patient was located in a state where the provider was credentialed to provide care. Total time spent for this encounter: 20 minutes    --ADELA Cisneros CNP on 7/16/2021 at 1:27 PM    An electronic signature was used to authenticate this note.

## 2021-07-26 ENCOUNTER — HOSPITAL ENCOUNTER (OUTPATIENT)
Age: 61
Discharge: HOME | End: 2021-07-26
Payer: MEDICARE

## 2021-07-26 DIAGNOSIS — I10 ESSENTIAL HYPERTENSION: ICD-10-CM

## 2021-07-26 DIAGNOSIS — E78.2 MIXED HYPERLIPIDEMIA: ICD-10-CM

## 2021-07-26 DIAGNOSIS — E66.09 CLASS 1 OBESITY DUE TO EXCESS CALORIES WITH SERIOUS COMORBIDITY AND BODY MASS INDEX (BMI) OF 32.0 TO 32.9 IN ADULT: ICD-10-CM

## 2021-07-26 DIAGNOSIS — M50.30 DEGENERATIVE DISC DISEASE, CERVICAL: ICD-10-CM

## 2021-07-26 LAB
ABSOLUTE EOS #: 0.2 K/UL (ref 0–0.4)
ABSOLUTE IMMATURE GRANULOCYTE: ABNORMAL K/UL (ref 0–0.3)
ABSOLUTE LYMPH #: 2.1 K/UL (ref 1–4.8)
ABSOLUTE MONO #: 0.8 K/UL (ref 0.1–1.3)
ALBUMIN SERPL-MCNC: 3.9 G/DL (ref 3.5–5.2)
ALBUMIN/GLOBULIN RATIO: ABNORMAL (ref 1–2.5)
ALP BLD-CCNC: 74 U/L (ref 35–104)
ALT SERPL-CCNC: 16 U/L (ref 5–33)
ANION GAP SERPL CALCULATED.3IONS-SCNC: 7 MMOL/L (ref 9–17)
AST SERPL-CCNC: 20 U/L
BASOPHILS # BLD: 1 % (ref 0–2)
BASOPHILS ABSOLUTE: 0 K/UL (ref 0–0.2)
BILIRUB SERPL-MCNC: 0.28 MG/DL (ref 0.3–1.2)
BUN BLDV-MCNC: 7 MG/DL (ref 8–23)
BUN/CREAT BLD: ABNORMAL (ref 9–20)
CALCIUM SERPL-MCNC: 9.5 MG/DL (ref 8.6–10.4)
CHLORIDE BLD-SCNC: 104 MMOL/L (ref 98–107)
CHOLESTEROL/HDL RATIO: 2.7
CHOLESTEROL: 136 MG/DL
CO2: 31 MMOL/L (ref 20–31)
CREAT SERPL-MCNC: 0.75 MG/DL (ref 0.5–0.9)
DIFFERENTIAL TYPE: ABNORMAL
EOSINOPHILS RELATIVE PERCENT: 3 % (ref 0–4)
GFR AFRICAN AMERICAN: >60 ML/MIN
GFR NON-AFRICAN AMERICAN: >60 ML/MIN
GFR SERPL CREATININE-BSD FRML MDRD: ABNORMAL ML/MIN/{1.73_M2}
GFR SERPL CREATININE-BSD FRML MDRD: ABNORMAL ML/MIN/{1.73_M2}
GLUCOSE BLD-MCNC: 98 MG/DL (ref 70–99)
HCT VFR BLD CALC: 44.1 % (ref 36–46)
HDLC SERPL-MCNC: 50 MG/DL
HEMOGLOBIN: 14.8 G/DL (ref 12–16)
IMMATURE GRANULOCYTES: ABNORMAL %
LDL CHOLESTEROL: 65 MG/DL (ref 0–130)
LYMPHOCYTES # BLD: 32 % (ref 24–44)
MCH RBC QN AUTO: 31.7 PG (ref 26–34)
MCHC RBC AUTO-ENTMCNC: 33.6 G/DL (ref 31–37)
MCV RBC AUTO: 94.4 FL (ref 80–100)
MONOCYTES # BLD: 12 % (ref 1–7)
NRBC AUTOMATED: ABNORMAL PER 100 WBC
PDW BLD-RTO: 15 % (ref 11.5–14.9)
PLATELET # BLD: 179 K/UL (ref 150–450)
PLATELET ESTIMATE: ABNORMAL
PMV BLD AUTO: 8.8 FL (ref 6–12)
POTASSIUM SERPL-SCNC: 4.9 MMOL/L (ref 3.7–5.3)
RBC # BLD: 4.67 M/UL (ref 4–5.2)
RBC # BLD: ABNORMAL 10*6/UL
SEG NEUTROPHILS: 52 % (ref 36–66)
SEGMENTED NEUTROPHILS ABSOLUTE COUNT: 3.4 K/UL (ref 1.3–9.1)
SODIUM BLD-SCNC: 142 MMOL/L (ref 135–144)
TOTAL PROTEIN: 6.9 G/DL (ref 6.4–8.3)
TRIGL SERPL-MCNC: 104 MG/DL
TSH SERPL DL<=0.05 MIU/L-ACNC: 2.33 MIU/L (ref 0.3–5)
VLDLC SERPL CALC-MCNC: NORMAL MG/DL (ref 1–30)
WBC # BLD: 6.6 K/UL (ref 3.5–11)
WBC # BLD: ABNORMAL 10*3/UL

## 2021-07-26 PROCEDURE — 84443 ASSAY THYROID STIM HORMONE: CPT

## 2021-07-26 PROCEDURE — 36415 COLL VENOUS BLD VENIPUNCTURE: CPT

## 2021-07-26 PROCEDURE — 83036 HEMOGLOBIN GLYCOSYLATED A1C: CPT

## 2021-07-26 PROCEDURE — 80053 COMPREHEN METABOLIC PANEL: CPT

## 2021-07-26 PROCEDURE — 80061 LIPID PANEL: CPT

## 2021-07-26 PROCEDURE — 85025 COMPLETE CBC W/AUTO DIFF WBC: CPT

## 2021-07-26 PROCEDURE — 82306 VITAMIN D 25 HYDROXY: CPT

## 2021-07-26 RX ORDER — CALCIUM CARBONATE/VITAMIN D3 600 MG-10
TABLET ORAL
Qty: 90 TABLET | Refills: 0 | Status: SHIPPED | OUTPATIENT
Start: 2021-07-26 | End: 2021-12-22

## 2021-07-27 LAB
ESTIMATED AVERAGE GLUCOSE: 126 MG/DL
HBA1C MFR BLD: 6 % (ref 4–6)
VITAMIN D 25-HYDROXY: 46.7 NG/ML (ref 30–100)

## 2021-08-02 DIAGNOSIS — M54.16 LUMBAR RADICULOPATHY: ICD-10-CM

## 2021-08-02 DIAGNOSIS — M48.061 SPINAL STENOSIS OF LUMBAR REGION WITHOUT NEUROGENIC CLAUDICATION: ICD-10-CM

## 2021-08-03 ENCOUNTER — HOSPITAL ENCOUNTER (OUTPATIENT)
Dept: GENERAL RADIOLOGY | Age: 61
Discharge: HOME | End: 2021-08-05
Payer: MEDICARE

## 2021-08-03 ENCOUNTER — HOSPITAL ENCOUNTER (OUTPATIENT)
Dept: PAIN MANAGEMENT | Age: 61
Discharge: HOME | End: 2021-08-03
Payer: MEDICARE

## 2021-08-03 DIAGNOSIS — M51.36 LUMBAR DEGENERATIVE DISC DISEASE: ICD-10-CM

## 2021-08-03 DIAGNOSIS — M54.16 LUMBAR RADICULOPATHY: Primary | ICD-10-CM

## 2021-08-03 DIAGNOSIS — M48.061 SPINAL STENOSIS OF LUMBAR REGION, UNSPECIFIED WHETHER NEUROGENIC CLAUDICATION PRESENT: ICD-10-CM

## 2021-08-03 DIAGNOSIS — M47.817 LUMBOSACRAL SPONDYLOSIS WITHOUT MYELOPATHY: ICD-10-CM

## 2021-08-03 DIAGNOSIS — M54.16 LUMBAR RADICULOPATHY: ICD-10-CM

## 2021-08-03 PROCEDURE — 62323 NJX INTERLAMINAR LMBR/SAC: CPT

## 2021-08-03 PROCEDURE — 2580000003 HC RX 258: Performed by: PAIN MEDICINE

## 2021-08-03 PROCEDURE — 62323 NJX INTERLAMINAR LMBR/SAC: CPT | Performed by: PAIN MEDICINE

## 2021-08-03 PROCEDURE — 6360000002 HC RX W HCPCS: Performed by: PAIN MEDICINE

## 2021-08-03 PROCEDURE — 6360000004 HC RX CONTRAST MEDICATION: Performed by: PAIN MEDICINE

## 2021-08-03 PROCEDURE — 3209999900 FLUORO FOR SURGICAL PROCEDURES

## 2021-08-03 RX ORDER — TRIAMCINOLONE ACETONIDE 40 MG/ML
INJECTION, SUSPENSION INTRA-ARTICULAR; INTRAMUSCULAR
Status: COMPLETED | OUTPATIENT
Start: 2021-08-03 | End: 2021-08-03

## 2021-08-03 RX ORDER — PREGABALIN 150 MG/1
CAPSULE ORAL
Qty: 90 CAPSULE | Refills: 0 | Status: SHIPPED | OUTPATIENT
Start: 2021-08-03 | End: 2021-09-07

## 2021-08-03 RX ORDER — SODIUM CHLORIDE, SODIUM LACTATE, POTASSIUM CHLORIDE, CALCIUM CHLORIDE 600; 310; 30; 20 MG/100ML; MG/100ML; MG/100ML; MG/100ML
75 INJECTION, SOLUTION INTRAVENOUS CONTINUOUS
Status: DISCONTINUED | OUTPATIENT
Start: 2021-08-03 | End: 2021-08-04 | Stop reason: HOSPADM

## 2021-08-03 RX ORDER — FENTANYL CITRATE 50 UG/ML
INJECTION, SOLUTION INTRAMUSCULAR; INTRAVENOUS
Status: COMPLETED | OUTPATIENT
Start: 2021-08-03 | End: 2021-08-03

## 2021-08-03 RX ORDER — MIDAZOLAM HYDROCHLORIDE 1 MG/ML
INJECTION INTRAMUSCULAR; INTRAVENOUS
Status: COMPLETED | OUTPATIENT
Start: 2021-08-03 | End: 2021-08-03

## 2021-08-03 RX ORDER — ONDANSETRON 2 MG/ML
2 INJECTION INTRAMUSCULAR; INTRAVENOUS EVERY 6 HOURS PRN
Status: DISCONTINUED | OUTPATIENT
Start: 2021-08-03 | End: 2021-08-04 | Stop reason: HOSPADM

## 2021-08-03 RX ORDER — SIMVASTATIN 40 MG
TABLET ORAL
Qty: 90 TABLET | Refills: 3 | Status: SHIPPED | OUTPATIENT
Start: 2021-08-03 | End: 2022-05-09 | Stop reason: SDUPTHER

## 2021-08-03 RX ADMIN — ONDANSETRON 2 MG: 2 INJECTION INTRAMUSCULAR; INTRAVENOUS at 11:12

## 2021-08-03 RX ADMIN — MIDAZOLAM 2 MG: 1 INJECTION INTRAMUSCULAR; INTRAVENOUS at 10:30

## 2021-08-03 RX ADMIN — TRIAMCINOLONE ACETONIDE 80 MG: 40 INJECTION, SUSPENSION INTRA-ARTICULAR; INTRAMUSCULAR at 10:42

## 2021-08-03 RX ADMIN — SODIUM CHLORIDE, POTASSIUM CHLORIDE, SODIUM LACTATE AND CALCIUM CHLORIDE 75 ML/HR: 600; 310; 30; 20 INJECTION, SOLUTION INTRAVENOUS at 11:08

## 2021-08-03 RX ADMIN — FENTANYL CITRATE 100 MCG: 50 INJECTION, SOLUTION INTRAMUSCULAR; INTRAVENOUS at 10:40

## 2021-08-03 RX ADMIN — IOHEXOL 2 ML: 180 INJECTION INTRAVENOUS at 10:42

## 2021-08-03 ASSESSMENT — PAIN DESCRIPTION - DESCRIPTORS: DESCRIPTORS: ACHING;CONSTANT;JABBING;NAGGING;SHARP

## 2021-08-03 NOTE — PROGRESS NOTES
Discharge criteria met. Patient alert and oriented x3  Post procedure dressing dry and intact. Sensory and motor function intact as per pre-procedure. Instructions and follow up reviewed with pt at patient at discharge.   Patient discharged wheelchair @ [phone]

## 2021-08-03 NOTE — PROCEDURES
Pre-Procedure Note    Patient Name: Jakob Gomez   YOB: 1960  Room/Bed: Room/bed info not found  Medical Record Number: [id number]  Date: 8/3/2021       Indication:    1. Lumbar radiculopathy    2. Lumbar degenerative disc disease    3. Spinal stenosis of lumbar region, unspecified whether neurogenic claudication present    4. Lumbosacral spondylosis without myelopathy        Consent: On file. Vital Signs:   Vitals:    21 1044   BP: 128/82   Pulse: 65   Resp: 16   Temp:    SpO2: 93%       Past Medical History:   has a past medical history of ARIADNE (acute kidney injury) ([facility] [address] Anxiety, Asthma, CAD (coronary artery disease), Class 1 obesity due to excess calories with serious comorbidity and body mass index (BMI) of 32.0 to 32.9 in adult, Depression, Examination of participant in clinical trial, HTN (hypertension), Lumbar radiculopathy, Mixed hyperlipidemia, OA (osteoarthritis), and PVD (peripheral vascular disease) with claudication ([facility] [address] Past Surgical History:   has a past surgical history that includes  section; Dilation and curettage of uterus (N/A); Colonoscopy (N/A, 2019); and Coronary angioplasty (). Pre-Sedation Documentation and Exam:   Vital signs have been reviewed (see flow sheet for vitals). Mallampati Airway Assessment:  normal    ASA Classification:  Class 3 - A patient with severe systemic disease that limits activity but is not incapacitating    Sedation/ Anesthesia Plan:   intravenous sedation   as needed. Medications Planned:   midazolam (Versed) / Fentanyl  Intravenously  as needed. Patient is an appropriate candidate for plan of sedation: yes  Patient's History and Physical examination was reviewed and there is no change. Electronically signed by America Armstrong MD on 8/3/2021 at 10:47 AM        Preoperative Diagnosis:    1. Lumbar radiculopathy    2. Lumbar degenerative disc disease    3.  Spinal stenosis of lumbar region, unspecified whether neurogenic claudication present    4. Lumbosacral spondylosis without myelopathy        Postoperative Diagnosis:    1. Lumbar radiculopathy    2. Lumbar degenerative disc disease    3. Spinal stenosis of lumbar region, unspecified whether neurogenic claudication present    4. Lumbosacral spondylosis without myelopathy        Procedure Performed:  Lumbar epidural steroid injection under fluoroscopy guidance with IV sedation. Indication for the Procedure: The patient failed conservative management  for pain in the low back radiating to lower extremities. As the patient is not responding to conservative management and it is interfering with activities of daily living we decided to proceed with lumbar epidural steroid injection. The procedure and risks were discussed with the patient and an informed consent was obtained  Current Pain Assessment  Pain Assessment  Pain Assessment: 0-10  Pain Level: 8  Patient's Stated Pain Goal: 2  Pain Type: Chronic pain  Pain Location: Back, Leg  Pain Orientation: Right, Left  Pain Radiating Towards: both legs  Pain Descriptors: Aching, Constant, Jabbing, Nagging, Sharp  Pain Frequency: Continuous  Clinical Progression: Gradually worsening  Functional Pain Assessment: Prevents or interferes some active activities and ADLs     Procedure:    After starting an IV, the patient was sedated with 2 mg of Midazolam and 100 mcg of Fentanyl Intravenously by the RN under my direct supervision. Patient's vital signs including BP, EKG and SaO2 were monitored by the RN and they remained stable during the procedure. A meaningful communication was kept up with the patient throughout  the procedure. The patient is placed in prone position. Skin over the back was prepped and draped in sterile manner. Then using fluoroscopy the L4, L5 interspace was observed and the skin and deep tissues in the right paramedian area were infiltrated with 4 ml of 1% lidocaine.  The #20-gauge, 3-1/2 inch Tuohy needle was inserted through the skin wheal and the epidural space was identified using loss of resistance technique with normal saline. On entering the epidural space patient complained of severe paresthesias in the lower extremities in spite of the needle being in the middle and severe back pain hence the needle was removed and repositioned at L3-4 interspace and again the epidural space was identified using loss-of-resistance technique. This was confirmed with AP and lateral views using fluoroscopy after injecting about 2 ml of Omnipaque-180 and observing the spread of the contrast in the epidural space. Then after negative aspiration a total of 80 mg of triamcinolone with 8 ml of normal saline was injected into the epidural space. The needle is removed and a Band-Aid was placed over the needle insertion site. Patient's vital signs remained stable and the patient tolerated the procedure well. The patient was discharged home in stable condition and will be followed in the pain clinic in the next few weeks for further planning.     Electronically signed by Bruce Lawson MD on 8/3/2021 at 10:47 AM

## 2021-08-04 ENCOUNTER — TELEPHONE (OUTPATIENT)
Dept: PAIN MANAGEMENT | Age: 61
End: 2021-08-04

## 2021-08-06 DIAGNOSIS — M50.30 DEGENERATIVE DISC DISEASE, CERVICAL: ICD-10-CM

## 2021-08-06 DIAGNOSIS — M19.019 OSTEOARTHRITIS OF SHOULDER, UNSPECIFIED LATERALITY, UNSPECIFIED OSTEOARTHRITIS TYPE: ICD-10-CM

## 2021-08-06 NOTE — TELEPHONE ENCOUNTER
Please Approve or Refuse.   Send to Pharmacy per Pt's Request:      Next Visit Date:  9/13/2021   Last Visit Date: 5/10/2021    Hemoglobin A1C (%)   Date Value   07/26/2021 6.0   07/01/2020 5.7   10/04/2016 5.3             ( goal A1C is < 7)   BP Readings from Last 3 Encounters:   08/03/21 131/88   05/10/21 132/88   05/05/21 (!) 154/84          (goal 120/80)  BUN   Date Value Ref Range Status   07/26/2021 7 (L) 8 - 23 mg/dL Final     CREATININE   Date Value Ref Range Status   07/26/2021 0.75 0.50 - 0.90 mg/dL Final     Potassium   Date Value Ref Range Status   07/26/2021 4.9 3.7 - 5.3 mmol/L Final

## 2021-08-09 RX ORDER — CYCLOBENZAPRINE HCL 10 MG
TABLET ORAL
Qty: 60 TABLET | Refills: 0 | Status: SHIPPED | OUTPATIENT
Start: 2021-08-09 | End: 2021-09-07

## 2021-08-14 DIAGNOSIS — M51.36 LUMBAR DEGENERATIVE DISC DISEASE: ICD-10-CM

## 2021-08-14 DIAGNOSIS — S33.6XXD SPRAIN OF SACROILIAC LIGAMENT, SUBSEQUENT ENCOUNTER: ICD-10-CM

## 2021-08-14 DIAGNOSIS — M51.26 PROTRUDED LUMBAR DISC: ICD-10-CM

## 2021-08-17 ENCOUNTER — HOSPITAL ENCOUNTER (OUTPATIENT)
Dept: PAIN MANAGEMENT | Age: 61
Discharge: HOME | End: 2021-08-17
Payer: MEDICARE

## 2021-08-17 DIAGNOSIS — M54.16 LUMBAR RADICULOPATHY: ICD-10-CM

## 2021-08-17 DIAGNOSIS — M51.36 LUMBAR DEGENERATIVE DISC DISEASE: ICD-10-CM

## 2021-08-17 DIAGNOSIS — M50.30 DEGENERATIVE DISC DISEASE, CERVICAL: ICD-10-CM

## 2021-08-17 DIAGNOSIS — S33.6XXS SPRAIN OF SACROILIAC LIGAMENT, SEQUELA: Primary | ICD-10-CM

## 2021-08-17 DIAGNOSIS — M48.061 SPINAL STENOSIS OF LUMBAR REGION, UNSPECIFIED WHETHER NEUROGENIC CLAUDICATION PRESENT: ICD-10-CM

## 2021-08-17 PROCEDURE — 99213 OFFICE O/P EST LOW 20 MIN: CPT

## 2021-08-17 PROCEDURE — 99441 PR PHYS/QHP TELEPHONE EVALUATION 5-10 MIN: CPT | Performed by: NURSE PRACTITIONER

## 2021-08-17 ASSESSMENT — ENCOUNTER SYMPTOMS
BACK PAIN: 1
HEARTBURN: 1
RESPIRATORY NEGATIVE: 1

## 2021-08-17 NOTE — PROGRESS NOTES
[address] PROGRESS NOTE      Patient  completed []  video visit   [x]   phone call:    9     Minutes :       []    to  review Medication Agreement    [x]  Follow up after procedure   []  Discuss treatment options      Location:  Provider:  working from    [x]    home    []   [facility] - TREMAYNE WOLFE ,   patient at  home       Chief Complaint: low back pain      She c/o low back pain which radiates down her legs. . She had lumbar epidural injection L2, L3 on 8-3-2021 with 75% relief. She completed PT this past April, which helped. She can tolerate activity better. She sleeps okay, She states is active. Back Pain  This is a chronic problem. The problem occurs intermittently. The problem has been gradually improving since onset. The pain is present in the lumbar spine. Quality: muscle cramps. Radiates to: legs. The pain is at a severity of 0/10 (increases when up). The patient is experiencing no pain. The pain is the same all the time. Exacerbated by: walking, activity. Associated symptoms include numbness. (Right thigh, groin area) She has tried heat (rest) for the symptoms.      Treatment goals:  Functional status: avoid back surgery    Aberrancy:   Any alcoholic beverages     no       Any illegal drugs   no      Analgesia:       0              Adverse  Effects :no      ADL;s :active    Data:    When was thelast UDS:   3-6-2020         Was the UDS appropriate:  [x] yes []   no      Record/Diagnostics Review:      As above, I did review the imaging       3/9/2020  3:59 PM - Jaime, [facility] Incoming Lab Results From [facility]    Component Value Ref Range & Units Status Collected Lab   Pain Management Drug Panel Interp, Urine Consistent   Final 03/06/2020  2:05 PM [facility]   (NOTE)   ________________________________________________________________   DRUGS EXPECTED:   NO DRUGS EXPECTED   ________________________________________________________________   CONSISTENT with medications provided:   NO DRUGS DETECTED   ________________________________________________________________   Creatinine <20 mg/dL is consistent with a dilute urine specimen. Recollection to obtain a more concentrated specimen, such as a   first morning void, may be considered if unexpected negative urine   drug screening results were obtained.   ________________________________________________________________   INTERPRETIVE INFORMATION: Pain Mgt Kapoor, Mass Spec/EMIT, Ur,                            Interp   Interpretation depends on accuracy and completeness of patient   medication information submitted by client. 6-Acetylmorphine, Ur Not Detected   Final 03/06/2020  2:05 PM [facility]   7-Aminoclonazepam, Urine Not Detected   Final 03/06/2020  2:05 PM [facility]   Alpha-OH-Alpraz, Urine Not Detected   Final 03/06/2020  2:05 PM [facility]   Alprazolam, Urine Not Detected   Final 03/06/2020  2:05 PM [facility]   Amphetamines, urine Not Detected   Final 03/06/2020  2:05 PM [facility]   Barbiturates, Ur Not Detected   Final 03/06/2020  2:05 PM [facility]   Benzoylecgonine, Ur Not Detected   Final 03/06/2020  2:05 PM [facility]   Buprenorphine Urine Not Detected   Final 03/06/2020  2:05 PM [facility]   Carisoprodol, Ur Not Detected   Final 03/06/2020  2:05 PM [facility]   (NOTE)   The carisoprodol immunoassay has cross-reactivity to carisoprodol   and meprobamate.     Clonazepam, Urine Not Detected   Final 03/06/2020  2:05 PM [facility]   Codeine, Urine Not Detected   Final 03/06/2020  2:05 PM [facility]   MDA, Ur Not Detected   Final 03/06/2020  2:05 PM [facility]   Diazepam, Urine Not Detected   Final 03/06/2020  2:05 PM [facility]   Ethyl Glucuronide Ur Not Detected   Final 03/06/2020  2:05 PM [facility]   Fentanyl, Ur Not Detected   Final 03/06/2020  2:05 PM [facility]   Hydrocodone, Urine Not Detected   Final 03/06/2020  2:05 PM [facility]   Hydromorphone, Urine Not Detected   Final 03/06/2020  2:05 PM [facility]   Lorazepam, Urine Not Detected   Final 03/06/2020  2:05 PM [facility]   Marijuana Metab, Ur Not Detected   Final 03/06/2020  2:05 PM [facility] MDEA, [place], Ur Not Detected   Final 03/06/2020  2:05 PM [facility]   MDMA, Urine Not Detected   Final 03/06/2020  2:05 PM [facility]   Meperidine Metab, Ur Not Detected   Final 03/06/2020  2:05 PM [facility]   Methadone, Urine Not Detected   Final 03/06/2020  2:05 PM [facility]   Methamphetamine, Urine Not Detected   Final 03/06/2020  2:05 PM [facility]   Methylphenidate Not Detected   Final 03/06/2020  2:05 PM [facility]   Midazolam, Urine Not Detected   Final 03/06/2020  2:05 PM [facility]   Morphine Urine Not Detected   Final 03/06/2020  2:05 PM [facility]   Norbuprenorphine, Urine Not Detected   Final 03/06/2020  2:05 PM [facility]   Nordiazepam, Urine Not Detected   Final 03/06/2020  2:05 PM [facility]   Norfentanyl, Urine Not Detected   Final 03/06/2020  2:05 PM [facility]   NORHYDROCODONE, URINE Not Detected   Final 03/06/2020  2:05 PM [facility]   Noroxycodone, Urine Not Detected   Final 03/06/2020  2:05 PM [facility]   NOROXYMORPHONE, URINE Not Detected   Final 03/06/2020  2:05 PM [facility]   Oxazepam, Urine Not Detected   Final 03/06/2020  2:05 PM [facility]   Oxycodone Urine Not Detected   Final 03/06/2020  2:05 PM [facility]   Oxymorphone, Urine Not Detected   Final 03/06/2020  2:05 PM [facility]   PCP, Urine Not Detected   Final 03/06/2020  2:05 PM [facility]   Phentermine, Ur Not Detected   Final 03/06/2020  2:05 PM [facility]   Propoxyphene, Urine Not Detected   Final 03/06/2020  2:05 PM [facility]   Tapentadol-O-Sulfate, Urine Not Detected   Final 03/06/2020  2:05 PM [facility]   Tapentadol, Urine Not Detected   Final 03/06/2020  2:05 PM [facility]   Temazepam, Urine Not Detected   Final 03/06/2020  2:05 PM [facility]   Tramadol, Urine Not Detected   Final 03/06/2020  2:05 PM [facility]   Zolpidem, Urine Not Detected   Final 03/06/2020  2:05 PM [facility]   Drugs Expected, Ur NONE    Final 03/06/2020  2:05  [facility]   Creatinine, Ur <20.0Low   20.0 - 400.0 mg/dL Final 03/06/2020  2:05 PM [facility]   (NOTE)   Creatinine <20 mg/dL is consistent with a dilute urine specimen.    Recollection to obtain a more concentrated specimen, TECHNOLOGIST PROVIDED HISTORY:   STANDING -- AP and Lateral; Include C2 to Pelvis & both femoral heads   scoliosis; STANDING -- AP and Lateral; Include C2 to Pelvis & both femoral   heads       FINDINGS:   Visualized portions of the lungs demonstrate no focal consolidation.  Mild   chronic-appearing interstitial lung changes.  Mild cardiomegaly.  No   pulmonary edema.       No abnormally dilated loops of small bowel.       L3-L4 anterolisthesis is unchanged.       Mild thoracic levoscoliosis of approximately 18 degrees is unchanged.  Mild   lumbar levoscoliosis of approximately 8 degrees.           Impression   Mild thoracolumbar scoliosis.           EXAMINATION:   3 X-RAY VIEWS OF THE LUMBAR SPINE       2/1/2021 3:30 pm       COMPARISON:   MRI November 24, 2020       HISTORY:   ORDERING SYSTEM PROVIDED HISTORY: Lumbar stenosis with neurogenic claudication   TECHNOLOGIST PROVIDED HISTORY:   Standing lateral Flexion, Neutral, extension   Include both femoral heads on neutral imaging   Evaluate for instability   Reason for Exam: Evaluate for instability   Acuity: Unknown   Type of Exam: Unknown       FINDINGS:   Lumbar vertebral body height is maintained.  6 mm L3-L4 anterolisthesis.       Severe disc space narrowing and endplate degenerative changes at L4-L5. Moderate disc space narrowing and endplate degenerative changes at L5-S1.       No instability with flexion or extension.           Impression   L3-L4 anterolisthesis without instability.                       Pill count: appropriate    fill date :  n/a    Morphine equivalent dose as reported on OARRS:     Review ofOARRS does not show any aberrant prescription behavior. Medication is helping the patient stay active. Patient denies any side effects and reports adequate analgesia. No sign of misuse/abuse.             Past Medical History:   Diagnosis Date    ARIADNE (acute kidney injury) ([facility] [address] 9/4/2019    Anxiety     Asthma     CAD (coronary artery disease)  Class 1 obesity due to excess calories with serious comorbidity and body mass index (BMI) of 32.0 to 32.9 in adult 2020    Depression     Examination of participant in clinical trial 11    End date 2015    HTN (hypertension)     Lumbar radiculopathy     Mixed hyperlipidemia 1/15/2018    OA (osteoarthritis)     PVD (peripheral vascular disease) with claudication ([facility] [address] 2015       Past Surgical History:   Procedure Laterality Date     SECTION      COLONOSCOPY N/A 2019    COLORECTAL CANCER SCREENING, NOT HIGH RISK performed by Valentín Nielsen MD at [address]      2 stents    DILATION AND CURETTAGE OF UTERUS N/A     uterine polyp       Allergies   Allergen Reactions    Claritin [Loratadine]      2010 Heart palpitations  2010 Heart palpitations    Codeine Nausea Only         Current Outpatient Medications:     cyclobenzaprine (FLEXERIL) 10 MG tablet, TAKE 1 TABLET BY MOUTH TWICE DAILY AS NEEDED FOR MUSCLE SPASMS, Disp: 60 tablet, Rfl: 0    pregabalin (LYRICA) 150 MG capsule, TAKE 1 CAPSULE BY MOUTH THREE TIMES DAILY, Disp: 90 capsule, Rfl: 0    simvastatin (ZOCOR) 40 MG tablet, TAKE 1 TABLET BY MOUTH EVERY NIGHT, Disp: 90 tablet, Rfl: 3    Calcium Carb-Cholecalciferol (CALCIUM-VITAMIN D) 600-400 MG-UNIT TABS, TAKE 1 TABLET BY MOUTH DAILY, Disp: 90 tablet, Rfl: 0    enalapril (VASOTEC) 10 MG tablet, TAKE 2 TABLETS BY MOUTH EVERY MORNING, THEN 1 TABLET BY MOUTH EVERY EVENING, Disp: 90 tablet, Rfl: 2    isosorbide mononitrate (IMDUR) 30 MG extended release tablet, TAKE 1 TABLET BY MOUTH EVERY DAY, Disp: 90 tablet, Rfl: 2    FLUoxetine (PROZAC) 20 MG capsule, TAKE 3 CAPSULES BY MOUTH EVERY DAY, Disp: 90 capsule, Rfl: 3    cetirizine (ZYRTEC) 10 MG tablet, Take 1 tablet by mouth daily, Disp: 90 tablet, Rfl: 2    aspirin (ASPIRIN LOW DOSE) 81 MG EC tablet, TAKE 1 TABLET BY MOUTH DAILY, Disp: 90 tablet, Rfl: 2    metoprolol succinate Last Year: Never true   Transportation Needs: No Transportation Needs    Lack of Transportation (Medical): No    Lack of Transportation (Non-Medical): No   Physical Activity:     Days of Exercise per Week:     Minutes of Exercise per Session:    Stress:     Feeling of Stress :    Social Connections:     Frequency of Communication with Friends and Family:     Frequency of Social Gatherings with Friends and Family:     Attends Baptism Services:     Active Member of Clubs or Organizations:     Attends Club or Organization Meetings:     Marital Status:    Intimate Partner Violence:     Fear of Current or Ex-Partner:     Emotionally Abused:     Physically Abused:     Sexually Abused:          Review of Systems:  Review of Systems   Constitutional: Negative. Eyes:        Glasses   Respiratory: Negative. Hematologic/Lymphatic: Negative. Skin: Negative. Musculoskeletal: Positive for back pain and joint pain. Gastrointestinal: Positive for heartburn. Genitourinary: Negative. Neurological: Positive for numbness. Psychiatric/Behavioral: Positive for depression. Managing         Physical Exam:  LMP 11/07/2012     Physical Exam  Skin:         Neurological:      Mental Status: She is alert and oriented to person, place, and time. Psychiatric:         Mood and Affect: Mood normal.         Thought Content: Thought content normal.           Assessment:      Problem List Items Addressed This Visit     Sprain of sacroiliac ligament - Primary    Spinal stenosis of lumbar region    Lumbar radiculopathy    Lumbar degenerative disc disease    Degenerative disc disease, cervical            Treatment Plan:  DISCUSSION: Treatment options discussed withpatient and all questions answered to patient's satisfaction.      Possible side effects, risk of tolerance and or dependence and alternative treatments discussed    Obtaining appropriate analgesic effect of treatment   No signs of potential drug abuse or diversion identified    [x] Ill effects of being on chronic pain medications such as sleep disturbances, respiratory depression, hormonal changes, withdrawal symptoms, chronic opioid dependence and tolerance as well as risk of taking opioids with Benzodiazepines and taking opioids along with alcohol,  werediscussed with patient. I had asked the patient to minimize medication use and utilize pain medications only for uncontrolled rest pain or pain with exertional activities. I advised patient not to self-escalate painmedications without consulting with us. At each of patient's future visits we will try to taper pain medications, while adjusting the adjunct medications, and re-evaluating for Physical Therapy to improve spinal andjoint strength. We will continue to have discussions to decrease pain medications as tolerated. Counseled patient on effects their pain medication and /or their medical condition mayhave on their  ability to drive or operate machinery. Instructed not to drive or operate machinery if drowsy     I also discussed with the patient regarding the dangers of combining narcotic pain medication with tranquilizers, alcohol or illegal drugs or taking the medication any way other than prescribed. The dangers were discussed  including respiratory depression and death. Patient was told to tell  all  physicians regarding the medications he is getting from pain clinic. Patient is warned not to take any unprescribed medications over-the-countermedications that can depress breathing . Patient is advised to talk to the pharmacist or physicians if planning to take any over-the-counter medications before  takeing them. Patient is strongly advised to avoid tranquilizers or  relaxants, illegal drugs  or any medications that can depress breathing  Patient is also advised to tell us if there is any changes in their medications from other physicians.       1, call if pain increases    TREATMENT OPTIONS: See as needed

## 2021-08-29 DIAGNOSIS — M19.019 OSTEOARTHRITIS OF SHOULDER, UNSPECIFIED LATERALITY, UNSPECIFIED OSTEOARTHRITIS TYPE: ICD-10-CM

## 2021-08-29 DIAGNOSIS — M50.30 DEGENERATIVE DISC DISEASE, CERVICAL: ICD-10-CM

## 2021-08-29 DIAGNOSIS — I10 ESSENTIAL HYPERTENSION: ICD-10-CM

## 2021-08-30 RX ORDER — METOPROLOL SUCCINATE 50 MG/1
TABLET, EXTENDED RELEASE ORAL
Qty: 90 TABLET | Refills: 2 | Status: SHIPPED | OUTPATIENT
Start: 2021-08-30 | End: 2022-05-09 | Stop reason: SDUPTHER

## 2021-08-30 RX ORDER — MELOXICAM 15 MG/1
TABLET ORAL
Qty: 90 TABLET | Refills: 2 | Status: SHIPPED | OUTPATIENT
Start: 2021-08-30 | End: 2022-05-09 | Stop reason: SDUPTHER

## 2021-09-05 DIAGNOSIS — M54.16 LUMBAR RADICULOPATHY: ICD-10-CM

## 2021-09-05 DIAGNOSIS — M48.061 SPINAL STENOSIS OF LUMBAR REGION WITHOUT NEUROGENIC CLAUDICATION: ICD-10-CM

## 2021-09-05 DIAGNOSIS — M19.019 OSTEOARTHRITIS OF SHOULDER, UNSPECIFIED LATERALITY, UNSPECIFIED OSTEOARTHRITIS TYPE: ICD-10-CM

## 2021-09-05 DIAGNOSIS — M50.30 DEGENERATIVE DISC DISEASE, CERVICAL: ICD-10-CM

## 2021-09-07 ENCOUNTER — OFFICE VISIT (OUTPATIENT)
Dept: NEUROLOGY | Age: 61
End: 2021-09-07
Payer: MEDICARE

## 2021-09-07 VITALS
OXYGEN SATURATION: 100 % | SYSTOLIC BLOOD PRESSURE: 136 MMHG | HEIGHT: 62 IN | HEART RATE: 85 BPM | DIASTOLIC BLOOD PRESSURE: 84 MMHG | WEIGHT: 180 LBS | BODY MASS INDEX: 33.13 KG/M2

## 2021-09-07 DIAGNOSIS — M48.061 SPINAL STENOSIS OF LUMBAR REGION WITHOUT NEUROGENIC CLAUDICATION: ICD-10-CM

## 2021-09-07 DIAGNOSIS — M54.41 ACUTE BILATERAL LOW BACK PAIN WITH BILATERAL SCIATICA: ICD-10-CM

## 2021-09-07 DIAGNOSIS — R20.0 LOWER EXTREMITY NUMBNESS: ICD-10-CM

## 2021-09-07 DIAGNOSIS — I10 ESSENTIAL HYPERTENSION: ICD-10-CM

## 2021-09-07 DIAGNOSIS — M54.16 LUMBAR RADICULOPATHY: Primary | ICD-10-CM

## 2021-09-07 DIAGNOSIS — M54.42 ACUTE BILATERAL LOW BACK PAIN WITH BILATERAL SCIATICA: ICD-10-CM

## 2021-09-07 DIAGNOSIS — M51.36 LUMBAR DEGENERATIVE DISC DISEASE: ICD-10-CM

## 2021-09-07 DIAGNOSIS — M54.16 LUMBAR RADICULOPATHY, CHRONIC: ICD-10-CM

## 2021-09-07 PROCEDURE — G8427 DOCREV CUR MEDS BY ELIG CLIN: HCPCS | Performed by: STUDENT IN AN ORGANIZED HEALTH CARE EDUCATION/TRAINING PROGRAM

## 2021-09-07 PROCEDURE — 99214 OFFICE O/P EST MOD 30 MIN: CPT | Performed by: STUDENT IN AN ORGANIZED HEALTH CARE EDUCATION/TRAINING PROGRAM

## 2021-09-07 PROCEDURE — 1036F TOBACCO NON-USER: CPT | Performed by: STUDENT IN AN ORGANIZED HEALTH CARE EDUCATION/TRAINING PROGRAM

## 2021-09-07 PROCEDURE — G8417 CALC BMI ABV UP PARAM F/U: HCPCS | Performed by: STUDENT IN AN ORGANIZED HEALTH CARE EDUCATION/TRAINING PROGRAM

## 2021-09-07 PROCEDURE — 3017F COLORECTAL CA SCREEN DOC REV: CPT | Performed by: STUDENT IN AN ORGANIZED HEALTH CARE EDUCATION/TRAINING PROGRAM

## 2021-09-07 RX ORDER — CYCLOBENZAPRINE HCL 10 MG
TABLET ORAL
Qty: 60 TABLET | Refills: 0 | Status: SHIPPED | OUTPATIENT
Start: 2021-09-07 | End: 2021-10-06

## 2021-09-07 RX ORDER — PREGABALIN 150 MG/1
CAPSULE ORAL
Qty: 90 CAPSULE | Refills: 0 | Status: SHIPPED | OUTPATIENT
Start: 2021-09-07 | End: 2021-10-08

## 2021-09-07 ASSESSMENT — ENCOUNTER SYMPTOMS
VOMITING: 0
NAUSEA: 0
CHEST TIGHTNESS: 0
ABDOMINAL PAIN: 0
SHORTNESS OF BREATH: 0
EYE DISCHARGE: 0
WHEEZING: 0
COUGH: 0
BACK PAIN: 0
SORE THROAT: 0
DIARRHEA: 0

## 2021-09-07 NOTE — PROGRESS NOTES
Attending Physician Statement:    I have discussed the case of Oval Shira, including pertinent history and exam findings with the resident. I have seen and examined the patient and the key elements of the encounter have been performed by me. I have reviewed medications, clinical laboratory, imaging and other diagnostic tests with the residents. I agree with the assessment, plan and orders as documented by the resident with changes made to the note as needed. Interval History: 9/7/2021   Since last clinic visit, patient endorses some improvement in the chronic back pain after receiving Epidural injections but endorses worsening of the numbness  Denies any weakness in bilateral lower extremities. Currently taking Lyrica 150 mg 2 times a day. Denies any side effects on the medications. Followed up with neurosurgery on 5/5/2021, as patient had significant improvement in the symptoms with multimodal conservative measures including physical therapy, home stretching exercise regimen and injections along with oral steroids, neurosurgery recommended continuing conservative measures for now. May consider surgical options if patient has more relapses or becomes refractory to above treatments    Interval History: 3/9/2021   Patient was seen in the clinic for back pain and right leg numbness. She was last seen in the clinic with neurology resident and attending Dr. Judge Romero on 11/10/2020     Patient has a history of chronic back pain which started around a year ago, associated with radicular symptoms going down from the right groin onto the anterolateral aspect of the right thigh with tingling sensation. Initially her pain was mild but then she has started having shooting pain from the back going down in her both lower extremities, especially getting worse on waking up in the morning or on walking for prolonged period of time.   She had MRI lumbar spine done in February 2020 which showed multilevel degenerative changes at L2-L3, L4-L5 and L5-S1. During the last clinic visit repeat MRI of the lumbar spine was ordered   and she was continued on Lyrica 150 mg twice a day.        Since last clinic visit, patient still continues to complain of pain in the right groin radiating to the right lower extremity. Endorses some improvement in her symptoms with Lyrica. Started following up with the pain physician, which is given prednisone taper, endorses improvement in the symptoms with that. May consider epidural injections if needed.     Denies any other new neurologic concerns during this visit.     MRI lumbar spine 11/24/2020  Impression   1. 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   2. Increased severe L5-S1 degenerative disc disease. 3. Severe L4-5 and L5-S1 and moderate L3-4 spinal canal stenosis. 4. Multilevel neural foraminal narrowing as detailed above and greatest   involving the L5 neural foramina where it is moderate bilaterally.         Patient was evaluated by neurosurgery on 2/10/2021 he recommended long cassette scoliosis film. And recommend continuing conservative measures including physical therapy, prednisone taper and repeat epidural injection.        Impression and Plan:      Chronic back pain with radicular symptoms in bilateral lower extremity  Lumbar radiculopathy  Lumbar degenerative disc disease  Hypertension  CAD  Hyperlipidemia  Asthma  History of depression           Plan  -Continue Lyrica to 150 mg 3 times a day. Patient denies any side effects of Lyrica.  -Continue physical therapy  -Continue to follow-up with the pain management.   Patient receives epidural injections  Continue home strengthening exercises  -May consider EMG nerve conduction study if symptoms does not improve  -Follow-up with neurosurgery as scheduled  - Follow up in the clinic with the resident in 3 to 4 months  - Instructed patient to call the clinic if symptoms worsen or develop any new symptoms.          This note is created with the assistance of a speech recognition program.  While intending to generate a document that actually reflects the content of the visit, the document can still have some errors including those of syntax and sound a like substitutions which may escape proof reading. In such instances, actual meaning can be extrapolated by contextual diversion.     Melvin Oleary MD 9/7/2021 3:37 PM    Neurology

## 2021-09-07 NOTE — PROGRESS NOTES
[address] [place] [address], [facility] [address], [facility], [address] [facility], [address] [place] [address]  P: [phone]  F: [phone]    NEUROLOGY CLINIC NOTE     PATIENT NAME: Hipolito Tyson  PATIENT MRN: [id number]  PRIMARY CARE PHYSICIAN: Fran Franco MD    HPI:      Hipolito Tyson is a 64 y.o. right handed  female with PMH significant for  was seen in the clinic for  Follow up      9/7/2021:  Patient continues to have right groin numbness, feels like numbness has increased. Patient recently had steroid injection with pain management felt to elevate the pain. Patient recently had Doppler scan done for bilateral lower extremity, as per the patient she has decreased blood flow in the right leg, she is: Follow-up with vascular surgeon at [facility].  Recommended patient to send over the document from the [facility].    Patient denies any weakness, headache, dizziness, tingling, vision changes. Followed up with neurosurgery on 5/5/2021, as patient had significant improvement in the symptoms with multimodal conservative measures including physical therapy, home stretching exercise regimen and injections along with oral steroids, neurosurgery recommended continuing conservative measures for now. May consider surgical options if patient has more relapses or becomes refractory to above treatments          3/9/2021: Patient continues to have right groin numbness, denies any worsening of the numbness. Patient denies any headache, dizziness, vision changes, weakness. Patient follow-up with pain management clinic. Patient completed prednisone taper. Patient saw neurosurgery on 2/10/2021: For lumbar stenosis neurogenic claudication the recommended to continue conservative management; physical therapy, prednisone taper, and repeat epidural injection. Location: Lyrica 150 mg twice daily.      Off note:      She was seen in clinic on 11/10/2020:      60 female with a history of diabetes, hypertension dyslipidemia coming in for right leg numbness and back pain. Patient reports both her symptoms started approximately a year ago. She reports disc constant sensation and numbness on her right groin down to the anterolateral right thigh, with occasional tingling sensations with no clear triggers. Pain severity is mild, approximately 4/10. She also has aching lower back pain that have actually gone away after she had epidural steroid injections with pain management in July. However, about 4 weeks ago she started having recurrence of shooting back pain that is now radiating down both her legs, typically worse first thing in the morning. Pain is also aggravated by prolonged standing or walking. Patient reports the symptoms are new, and different from her initial back pain a year ago. She denies any changes in bowel or bladder habits. She did have an MRI of the lumbar spine in February which showed multilevel degenerative changes at L2-3, L4-5 and L5-S1. She is on a Lyrica.             PATIENT HISTORY:     Past Medical History:   Diagnosis Date    ARIADNE (acute kidney injury) ([facility] [address] [facility] 2019    Anxiety     Asthma     CAD (coronary artery disease)     Class 1 obesity due to excess calories with serious comorbidity and body mass index (BMI) of 32.0 to 32.9 in adult 2020    Depression     Examination of participant in clinical trial 11    End date 2015    HTN (hypertension)     Lumbar radiculopathy     Mixed hyperlipidemia 1/15/2018    OA (osteoarthritis)     PVD (peripheral vascular disease) with claudication ([address] 2015        Past Surgical History:   Procedure Laterality Date     SECTION      COLONOSCOPY N/A 2019    COLORECTAL CANCER SCREENING, NOT HIGH RISK performed by Viktoria Hannah MD at [address]      2 stents    DILATION AND CURETTAGE OF UTERUS N/A     uterine polyp        Social History Socioeconomic History    Marital status:      Spouse name: Not on file    Number of children: Not on file    Years of education: Not on file    Highest education level: Not on file   Occupational History    Not on file   Tobacco Use    Smoking status: Former Smoker     Packs/day: 0.25     Years: 25.00     Pack years: 6.25     Types: Cigarettes     Quit date: 2009     Years since quittin.2    Smokeless tobacco: Never Used   Vaping Use    Vaping Use: Never used   Substance and Sexual Activity    Alcohol use: No     Alcohol/week: 0.0 standard drinks    Drug use: No    Sexual activity: Yes   Other Topics Concern    Not on file   Social History Narrative    Not on file     Social Determinants of Health     Financial Resource Strain: Low Risk     Difficulty of Paying Living Expenses: Not hard at all   Food Insecurity: No Food Insecurity    Worried About [address] in the Last Year: Never true    [facility] [facility] [employer] in the Last Year: Never true   Transportation Needs: No Transportation Needs    Lack of Transportation (Medical): No    Lack of Transportation (Non-Medical):  No   Physical Activity:     Days of Exercise per Week:     Minutes of Exercise per Session:    Stress:     Feeling of Stress :    Social Connections:     Frequency of Communication with Friends and Family:     Frequency of Social Gatherings with Friends and Family:     Attends Orthodox Services:     Active Member of Clubs or Organizations:     Attends Club or Organization Meetings:     Marital Status:    Intimate Partner Violence:     Fear of Current or Ex-Partner:     Emotionally Abused:     Physically Abused:     Sexually Abused:         Current Outpatient Medications   Medication Sig Dispense Refill    cyclobenzaprine (FLEXERIL) 10 MG tablet TAKE 1 TABLET BY MOUTH TWICE DAILY AS NEEDED FOR MUSCLE SPASMS 60 tablet 0    pregabalin (LYRICA) 150 MG capsule TAKE 1 CAPSULE BY MOUTH THREE TIMES DAILY 90 capsule 0    meloxicam (MOBIC) 15 MG tablet TAKE 1 TABLET BY MOUTH DAILY 90 tablet 2    metoprolol succinate (TOPROL XL) 50 MG extended release tablet TAKE 1 TABLET BY MOUTH DAILY 90 tablet 2    diclofenac sodium (VOLTAREN) 1 % GEL APPLY 2 GRAMS EXTERNALLY TO THE AFFECTED AREA FOUR TIMES DAILY 200 g 1    simvastatin (ZOCOR) 40 MG tablet TAKE 1 TABLET BY MOUTH EVERY NIGHT 90 tablet 3    Calcium Carb-Cholecalciferol (CALCIUM-VITAMIN D) 600-400 MG-UNIT TABS TAKE 1 TABLET BY MOUTH DAILY 90 tablet 0    enalapril (VASOTEC) 10 MG tablet TAKE 2 TABLETS BY MOUTH EVERY MORNING, THEN 1 TABLET BY MOUTH EVERY EVENING 90 tablet 2    isosorbide mononitrate (IMDUR) 30 MG extended release tablet TAKE 1 TABLET BY MOUTH EVERY DAY 90 tablet 2    FLUoxetine (PROZAC) 20 MG capsule TAKE 3 CAPSULES BY MOUTH EVERY DAY 90 capsule 3    busPIRone (BUSPAR) 15 MG tablet TAKE 1 TABLET BY MOUTH EVERY 12 HOURS AS NEEDED 120 tablet 3    ACETAMINOPHEN EXTRA STRENGTH 500 MG tablet TAKE 1 TABLET BY MOUTH EVERY 6 HOURS AS NEEDED FOR PAIN 120 tablet 3    nitroGLYCERIN (NITROSTAT) 0.4 MG SL tablet DISSOLVE 1 TABLET UNDER THE TONGUE EVERY 5 MINUTES AS NEEDED FOR CHEST PAIN. IF NO RELIEF AFTER 1 DOSE, CALL 911 25 tablet 0    albuterol sulfate  (90 Base) MCG/ACT inhaler INHALE 2 PUFFS INTO THE LUNGS EVERY 6 HOURS AS NEEDED FOR WHEEZING 36 g 3    cetirizine (ZYRTEC) 10 MG tablet Take 1 tablet by mouth daily 90 tablet 2    aspirin (ASPIRIN LOW DOSE) 81 MG EC tablet TAKE 1 TABLET BY MOUTH DAILY 90 tablet 2     No current facility-administered medications for this visit. Allergies   Allergen Reactions    Claritin [Loratadine]      02/2010 Heart palpitations  02/2010 Heart palpitations    Codeine Nausea Only        REVIEW OF SYSTEMS:     Review of Systems   Constitutional: Negative for activity change, chills, fever and unexpected weight change. HENT: Negative for congestion, hearing loss and sore throat.     Eyes: Negative for discharge and visual disturbance. Respiratory: Negative for cough, chest tightness, shortness of breath and wheezing. Cardiovascular: Negative for chest pain, palpitations and leg swelling. Gastrointestinal: Negative for abdominal pain, diarrhea, nausea and vomiting. Endocrine: Negative for polydipsia and polyphagia. Genitourinary: Negative for decreased urine volume, difficulty urinating, dysuria, frequency and vaginal pain. Musculoskeletal: Negative for arthralgias, back pain, joint swelling and neck stiffness. Skin: Negative for rash. Allergic/Immunologic: Negative for environmental allergies. Neurological: Positive for numbness (right groin and lateral part of thigh ). Negative for dizziness, tremors, seizures, syncope, facial asymmetry, speech difficulty, weakness, light-headedness and headaches. Hematological: Negative for adenopathy. Psychiatric/Behavioral: Negative for agitation, confusion, hallucinations and suicidal ideas. VITALS  /84   Pulse 85   Ht 5' 2\" (1.575 m)   Wt 180 lb (81.6 kg)   LMP 11/07/2012   SpO2 100%   BMI 32.92 kg/m²      PHYSICAL EXAMINATION:     Physical Exam     Constitutional: Well developed, well nourished and in no acute distress. Head:  normocephalic, atraumatic. Neck: supple, no carotid bruits, thyroid not palpable  Respiratory: Clear to auscultation bilaterally with no use of accessory muscles during respiration. Cardiovascular: normal rate, regular rhythm, no murmur, gallop, rub.   Abdomen: Soft, nontender, nondistended, normal bowel sounds, no hepatomegaly or splenomegaly  Extremities:  peripheral pulses palpable, no pedal edema or calf pain with palpation  Psych: normal affect      NEUROLOGICAL EXAMINATION:     Mental status   Alert and oriented; intact memory with no confusion, speech or language problems; no hallucinations or delusions     Cranial nerves   II - visual fields intact to confrontation in the right groin  -Hypertension  -Hyperlipidemia        PLAN:   -[place] dose increased 150 x 3 times daily  -PT/OT  -Pain management  -Follow-up with neurosurgery  - Follow up in the clinic in 4 months  - Instructed patient to call the clinic if symptoms worsen or develop any new symptoms. Ms. Loli Navarrete received counseling on the following healthy behaviors: medical compliance, smoking cessation, blood pressure control, regular follow up with primary doctor.            Electronically signed by Vonzella Brunner, MD on 9/7/2021 at 3:58 PM

## 2021-09-13 ENCOUNTER — OFFICE VISIT (OUTPATIENT)
Dept: FAMILY MEDICINE CLINIC | Age: 61
End: 2021-09-13
Payer: MEDICARE

## 2021-09-13 VITALS
OXYGEN SATURATION: 98 % | TEMPERATURE: 97.3 F | HEIGHT: 62 IN | SYSTOLIC BLOOD PRESSURE: 130 MMHG | HEART RATE: 74 BPM | BODY MASS INDEX: 34.96 KG/M2 | WEIGHT: 190 LBS | DIASTOLIC BLOOD PRESSURE: 80 MMHG

## 2021-09-13 DIAGNOSIS — E66.09 CLASS 1 OBESITY DUE TO EXCESS CALORIES WITH SERIOUS COMORBIDITY AND BODY MASS INDEX (BMI) OF 32.0 TO 32.9 IN ADULT: ICD-10-CM

## 2021-09-13 DIAGNOSIS — E78.2 MIXED HYPERLIPIDEMIA: ICD-10-CM

## 2021-09-13 DIAGNOSIS — M54.16 LUMBAR RADICULOPATHY: ICD-10-CM

## 2021-09-13 DIAGNOSIS — I73.9 PVD (PERIPHERAL VASCULAR DISEASE) WITH CLAUDICATION (HCC): ICD-10-CM

## 2021-09-13 DIAGNOSIS — M48.061 SPINAL STENOSIS OF LUMBAR REGION, UNSPECIFIED WHETHER NEUROGENIC CLAUDICATION PRESENT: ICD-10-CM

## 2021-09-13 DIAGNOSIS — I10 ESSENTIAL HYPERTENSION: ICD-10-CM

## 2021-09-13 DIAGNOSIS — R73.03 PREDIABETES: Primary | ICD-10-CM

## 2021-09-13 DIAGNOSIS — J45.20 MILD INTERMITTENT ASTHMA WITHOUT COMPLICATION: ICD-10-CM

## 2021-09-13 DIAGNOSIS — Z23 NEED FOR VACCINATION: ICD-10-CM

## 2021-09-13 DIAGNOSIS — F41.9 ANXIETY: ICD-10-CM

## 2021-09-13 PROCEDURE — 99214 OFFICE O/P EST MOD 30 MIN: CPT | Performed by: FAMILY MEDICINE

## 2021-09-13 PROCEDURE — 3017F COLORECTAL CA SCREEN DOC REV: CPT | Performed by: FAMILY MEDICINE

## 2021-09-13 PROCEDURE — G8427 DOCREV CUR MEDS BY ELIG CLIN: HCPCS | Performed by: FAMILY MEDICINE

## 2021-09-13 PROCEDURE — 1036F TOBACCO NON-USER: CPT | Performed by: FAMILY MEDICINE

## 2021-09-13 PROCEDURE — G8417 CALC BMI ABV UP PARAM F/U: HCPCS | Performed by: FAMILY MEDICINE

## 2021-09-13 PROCEDURE — 90674 CCIIV4 VAC NO PRSV 0.5 ML IM: CPT | Performed by: FAMILY MEDICINE

## 2021-09-13 PROCEDURE — 90471 IMMUNIZATION ADMIN: CPT | Performed by: FAMILY MEDICINE

## 2021-09-13 RX ORDER — METFORMIN HYDROCHLORIDE 500 MG/1
500 TABLET, EXTENDED RELEASE ORAL
Qty: 60 TABLET | Refills: 3 | Status: SHIPPED | OUTPATIENT
Start: 2021-09-13 | End: 2022-04-20

## 2021-09-13 RX ORDER — BUPROPION HYDROCHLORIDE 100 MG/1
100 TABLET, EXTENDED RELEASE ORAL 2 TIMES DAILY
Qty: 60 TABLET | Refills: 3 | Status: SHIPPED | OUTPATIENT
Start: 2021-09-13 | End: 2022-01-09

## 2021-09-13 ASSESSMENT — ENCOUNTER SYMPTOMS
ABDOMINAL PAIN: 0
BACK PAIN: 1
CHEST TIGHTNESS: 0
WHEEZING: 0
CONSTIPATION: 0
SINUS PRESSURE: 0
DIARRHEA: 0
SHORTNESS OF BREATH: 0
COUGH: 0
ABDOMINAL DISTENTION: 0
RHINORRHEA: 0
VOMITING: 0

## 2021-09-13 NOTE — PROGRESS NOTES
Visit Information    Have you changed or started any medications since your last visit including any over-the-counter medicines, vitamins, or herbal medicines? no   Are you having any side effects from any of your medications? -  no  Have you stopped taking any of your medications? Is so, why? -  no    Have you seen any other physician or provider since your last visit? No  Have you had any other diagnostic tests since your last visit? No  Have you been seen in the emergency room and/or had an admission to a hospital since we last saw you? No  Have you had your routine dental cleaning in the past 6 months? no    Have you activated your [employer] account? If not, what are your barriers?  Yes     Patient Care Team:  Aydee Miller MD as PCP - General (Family Medicine)  Aydee Miller MD as PCP - [facility]    Medical History Review  Past Medical, Family, and Social History reviewed and does contribute to the patient presenting condition    Health Maintenance   Topic Date Due    Flu vaccine (1) 09/01/2021    A1C test (Diabetic or Prediabetic)  07/26/2022    Lipid screen  07/26/2022    Potassium monitoring  07/26/2022    Creatinine monitoring  07/26/2022    Breast cancer screen  10/08/2022    Cervical cancer screen  04/16/2023    Colon cancer screen colonoscopy  05/06/2024    Pneumococcal 0-64 years Vaccine (2 of 2 - PPSV23) 05/02/2025    DTaP/Tdap/Td vaccine (2 - Td or Tdap) 04/16/2028    Shingles Vaccine  Completed    COVID-19 Vaccine  Completed    Hepatitis C screen  Completed    HIV screen  Completed    Hepatitis A vaccine  Aged Out    Hepatitis B vaccine  Aged Out    Hib vaccine  Aged Out    Meningococcal (ACWY) vaccine  Aged Out

## 2021-09-13 NOTE — PROGRESS NOTES
Chief Complaint   Patient presents with    Results     labs          Jin Short  here today for follow up on chronic medical problems, go over labs and/or diagnostic studies, and medication refills. Results (labs )      HPI: Patient is here for follow-up on lab work and hypertension hyperlipidemia. Hypertension controlled on current treatment denies any chest pain shortness of breath. Patient had lab work done that showed increase in A1c from 5.7-6 patient is on diet control. Obesity getting worse patient has gained about 15 pounds in the last 4 months. Patient reports she is not able to walk because of the pain in her back and back. She also has peripheral artery disease and had scan done that showed results below. Patient follows with vascular surgeon and has appointment next week. Patient has 1 stent placed in the past.    Anxiety patient is on Prozac for more than 6 years, which could be cause for her weight gain also. Patient reports her anxiety is stable and she can try to switch medications. Spinal lumbar stenosis follows with neurologist and pain management. Multilevel arterial disease (common femoral artery or above with femoropopliteal and/or tibioperoneal); TERRI consistent with moderate arterial disease. LEFT:   Normal lower extremity arterial physiological examination at rest.   Recommendations: Any questions prior to finalization, please call the reading physician during normal business hours at the phone number beside their name. Sylvia Chowdary compared to previous lower arterial report significant changes were noted. Clinical   correlation is recommended. /80   Pulse 74   Temp 97.3 °F (36.3 °C)   Ht 5' 2\" (1.575 m)   Wt 190 lb (86.2 kg)   LMP 11/07/2012   SpO2 98%   BMI 34.75 kg/m²    Body mass index is 34.75 kg/m².   Wt Readings from Last 3 Encounters:   09/13/21 190 lb (86.2 kg)   09/07/21 180 lb (81.6 kg)   08/03/21 180 lb (81.6 kg)        [x]Negative depression screening. PHQ Scores 2/8/2021 3/25/2019 9/25/2018 4/16/2018   PHQ2 Score 0 0 0 0   PHQ9 Score 0 0 0 0      []1-4 = Minimal depression   []5-9 = Milddepression   []10-14 = Moderate depression   []15-19 = Moderately severe depression   []20-27 = Severe depression    Discussed testing with the patient and all questions fully answered. Hospital Outpatient Visit on 07/26/2021   Component Date Value Ref Range Status    Glucose 07/26/2021 98  70 - 99 mg/dL Final    BUN 07/26/2021 7* 8 - 23 mg/dL Final    CREATININE 07/26/2021 0.75  0.50 - 0.90 mg/dL Final    Bun/Cre Ratio 07/26/2021 NOT REPORTED  9 - 20 Final    Calcium 07/26/2021 9.5  8.6 - 10.4 mg/dL Final    Sodium 07/26/2021 142  135 - 144 mmol/L Final    Potassium 07/26/2021 4.9  3.7 - 5.3 mmol/L Final    Chloride 07/26/2021 104  98 - 107 mmol/L Final    CO2 07/26/2021 31  20 - 31 mmol/L Final    Anion Gap 07/26/2021 7* 9 - 17 mmol/L Final    Alkaline Phosphatase 07/26/2021 74  35 - 104 U/L Final    ALT 07/26/2021 16  5 - 33 U/L Final    AST 07/26/2021 20  <32 U/L Final    Total Bilirubin 07/26/2021 0.28* 0.3 - 1.2 mg/dL Final    Total Protein 07/26/2021 6.9  6.4 - 8.3 g/dL Final    Albumin 07/26/2021 3.9  3.5 - 5.2 g/dL Final    Albumin/Globulin Ratio 07/26/2021 NOT REPORTED  1.0 - 2.5 Final    GFR Non- 07/26/2021 >60  >60 mL/min Final    GFR  07/26/2021 >60  >60 mL/min Final    GFR Comment 07/26/2021        Final    Comment: Average GFR for 61-76 years old:   80 mL/min/1.73sq m  Chronic Kidney Disease:   <60 mL/min/1.73sq m  Kidney failure:   <15 mL/min/1.73sq m              eGFR calculated using average adult body mass.  Additional eGFR calculator available at:        [employer]            GFR Staging 07/26/2021 NOT REPORTED   Final    WBC 07/26/2021 6.6  3.5 - 11.0 k/uL Final    RBC 07/26/2021 4.67  4.0 - 5.2 m/uL Final    Hemoglobin 07/26/2021 14.8  12.0 - 16.0 g/dL Final    Hematocrit 07/26/2021 44.1  36 - 46 % Final    MCV 07/26/2021 94.4  80 - 100 fL Final    MCH 07/26/2021 31.7  26 - 34 pg Final    MCHC 07/26/2021 33.6  31 - 37 g/dL Final    RDW 07/26/2021 15.0* 11.5 - 14.9 % Final    Platelets 20/45/4614 179  150 - 450 k/uL Final    MPV 07/26/2021 8.8  6.0 - 12.0 fL Final    NRBC Automated 07/26/2021 NOT REPORTED  per 100 WBC Final    Differential Type 07/26/2021 NOT REPORTED   Final    Seg Neutrophils 07/26/2021 52  36 - 66 % Final    Lymphocytes 07/26/2021 32  24 - 44 % Final    Monocytes 07/26/2021 12* 1 - 7 % Final    Eosinophils % 07/26/2021 3  0 - 4 % Final    Basophils 07/26/2021 1  0 - 2 % Final    Immature Granulocytes 07/26/2021 NOT REPORTED  0 % Final    Segs Absolute 07/26/2021 3.40  1.3 - 9.1 k/uL Final    Absolute Lymph # 07/26/2021 2.10  1.0 - 4.8 k/uL Final    Absolute Mono # 07/26/2021 0.80  0.1 - 1.3 k/uL Final    Absolute Eos # 07/26/2021 0.20  0.0 - 0.4 k/uL Final    Basophils Absolute 07/26/2021 0.00  0.0 - 0.2 k/uL Final    Absolute Immature Granulocyte 07/26/2021 NOT REPORTED  0.00 - 0.30 k/uL Final    WBC Morphology 07/26/2021 NOT REPORTED   Final    RBC Morphology 07/26/2021 NOT REPORTED   Final    Platelet Estimate 41/58/6610 NOT REPORTED   Final    Hemoglobin A1C 07/26/2021 6.0  4.0 - 6.0 % Final    Estimated Avg Glucose 07/26/2021 126  mg/dL Final    Comment: The ADA and AACC recommend providing the estimated average glucose result to permit better   patient understanding of their HBA1c result.       Cholesterol 07/26/2021 136  <200 mg/dL Final    Comment:    Cholesterol Guidelines:      <200  Desirable   200-240  Borderline      >240  Undesirable         HDL 07/26/2021 50  >40 mg/dL Final    Comment:    HDL Guidelines:    <40     Undesirable   40-59    Borderline    >59     Desirable         LDL Cholesterol 07/26/2021 65  0 - 130 mg/dL Final    Comment:    LDL Guidelines:     <100    Desirable   100-129 Near to/above Desirable   130-159   Borderline      >159   Undesirable     Direct (measured) LDL and calculated LDL are not interchangeable tests.  Chol/HDL Ratio 07/26/2021 2.7  <5 Final            Triglycerides 07/26/2021 104  <150 mg/dL Final    Comment:    Triglyceride Guidelines:     <150   Desirable   150-199  Borderline   200-499  High     >499   Very high   Based on AHA Guidelines for fasting triglyceride, October 2012.          VLDL 07/26/2021 NOT REPORTED  1 - 30 mg/dL Final    Vit D, 25-Hydroxy 07/26/2021 46.7  30.0 - 100.0 ng/mL Final    Comment:    Reference Range:  Vitamin D status         Range   Deficiency              <20 ng/mL   Mild Deficiency       20-30 ng/mL   Sufficiency           ng/mL   Toxicity               >100 ng/mL      TSH 07/26/2021 2.33  0.30 - 5.00 mIU/L Final         Most recent labs reviewed:     Lab Results   Component Value Date    WBC 6.6 07/26/2021    HGB 14.8 07/26/2021    HCT 44.1 07/26/2021    MCV 94.4 07/26/2021     07/26/2021       @BRIEFLAB(NA,K,CL,CO2,BUN,CREATININE,GLUCOSE,CALCIUM)@     Lab Results   Component Value Date    ALT 16 07/26/2021    AST 20 07/26/2021    ALKPHOS 74 07/26/2021    BILITOT 0.28 (L) 07/26/2021       Lab Results   Component Value Date    TSH 2.33 07/26/2021       Lab Results   Component Value Date    CHOL 136 07/26/2021    CHOL 116 03/26/2019    CHOL 137 10/26/2017     Lab Results   Component Value Date    TRIG 104 07/26/2021    TRIG 91 03/26/2019    TRIG 108 10/26/2017     Lab Results   Component Value Date    HDL 50 07/26/2021    HDL 59 07/01/2020    HDL 51 03/26/2019     Lab Results   Component Value Date    LDLCHOLESTEROL 65 07/26/2021    LDLCHOLESTEROL 67 07/01/2020    LDLCHOLESTEROL 47 03/26/2019     Lab Results   Component Value Date    VLDL NOT REPORTED 07/26/2021    VLDL NOT REPORTED 07/01/2020    VLDL NOT REPORTED 03/26/2019     Lab Results   Component Value Date    CHOLHDLRATIO 2.7 07/26/2021    CHOLHDLRATIO 2.4 07/01/2020    CHOLHDLRATIO 2.3 03/26/2019       Lab Results   Component Value Date    LABA1C 6.0 07/26/2021       No results found for: [id number]    No results found for: FOLATE    No results found for: IRON, TIBC, FERRITIN    Lab Results   Component Value Date    VITD25 46.7 07/26/2021             Current Outpatient Medications   Medication Sig Dispense Refill    buPROPion (WELLBUTRIN SR) 100 MG extended release tablet Take 1 tablet by mouth 2 times daily 60 tablet 3    metFORMIN (GLUCOPHAGE-XR) 500 MG extended release tablet Take 1 tablet by mouth daily (with breakfast) 60 tablet 3    cyclobenzaprine (FLEXERIL) 10 MG tablet TAKE 1 TABLET BY MOUTH TWICE DAILY AS NEEDED FOR MUSCLE SPASMS 60 tablet 0    pregabalin (LYRICA) 150 MG capsule TAKE 1 CAPSULE BY MOUTH THREE TIMES DAILY 90 capsule 0    meloxicam (MOBIC) 15 MG tablet TAKE 1 TABLET BY MOUTH DAILY 90 tablet 2    metoprolol succinate (TOPROL XL) 50 MG extended release tablet TAKE 1 TABLET BY MOUTH DAILY 90 tablet 2    diclofenac sodium (VOLTAREN) 1 % GEL APPLY 2 GRAMS EXTERNALLY TO THE AFFECTED AREA FOUR TIMES DAILY 200 g 1    simvastatin (ZOCOR) 40 MG tablet TAKE 1 TABLET BY MOUTH EVERY NIGHT 90 tablet 3    enalapril (VASOTEC) 10 MG tablet TAKE 2 TABLETS BY MOUTH EVERY MORNING, THEN 1 TABLET BY MOUTH EVERY EVENING 90 tablet 2    isosorbide mononitrate (IMDUR) 30 MG extended release tablet TAKE 1 TABLET BY MOUTH EVERY DAY 90 tablet 2    busPIRone (BUSPAR) 15 MG tablet TAKE 1 TABLET BY MOUTH EVERY 12 HOURS AS NEEDED 120 tablet 3    ACETAMINOPHEN EXTRA STRENGTH 500 MG tablet TAKE 1 TABLET BY MOUTH EVERY 6 HOURS AS NEEDED FOR PAIN 120 tablet 3    nitroGLYCERIN (NITROSTAT) 0.4 MG SL tablet DISSOLVE 1 TABLET UNDER THE TONGUE EVERY 5 MINUTES AS NEEDED FOR CHEST PAIN.  IF NO RELIEF AFTER 1 DOSE, CALL 911 25 tablet 0    albuterol sulfate  (90 Base) MCG/ACT inhaler INHALE 2 PUFFS INTO THE LUNGS EVERY 6 HOURS AS NEEDED FOR WHEEZING 36 g 3    aspirin (ASPIRIN LOW DOSE) 81 MG EC tablet TAKE 1 TABLET BY MOUTH DAILY 90 tablet 2    Calcium Carb-Cholecalciferol (CALCIUM-VITAMIN D) 600-400 MG-UNIT TABS TAKE 1 TABLET BY MOUTH DAILY 90 tablet 0    cetirizine (ZYRTEC) 10 MG tablet Take 1 tablet by mouth daily 90 tablet 2     No current facility-administered medications for this visit. Social History     Socioeconomic History    Marital status:      Spouse name: Not on file    Number of children: Not on file    Years of education: Not on file    Highest education level: Not on file   Occupational History    Not on file   Tobacco Use    Smoking status: Former Smoker     Packs/day: 0.25     Years: 25.00     Pack years: 6.25     Types: Cigarettes     Quit date: 2009     Years since quittin.2    Smokeless tobacco: Never Used   Vaping Use    Vaping Use: Never used   Substance and Sexual Activity    Alcohol use: No     Alcohol/week: 0.0 standard drinks    Drug use: No    Sexual activity: Yes   Other Topics Concern    Not on file   Social History Narrative    Not on file     Social Determinants of Health     Financial Resource Strain: Low Risk     Difficulty of Paying Living Expenses: Not hard at all   Food Insecurity: No Food Insecurity    Worried About [address] in the Last Year: Never true    [address] in the Last Year: Never true   Transportation Needs: No Transportation Needs    Lack of Transportation (Medical): No    Lack of Transportation (Non-Medical):  No   Physical Activity:     Days of Exercise per Week:     Minutes of Exercise per Session:    Stress:     Feeling of Stress :    Social Connections:     Frequency of Communication with Friends and Family:     Frequency of Social Gatherings with Friends and Family:     Attends Hindu Services:     Active Member of Clubs or Organizations:     Attends Club or Organization Meetings:     Marital Status:    Intimate Partner Violence:     Fear of Current or Ex-Partner:     Emotionally Abused:     Physically Abused:     Sexually Abused:      Counseling given: Not Answered        Family History   Problem Relation Age of Onset    Emphysema Mother              -rest of complaints with corresponding details per ROS    The patient's past medical, surgical, social, and family history as well as her current medications and allergies were reviewed as documented intoday's encounter. Review of Systems   Constitutional: Positive for activity change and unexpected weight change. Negative for appetite change, chills, diaphoresis and fever. HENT: Negative for congestion, nosebleeds, postnasal drip, rhinorrhea and sinus pressure. Eyes: Negative for visual disturbance. Respiratory: Negative for cough, chest tightness, shortness of breath and wheezing. Cardiovascular: Positive for leg swelling. Negative for chest pain and palpitations. Gastrointestinal: Negative for abdominal distention, abdominal pain, constipation, diarrhea and vomiting. Genitourinary: Negative for difficulty urinating, flank pain, frequency and pelvic pain. Musculoskeletal: Positive for arthralgias, back pain and gait problem. Negative for joint swelling, myalgias, neck pain and neck stiffness. Neurological: Positive for weakness and numbness. Negative for dizziness, facial asymmetry, speech difficulty and headaches. Psychiatric/Behavioral: Negative for agitation, decreased concentration, dysphoric mood, self-injury and sleep disturbance. The patient is nervous/anxious. Physical Exam  Vitals and nursing note reviewed. Constitutional:       Appearance: Normal appearance. She is obese. HENT:      Head: Normocephalic and atraumatic. Nose: Nose normal.      Mouth/Throat:      Mouth: Mucous membranes are moist.   Eyes:      Extraocular Movements: Extraocular movements intact. Pupils: Pupils are equal, round, and reactive to light.    Cardiovascular: Rate and Rhythm: Normal rate and regular rhythm. Heart sounds: Normal heart sounds. Pulmonary:      Effort: Pulmonary effort is normal.      Breath sounds: Normal breath sounds. Abdominal:      General: Bowel sounds are normal.      Palpations: Abdomen is soft. There is no mass. Tenderness: There is no abdominal tenderness. Musculoskeletal:      Cervical back: Normal range of motion. Thoracic back: No deformity, signs of trauma, spasms or bony tenderness. Normal range of motion. Lumbar back: Deformity and spasms present. No tenderness or bony tenderness. Decreased range of motion. No scoliosis. Lymphadenopathy:      Cervical: No cervical adenopathy. Neurological:      Mental Status: She is alert and oriented to person, place, and time. Cranial Nerves: Cranial nerves are intact. No cranial nerve deficit. Sensory: Sensation is intact. Motor: Weakness present. Gait: Gait abnormal.   Psychiatric:         Attention and Perception: Attention and perception normal.         Mood and Affect: Mood is anxious. Mood is not depressed. Speech: Speech normal. She is communicative. Speech is not rapid and pressured. Behavior: Behavior normal.         Thought Content: Thought content normal.             ASSESSMENT AND PLAN      1. Prediabetes  Increase in A1c start on Metformin discussed about the side effects monitoring her weight and watch your diet. - metFORMIN (GLUCOPHAGE-XR) 500 MG extended release tablet; Take 1 tablet by mouth daily (with breakfast)  Dispense: 60 tablet; Refill: 3    2. Lumbar radiculopathy  Stable continue to follow with neurologist continue Lyrica    3. Essential hypertension  Controlled continue same medications  4. PVD (peripheral vascular disease) with claudication (HCC)  Artery scan results reviewed. Follow-up with vascular surgeon    5.  Class 1 obesity due to excess calories with serious comorbidity and body mass index (BMI) of 32.0 to 32.9 in adult  Worsening discussed with patient about the dietary management. Cut down on carbs and fats    6. Spinal stenosis of lumbar region, unspecified whether neurogenic claudication present  Stable on current treatment    7. Mixed hyperlipidemia  Stable on recent blood work continue statins    8. Mild intermittent asthma without complication  Stable    9. Anxiety  Discontinue Prozac due to weight gain start on Wellbutrin  - buPROPion (WELLBUTRIN SR) 100 MG extended release tablet; Take 1 tablet by mouth 2 times daily  Dispense: 60 tablet; Refill: 3    10. Need for vaccination    - INFLUENZA, MDCK QUADV, 2 YRS AND OLDER, IM, PF, PREFILL SYR OR SDV, 0.5ML (FLUCELVAX QUADV, PF)      Orders Placed This Encounter   Procedures    INFLUENZA, MDCK QUADV, 2 YRS AND OLDER, IM, PF, PREFILL SYR OR SDV, 0.5ML (FLUCELVAX QUADV, PF)         Medications Discontinued During This Encounter   Medication Reason    FLUoxetine (PROZAC) 20 MG capsule Alternate therapy       Christina received counseling on the following healthy behaviors: nutrition, exercise and medication adherence  Reviewed prior labs and health maintenance  Continue current medications, diet and exercise. Discussed use, benefit, and side effects of prescribed medications. Barriers to medication compliance addressed. Patient given educational materials - see patient instructions  Was a self-tracking handout given in paper form or via [employer]? Yes    Requested Prescriptions     Signed Prescriptions Disp Refills    buPROPion (WELLBUTRIN SR) 100 MG extended release tablet 60 tablet 3     Sig: Take 1 tablet by mouth 2 times daily    metFORMIN (GLUCOPHAGE-XR) 500 MG extended release tablet 60 tablet 3     Sig: Take 1 tablet by mouth daily (with breakfast)       All patient questions answered. Patient voiced understanding. Quality Measures    Body mass index is 34.75 kg/m². Elevated.  Weight control planned discussed daily exercise regimen and Healthy diet and regular exercise. BP: 130/80 Blood pressure is normal. Treatment plan consists of Weight Reduction, DASH Eating Plan, Dietary Sodium Restriction, Increased Physical Activity and No treatment change needed. Lab Results   Component Value Date    LDLCHOLESTEROL 65 07/26/2021    (goal LDL reduction with dx if diabetes is 50% LDL reduction)      PHQ Scores 2/8/2021 3/25/2019 9/25/2018 4/16/2018   PHQ2 Score 0 0 0 0   PHQ9 Score 0 0 0 0     Interpretation of Total Score Depression Severity: 1-4 = Minimal depression, 5-9 = Mild depression, 10-14 = Moderate depression, 15-19 = Moderately severe depression, 20-27 = Severe depression    The patient'spast medical, surgical, social, and family history as well as her   current medications and allergies were reviewed as documented in today's encounter. Medications, labs, diagnostic studies, consultations andfollow-up as documented in this encounter. Return in about 4 months (around 1/13/2022). Patient wasseen with total face to face time of 30 minutes. More than 50% of this visit was counseling and education. Future Appointments   Date Time Provider Matt Diez   1/11/2022  3:00 PM Souleymane Saenz MD [facility] Aicha Baez   1/13/2022  2:45 PM Sujata Esteban MD [facility] Aishwarya Baez     This note was completed by using the assistance of a speech-recognition program. However, inadvertent computerized transcription errors may be present. Althoughevery effort was made to ensure accuracy, no guarantees can be provided that every mistake has been identified and corrected by editing.   Electronically signed by Sujata Esteban MD on 9/13/2021  3:26 PM

## 2021-09-20 DIAGNOSIS — I10 ESSENTIAL HYPERTENSION: ICD-10-CM

## 2021-09-20 RX ORDER — ASPIRIN 81 MG/1
TABLET ORAL
Qty: 90 TABLET | Refills: 2 | Status: SHIPPED | OUTPATIENT
Start: 2021-09-20 | End: 2022-06-16

## 2021-09-27 DIAGNOSIS — M79.605 LEG PAIN, LEFT: ICD-10-CM

## 2021-09-27 RX ORDER — PSEUDOEPHED/ACETAMINOPH/DIPHEN 30MG-500MG
TABLET ORAL
Qty: 120 TABLET | Refills: 3 | Status: SHIPPED | OUTPATIENT
Start: 2021-09-27 | End: 2022-01-19

## 2021-09-27 NOTE — TELEPHONE ENCOUNTER
Please Approve or Refuse.   Send to Pharmacy per Pt's Request:      Next Visit Date:  1/13/2022   Last Visit Date: 9/13/2021    Hemoglobin A1C (%)   Date Value   07/26/2021 6.0   07/01/2020 5.7   10/04/2016 5.3             ( goal A1C is < 7)   BP Readings from Last 3 Encounters:   09/13/21 130/80   09/07/21 136/84   08/03/21 131/88          (goal 120/80)  BUN   Date Value Ref Range Status   07/26/2021 7 (L) 8 - 23 mg/dL Final     CREATININE   Date Value Ref Range Status   07/26/2021 0.75 0.50 - 0.90 mg/dL Final     Potassium   Date Value Ref Range Status   07/26/2021 4.9 3.7 - 5.3 mmol/L Final

## 2021-09-28 DIAGNOSIS — J30.9 ALLERGIC SINUSITIS: ICD-10-CM

## 2021-09-28 RX ORDER — CETIRIZINE HYDROCHLORIDE 10 MG/1
TABLET ORAL
Qty: 90 TABLET | Refills: 2 | Status: SHIPPED | OUTPATIENT
Start: 2021-09-28 | End: 2022-02-21

## 2021-10-01 DIAGNOSIS — S33.6XXD SPRAIN OF SACROILIAC LIGAMENT, SUBSEQUENT ENCOUNTER: ICD-10-CM

## 2021-10-01 DIAGNOSIS — M51.26 PROTRUDED LUMBAR DISC: ICD-10-CM

## 2021-10-01 DIAGNOSIS — M51.36 LUMBAR DEGENERATIVE DISC DISEASE: ICD-10-CM

## 2021-10-06 DIAGNOSIS — M50.30 DEGENERATIVE DISC DISEASE, CERVICAL: ICD-10-CM

## 2021-10-06 DIAGNOSIS — M19.019 OSTEOARTHRITIS OF SHOULDER, UNSPECIFIED LATERALITY, UNSPECIFIED OSTEOARTHRITIS TYPE: ICD-10-CM

## 2021-10-06 RX ORDER — CYCLOBENZAPRINE HCL 10 MG
TABLET ORAL
Qty: 60 TABLET | Refills: 0 | Status: SHIPPED | OUTPATIENT
Start: 2021-10-06 | End: 2021-11-04

## 2021-10-07 DIAGNOSIS — M48.061 SPINAL STENOSIS OF LUMBAR REGION WITHOUT NEUROGENIC CLAUDICATION: ICD-10-CM

## 2021-10-07 DIAGNOSIS — M54.16 LUMBAR RADICULOPATHY: ICD-10-CM

## 2021-10-08 RX ORDER — PREGABALIN 150 MG/1
CAPSULE ORAL
Qty: 90 CAPSULE | Refills: 1 | Status: SHIPPED | OUTPATIENT
Start: 2021-10-08 | End: 2021-12-01

## 2021-10-18 ENCOUNTER — OFFICE VISIT (OUTPATIENT)
Dept: FAMILY MEDICINE CLINIC | Age: 61
End: 2021-10-18
Payer: MEDICARE

## 2021-10-18 ENCOUNTER — NURSE TRIAGE (OUTPATIENT)
Dept: OTHER | Facility: CLINIC | Age: 61
End: 2021-10-18

## 2021-10-18 VITALS
DIASTOLIC BLOOD PRESSURE: 78 MMHG | OXYGEN SATURATION: 98 % | SYSTOLIC BLOOD PRESSURE: 138 MMHG | WEIGHT: 192 LBS | TEMPERATURE: 97.2 F | HEART RATE: 96 BPM | HEIGHT: 62 IN | BODY MASS INDEX: 35.33 KG/M2

## 2021-10-18 DIAGNOSIS — H60.392 PINNA INFECTION, ACUTE, LEFT: ICD-10-CM

## 2021-10-18 DIAGNOSIS — R60.9 PERIPHERAL EDEMA: ICD-10-CM

## 2021-10-18 DIAGNOSIS — H66.92 ACUTE INFECTION OF LEFT EAR: Primary | ICD-10-CM

## 2021-10-18 PROBLEM — R60.0 PERIPHERAL EDEMA: Status: ACTIVE | Noted: 2021-10-18

## 2021-10-18 PROCEDURE — 3017F COLORECTAL CA SCREEN DOC REV: CPT | Performed by: FAMILY MEDICINE

## 2021-10-18 PROCEDURE — G8427 DOCREV CUR MEDS BY ELIG CLIN: HCPCS | Performed by: FAMILY MEDICINE

## 2021-10-18 PROCEDURE — 1036F TOBACCO NON-USER: CPT | Performed by: FAMILY MEDICINE

## 2021-10-18 PROCEDURE — 4130F TOPICAL PREP RX AOE: CPT | Performed by: FAMILY MEDICINE

## 2021-10-18 PROCEDURE — G8417 CALC BMI ABV UP PARAM F/U: HCPCS | Performed by: FAMILY MEDICINE

## 2021-10-18 PROCEDURE — 99213 OFFICE O/P EST LOW 20 MIN: CPT | Performed by: FAMILY MEDICINE

## 2021-10-18 PROCEDURE — G8482 FLU IMMUNIZE ORDER/ADMIN: HCPCS | Performed by: FAMILY MEDICINE

## 2021-10-18 RX ORDER — METHYLPREDNISOLONE SODIUM SUCCINATE 125 MG/2ML
125 INJECTION, POWDER, LYOPHILIZED, FOR SOLUTION INTRAMUSCULAR; INTRAVENOUS ONCE
Status: COMPLETED | OUTPATIENT
Start: 2021-10-18 | End: 2021-10-18

## 2021-10-18 RX ORDER — HYDROCHLOROTHIAZIDE 12.5 MG/1
12.5 TABLET ORAL DAILY
Qty: 30 TABLET | Refills: 1 | Status: SHIPPED | OUTPATIENT
Start: 2021-10-18 | End: 2021-12-14

## 2021-10-18 RX ORDER — AMOXICILLIN AND CLAVULANATE POTASSIUM 875; 125 MG/1; MG/1
1 TABLET, FILM COATED ORAL 2 TIMES DAILY
Qty: 14 TABLET | Refills: 0 | Status: SHIPPED | OUTPATIENT
Start: 2021-10-18 | End: 2021-10-25

## 2021-10-18 RX ORDER — CILOSTAZOL 50 MG/1
50 TABLET ORAL 2 TIMES DAILY
COMMUNITY

## 2021-10-18 RX ORDER — METHYLPREDNISOLONE 4 MG/1
TABLET ORAL
Qty: 1 KIT | Refills: 0 | Status: SHIPPED | OUTPATIENT
Start: 2021-10-18 | End: 2021-12-02 | Stop reason: ALTCHOICE

## 2021-10-18 RX ADMIN — METHYLPREDNISOLONE SODIUM SUCCINATE 125 MG: 125 INJECTION, POWDER, LYOPHILIZED, FOR SOLUTION INTRAMUSCULAR; INTRAVENOUS at 16:23

## 2021-10-18 ASSESSMENT — ENCOUNTER SYMPTOMS
WHEEZING: 0
ABDOMINAL PAIN: 0
SHORTNESS OF BREATH: 0

## 2021-10-18 NOTE — PROGRESS NOTES
[facility] PHYSICIANS  [facility]  [address]  [address] [id number]  Dept: [address] (:  1960) is a 64 y.o. female. Patient is here for evaluation of the following chief complaint(s):  Chief Complaint   Patient presents with    Otalgia     redness and swelling left side started this morning     Leg Swelling     left foot         SUBJECTIVE/OBJECTIVE:  HENRIK Ward is a 64 y.o. female patient. Patient is an established patient of Dr. Cranford Riedel . Patient has a known history of peripheral edema  . Patient also came in c/o severe left ear pain. She noted having some itchiness yesterday and had used her fingernails to clean cerumen. She woke up this morning with severe swelling, and pain. She also has cerumen impaction on both ears. She denies any fever or chills    She also has some worsening peripheral edema. She mentioned that she was on HCTZ in the past for this. She does not recall why this medication was discontinued. Renal function is normal.    No trauma to legs. Left ankle/foot more swollen on left than right. Patient unable to wear compression stockings due to PAD. No data recorded  VITAL SIGNS:  Vitals:    10/18/21 1553   BP: 138/78   Pulse: 96   Temp: 97.2 °F (36.2 °C)   SpO2: 98%   Weight: 192 lb (87.1 kg)   Height: 5' 2\" (1.575 m)   Estimated body mass index is 35.12 kg/m² as calculated from the following:    Height as of this encounter: 5' 2\" (1.575 m). Weight as of this encounter: 192 lb (87.1 kg). Review of Systems   Constitutional: Positive for activity change. Negative for chills, fatigue and fever. HENT: Positive for ear pain and hearing loss. Respiratory: Negative for shortness of breath and wheezing. Cardiovascular: Positive for leg swelling. Negative for chest pain and palpitations. Gastrointestinal: Negative for abdominal pain. Genitourinary: Negative for dysuria.    Neurological: Positive for headaches. Hematological: Positive for adenopathy. Psychiatric/Behavioral: Negative for sleep disturbance and suicidal ideas. The patient is nervous/anxious. Physical Exam  Vitals and nursing note reviewed. Constitutional:       Appearance: Normal appearance. HENT:      Head: Normocephalic. Right Ear: There is impacted cerumen. Left Ear: Swelling and tenderness present. There is impacted cerumen. Tympanic membrane is injected. Ears:      Comments: Erythematous and swollen pinna     Nose: Nose normal.   Cardiovascular:      Rate and Rhythm: Normal rate and regular rhythm. Pulmonary:      Effort: Pulmonary effort is normal.      Breath sounds: Normal breath sounds. Musculoskeletal:      Right lower le+ Edema present. Left lower leg: 3+ Edema present. Lymphadenopathy:      Head:      Left side of head: Preauricular and posterior auricular adenopathy present. Skin:     General: Skin is warm and dry. Neurological:      Mental Status: She is alert and oriented to person, place, and time. Psychiatric:         Mood and Affect: Mood is anxious. Speech: Speech is rapid and pressured. Thought Content: Thought content does not include suicidal ideation. MEDICAL HISTORY      Diagnosis Date    ARIADNE (acute kidney injury) ([facility] [address] 2019    Anxiety     Asthma     CAD (coronary artery disease)     Class 1 obesity due to excess calories with serious comorbidity and body mass index (BMI) of 32.0 to 32.9 in adult 2020    Depression     Examination of participant in clinical trial 11    End date 2015    HTN (hypertension)     Lumbar radiculopathy     Mixed hyperlipidemia 1/15/2018    OA (osteoarthritis)     PVD (peripheral vascular disease) with claudication ([facility] [address] 2015      MEDICATIONS  Prior to Visit Medications    Medication Sig Taking?  Authorizing Provider   cilostazol (PLETAL) 50 MG tablet Take 50 mg by mouth 2 times TABLET BY MOUTH EVERY DAY Yes Beti Pérez MD   busPIRone (BUSPAR) 15 MG tablet TAKE 1 TABLET BY MOUTH EVERY 12 HOURS AS NEEDED Yes Beti Pérez MD   nitroGLYCERIN (NITROSTAT) 0.4 MG SL tablet DISSOLVE 1 TABLET UNDER THE TONGUE EVERY 5 MINUTES AS NEEDED FOR CHEST PAIN. IF NO RELIEF AFTER 1 DOSE, CALL 911 Yes Beti Pérez MD   albuterol sulfate  (90 Base) MCG/ACT inhaler INHALE 2 PUFFS INTO THE LUNGS EVERY 6 HOURS AS NEEDED FOR WHEEZING Yes Beti Pérez MD     Controlled Substance Monitoring:  Acute and Chronic Pain Monitoring:   RX Monitoring 7/7/2020   Periodic Controlled Substance Monitoring No signs of potential drug abuse or diversion identified. ;Assessed functional status. ASSESSMENT/PLAN:  1. Acute infection of left ear  Worsening  DISCUSSED AND ADVISED TO:  Rest.  Advised to increase fluid intake. Eat more fruits and vegetables. Take medications as discussed. Report for worsening symptoms. - methylPREDNISolone sodium (SOLU-MEDROL) injection 125 mg  - amoxicillin-clavulanate (AUGMENTIN) 875-125 MG per tablet; Take 1 tablet by mouth 2 times daily for 7 days  Dispense: 14 tablet; Refill: 0  - methylPREDNISolone (MEDROL DOSEPACK) 4 MG tablet; Take by mouth. Dispense: 1 kit; Refill: 0    2. Pinna infection, acute, left  Worsening  DISCUSSED AND ADVISED TO:  Rest.  Advised to increase fluid intake. Eat more fruits and vegetables. Take medications as discussed. Report for worsening symptoms. - methylPREDNISolone sodium (SOLU-MEDROL) injection 125 mg  - amoxicillin-clavulanate (AUGMENTIN) 875-125 MG per tablet; Take 1 tablet by mouth 2 times daily for 7 days  Dispense: 14 tablet; Refill: 0  - methylPREDNISolone (MEDROL DOSEPACK) 4 MG tablet; Take by mouth. Dispense: 1 kit; Refill: 0    3. Peripheral edema  Worsening  DISCUSSED AND ADVISED TO:  Raise legs above the swollen area when resting. Take frequent breaks from standing and sitting positions.   Walk around to promote blood flow to legs. Cut down salt on diet. - hydroCHLOROthiazide (HYDRODIURIL) 12.5 MG tablet; Take 1 tablet by mouth daily Take with Potassium supplement  Dispense: 30 tablet; Refill: 1  - Basic Metabolic Panel; Future     On this date 10/18/2021 I have spent 15 minutes reviewing previous notes, test results and face to face with the patient discussing the diagnosis and importance of compliance with the treatment plan as well as documenting on the day of the visit. Return in 3 months (on 1/18/2022) for Keep Prev Appt, Appt w/ Dr. Kevin Ruggiero. This note was completed by using the assistance of a speech-recognition program. However, inadvertent computerized transcription errors may be present. Although every effort was made to ensure accuracy, no guarantees can be provided that every mistake has been identified and corrected by editing.   Electronically signed by ADELA Li CNP on 10/18/21 at 4:07 PM EDT     --ADELA Li CNP

## 2021-10-18 NOTE — PROGRESS NOTES
Visit Information    Have you changed or started any medications since your last visit including any over-the-counter medicines, vitamins, or herbal medicines? no   Are you having any side effects from any of your medications? -  no  Have you stopped taking any of your medications? Is so, why? -  no    Have you seen any other physician or provider since your last visit? No  Have you had any other diagnostic tests since your last visit? No  Have you been seen in the emergency room and/or had an admission to a hospital since we last saw you? No  Have you had your routine dental cleaning in the past 6 months? no    Have you activated your [employer] account? If not, what are your barriers?  Yes     Patient Care Team:  Sridhar Saenz MD as PCP - General (Family Medicine)  Sridhar Saenz MD as PCP - [facility]    Medical History Review  Past Medical, Family, and Social History reviewed and does contribute to the patient presenting condition    Health Maintenance   Topic Date Due    A1C test (Diabetic or Prediabetic)  07/26/2022    Lipid screen  07/26/2022    Potassium monitoring  07/26/2022    Creatinine monitoring  07/26/2022    Breast cancer screen  10/08/2022    Cervical cancer screen  04/16/2023    Colon cancer screen colonoscopy  05/06/2024    Pneumococcal 0-64 years Vaccine (2 of 2 - PPSV23) 05/02/2025    DTaP/Tdap/Td vaccine (2 - Td or Tdap) 04/16/2028    Flu vaccine  Completed    Shingles Vaccine  Completed    COVID-19 Vaccine  Completed    Hepatitis C screen  Completed    HIV screen  Completed    Hepatitis A vaccine  Aged Out    Hepatitis B vaccine  Aged Out    Hib vaccine  Aged Out    Meningococcal (ACWY) vaccine  Aged Out

## 2021-10-18 NOTE — TELEPHONE ENCOUNTER
Reason for Disposition   MODERATE swelling of both ankles (e.g., swelling extends up to the knees) AND new onset or worsening    Answer Assessment - Initial Assessment Questions  1. ONSET: \"When did the swelling start? \" (e.g., minutes, hours, days)      Has had swelling in bilateral legs that would go down at night. Has steadily gotten worse of last month    2. LOCATION: \"What part of the leg is swollen? \"  \"Are both legs swollen or just one leg? \"      Bilateral swelling extends at least half way up calf. Worse on left side    3. SEVERITY: \"How bad is the swelling? \" (e.g., localized; mild, moderate, severe)   - Localized - small area of swelling localized to one leg   - MILD pedal edema - swelling limited to foot and ankle, pitting edema < 1/4 inch (6 mm) deep, rest and elevation eliminate most or all swelling   - MODERATE edema - swelling of lower leg to knee, pitting edema > 1/4 inch (6 mm) deep, rest and elevation only partially reduce swelling   - SEVERE edema - swelling extends above knee, facial or hand swelling present       Moderate    4. REDNESS: \"Does the swelling look red or infected? \"      Denies    5. PAIN: \"Is the swelling painful to touch? \" If so, ask: \"How painful is it? \"   (Scale 1-10; mild, moderate or severe)      Denies    6. FEVER: \"Do you have a fever? \" If so, ask: \"What is it, how was it measured, and when did it start? \"       Denies    7. CAUSE: \"What do you think is causing the leg swelling? \"      Ankita Masterson- has had circulation problems for a while    8. MEDICAL HISTORY: \"Do you have a history of heart failure, kidney disease, liver failure, or cancer? \"      Denies    9. RECURRENT SYMPTOM: \"Have you had leg swelling before? \" If so, ask: \"When was the last time? \" \"What happened that time? \"      Has had swelling before    10. OTHER SYMPTOMS: \"Do you have any other symptoms? \" (e.g., chest pain, difficulty breathing)        Denies    11. PREGNANCY: \"Is there any chance you are pregnant? \" \"When was your last menstrual period? \"        n/a    Protocols used: LEG SWELLING AND EDEMA-ADULT-OH    Received call from Swati peters [facility] with [employer]. Brief description of triage: Patient experiencing bilateral leg swelling, left worse than right that has worsened over last month    Triage indicates for patient to Be seen today. Patient already has appointment scheduled for 1600. Care advice provided, patient verbalizes understanding; denies any other questions or concerns; instructed to call back for any new or worsening symptoms. Attention Provider: Thank you for allowing me to participate in the care of your patient. The patient was connected to triage in response to information provided to the ECC/PSC. Please do not respond through this encounter as the response is not directed to a shared pool.

## 2021-10-18 NOTE — PATIENT INSTRUCTIONS
Patient Education        Ear Infection (Otitis Media): Care Instructions  Overview     An ear infection may start with a cold and affect the middle ear (otitis media). It can hurt a lot. Most ear infections clear up on their own in a couple of days and do not need antibiotics. Also, antibiotics do not work against viruses, which may be the cause of your infection. Regular doses of pain relievers are the best way to reduce your fever and help you feel better. Follow-up care is a key part of your treatment and safety. Be sure to make and go to all appointments, and call your doctor if you are having problems. It's also a good idea to know your test results and keep a list of the medicines you take. How can you care for yourself at home? · Take pain medicines exactly as directed. ? If the doctor gave you a prescription medicine for pain, take it as prescribed. ? If you are not taking a prescription pain medicine, take an over-the-counter medicine, such as acetaminophen (Tylenol), ibuprofen (Advil, Motrin), or naproxen (Aleve). Read and follow all instructions on the label. ? Do not take two or more pain medicines at the same time unless the doctor told you to. Many pain medicines have acetaminophen, which is Tylenol. Too much acetaminophen (Tylenol) can be harmful. · Plan to take a full dose of pain reliever before bedtime. Getting enough sleep will help you get better. · Try a warm, moist washcloth on the ear. It may help relieve pain. · If your doctor prescribed antibiotics, take them as directed. Do not stop taking them just because you feel better. You need to take the full course of antibiotics. When should you call for help? Call your doctor now or seek immediate medical care if:    · You have new or increasing ear pain.     · You have new or increasing pus or blood draining from your ear.     · You have a fever with a stiff neck or a severe headache.    Watch closely for changes in your health, and be sure to contact your doctor if:    · You have new or worse symptoms.     · You are not getting better after taking an antibiotic for 2 days. Where can you learn more? Go to [email or web address] and sign in to your [employer] account. Enter N960 in the [facility] box to learn more about \"Ear Infection (Otitis Media): Care Instructions. \"     If you do not have an account, please click on the \"Sign Up Now\" link. Current as of: 2020               Content Version: 13.0  © 0386-1134 [employer]. Care instructions adapted under license by [facility] ([facility]). If you have questions about a medical condition or this instruction, always ask your healthcare professional. [address] any warranty or liability for your use of this information. Patient Education        hydrochlorothiazide  Pronunciation:  NATALIE Berg Getting a zide  What is the most important information I should know about hydrochlorothiazide? You should not use this medicine if you are unable to urinate. What is hydrochlorothiazide? Hydrochlorothiazide is a thiazide diuretic (water pill) that is used to treat high blood pressure (hypertension). Hydrochlorothiazide may also be used for purposes not listed in this medication guide. What should I discuss with my healthcare provider before taking hydrochlorothiazide? You should not use hydrochlorothiazide if you are allergic to it, or if you are unable to urinate. Tell your doctor if you have ever had:  · kidney disease;  · liver disease;  · gout;  · glaucoma;  · low levels of potassium or sodium in your blood;  · high levels of calcium in your blood;  · a parathyroid gland disorder;  · diabetes; or  · an allergy to sulfa drugs or penicillin. Tell your doctor if you are pregnant or plan to become pregnant. If you take this medicine during pregnancy, your  baby may develop jaundice or other problems.   You should not breastfeed while using this medicine. Hydrochlorothiazide is not approved for use by anyone younger than 25years old. How should I take hydrochlorothiazide? Follow all directions on your prescription label and read all medication guides or instruction sheets. Your doctor may occasionally change your dose. Use the medicine exactly as directed. Call your doctor if you have ongoing vomiting or diarrhea, or if you are sweating more than usual. You can easily become dehydrated while taking this medicine, which can lead to severely low blood pressure or a serious electrolyte imbalance. Your blood pressure will need to be checked often. Your blood and urine may be tested if you have been vomiting or are dehydrated. If you need surgery, tell your surgeon you currently use this medicine. You may need to stop for a short time. Keep using this medicine as directed, even if you feel well. High blood pressure often has no symptoms. You may need to use blood pressure medicine for the rest of your life. Store at room temperature away from moisture, heat, and freezing. Keep the bottle tightly closed when not in use. What happens if I miss a dose? Take the medicine as soon as you can, but skip the missed dose if it is almost time for your next dose. Do not take two doses at one time. What happens if I overdose? Seek emergency medical attention or call the Poison Help line at 1-995.660.9377. Overdose symptoms may include nausea, dizziness, dry mouth, thirst, and muscle pain or weakness. What should I avoid while taking hydrochlorothiazide? Hydrochlorothiazide may increase your risk of skin cancer. Avoid sunlight or tanning beds. Wear protective clothing and use sunscreen (SPF 30 or higher) when you are outdoors. Your doctor may want you to have skin examinations on a regular basis. Drinking alcohol with this medicine can cause side effects.   Avoid getting up too fast from a sitting or lying position, or you may feel dizzy. Avoid becoming overheated or dehydrated during exercise, in hot weather, or by not drinking enough fluids. Follow your doctor's instructions about the type and amount of liquids you should drink. In some cases, drinking too much liquid can be as unsafe as not drinking enough. What are the possible side effects of hydrochlorothiazide? Get emergency medical help if you have signs of an allergic reaction (hives, difficult breathing, swelling in your face or throat) or a severe skin reaction (fever, sore throat, burning eyes, skin pain, red or purple skin rash with blistering and peeling). Call your doctor at once if you have:  · a light-headed feeling;  · eye pain, vision problems;  · jaundice (yellowing of the skin or eyes);  · pale skin, easy bruising, unusual bleeding (nose, mouth, vagina, or rectum);  · shortness of breath, wheezing, cough with foamy mucus, chest pain;  · dehydration symptoms --feeling very thirsty or hot, being unable to urinate, heavy sweating, or hot and dry skin; or  · signs of an electrolyte imbalance --increased thirst or urination, confusion, vomiting, constipation, muscle pain, leg cramps, bone pain, lack of energy, irregular heartbeats, tingly feeling. Common side effects may include:  · weakness;  · feeling like you might pass out;  · severe pain in your upper stomach spreading to your back, nausea and vomiting;  · fever, chills, tiredness, mouth sores, skin sores, easy bruising, unusual bleeding, pale skin, cold hands and feet, feeling light-headed or short of breath; or  · electrolyte imbalance. This is not a complete list of side effects and others may occur. Call your doctor for medical advice about side effects. You may report side effects to FDA at 1-558-FDA-4005. What other drugs will affect hydrochlorothiazide? Taking this medicine with other drugs that make you light-headed can worsen this effect.  Ask your doctor before using opioid medication, a sleeping pill, a or appropriate for any given patient. [facility] does not assume any responsibility for any aspect of healthcare administered with the aid of information [facility] provides. The information contained herein is not intended to cover all possible uses, directions, precautions, warnings, drug interactions, allergic reactions, or adverse effects. If you have questions about the drugs you are taking, check with your doctor, nurse or pharmacist.  Copyright 9909-3521 [address]: 13.01. Revision date: 2/9/2021. Care instructions adapted under license by [facility] ([facility]). If you have questions about a medical condition or this instruction, always ask your healthcare professional. [place] [address] any warranty or liability for your use of this information.

## 2021-10-21 DIAGNOSIS — I25.10 CORONARY ARTERY DISEASE INVOLVING NATIVE CORONARY ARTERY OF NATIVE HEART WITHOUT ANGINA PECTORIS: ICD-10-CM

## 2021-10-21 RX ORDER — NITROGLYCERIN 0.4 MG/1
TABLET SUBLINGUAL
Qty: 25 TABLET | Refills: 0 | Status: SHIPPED | OUTPATIENT
Start: 2021-10-21 | End: 2022-07-18 | Stop reason: SDUPTHER

## 2021-10-21 NOTE — TELEPHONE ENCOUNTER
Please Approve or Refuse.   Send to Pharmacy per Pt's Request:      Next Visit Date:  1/13/2022   Last Visit Date: 10/18/2021    Hemoglobin A1C (%)   Date Value   07/26/2021 6.0   07/01/2020 5.7   10/04/2016 5.3             ( goal A1C is < 7)   BP Readings from Last 3 Encounters:   10/18/21 138/78   09/13/21 130/80   09/07/21 136/84          (goal 120/80)  BUN   Date Value Ref Range Status   07/26/2021 7 (L) 8 - 23 mg/dL Final     CREATININE   Date Value Ref Range Status   07/26/2021 0.75 0.50 - 0.90 mg/dL Final     Potassium   Date Value Ref Range Status   07/26/2021 4.9 3.7 - 5.3 mmol/L Final

## 2021-10-25 DIAGNOSIS — M50.30 DEGENERATIVE DISC DISEASE, CERVICAL: ICD-10-CM

## 2021-10-25 DIAGNOSIS — I10 ESSENTIAL HYPERTENSION: ICD-10-CM

## 2021-10-25 RX ORDER — ENALAPRIL MALEATE 10 MG/1
TABLET ORAL
Qty: 90 TABLET | Refills: 2 | Status: SHIPPED | OUTPATIENT
Start: 2021-10-25 | End: 2022-01-19

## 2021-10-25 RX ORDER — CALCIUM CARBONATE/VITAMIN D3 600 MG-10
TABLET ORAL
Qty: 90 TABLET | Refills: 0 | Status: SHIPPED | OUTPATIENT
Start: 2021-10-25 | End: 2022-01-19

## 2021-10-26 ENCOUNTER — HOSPITAL ENCOUNTER (OUTPATIENT)
Age: 61
Discharge: HOME | End: 2021-10-26
Payer: MEDICARE

## 2021-10-26 DIAGNOSIS — R60.9 PERIPHERAL EDEMA: ICD-10-CM

## 2021-10-26 LAB
ANION GAP SERPL CALCULATED.3IONS-SCNC: 11 MMOL/L (ref 9–17)
BUN BLDV-MCNC: 18 MG/DL (ref 8–23)
BUN/CREAT BLD: ABNORMAL (ref 9–20)
CALCIUM SERPL-MCNC: 9.7 MG/DL (ref 8.6–10.4)
CHLORIDE BLD-SCNC: 101 MMOL/L (ref 98–107)
CO2: 29 MMOL/L (ref 20–31)
CREAT SERPL-MCNC: 0.92 MG/DL (ref 0.5–0.9)
GFR AFRICAN AMERICAN: >60 ML/MIN
GFR NON-AFRICAN AMERICAN: >60 ML/MIN
GFR SERPL CREATININE-BSD FRML MDRD: ABNORMAL ML/MIN/{1.73_M2}
GFR SERPL CREATININE-BSD FRML MDRD: ABNORMAL ML/MIN/{1.73_M2}
GLUCOSE BLD-MCNC: 85 MG/DL (ref 70–99)
POTASSIUM SERPL-SCNC: 4.2 MMOL/L (ref 3.7–5.3)
SODIUM BLD-SCNC: 141 MMOL/L (ref 135–144)

## 2021-10-26 PROCEDURE — 36415 COLL VENOUS BLD VENIPUNCTURE: CPT

## 2021-10-26 PROCEDURE — 80048 BASIC METABOLIC PNL TOTAL CA: CPT

## 2021-11-04 DIAGNOSIS — M19.019 OSTEOARTHRITIS OF SHOULDER, UNSPECIFIED LATERALITY, UNSPECIFIED OSTEOARTHRITIS TYPE: ICD-10-CM

## 2021-11-04 DIAGNOSIS — M50.30 DEGENERATIVE DISC DISEASE, CERVICAL: ICD-10-CM

## 2021-11-04 RX ORDER — CYCLOBENZAPRINE HCL 10 MG
TABLET ORAL
Qty: 60 TABLET | Refills: 0 | Status: SHIPPED | OUTPATIENT
Start: 2021-11-04 | End: 2021-12-02

## 2021-11-04 NOTE — TELEPHONE ENCOUNTER
Please Approve or Refuse.   Send to Pharmacy per Pt's Request:     Next Visit Date:  1/13/2022   Last Visit Date: 10/18/2021    Hemoglobin A1C (%)   Date Value   07/26/2021 6.0   07/01/2020 5.7   10/04/2016 5.3             ( goal A1C is < 7)   BP Readings from Last 3 Encounters:   10/18/21 138/78   09/13/21 130/80   09/07/21 136/84          (goal 120/80)  BUN   Date Value Ref Range Status   10/26/2021 18 8 - 23 mg/dL Final     CREATININE   Date Value Ref Range Status   10/26/2021 0.92 (H) 0.50 - 0.90 mg/dL Final     Potassium   Date Value Ref Range Status   10/26/2021 4.2 3.7 - 5.3 mmol/L Final

## 2021-11-09 RX ORDER — ALBUTEROL SULFATE 90 UG/1
AEROSOL, METERED RESPIRATORY (INHALATION)
Qty: 36 G | Refills: 3 | Status: SHIPPED | OUTPATIENT
Start: 2021-11-09 | End: 2022-05-18

## 2021-11-17 DIAGNOSIS — S33.6XXD SPRAIN OF SACROILIAC LIGAMENT, SUBSEQUENT ENCOUNTER: ICD-10-CM

## 2021-11-17 DIAGNOSIS — M51.26 PROTRUDED LUMBAR DISC: ICD-10-CM

## 2021-11-17 DIAGNOSIS — M51.36 LUMBAR DEGENERATIVE DISC DISEASE: ICD-10-CM

## 2021-12-01 DIAGNOSIS — M54.16 LUMBAR RADICULOPATHY: ICD-10-CM

## 2021-12-01 DIAGNOSIS — M48.061 SPINAL STENOSIS OF LUMBAR REGION WITHOUT NEUROGENIC CLAUDICATION: ICD-10-CM

## 2021-12-01 RX ORDER — PREGABALIN 150 MG/1
CAPSULE ORAL
Qty: 90 CAPSULE | Refills: 0 | Status: SHIPPED | OUTPATIENT
Start: 2021-12-01 | End: 2022-01-03

## 2021-12-02 ENCOUNTER — HOSPITAL ENCOUNTER (OUTPATIENT)
Dept: PAIN MANAGEMENT | Age: 61
Discharge: HOME | End: 2021-12-02
Payer: MEDICARE

## 2021-12-02 DIAGNOSIS — M19.019 OSTEOARTHRITIS OF SHOULDER, UNSPECIFIED LATERALITY, UNSPECIFIED OSTEOARTHRITIS TYPE: ICD-10-CM

## 2021-12-02 DIAGNOSIS — M50.30 DEGENERATIVE DISC DISEASE, CERVICAL: Primary | ICD-10-CM

## 2021-12-02 DIAGNOSIS — M54.16 LUMBAR RADICULOPATHY: ICD-10-CM

## 2021-12-02 DIAGNOSIS — M48.061 SPINAL STENOSIS OF LUMBAR REGION, UNSPECIFIED WHETHER NEUROGENIC CLAUDICATION PRESENT: ICD-10-CM

## 2021-12-02 DIAGNOSIS — M51.36 LUMBAR DEGENERATIVE DISC DISEASE: ICD-10-CM

## 2021-12-02 DIAGNOSIS — M50.30 DEGENERATIVE DISC DISEASE, CERVICAL: ICD-10-CM

## 2021-12-02 PROBLEM — K44.9 HIATAL HERNIA: Status: ACTIVE | Noted: 2021-09-22

## 2021-12-02 PROCEDURE — 99213 OFFICE O/P EST LOW 20 MIN: CPT

## 2021-12-02 PROCEDURE — 99213 OFFICE O/P EST LOW 20 MIN: CPT | Performed by: NURSE PRACTITIONER

## 2021-12-02 RX ORDER — CYCLOBENZAPRINE HCL 10 MG
TABLET ORAL
Qty: 60 TABLET | Refills: 0 | Status: SHIPPED | OUTPATIENT
Start: 2021-12-02 | End: 2022-01-03

## 2021-12-02 ASSESSMENT — ENCOUNTER SYMPTOMS
BACK PAIN: 1
GASTROINTESTINAL NEGATIVE: 1

## 2021-12-02 NOTE — PROGRESS NOTES
claudication   TECHNOLOGIST PROVIDED HISTORY:   STANDING -- AP and Lateral; Include C2 to Pelvis & both femoral heads   scoliosis; STANDING -- AP and Lateral; Include C2 to Pelvis & both femoral   heads       FINDINGS:   Visualized portions of the lungs demonstrate no focal consolidation.  Mild   chronic-appearing interstitial lung changes.  Mild cardiomegaly.  No   pulmonary edema.       No abnormally dilated loops of small bowel.       L3-L4 anterolisthesis is unchanged.       Mild thoracic levoscoliosis of approximately 18 degrees is unchanged.  Mild   lumbar levoscoliosis of approximately 8 degrees.           Impression   Mild thoracolumbar scoliosis. EXAMINATION:   3 X-RAY VIEWS OF THE LUMBAR SPINE       2/1/2021 3:30 pm       COMPARISON:   MRI November 24, 2020       HISTORY:   ORDERING SYSTEM PROVIDED HISTORY: Lumbar stenosis with neurogenic claudication   TECHNOLOGIST PROVIDED HISTORY:   Standing lateral Flexion, Neutral, extension   Include both femoral heads on neutral imaging   Evaluate for instability   Reason for Exam: Evaluate for instability   Acuity: Unknown   Type of Exam: Unknown       FINDINGS:   Lumbar vertebral body height is maintained.  6 mm L3-L4 anterolisthesis.       Severe disc space narrowing and endplate degenerative changes at L4-L5. Moderate disc space narrowing and endplate degenerative changes at L5-S1.       No instability with flexion or extension.           Impression   L3-L4 anterolisthesis without instability.                     Pill count: appropriate    fill date : n/a  Morphine equivalent dose as reported on [place]ARRS:     Review ofOARRS does not show any aberrant prescription behavior. Medication is helping the patient stay active. Patient denies any side effects and reports adequate analgesia. No sign of misuse/abuse.             Past Medical History:   Diagnosis Date    ARIADNE (acute kidney injury) ([facility] [address] 9/4/2019    Anxiety     Asthma     CAD (coronary artery disease)  Class 1 obesity due to excess calories with serious comorbidity and body mass index (BMI) of 32.0 to 32.9 in adult 2020    Depression     Examination of participant in clinical trial 11    End date 2015    HTN (hypertension)     Lumbar radiculopathy     Mixed hyperlipidemia 1/15/2018    OA (osteoarthritis)     PVD (peripheral vascular disease) with claudication ([facility] [address] 2015       Past Surgical History:   Procedure Laterality Date     SECTION      COLONOSCOPY N/A 2019    COLORECTAL CANCER SCREENING, NOT HIGH RISK performed by Nara Lr MD at [address]      2 stents    DILATION AND CURETTAGE OF UTERUS N/A     uterine polyp       Allergies   Allergen Reactions    Claritin [Loratadine]      2010 Heart palpitations  2010 Heart palpitations    Codeine Nausea Only         Current Outpatient Medications:     cyclobenzaprine (FLEXERIL) 10 MG tablet, TAKE 1 TABLET BY MOUTH TWICE DAILY AS NEEDED FOR MUSCLE SPASMS, Disp: 60 tablet, Rfl: 0    pregabalin (LYRICA) 150 MG capsule, TAKE 1 CAPSULE BY MOUTH THREE TIMES DAILY, Disp: 90 capsule, Rfl: 0    diclofenac sodium (VOLTAREN) 1 % GEL, APPLY 2 GRAMS EXTERNALLY TO THE AFFECTED AREA FOUR TIMES DAILY, Disp: 200 g, Rfl: 1    albuterol sulfate  (90 Base) MCG/ACT inhaler, INHALE 2 PUFFS INTO THE LUNGS EVERY 6 HOURS AS NEEDED FOR WHEEZING, Disp: 36 g, Rfl: 3    Calcium Carb-Cholecalciferol (CALCIUM-VITAMIN D) 600-400 MG-UNIT TABS, TAKE 1 TABLET BY MOUTH DAILY, Disp: 90 tablet, Rfl: 0    enalapril (VASOTEC) 10 MG tablet, TAKE 2 TABLETS BY MOUTH EVERY MORNING, THEN 1 TABLET BY MOUTH EVERY EVENING, Disp: 90 tablet, Rfl: 2    nitroGLYCERIN (NITROSTAT) 0.4 MG SL tablet, DISSOLVE 1 TABLET UNDER THE TONGUE EVERY 5 MINUTES AS NEEDED FOR CHEST PAIN.  IF NO RELIEF AFTER 1 DOSE, CALL 911, Disp: 25 tablet, Rfl: 0    cilostazol (PLETAL) 50 MG tablet, Take 50 mg by mouth 2 times daily, Disp: , Rfl:   methylPREDNISolone (MEDROL DOSEPACK) 4 MG tablet, Take by mouth., Disp: 1 kit, Rfl: 0    hydroCHLOROthiazide (HYDRODIURIL) 12.5 MG tablet, Take 1 tablet by mouth daily Take with Potassium supplement, Disp: 30 tablet, Rfl: 1    cetirizine (ZYRTEC) 10 MG tablet, TAKE 1 TABLET BY MOUTH EVERY DAY, Disp: 90 tablet, Rfl: 2    ACETAMINOPHEN EXTRA STRENGTH 500 MG tablet, TAKE 1 TABLET BY MOUTH EVERY 6 HOURS AS NEEDED FOR PAIN, Disp: 120 tablet, Rfl: 3    aspirin (ASPIRIN LOW DOSE) 81 MG EC tablet, TAKE 1 TABLET BY MOUTH DAILY, Disp: 90 tablet, Rfl: 2    buPROPion (WELLBUTRIN SR) 100 MG extended release tablet, Take 1 tablet by mouth 2 times daily, Disp: 60 tablet, Rfl: 3    metFORMIN (GLUCOPHAGE-XR) 500 MG extended release tablet, Take 1 tablet by mouth daily (with breakfast), Disp: 60 tablet, Rfl: 3    meloxicam (MOBIC) 15 MG tablet, TAKE 1 TABLET BY MOUTH DAILY, Disp: 90 tablet, Rfl: 2    metoprolol succinate (TOPROL XL) 50 MG extended release tablet, TAKE 1 TABLET BY MOUTH DAILY, Disp: 90 tablet, Rfl: 2    simvastatin (ZOCOR) 40 MG tablet, TAKE 1 TABLET BY MOUTH EVERY NIGHT, Disp: 90 tablet, Rfl: 3    isosorbide mononitrate (IMDUR) 30 MG extended release tablet, TAKE 1 TABLET BY MOUTH EVERY DAY, Disp: 90 tablet, Rfl: 2    busPIRone (BUSPAR) 15 MG tablet, TAKE 1 TABLET BY MOUTH EVERY 12 HOURS AS NEEDED, Disp: 120 tablet, Rfl: 3    Family History   Problem Relation Age of Onset    Emphysema Mother        Social History     Socioeconomic History    Marital status:      Spouse name: Not on file    Number of children: Not on file    Years of education: Not on file    Highest education level: Not on file   Occupational History    Not on file   Tobacco Use    Smoking status: Former Smoker     Packs/day: 0.25     Years: 25.00     Pack years: 6.25     Types: Cigarettes     Quit date: 2009     Years since quittin.5    Smokeless tobacco: Never Used   Vaping Use    Vaping Use: Never used Substance and Sexual Activity    Alcohol use: No     Alcohol/week: 0.0 standard drinks    Drug use: No    Sexual activity: Yes   Other Topics Concern    Not on file   Social History Narrative    Not on file     Social Determinants of Health     Financial Resource Strain: Low Risk     Difficulty of Paying Living Expenses: Not hard at all   Food Insecurity: No Food Insecurity    Worried About Running Out of Food in the Last Year: Never true    Nabila of Food in the Last Year: Never true   Transportation Needs: No Transportation Needs    Lack of Transportation (Medical): No    Lack of Transportation (Non-Medical): No   Physical Activity:     Days of Exercise per Week: Not on file    Minutes of Exercise per Session: Not on file   Stress:     Feeling of Stress : Not on file   Social Connections:     Frequency of Communication with Friends and Family: Not on file    Frequency of Social Gatherings with Friends and Family: Not on file    Attends Judaism Services: Not on file    Active Member of [address] [place] [address] or Organizations: Not on file    Attends Club or Organization Meetings: Not on file    Marital Status: Not on file   Intimate Partner Violence:     Fear of Current or Ex-Partner: Not on file    Emotionally Abused: Not on file    Physically Abused: Not on file    Sexually Abused: Not on file   Housing Stability:     Unable to Pay for Housing in the Last Year: Not on file    Number of [place] in the Last Year: Not on file    Unstable Housing in the Last Year: Not on file         Review of Systems:  Review of Systems   Constitutional: Negative. HENT: Negative. Eyes:        Glasses   Cardiovascular: Negative. Endocrine: Negative. Hematologic/Lymphatic: Negative. Skin: Negative. Musculoskeletal: Positive for back pain and joint pain. Gastrointestinal: Negative. Genitourinary: Negative. Neurological: Positive for headaches and numbness.         Uses a cane Psychiatric/Behavioral: Positive for depression. The patient is nervous/anxious. Maanging         Physical Exam:  LMP 11/07/2012     Physical Exam  HENT:      Head: Normocephalic. Pulmonary:      Effort: Pulmonary effort is normal.   Skin:         Neurological:      Mental Status: She is alert and oriented to person, place, and time. Psychiatric:         Mood and Affect: Mood normal.         Thought Content: Thought content normal.           Assessment:      Problem List Items Addressed This Visit     Spinal stenosis of lumbar region    Lumbar radiculopathy    Lumbar degenerative disc disease    Degenerative disc disease, cervical - Primary            Treatment Plan:  DISCUSSION: Treatment options discussed withpatient and all questions answered to patient's satisfaction. Possible side effects, risk of tolerance and or dependence and alternative treatments discussed    Obtaining appropriate analgesic effect of treatment   No signs of potential drug abuse or diversion identified    [x] Ill effects of being on chronic pain medications such as sleep disturbances, respiratory depression, hormonal changes, withdrawal symptoms, chronic opioid dependence and tolerance as well as risk of taking opioids with Benzodiazepines and taking opioids along with alcohol,  werediscussed with patient. I had asked the patient to minimize medication use and utilize pain medications only for uncontrolled rest pain or pain with exertional activities. I advised patient not to self-escalate painmedications without consulting with us. At each of patient's future visits we will try to taper pain medications, while adjusting the adjunct medications, and re-evaluating for Physical Therapy to improve spinal andjoint strength. We will continue to have discussions to decrease pain medications as tolerated.       Counseled patient on effects their pain medication and /or their medical condition mayhave on their  ability to drive or operate machinery. Instructed not to drive or operate machinery if drowsy     I also discussed with the patient regarding the dangers of combining narcotic pain medication with tranquilizers, alcohol or illegal drugs or taking the medication any way other than prescribed. The dangers were discussed  including respiratory depression and death. Patient was told to tell  all  physicians regarding the medications he is getting from pain clinic. Patient is warned not to take any unprescribed medications over-the-countermedications that can depress breathing . Patient is advised to talk to the pharmacist or physicians if planning to take any over-the-counter medications before  takeing them. Patient is strongly advised to avoid tranquilizers or  relaxants, illegal drugs  or any medications that can depress breathing  Patient is also advised to tell us if there is any changes in their medications from other physicians.       1. Schedule lumbar epidural L2, l3, pain returning, radicular, interferes with ADL's  Will need permission to stop pletal before procedure      TREATMENT OPTIONS:       FAM L2, L3    Follow up appointment made

## 2021-12-14 DIAGNOSIS — R60.9 PERIPHERAL EDEMA: ICD-10-CM

## 2021-12-14 RX ORDER — HYDROCHLOROTHIAZIDE 12.5 MG/1
TABLET ORAL
Qty: 30 TABLET | Refills: 1 | Status: SHIPPED | OUTPATIENT
Start: 2021-12-14 | End: 2022-01-13 | Stop reason: ALTCHOICE

## 2021-12-15 DIAGNOSIS — M51.26 PROTRUDED LUMBAR DISC: ICD-10-CM

## 2021-12-15 DIAGNOSIS — S33.6XXD SPRAIN OF SACROILIAC LIGAMENT, SUBSEQUENT ENCOUNTER: ICD-10-CM

## 2021-12-15 DIAGNOSIS — M51.36 LUMBAR DEGENERATIVE DISC DISEASE: ICD-10-CM

## 2021-12-22 ENCOUNTER — HOSPITAL ENCOUNTER (OUTPATIENT)
Dept: GENERAL RADIOLOGY | Age: 61
Discharge: HOME | End: 2021-12-24
Payer: MEDICARE

## 2021-12-22 ENCOUNTER — HOSPITAL ENCOUNTER (OUTPATIENT)
Dept: PAIN MANAGEMENT | Age: 61
Discharge: HOME | End: 2021-12-22
Payer: MEDICARE

## 2021-12-22 VITALS
WEIGHT: 192 LBS | HEART RATE: 84 BPM | HEIGHT: 62 IN | DIASTOLIC BLOOD PRESSURE: 60 MMHG | RESPIRATION RATE: 18 BRPM | SYSTOLIC BLOOD PRESSURE: 98 MMHG | OXYGEN SATURATION: 96 % | TEMPERATURE: 97.3 F | BODY MASS INDEX: 35.33 KG/M2

## 2021-12-22 VITALS
WEIGHT: 192 LBS | HEART RATE: 66 BPM | BODY MASS INDEX: 35.33 KG/M2 | RESPIRATION RATE: 16 BRPM | OXYGEN SATURATION: 99 % | DIASTOLIC BLOOD PRESSURE: 1 MMHG | HEIGHT: 62 IN | SYSTOLIC BLOOD PRESSURE: 131 MMHG | TEMPERATURE: 98.2 F

## 2021-12-22 DIAGNOSIS — M51.36 LUMBAR DEGENERATIVE DISC DISEASE: ICD-10-CM

## 2021-12-22 DIAGNOSIS — M50.30 DEGENERATIVE DISC DISEASE, CERVICAL: ICD-10-CM

## 2021-12-22 DIAGNOSIS — M48.062 SPINAL STENOSIS OF LUMBAR REGION WITH NEUROGENIC CLAUDICATION: Primary | ICD-10-CM

## 2021-12-22 DIAGNOSIS — M54.16 LUMBAR RADICULOPATHY: ICD-10-CM

## 2021-12-22 PROCEDURE — 6360000004 HC RX CONTRAST MEDICATION: Performed by: ANESTHESIOLOGY

## 2021-12-22 PROCEDURE — 64483 NJX AA&/STRD TFRM EPI L/S 1: CPT

## 2021-12-22 PROCEDURE — 6360000002 HC RX W HCPCS: Performed by: ANESTHESIOLOGY

## 2021-12-22 PROCEDURE — 64484 NJX AA&/STRD TFRM EPI L/S EA: CPT

## 2021-12-22 PROCEDURE — 64484 NJX AA&/STRD TFRM EPI L/S EA: CPT | Performed by: ANESTHESIOLOGY

## 2021-12-22 PROCEDURE — 64483 NJX AA&/STRD TFRM EPI L/S 1: CPT | Performed by: ANESTHESIOLOGY

## 2021-12-22 PROCEDURE — 3209999900 FLUORO FOR SURGICAL PROCEDURES

## 2021-12-22 RX ORDER — FENTANYL CITRATE 50 UG/ML
INJECTION, SOLUTION INTRAMUSCULAR; INTRAVENOUS
Status: COMPLETED | OUTPATIENT
Start: 2021-12-22 | End: 2021-12-22

## 2021-12-22 RX ORDER — DEXAMETHASONE SODIUM PHOSPHATE 10 MG/ML
INJECTION, SOLUTION INTRAMUSCULAR; INTRAVENOUS
Status: COMPLETED | OUTPATIENT
Start: 2021-12-22 | End: 2021-12-22

## 2021-12-22 RX ORDER — MIDAZOLAM HYDROCHLORIDE 1 MG/ML
INJECTION INTRAMUSCULAR; INTRAVENOUS
Status: COMPLETED | OUTPATIENT
Start: 2021-12-22 | End: 2021-12-22

## 2021-12-22 RX ADMIN — DEXAMETHASONE SODIUM PHOSPHATE 10 MG: 10 INJECTION, SOLUTION INTRAMUSCULAR; INTRAVENOUS at 09:32

## 2021-12-22 RX ADMIN — MIDAZOLAM 1 MG: 1 INJECTION INTRAMUSCULAR; INTRAVENOUS at 09:11

## 2021-12-22 RX ADMIN — IOHEXOL 5 ML: 180 INJECTION INTRAVENOUS at 09:32

## 2021-12-22 RX ADMIN — Medication 50 MCG: at 09:11

## 2021-12-22 ASSESSMENT — PAIN DESCRIPTION - PROGRESSION: CLINICAL_PROGRESSION: NOT CHANGED

## 2021-12-22 ASSESSMENT — PAIN DESCRIPTION - ORIENTATION: ORIENTATION: RIGHT;LEFT

## 2021-12-22 ASSESSMENT — PAIN DESCRIPTION - ONSET: ONSET: ON-GOING

## 2021-12-22 ASSESSMENT — PAIN DESCRIPTION - LOCATION: LOCATION: BACK

## 2021-12-22 ASSESSMENT — PAIN DESCRIPTION - DESCRIPTORS: DESCRIPTORS: ACHING;CONSTANT;JABBING

## 2021-12-22 ASSESSMENT — PAIN DESCRIPTION - PAIN TYPE: TYPE: CHRONIC PAIN

## 2021-12-22 ASSESSMENT — PAIN SCALES - GENERAL: PAINLEVEL_OUTOF10: 8

## 2021-12-22 ASSESSMENT — PAIN - FUNCTIONAL ASSESSMENT: PAIN_FUNCTIONAL_ASSESSMENT: PREVENTS OR INTERFERES SOME ACTIVE ACTIVITIES AND ADLS

## 2021-12-22 ASSESSMENT — PAIN DESCRIPTION - DIRECTION: RADIATING_TOWARDS: BILATERAL LEGS

## 2021-12-22 ASSESSMENT — PAIN DESCRIPTION - FREQUENCY: FREQUENCY: CONTINUOUS

## 2021-12-22 NOTE — H&P
UPDATE:  Office visit pain clinic in [facility] with all required elements of H&P dated 12/02/2021  Patient seen preop, chart reviewed, AAO x 3, in NAD, VSS,. No changes in medical history, physical exam and health assessment since last evaluation. Risk / Benefits explained to patient, patient agree to proceed with plan.   ASA 3  MP 3

## 2021-12-22 NOTE — PROGRESS NOTES
Discharge criteria met. Post procedure dressing dry and intact. Sensory and motor function intact as per pre-procedure. Patient alert and oriented x3  Instructions and follow up reviewed with pt at patient at discharge. Discharged home with  driving tramsported to car via wheelchair prior.   Discharged @ 1000

## 2021-12-22 NOTE — OP NOTE
Patient Name: Dede Chamberlain   YOB: 1960  Room/Bed: Room/bed info not found  Medical Record Number: [id number]  Date: 12/22/2021       Preoperative Diagnosis:    1. Spinal stenosis of lumbar region with neurogenic claudication          Postoperative diagnosis:   1. Spinal stenosis of lumbar region with neurogenic claudication          Blood Loss: none    Procedure Performed:  Transforaminal lumbar epidural steroid injection at the levels of   Right-sided L4 and left-sided L5  under fluoroscopic guidance. Procedure: The Patient was seen in the preop area, chart was reviewed, informed consent was obtained. Patient was taken to procedure room and was placed in prone position. Vital signs were monitored through out the  Procedure. A time out was completed. The skin over the back was prepped and draped in sterile manner. The target point was marked in ipsilateral obliques just below the pedicle at the target levels. Skin and deep tissues were anesthetized with 1 % lidocaine. A 20-gauge, spinal needle was advanced  under fluoroscopy guidance in AP / Oblique and lateral views, such that the tip of the needle lies in the neuroforamina at about the 6 o'clock position under the pedicle of the target vertebra. This was confirmed with AP and lateral views of the fluoroscopy. Then after negative aspiration contrast dye Omnipaque-180 was injected with live fluoroscopy in AP views that showed  spread of the contrast in the epidural space  and no vascular runoff or intrathecal spread. Finally 3 ml of treatment solution containing 5 ml of  PF NS mixed with 1 ml of dexamethasone 10 mg / ml was injected. The needle was removed and a Band-Aid was placed over the needle  insertion site. The patient's vital signs remained stable and the patient tolerated the procedure well. The same procedure was then repeated at left at L 5level with same technique.  The remaining 3 ml of treatment solution was injected at that. The total dose of steroid injected today was dexamethasone 10 mg.     Electronically signed by Albert Conteh MD on 12/22/2021 at 9:53 AM

## 2021-12-29 ENCOUNTER — TELEPHONE (OUTPATIENT)
Dept: PAIN MANAGEMENT | Age: 61
End: 2021-12-29

## 2021-12-29 DIAGNOSIS — M48.062 SPINAL STENOSIS OF LUMBAR REGION WITH NEUROGENIC CLAUDICATION: ICD-10-CM

## 2021-12-29 NOTE — TELEPHONE ENCOUNTER
Post Procedure follow up call made. Patient denies any problems. States injection did not help like it had in the past. Will keep follow up appointment. Instructed to call if any issues, questions or concerns.

## 2021-12-31 DIAGNOSIS — M50.30 DEGENERATIVE DISC DISEASE, CERVICAL: ICD-10-CM

## 2021-12-31 DIAGNOSIS — M19.019 OSTEOARTHRITIS OF SHOULDER, UNSPECIFIED LATERALITY, UNSPECIFIED OSTEOARTHRITIS TYPE: ICD-10-CM

## 2022-01-02 DIAGNOSIS — M54.16 LUMBAR RADICULOPATHY: ICD-10-CM

## 2022-01-02 DIAGNOSIS — M48.061 SPINAL STENOSIS OF LUMBAR REGION WITHOUT NEUROGENIC CLAUDICATION: ICD-10-CM

## 2022-01-03 RX ORDER — PREGABALIN 150 MG/1
CAPSULE ORAL
Qty: 90 CAPSULE | Refills: 0 | Status: SHIPPED | OUTPATIENT
Start: 2022-01-03 | End: 2022-02-03

## 2022-01-03 RX ORDER — CYCLOBENZAPRINE HCL 10 MG
TABLET ORAL
Qty: 60 TABLET | Refills: 0 | Status: SHIPPED | OUTPATIENT
Start: 2022-01-03 | End: 2022-01-24

## 2022-01-03 NOTE — TELEPHONE ENCOUNTER
Please Approve or Refuse.   Send to Pharmacy per Pt's Request:      Next Visit Date:  1/13/2022   Last Visit Date: 10/18/2021    Hemoglobin A1C (%)   Date Value   07/26/2021 6.0   07/01/2020 5.7   10/04/2016 5.3             ( goal A1C is < 7)   BP Readings from Last 3 Encounters:   12/22/21 98/60   10/18/21 138/78   09/13/21 130/80          (goal 120/80)  BUN   Date Value Ref Range Status   10/26/2021 18 8 - 23 mg/dL Final     CREATININE   Date Value Ref Range Status   10/26/2021 0.92 (H) 0.50 - 0.90 mg/dL Final     Potassium   Date Value Ref Range Status   10/26/2021 4.2 3.7 - 5.3 mmol/L Final

## 2022-01-03 NOTE — TELEPHONE ENCOUNTER
Please Approve or Refuse.   Send to Pharmacy per Pt's Request:      Next Visit Date:  1/2/2022   Last Visit Date: 10/18/2021    Hemoglobin A1C (%)   Date Value   07/26/2021 6.0   07/01/2020 5.7   10/04/2016 5.3             ( goal A1C is < 7)   BP Readings from Last 3 Encounters:   12/22/21 98/60   10/18/21 138/78   09/13/21 130/80          (goal 120/80)  BUN   Date Value Ref Range Status   10/26/2021 18 8 - 23 mg/dL Final     CREATININE   Date Value Ref Range Status   10/26/2021 0.92 (H) 0.50 - 0.90 mg/dL Final     Potassium   Date Value Ref Range Status   10/26/2021 4.2 3.7 - 5.3 mmol/L Final

## 2022-01-07 DIAGNOSIS — F41.9 ANXIETY: ICD-10-CM

## 2022-01-09 DIAGNOSIS — M51.36 LUMBAR DEGENERATIVE DISC DISEASE: ICD-10-CM

## 2022-01-09 DIAGNOSIS — M51.26 PROTRUDED LUMBAR DISC: ICD-10-CM

## 2022-01-09 DIAGNOSIS — S33.6XXD SPRAIN OF SACROILIAC LIGAMENT, SUBSEQUENT ENCOUNTER: ICD-10-CM

## 2022-01-09 RX ORDER — BUPROPION HYDROCHLORIDE 100 MG/1
TABLET, EXTENDED RELEASE ORAL
Qty: 60 TABLET | Refills: 3 | Status: SHIPPED | OUTPATIENT
Start: 2022-01-09 | End: 2022-04-19

## 2022-01-09 NOTE — PROGRESS NOTES
[address] PROGRESS NOTE      Patient  completed []  video visit   []   phone call:         Minutes :   [x] in person visit to pain clinic    []    to  review Medication Agreement    [x]  Follow up after procedure   []  Discuss treatment options      Location:  Provider:  working from    []    home    [x]   [facility] - TREMAYNE WOLFE ,   patient at   [x] pain clinic     []  home         Chief Complaint: low back pain    She had lumbar epidural injection 8/2021, L2, L3 with  75% relief as her pain was increasing ,she had a transforaminal  lumbar epidural injection on the right at L4 and left at L5. vd09- with 50% relief. She c/o pain in her right buttock and some cramping in her thighs. She has no history of lumbar surgery. She is on lyrica. She wakes up sometimes with cramping in her right calf. She saw Neurosurgery  and no plans at this time for surgery. She follows with a Neurologist.    .Back Pain  This is a chronic problem. The current episode started more than 1 year ago. The problem occurs constantly. The problem has been gradually improving since onset. The pain is present in the lumbar spine and gluteal (right buttock). The quality of the pain is described as aching (dull). The pain is at a severity of 3/10. The pain is moderate. The pain is the same all the time. The symptoms are aggravated by bending (walking). (Cramping thighs and right calf) Risk factors include obesity. She has tried heat (rest) for the symptoms.      Treatment goals:  Functional status: avoid back surgery      Aberrancy:   Any alcoholic beverages    no        Any illegal drugs no        Analgesia:       3              Adverse  Effects : n/a      ADL;s :exercises, paces self with household tasks    Data:    When was thelast UDS:  n/a        Was the UDS appropriate:  [] yes []   no      Record/Diagnostics Review:      As above, I did review the imaging         abnormal data DRUG SCREEN, PAIN  Order: [id number]   Status: Final result     Visible to patient: Yes (seen)     Next appt: 01/10/2022 at 01:00 PM in Pain Management ADELA Jimenez CNP)     1 Result Note     Ref Range & Units 3/6/20 1405 Resulting Agency   Pain Management Drug Panel Interp, Urine  Consistent  [facility]   Comment: (NOTE)   ________________________________________________________________   DRUGS EXPECTED:   NO DRUGS EXPECTED   ________________________________________________________________   CONSISTENT with medications provided:   NO DRUGS DETECTED   ________________________________________________________________   Creatinine <20 mg/dL is consistent with a dilute urine specimen. Recollection to obtain a more concentrated specimen, such as a   first morning void, may be considered if unexpected negative urine   drug screening results were obtained.   ________________________________________________________________   INTERPRETIVE INFORMATION: Pain Mgt Kapoor, Mass Spec/EMIT, Ur,                            Interp   Interpretation depends on accuracy and completeness of patient   medication information submitted by client.     6-Acetylmorphine, Ur  Not Detecte         EXAMINATION:   TWO XRAY VIEWS SCOLIOSIS SERIES       2/10/2021 3:52 pm       COMPARISON:   February 1, 2021       HISTORY:   ORDERING SYSTEM PROVIDED HISTORY: Lumbar stenosis with neurogenic claudication   TECHNOLOGIST PROVIDED HISTORY:   STANDING -- AP and Lateral; Include C2 to Pelvis & both femoral heads   scoliosis; STANDING -- AP and Lateral; Include C2 to Pelvis & both femoral   heads       FINDINGS:   Visualized portions of the lungs demonstrate no focal consolidation.  Mild   chronic-appearing interstitial lung changes.  Mild cardiomegaly.  No   pulmonary edema.       No abnormally dilated loops of small bowel.       L3-L4 anterolisthesis is unchanged.       Mild thoracic levoscoliosis of approximately 18 degrees is unchanged.  Mild   lumbar levoscoliosis of approximately 8 degrees.           Impression   Mild thoracolumbar scoliosis. Pill count: appropriate    fill date :n/a    Morphine equivalent dose as reported on OARRS:     Review ofOARRS does not show any aberrant prescription behavior. Medication is helping the patient stay active. Patient denies any side effects and reports adequate analgesia. No sign of misuse/abuse.             Past Medical History:   Diagnosis Date    ARIADNE (acute kidney injury) ([facility] 2019    Anxiety     Asthma     CAD (coronary artery disease)     Class 1 obesity due to excess calories with serious comorbidity and body mass index (BMI) of 32.0 to 32.9 in adult 2020    Depression     Examination of participant in clinical trial 11    End date 2015    HTN (hypertension)     Lumbar radiculopathy     Mixed hyperlipidemia 1/15/2018    OA (osteoarthritis)     PVD (peripheral vascular disease) with claudication ([facility] 2015       Past Surgical History:   Procedure Laterality Date     SECTION      COLONOSCOPY N/A 2019    COLORECTAL CANCER SCREENING, NOT HIGH RISK performed by Liliya Mesa MD at [address]      2 stents    DILATION AND CURETTAGE OF UTERUS N/A     uterine polyp       Allergies   Allergen Reactions    Claritin [Loratadine]      2010 Heart palpitations  2010 Heart palpitations    Codeine Nausea Only         Current Outpatient Medications:     diclofenac sodium (VOLTAREN) 1 % GEL, APPLY 2 GRAMS TOPICALLY TO THE AFFECTED AREA FOUR TIMES DAILY, Disp: 200 g, Rfl: 1    buPROPion (WELLBUTRIN SR) 100 MG extended release tablet, TAKE 1 TABLET BY MOUTH TWICE DAILY, Disp: 60 tablet, Rfl: 3    pregabalin (LYRICA) 150 MG capsule, TAKE 1 CAPSULE BY MOUTH THREE TIMES DAILY, Disp: 90 capsule, Rfl: 0    hydroCHLOROthiazide (HYDRODIURIL) 12.5 MG tablet, TAKE 1 TABLET BY MOUTH DAILY WITH POTASSIUM, Disp: 30 tablet, Rfl: 1    Calcium Carb-Cholecalciferol (CALCIUM-VITAMIN D) 600-400 MG-UNIT TABS, TAKE 1 TABLET BY MOUTH DAILY, Disp: 90 tablet, Rfl: 0    enalapril (VASOTEC) 10 MG tablet, TAKE 2 TABLETS BY MOUTH EVERY MORNING, THEN 1 TABLET BY MOUTH EVERY EVENING, Disp: 90 tablet, Rfl: 2    cilostazol (PLETAL) 50 MG tablet, Take 50 mg by mouth 2 times daily, Disp: , Rfl:     cetirizine (ZYRTEC) 10 MG tablet, TAKE 1 TABLET BY MOUTH EVERY DAY, Disp: 90 tablet, Rfl: 2    aspirin (ASPIRIN LOW DOSE) 81 MG EC tablet, TAKE 1 TABLET BY MOUTH DAILY, Disp: 90 tablet, Rfl: 2    metFORMIN (GLUCOPHAGE-XR) 500 MG extended release tablet, Take 1 tablet by mouth daily (with breakfast), Disp: 60 tablet, Rfl: 3    meloxicam (MOBIC) 15 MG tablet, TAKE 1 TABLET BY MOUTH DAILY, Disp: 90 tablet, Rfl: 2    metoprolol succinate (TOPROL XL) 50 MG extended release tablet, TAKE 1 TABLET BY MOUTH DAILY, Disp: 90 tablet, Rfl: 2    simvastatin (ZOCOR) 40 MG tablet, TAKE 1 TABLET BY MOUTH EVERY NIGHT, Disp: 90 tablet, Rfl: 3    isosorbide mononitrate (IMDUR) 30 MG extended release tablet, TAKE 1 TABLET BY MOUTH EVERY DAY, Disp: 90 tablet, Rfl: 2    cyclobenzaprine (FLEXERIL) 10 MG tablet, TAKE 1 TABLET BY MOUTH TWICE DAILY AS NEEDED FOR MUSCLE SPASMS, Disp: 60 tablet, Rfl: 0    albuterol sulfate  (90 Base) MCG/ACT inhaler, INHALE 2 PUFFS INTO THE LUNGS EVERY 6 HOURS AS NEEDED FOR WHEEZING, Disp: 36 g, Rfl: 3    nitroGLYCERIN (NITROSTAT) 0.4 MG SL tablet, DISSOLVE 1 TABLET UNDER THE TONGUE EVERY 5 MINUTES AS NEEDED FOR CHEST PAIN.  IF NO RELIEF AFTER 1 DOSE, CALL 911, Disp: 25 tablet, Rfl: 0    ACETAMINOPHEN EXTRA STRENGTH 500 MG tablet, TAKE 1 TABLET BY MOUTH EVERY 6 HOURS AS NEEDED FOR PAIN, Disp: 120 tablet, Rfl: 3    busPIRone (BUSPAR) 15 MG tablet, TAKE 1 TABLET BY MOUTH EVERY 12 HOURS AS NEEDED, Disp: 120 tablet, Rfl: 3    Family History   Problem Relation Age of Onset    Emphysema Mother        Social History     Socioeconomic History    Marital status:      Spouse name: Not on file    Number of children: Not on file    Years of education: Not on file    Highest education level: Not on file   Occupational History    Not on file   Tobacco Use    Smoking status: Former Smoker     Packs/day: 0.25     Years: 25.00     Pack years: 6.25     Types: Cigarettes     Quit date: 2009     Years since quittin.6    Smokeless tobacco: Never Used   Vaping Use    Vaping Use: Never used   Substance and Sexual Activity    Alcohol use: No     Alcohol/week: 0.0 standard drinks    Drug use: No    Sexual activity: Yes   Other Topics Concern    Not on file   Social History Narrative    Not on file     Social Determinants of Health     Financial Resource Strain: Low Risk     Difficulty of Paying Living Expenses: Not hard at all   Food Insecurity: No Food Insecurity    Worried About [address] in the Last Year: Never true    [facility] [employer] in the Last Year: Never true   Transportation Needs: No Transportation Needs    Lack of Transportation (Medical): No    Lack of Transportation (Non-Medical):  No   Physical Activity:     Days of Exercise per Week: Not on file    Minutes of Exercise per Session: Not on file   Stress:     Feeling of Stress : Not on file   Social Connections:     Frequency of Communication with Friends and Family: Not on file    Frequency of Social Gatherings with Friends and Family: Not on file    Attends Mu-ism Services: Not on file    Active Member of Clubs or Organizations: Not on file    Attends Club or Organization Meetings: Not on file    Marital Status: Not on file   Intimate Partner Violence:     Fear of Current or Ex-Partner: Not on file    Emotionally Abused: Not on file    Physically Abused: Not on file    Sexually Abused: Not on file   Housing Stability:     Unable to Pay for Housing in the Last Year: Not on file    Number of [place] in the Last Year: Not on file    Unstable Housing in the Last Year: Not on file         Review of Systems:  Review of Systems   Constitutional: Negative. HENT: Negative. Eyes:        Glasses   Cardiovascular: Negative. Endocrine: Negative. Hematologic/Lymphatic: Bruises/bleeds easily. Skin:        Rash on back a few days ago   Musculoskeletal: Positive for back pain and joint pain. Knees   Gastrointestinal: Negative. Genitourinary: Negative. Neurological: Positive for loss of balance. Psychiatric/Behavioral: Positive for depression. The patient is nervous/anxious. Managing         Physical Exam:  /67   Pulse 103   Temp 97.7 °F (36.5 °C) (Skin)   Resp 20   LMP 11/07/2012   SpO2 99%     Physical Exam  Skin:         Neurological:      Mental Status: She is alert and oriented to person, place, and time. Psychiatric:         Mood and Affect: Mood normal.         Thought Content: Thought content normal.           Assessment:    Problem List Items Addressed This Visit     Spinal stenosis of lumbar region (Chronic)    Lumbar radiculopathy    Lumbar degenerative disc disease    Degenerative disc disease, cervical - Primary            Treatment Plan:  DISCUSSION: Treatment options discussed withpatient and all questions answered to patient's satisfaction. Possible side effects, risk of tolerance and or dependence and alternative treatments discussed    Obtaining appropriate analgesic effect of treatment   No signs of potential drug abuse or diversion identified    [x] Ill effects of being on chronic pain medications such as sleep disturbances, respiratory depression, hormonal changes, withdrawal symptoms, chronic opioid dependence and tolerance as well as risk of taking opioids with Benzodiazepines and taking opioids along with alcohol,  werediscussed with patient. I had asked the patient to minimize medication use and utilize pain medications only for uncontrolled rest pain or pain with exertional activities.  I advised patient

## 2022-01-10 ENCOUNTER — HOSPITAL ENCOUNTER (OUTPATIENT)
Dept: PAIN MANAGEMENT | Age: 62
Discharge: HOME | End: 2022-01-10
Payer: MEDICARE

## 2022-01-10 VITALS
SYSTOLIC BLOOD PRESSURE: 101 MMHG | DIASTOLIC BLOOD PRESSURE: 67 MMHG | HEART RATE: 103 BPM | OXYGEN SATURATION: 99 % | RESPIRATION RATE: 20 BRPM | TEMPERATURE: 97.7 F

## 2022-01-10 DIAGNOSIS — M50.30 DEGENERATIVE DISC DISEASE, CERVICAL: Primary | ICD-10-CM

## 2022-01-10 DIAGNOSIS — M54.16 LUMBAR RADICULOPATHY: ICD-10-CM

## 2022-01-10 DIAGNOSIS — M51.36 LUMBAR DEGENERATIVE DISC DISEASE: ICD-10-CM

## 2022-01-10 DIAGNOSIS — M48.062 SPINAL STENOSIS OF LUMBAR REGION WITH NEUROGENIC CLAUDICATION: Chronic | ICD-10-CM

## 2022-01-10 PROCEDURE — 99213 OFFICE O/P EST LOW 20 MIN: CPT | Performed by: NURSE PRACTITIONER

## 2022-01-10 PROCEDURE — 99213 OFFICE O/P EST LOW 20 MIN: CPT

## 2022-01-10 ASSESSMENT — ENCOUNTER SYMPTOMS
GASTROINTESTINAL NEGATIVE: 1
BACK PAIN: 1

## 2022-01-11 ENCOUNTER — OFFICE VISIT (OUTPATIENT)
Dept: NEUROLOGY | Age: 62
End: 2022-01-11
Payer: MEDICARE

## 2022-01-11 VITALS
HEART RATE: 100 BPM | WEIGHT: 192 LBS | OXYGEN SATURATION: 99 % | HEIGHT: 62 IN | RESPIRATION RATE: 16 BRPM | BODY MASS INDEX: 35.33 KG/M2 | DIASTOLIC BLOOD PRESSURE: 75 MMHG | SYSTOLIC BLOOD PRESSURE: 120 MMHG

## 2022-01-11 DIAGNOSIS — M54.17 LUMBOSACRAL RADICULOPATHY: ICD-10-CM

## 2022-01-11 DIAGNOSIS — M48.061 SPINAL STENOSIS OF LUMBAR REGION WITHOUT NEUROGENIC CLAUDICATION: Primary | ICD-10-CM

## 2022-01-11 PROCEDURE — 1036F TOBACCO NON-USER: CPT | Performed by: STUDENT IN AN ORGANIZED HEALTH CARE EDUCATION/TRAINING PROGRAM

## 2022-01-11 PROCEDURE — 3017F COLORECTAL CA SCREEN DOC REV: CPT | Performed by: STUDENT IN AN ORGANIZED HEALTH CARE EDUCATION/TRAINING PROGRAM

## 2022-01-11 PROCEDURE — G8427 DOCREV CUR MEDS BY ELIG CLIN: HCPCS | Performed by: STUDENT IN AN ORGANIZED HEALTH CARE EDUCATION/TRAINING PROGRAM

## 2022-01-11 PROCEDURE — G8417 CALC BMI ABV UP PARAM F/U: HCPCS | Performed by: STUDENT IN AN ORGANIZED HEALTH CARE EDUCATION/TRAINING PROGRAM

## 2022-01-11 PROCEDURE — G8482 FLU IMMUNIZE ORDER/ADMIN: HCPCS | Performed by: STUDENT IN AN ORGANIZED HEALTH CARE EDUCATION/TRAINING PROGRAM

## 2022-01-11 PROCEDURE — 99212 OFFICE O/P EST SF 10 MIN: CPT | Performed by: STUDENT IN AN ORGANIZED HEALTH CARE EDUCATION/TRAINING PROGRAM

## 2022-01-11 RX ORDER — LIDOCAINE 50 MG/G
1 PATCH TOPICAL DAILY
Qty: 10 PATCH | Refills: 0 | Status: SHIPPED | OUTPATIENT
Start: 2022-01-11 | End: 2022-01-21

## 2022-01-11 ASSESSMENT — ENCOUNTER SYMPTOMS
COUGH: 0
BACK PAIN: 1
SHORTNESS OF BREATH: 0
CHEST TIGHTNESS: 0
EYE DISCHARGE: 0
SORE THROAT: 0
WHEEZING: 0
VOMITING: 0
DIARRHEA: 0
ABDOMINAL PAIN: 0
NAUSEA: 0

## 2022-01-11 NOTE — PROGRESS NOTES
[address] [place] [address], [facility], [facility], [facility], [address] [place] [address]  P: [phone]  F: [phone]    NEUROLOGY CLINIC NOTE     PATIENT NAME: Eli Gomez  PATIENT MRN: [id number]  PRIMARY CARE PHYSICIAN: Héctor Lane MD    HPI:       Eli Gomez is a 64 y.o. right handed  female with PMH significant for  was seen in the clinic for  Follow up        Interval History:   Seen last time in clinic on September 7, 2021. Since then patient has had epidural injection for backache. As per the patient it did not help that but patient continues to have back pain. MRI of the lumbar spine in February which showed multilevel degenerative changes at L2-3, L4-5 and L5-S1. Patient last saw neurosurgery back in February 2021., recommended attempt to continue with physical therapy, pain management, and if that does not help to follow-up with neurosurgery again. Patient wants to see Neurosurgery for surgical options     9/7/2021:  Patient continues to have right groin numbness, feels like numbness has increased. Patient recently had steroid injection with pain management felt to elevate the pain. Patient recently had Doppler scan done for bilateral lower extremity, as per the patient she has decreased blood flow in the right leg, she is: Follow-up with vascular surgeon at [facility].    Recommended patient to send over the document from the [facility].     Patient denies any weakness, headache, dizziness, tingling, vision changes. Followed up with neurosurgery on 5/5/2021, as patient had significant improvement in the symptoms with multimodal conservative measures including physical therapy, home stretching exercise regimen and injections along with oral steroids, neurosurgery recommended continuing conservative measures for now.   May consider surgical options if patient has more relapses or becomes refractory to above treatments  Examination of participant in clinical trial 11    End date 2015    HTN (hypertension)     Lumbar radiculopathy     Mixed hyperlipidemia 1/15/2018    OA (osteoarthritis)     [facility]        Past Surgical History:   Procedure Laterality Date     SECTION      COLONOSCOPY N/A 2019    COLORECTAL CANCER SCREENING, NOT HIGH RISK performed by Jaxson Minaya MD at [address]  2015    2 stents    DILATION AND CURETTAGE OF UTERUS N/A     uterine polyp        Social History     Socioeconomic History    Marital status:      Spouse name: Not on file    Number of children: Not on file    Years of education: Not on file    Highest education level: Not on file   Occupational History    Not on file   Tobacco Use    Smoking status: Former Smoker     Packs/day: 0.25     Years: 25.00     Pack years: 6.25     Types: Cigarettes     Quit date: 2009     Years since quittin.6    Smokeless tobacco: Never Used   Vaping Use    Vaping Use: Never used   Substance and Sexual Activity    Alcohol use: No     Alcohol/week: 0.0 standard drinks    Drug use: No    Sexual activity: Yes   Other Topics Concern    Not on file   Social History Narrative    Not on file     Social Determinants of Health     Financial Resource Strain: Low Risk     Difficulty of Paying Living Expenses: Not hard at all   Food Insecurity: No Food Insecurity    Worried About Running Out of Food in the Last Year: Never true    N[place] of Food in the Last Year: Never true   Transportation Needs: No Transportation Needs    Lack of Transportation (Medical): No    Lack of Transportation (Non-Medical):  No   Physical Activity:     Days of Exercise per Week: Not on file    Minutes of Exercise per Session: Not on file   Stress:     Feeling of Stress : Not on file   Social Connections:     Frequency of Communication with Friends and 90 tablet 2    ACETAMINOPHEN EXTRA STRENGTH 500 MG tablet TAKE 1 TABLET BY MOUTH EVERY 6 HOURS AS NEEDED FOR PAIN 120 tablet 3    aspirin (ASPIRIN LOW DOSE) 81 MG EC tablet TAKE 1 TABLET BY MOUTH DAILY 90 tablet 2    metFORMIN (GLUCOPHAGE-XR) 500 MG extended release tablet Take 1 tablet by mouth daily (with breakfast) 60 tablet 3    meloxicam (MOBIC) 15 MG tablet TAKE 1 TABLET BY MOUTH DAILY 90 tablet 2    metoprolol succinate (TOPROL XL) 50 MG extended release tablet TAKE 1 TABLET BY MOUTH DAILY 90 tablet 2    simvastatin (ZOCOR) 40 MG tablet TAKE 1 TABLET BY MOUTH EVERY NIGHT 90 tablet 3    isosorbide mononitrate (IMDUR) 30 MG extended release tablet TAKE 1 TABLET BY MOUTH EVERY DAY 90 tablet 2    busPIRone (BUSPAR) 15 MG tablet TAKE 1 TABLET BY MOUTH EVERY 12 HOURS AS NEEDED 120 tablet 3     No current facility-administered medications for this visit. Allergies   Allergen Reactions    Claritin [Loratadine]      02/2010 Heart palpitations  02/2010 Heart palpitations    Codeine Nausea Only        REVIEW OF SYSTEMS:     Review of Systems   Constitutional: Negative for activity change, chills, fever and unexpected weight change. HENT: Negative for congestion, hearing loss and sore throat. Eyes: Negative for discharge and visual disturbance. Respiratory: Negative for cough, chest tightness, shortness of breath and wheezing. Cardiovascular: Negative for chest pain, palpitations and leg swelling. Gastrointestinal: Negative for abdominal pain, diarrhea, nausea and vomiting. Endocrine: Negative for polydipsia and polyphagia. Genitourinary: Negative for decreased urine volume, difficulty urinating, dysuria, frequency and vaginal pain. Musculoskeletal: Positive for back pain. Negative for arthralgias, joint swelling and neck stiffness. Skin: Negative for rash. Allergic/Immunologic: Negative for environmental allergies.    Neurological: Positive for numbness (Positive for numbness (right groin and lateral part of thigh ). ). Negative for dizziness, tremors, seizures, syncope, facial asymmetry, speech difficulty, weakness, light-headedness and headaches. Hematological: Negative for adenopathy. Psychiatric/Behavioral: Negative for agitation, confusion, hallucinations and suicidal ideas. VITALS  /75   Pulse 100   Resp 16   Ht 5' 2\" (1.575 m)   Wt 192 lb (87.1 kg)   LMP 11/07/2012   SpO2 99%   BMI 35.12 kg/m²      PHYSICAL EXAMINATION:     Physical Exam     Constitutional: Well developed, well nourished and in no acute distress. Head:  normocephalic, atraumatic. Neck: supple, no carotid bruits, thyroid not palpable  Respiratory: Clear to auscultation bilaterally with no use of accessory muscles during respiration. Cardiovascular: normal rate, regular rhythm, no murmur, gallop, rub.   Abdomen: Soft, nontender, nondistended, normal bowel sounds,   Extremities:  peripheral pulses palpable, no pedal edema or calf pain with palpation  Psych: normal affect      NEUROLOGICAL EXAMINATION:     Mental status   Alert and oriented; intact memory with no confusion, speech or language problems; no hallucinations or delusions     Cranial nerves   II - visual fields intact to confrontation                                                III, IV, VI  extra-ocular muscles full: no pupillary defect; no JEFFREY, no nystagmus, no ptosis   V - normal facial sensation                                                               VII - normal facial symmetry                                                             VIII - intact hearing                                                                             IX, X - symmetrical palate                                                                  XI - symmetrical shoulder shrug                                                       XII - midline tongue without atrophy or fasciculation     Motor function  Normal muscle bulk and tone  Muscle strength: normal power bilateral upper extremity 5/5  Right lower extremity 4+/5  fine motor movements     Sensory function  decreased sensation right lower extremity in the groin and anterolateral region more than left     Cerebellar Intact fine motor movement. No involuntary movements or tremors     Reflex function Intact 2+ DTR and symmetric. Negative Babinski     Gait                  Normal station and gait           PRIOR TESTS AND IMAGING: Following images and Labs were reviewed by the examiner          MRI lumbar spine: 11/24/2020      Impression   1. 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   2. Increased severe L5-S1 degenerative disc disease. 3. Severe L4-5 and L5-S1 and moderate L3-4 spinal canal stenosis. 4. Multilevel neural foraminal narrowing as detailed above and greatest   involving the L5 neural foramina where it is moderate bilaterally. X-ray spine entire: 2/10/2021:      Impression   Mild thoracolumbar scoliosis. X-ray lumbar spine lection extension     Impression   L3-L4 anterolisthesis without instability. ASSESSMENT / PLAN:           69-year-old female presents to the clinic for evaluation of right groin anterolateral tingling and numbness.        -Lumbar radiculopathy  -Numbness in the right groin  -Hypertension  -Hyperlipidemia        PLAN:   -Lyrica dose  150 x 3 times daily  -PT/OT  -Pain management  -Refer to neurosurgery: Dr. Rachelle Silva patient to call the clinic if symptoms worsen or develop any new symptoms.                 Electronically signed by Harjit Juan MD on 1/11/2022 at 3:47 PM

## 2022-01-13 ENCOUNTER — OFFICE VISIT (OUTPATIENT)
Dept: FAMILY MEDICINE CLINIC | Age: 62
End: 2022-01-13
Payer: MEDICARE

## 2022-01-13 VITALS
SYSTOLIC BLOOD PRESSURE: 102 MMHG | TEMPERATURE: 97.9 F | WEIGHT: 192.6 LBS | OXYGEN SATURATION: 100 % | BODY MASS INDEX: 35.44 KG/M2 | HEIGHT: 62 IN | HEART RATE: 97 BPM | DIASTOLIC BLOOD PRESSURE: 60 MMHG

## 2022-01-13 DIAGNOSIS — R73.03 PREDIABETES: ICD-10-CM

## 2022-01-13 DIAGNOSIS — R60.9 PERIPHERAL EDEMA: ICD-10-CM

## 2022-01-13 DIAGNOSIS — I25.10 CORONARY ARTERY DISEASE INVOLVING NATIVE CORONARY ARTERY OF NATIVE HEART WITHOUT ANGINA PECTORIS: ICD-10-CM

## 2022-01-13 DIAGNOSIS — E78.2 MIXED HYPERLIPIDEMIA: ICD-10-CM

## 2022-01-13 DIAGNOSIS — I73.9 PVD (PERIPHERAL VASCULAR DISEASE) WITH CLAUDICATION (HCC): ICD-10-CM

## 2022-01-13 DIAGNOSIS — I10 ESSENTIAL HYPERTENSION: Primary | ICD-10-CM

## 2022-01-13 LAB — HBA1C MFR BLD: 5.8 %

## 2022-01-13 PROCEDURE — 99214 OFFICE O/P EST MOD 30 MIN: CPT | Performed by: FAMILY MEDICINE

## 2022-01-13 PROCEDURE — G8427 DOCREV CUR MEDS BY ELIG CLIN: HCPCS | Performed by: FAMILY MEDICINE

## 2022-01-13 PROCEDURE — G8417 CALC BMI ABV UP PARAM F/U: HCPCS | Performed by: FAMILY MEDICINE

## 2022-01-13 PROCEDURE — 83036 HEMOGLOBIN GLYCOSYLATED A1C: CPT | Performed by: FAMILY MEDICINE

## 2022-01-13 PROCEDURE — G8482 FLU IMMUNIZE ORDER/ADMIN: HCPCS | Performed by: FAMILY MEDICINE

## 2022-01-13 PROCEDURE — 3017F COLORECTAL CA SCREEN DOC REV: CPT | Performed by: FAMILY MEDICINE

## 2022-01-13 PROCEDURE — 1036F TOBACCO NON-USER: CPT | Performed by: FAMILY MEDICINE

## 2022-01-13 RX ORDER — FUROSEMIDE 20 MG/1
20 TABLET ORAL DAILY
Qty: 60 TABLET | Refills: 3 | Status: SHIPPED | OUTPATIENT
Start: 2022-01-13 | End: 2022-01-28

## 2022-01-13 RX ORDER — POTASSIUM CHLORIDE 750 MG/1
10 TABLET, EXTENDED RELEASE ORAL DAILY
Qty: 90 TABLET | Refills: 1 | Status: SHIPPED | OUTPATIENT
Start: 2022-01-13 | End: 2022-06-20

## 2022-01-13 ASSESSMENT — PATIENT HEALTH QUESTIONNAIRE - PHQ9
SUM OF ALL RESPONSES TO PHQ QUESTIONS 1-9: 0
1. LITTLE INTEREST OR PLEASURE IN DOING THINGS: 0
SUM OF ALL RESPONSES TO PHQ QUESTIONS 1-9: 0
2. FEELING DOWN, DEPRESSED OR HOPELESS: 0
SUM OF ALL RESPONSES TO PHQ9 QUESTIONS 1 & 2: 0

## 2022-01-13 ASSESSMENT — ENCOUNTER SYMPTOMS
ABDOMINAL DISTENTION: 0
PHOTOPHOBIA: 0
WHEEZING: 0
SORE THROAT: 0
ABDOMINAL PAIN: 0
RECTAL PAIN: 0
COUGH: 0
CONSTIPATION: 0
DIARRHEA: 0
VOMITING: 0
SHORTNESS OF BREATH: 0
BACK PAIN: 1
FACIAL SWELLING: 0
CHEST TIGHTNESS: 0
SINUS PRESSURE: 0

## 2022-01-13 NOTE — PROGRESS NOTES
Visit Information    Have you changed or started any medications since your last visit including any over-the-counter medicines, vitamins, or herbal medicines? no   Are you having any side effects from any of your medications? -  no  Have you stopped taking any of your medications? Is so, why? -  no    Have you seen any other physician or provider since your last visit? No  Have you had any other diagnostic tests since your last visit? No  Have you been seen in the emergency room and/or had an admission to a hospital since we last saw you? No  Have you had your routine dental cleaning in the past 6 months? no    Have you activated your [employer] account? If not, what are your barriers?  Yes     Patient Care Team:  Josesito Lassiter MD as PCP - General (Family Medicine)  Josesito Lassiter MD as PCP - [facility]    Medical History Review  Past Medical, Family, and Social History reviewed and does contribute to the patient presenting condition    Health Maintenance   Topic Date Due    COVID-19 Vaccine (2 - Pfizer 3-dose series) 11/26/2021    Depression Monitoring  02/08/2022    A1C test (Diabetic or Prediabetic)  07/26/2022    Lipid screen  07/26/2022    Breast cancer screen  10/08/2022    Potassium monitoring  10/26/2022    Creatinine monitoring  10/26/2022    Cervical cancer screen  04/16/2023    Colon cancer screen colonoscopy  05/06/2024    Pneumococcal 0-64 years Vaccine (2 of 2 - PPSV23) 05/02/2025    DTaP/Tdap/Td vaccine (2 - Td or Tdap) 04/16/2028    Flu vaccine  Completed    Shingles Vaccine  Completed    Hepatitis C screen  Completed    HIV screen  Completed    Hepatitis A vaccine  Aged Out    Hepatitis B vaccine  Aged Out    Hib vaccine  Aged Out    Meningococcal (ACWY) vaccine  Aged Out

## 2022-01-13 NOTE — PROGRESS NOTES
Chief Complaint   Patient presents with    Hypertension    Swelling     leg swelling waterpill not working          Adalgisa Cornejo  here today for follow up on chronic medical problems, go over labs and/or diagnostic studies, and medication refills. Hypertension and Swelling (leg swelling waterpill not working )      HPI: Patient is scheduled for follow-up appointment on chronic medical conditions. Hypertension controlled denies any chest pain shortness of breath. Prediabetes A1c is 5.8 stable on diet control. Patient was seen few months before for leg swelling. Patient reports she has noticed swelling in both her which is recently increased. She has history of underlying coronary artery disease. Reports she was taking hydrochlorothiazide 12.5 mg which is not helping. She also had recent weight gain. Patient has bilateral peripheral artery disease including aortic and iliac arteries, is planning for surgery. Hyperlipidemia under control on medication. Asthma stable on current inhalers. CTA angiogram of abdomen  IMPRESSION:   1.  Atherosclerotic disease of the abdominal aorta and iliac arteries. 2.  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 3.  Mild narrowing of the right external iliac artery and left common iliac artery. 4.  Mild narrowing of the proximal SFA bilaterally, otherwise patent runoff vessels. /60   Pulse 97   Temp 97.9 °F (36.6 °C)   Ht 5' 2\" (1.575 m)   Wt 192 lb 9.6 oz (87.4 kg)   LMP 11/07/2012   SpO2 100%   BMI 35.23 kg/m²    Body mass index is 35.23 kg/m². Wt Readings from Last 3 Encounters:   01/13/22 192 lb 9.6 oz (87.4 kg)   01/11/22 192 lb (87.1 kg)   12/22/21 192 lb (87.1 kg)        [x]Negative depression screening.   PHQ Scores 1/13/2022 2/8/2021 3/25/2019 9/25/2018 4/16/2018   PHQ2 Score 0 0 0 0 0   PHQ9 Score 0 0 0 0 0      []1-4 = Minimal depression   []5-9 = Milddepression   []10-14 = Moderate depression   []15-19 = Moderately severe depression   []20-27 = Severe depression    Discussed testing with the patient and all questions fully answered. Hospital Outpatient Visit on 10/26/2021   Component Date Value Ref Range Status    Glucose 10/26/2021 85  70 - 99 mg/dL Final    BUN 10/26/2021 18  8 - 23 mg/dL Final    CREATININE 10/26/2021 0.92* 0.50 - 0.90 mg/dL Final    Bun/Cre Ratio 10/26/2021 NOT REPORTED  9 - 20 Final    Calcium 10/26/2021 9.7  8.6 - 10.4 mg/dL Final    Sodium 10/26/2021 141  135 - 144 mmol/L Final    Potassium 10/26/2021 4.2  3.7 - 5.3 mmol/L Final    Chloride 10/26/2021 101  98 - 107 mmol/L Final    CO2 10/26/2021 29  20 - 31 mmol/L Final    Anion Gap 10/26/2021 11  9 - 17 mmol/L Final    GFR Non- 10/26/2021 >60  >60 mL/min Final    GFR  10/26/2021 >60  >60 mL/min Final    GFR Comment 10/26/2021        Final    Comment: Average GFR for 61-76 years old:   80 mL/min/1.73sq m  Chronic Kidney Disease:   <60 mL/min/1.73sq m  Kidney failure:   <15 mL/min/1.73sq m              eGFR calculated using average adult body mass.  Additional eGFR calculator available at:        [email or web address]            GFR Staging 10/26/2021 NOT REPORTED   Final         Most recent labs reviewed:     Lab Results   Component Value Date    WBC 6.6 07/26/2021    HGB 14.8 07/26/2021    HCT 44.1 07/26/2021    MCV 94.4 07/26/2021     07/26/2021       @BRIEFLAB(NA,K,CL,CO2,BUN,CREATININE,GLUCOSE,CALCIUM)@     Lab Results   Component Value Date    ALT 16 07/26/2021    AST 20 07/26/2021    ALKPHOS 74 07/26/2021    BILITOT 0.28 (L) 07/26/2021       Lab Results   Component Value Date    TSH 2.33 07/26/2021       Lab Results   Component Value Date    CHOL 136 07/26/2021    CHOL 116 03/26/2019    CHOL 137 10/26/2017     Lab Results   Component Value Date    TRIG 104 07/26/2021    TRIG 91 03/26/2019    TRIG 108 10/26/2017     Lab Results Component Value Date    HDL 50 07/26/2021    HDL 59 07/01/2020    HDL 51 03/26/2019     Lab Results   Component Value Date    LDLCHOLESTEROL 65 07/26/2021    LDLCHOLESTEROL 67 07/01/2020    LDLCHOLESTEROL 47 03/26/2019     Lab Results   Component Value Date    VLDL NOT REPORTED 07/26/2021    VLDL NOT REPORTED 07/01/2020    VLDL NOT REPORTED 03/26/2019     Lab Results   Component Value Date    CHOLHDLRATIO 2.7 07/26/2021    CHOLHDLRATIO 2.4 07/01/2020    CHOLHDLRATIO 2.3 03/26/2019       Lab Results   Component Value Date    LABA1C 5.8 01/13/2022       No results found for: [id number]    No results found for: FOLATE    No results found for: IRON, TIBC, FERRITIN    Lab Results   Component Value Date    VITD25 46.7 07/26/2021             Current Outpatient Medications   Medication Sig Dispense Refill    furosemide (LASIX) 20 MG tablet Take 1 tablet by mouth daily 60 tablet 3    potassium chloride (KLOR-CON M) 10 MEQ extended release tablet Take 1 tablet by mouth daily 90 tablet 1    lidocaine (LIDODERM) 5 % Place 1 patch onto the skin daily for 10 days 12 hours on, 12 hours off.  10 patch 0    diclofenac sodium (VOLTAREN) 1 % GEL APPLY 2 GRAMS TOPICALLY TO THE AFFECTED AREA FOUR TIMES DAILY 200 g 1    buPROPion (WELLBUTRIN SR) 100 MG extended release tablet TAKE 1 TABLET BY MOUTH TWICE DAILY 60 tablet 3    cyclobenzaprine (FLEXERIL) 10 MG tablet TAKE 1 TABLET BY MOUTH TWICE DAILY AS NEEDED FOR MUSCLE SPASMS 60 tablet 0    pregabalin (LYRICA) 150 MG capsule TAKE 1 CAPSULE BY MOUTH THREE TIMES DAILY 90 capsule 0    albuterol sulfate  (90 Base) MCG/ACT inhaler INHALE 2 PUFFS INTO THE LUNGS EVERY 6 HOURS AS NEEDED FOR WHEEZING 36 g 3    Calcium Carb-Cholecalciferol (CALCIUM-VITAMIN D) 600-400 MG-UNIT TABS TAKE 1 TABLET BY MOUTH DAILY 90 tablet 0    enalapril (VASOTEC) 10 MG tablet TAKE 2 TABLETS BY MOUTH EVERY MORNING, THEN 1 TABLET BY MOUTH EVERY EVENING 90 tablet 2    nitroGLYCERIN (NITROSTAT) 0.4 MG SL tablet DISSOLVE 1 TABLET UNDER THE TONGUE EVERY 5 MINUTES AS NEEDED FOR CHEST PAIN. IF NO RELIEF AFTER 1 DOSE, CALL 911 25 tablet 0    cilostazol (PLETAL) 50 MG tablet Take 50 mg by mouth 2 times daily      cetirizine (ZYRTEC) 10 MG tablet TAKE 1 TABLET BY MOUTH EVERY DAY 90 tablet 2    ACETAMINOPHEN EXTRA STRENGTH 500 MG tablet TAKE 1 TABLET BY MOUTH EVERY 6 HOURS AS NEEDED FOR PAIN 120 tablet 3    aspirin (ASPIRIN LOW DOSE) 81 MG EC tablet TAKE 1 TABLET BY MOUTH DAILY 90 tablet 2    metFORMIN (GLUCOPHAGE-XR) 500 MG extended release tablet Take 1 tablet by mouth daily (with breakfast) 60 tablet 3    meloxicam (MOBIC) 15 MG tablet TAKE 1 TABLET BY MOUTH DAILY 90 tablet 2    metoprolol succinate (TOPROL XL) 50 MG extended release tablet TAKE 1 TABLET BY MOUTH DAILY 90 tablet 2    simvastatin (ZOCOR) 40 MG tablet TAKE 1 TABLET BY MOUTH EVERY NIGHT 90 tablet 3    isosorbide mononitrate (IMDUR) 30 MG extended release tablet TAKE 1 TABLET BY MOUTH EVERY DAY 90 tablet 2    busPIRone (BUSPAR) 15 MG tablet TAKE 1 TABLET BY MOUTH EVERY 12 HOURS AS NEEDED 120 tablet 3     No current facility-administered medications for this visit.              Social History     Socioeconomic History    Marital status:      Spouse name: Not on file    Number of children: Not on file    Years of education: Not on file    Highest education level: Not on file   Occupational History    Not on file   Tobacco Use    Smoking status: Former Smoker     Packs/day: 0.25     Years: 25.00     Pack years: 6.25     Types: Cigarettes     Quit date: 2009     Years since quittin.6    Smokeless tobacco: Never Used   Vaping Use    Vaping Use: Never used   Substance and Sexual Activity    Alcohol use: No     Alcohol/week: 0.0 standard drinks    Drug use: No    Sexual activity: Yes   Other Topics Concern    Not on file   Social History Narrative    Not on file     Social Determinants of Health     Financial Resource Strain: Low Risk     Difficulty of Paying Living Expenses: Not hard at all   Food Insecurity: No Food Insecurity    Worried About Running Out of Food in the Last Year: Never true    Nabila of Food in the Last Year: Never true   Transportation Needs: No Transportation Needs    Lack of Transportation (Medical): No    Lack of Transportation (Non-Medical): No   Physical Activity:     Days of Exercise per Week: Not on file    Minutes of Exercise per Session: Not on file   Stress:     Feeling of Stress : Not on file   Social Connections:     Frequency of Communication with Friends and Family: Not on file    Frequency of Social Gatherings with Friends and Family: Not on file    Attends [employer] Services: Not on file    Active Member of [address] [place] [address] [employer] or Organizations: Not on file    Attends Club or Organization Meetings: Not on file    Marital Status: Not on file   Intimate Partner Violence:     Fear of Current or Ex-Partner: Not on file    Emotionally Abused: Not on file    Physically Abused: Not on file    Sexually Abused: Not on file   Housing Stability:     Unable to Pay for Housing in the Last Year: Not on file    Number of [place] in the Last Year: Not on file    Unstable Housing in the Last Year: Not on file     Counseling given: Not Answered        Family History   Problem Relation Age of Onset    Emphysema Mother              -rest of complaints with corresponding details per ROS    The patient's past medical, surgical, social, and family history as well as her current medications and allergies were reviewed as documented intoday's encounter. Review of Systems   Constitutional: Negative for activity change, diaphoresis and unexpected weight change. HENT: Negative for congestion, facial swelling, mouth sores, postnasal drip, sinus pressure and sore throat. Eyes: Negative for photophobia and visual disturbance.    Respiratory: Negative for cough, chest tightness, shortness of breath and wheezing. Cardiovascular: Positive for leg swelling. Negative for chest pain and palpitations. Gastrointestinal: Negative for abdominal distention, abdominal pain, constipation, diarrhea, rectal pain and vomiting. Endocrine: Negative for polyphagia and polyuria. Genitourinary: Negative for difficulty urinating, frequency, hematuria, urgency and vaginal pain. Musculoskeletal: Positive for arthralgias, back pain, gait problem and joint swelling. Negative for myalgias. Neurological: Positive for numbness. Negative for dizziness, facial asymmetry, speech difficulty, weakness and headaches. Psychiatric/Behavioral: Negative for agitation, confusion, dysphoric mood, hallucinations and sleep disturbance. The patient is nervous/anxious. Physical Exam  Vitals and nursing note reviewed. Constitutional:       Appearance: Normal appearance. HENT:      Head: Atraumatic. Nose: Nose normal.   Eyes:      Extraocular Movements: Extraocular movements intact. Pupils: Pupils are equal, round, and reactive to light. Cardiovascular:      Rate and Rhythm: Normal rate and regular rhythm. Pulses:           Dorsalis pedis pulses are 0 on the right side and 0 on the left side. Heart sounds: Normal heart sounds. Pulmonary:      Effort: Pulmonary effort is normal.      Breath sounds: Normal breath sounds. No wheezing or rhonchi. Abdominal:      General: Bowel sounds are normal.      Palpations: Abdomen is soft. Tenderness: There is no abdominal tenderness. Hernia: No hernia is present. Musculoskeletal:      Cervical back: Normal range of motion. No tenderness. Lumbar back: Spasms present. Decreased range of motion. Right lower le+ Edema present. Left lower le+ Edema present. Neurological:      Mental Status: She is alert and oriented to person, place, and time. Cranial Nerves: Cranial nerves are intact. Motor: No weakness. Coordination: Romberg sign negative. Gait: Gait normal.   Psychiatric:         Attention and Perception: Attention and perception normal.         Mood and Affect: Mood is not anxious or depressed. Speech: She is communicative. Speech is not rapid and pressured. Behavior: Behavior is not agitated or slowed. ASSESSMENT AND PLAN      1. Essential hypertension  Controlled continue same medications    2. Prediabetes  Stable continue diet control  - POCT glycosylated hemoglobin (Hb A1C)    3. Coronary artery disease involving native coronary artery of native heart without angina pectoris  Discontinue hydrochlorothiazide start on Lasix recheck BMP in 1 month  - furosemide (LASIX) 20 MG tablet; Take 1 tablet by mouth daily  Dispense: 60 tablet; Refill: 3    4. Peripheral edema  Start on Lasix recheck BMP in 1 month, can increase to 40 if swelling did not improve  - furosemide (LASIX) 20 MG tablet; Take 1 tablet by mouth daily  Dispense: 60 tablet; Refill: 3  - potassium chloride (KLOR-CON M) 10 MEQ extended release tablet; Take 1 tablet by mouth daily  Dispense: 90 tablet; Refill: 1  - Basic Metabolic Panel; Future    5. Mixed hyperlipidemia  Stable continue statins    6. PVD (peripheral vascular disease) with claudication [facility])    Follow-up with vascular surgeon. Orders Placed This Encounter   Procedures    Basic Metabolic Panel     Standing Status:   Future     Standing Expiration Date:   1/13/2023    POCT glycosylated hemoglobin (Hb A1C)         Medications Discontinued During This Encounter   Medication Reason    hydroCHLOROthiazide (HYDRODIURIL) 12.5 MG tablet Alternate therapy       Christina received counseling on the following healthy behaviors: nutrition, exercise and medication adherence  Reviewed prior labs and health maintenance  Continue current medications, diet and exercise. Discussed use, benefit, and side effects of prescribed medications.  Barriers to medication compliance addressed. Patient given educational materials - see patient instructions  Was a self-tracking handout given in paper form or via [employer]? Yes    Requested Prescriptions     Signed Prescriptions Disp Refills    furosemide (LASIX) 20 MG tablet 60 tablet 3     Sig: Take 1 tablet by mouth daily    potassium chloride (KLOR-CON M) 10 MEQ extended release tablet 90 tablet 1     Sig: Take 1 tablet by mouth daily       All patient questions answered. Patient voiced understanding. Quality Measures    Body mass index is 35.23 kg/m². Elevated. Weight control planned discussed daily exercise regimen and Healthy diet and regular exercise. BP: 102/60 Blood pressure is normal. Treatment plan consists of Weight Reduction, DASH Eating Plan, Dietary Sodium Restriction and No treatment change needed. Lab Results   Component Value Date    LDLCHOLESTEROL 65 07/26/2021    (goal LDL reduction with dx if diabetes is 50% LDL reduction)      PHQ Scores 1/13/2022 2/8/2021 3/25/2019 9/25/2018 4/16/2018   PHQ2 Score 0 0 0 0 0   PHQ9 Score 0 0 0 0 0     Interpretation of Total Score Depression Severity: 1-4 = Minimal depression, 5-9 = Mild depression, 10-14 = Moderate depression, 15-19 = Moderately severe depression, 20-27 = Severe depression    The patient'spast medical, surgical, social, and family history as well as her   current medications and allergies were reviewed as documented in today's encounter. Medications, labs, diagnostic studies, consultations andfollow-up as documented in this encounter. Return in about 3 months (around 4/13/2022). Patient wasseen with total face to face time of 30 minutes. More than 50% of this visit was counseling and education.        Future Appointments   Date Time Provider Matt Diez   3/9/2022  3:30 PM Inocencia Pantoja [facility] [address] [facility]   4/14/2022  2:45 PM Luiz Cabrales MD [email or web address]   5/17/2022  3:30 PM Chao Tripp MD [facility]     This note was completed by using the assistance of a speech-recognition program. However, inadvertent computerized transcription errors may be present. Althoughevery effort was made to ensure accuracy, no guarantees can be provided that every mistake has been identified and corrected by editing.   Electronically signed by Nenita Santos MD on 1/13/2022  3:11 PM

## 2022-01-19 DIAGNOSIS — F41.9 ANXIETY: ICD-10-CM

## 2022-01-19 DIAGNOSIS — M50.30 DEGENERATIVE DISC DISEASE, CERVICAL: ICD-10-CM

## 2022-01-19 DIAGNOSIS — I10 ESSENTIAL HYPERTENSION: ICD-10-CM

## 2022-01-19 DIAGNOSIS — M79.605 LEG PAIN, LEFT: ICD-10-CM

## 2022-01-19 RX ORDER — BUSPIRONE HYDROCHLORIDE 15 MG/1
TABLET ORAL
Qty: 120 TABLET | Refills: 3 | Status: SHIPPED | OUTPATIENT
Start: 2022-01-19 | End: 2022-08-30

## 2022-01-19 RX ORDER — MULTIVITAMIN
TABLET ORAL
Qty: 90 TABLET | Refills: 0 | Status: SHIPPED | OUTPATIENT
Start: 2022-01-19 | End: 2022-04-18

## 2022-01-19 RX ORDER — ENALAPRIL MALEATE 10 MG/1
TABLET ORAL
Qty: 90 TABLET | Refills: 2 | Status: SHIPPED | OUTPATIENT
Start: 2022-01-19 | End: 2022-04-18

## 2022-01-19 RX ORDER — PSEUDOEPHED/ACETAMINOPH/DIPHEN 30MG-500MG
TABLET ORAL
Qty: 120 TABLET | Refills: 3 | Status: SHIPPED | OUTPATIENT
Start: 2022-01-19 | End: 2022-05-16

## 2022-01-19 NOTE — TELEPHONE ENCOUNTER
Please Approve or Refuse.   Send to Pharmacy per Pt's Request:      Next Visit Date:  4/14/2022   Last Visit Date: 1/13/2022    Hemoglobin A1C (%)   Date Value   01/13/2022 5.8   07/26/2021 6.0   07/01/2020 5.7             ( goal A1C is < 7)   BP Readings from Last 3 Encounters:   01/13/22 102/60   01/11/22 120/75   01/10/22 101/67          (goal 120/80)  BUN   Date Value Ref Range Status   10/26/2021 18 8 - 23 mg/dL Final     CREATININE   Date Value Ref Range Status   10/26/2021 0.92 (H) 0.50 - 0.90 mg/dL Final     Potassium   Date Value Ref Range Status   10/26/2021 4.2 3.7 - 5.3 mmol/L Final

## 2022-01-20 DIAGNOSIS — R60.9 PERIPHERAL EDEMA: ICD-10-CM

## 2022-01-20 DIAGNOSIS — I25.10 CORONARY ARTERY DISEASE INVOLVING NATIVE CORONARY ARTERY OF NATIVE HEART WITHOUT ANGINA PECTORIS: ICD-10-CM

## 2022-01-20 RX ORDER — FUROSEMIDE 20 MG/1
20 TABLET ORAL DAILY
Qty: 60 TABLET | Refills: 3 | Status: CANCELLED | OUTPATIENT
Start: 2022-01-20

## 2022-01-23 DIAGNOSIS — M50.30 DEGENERATIVE DISC DISEASE, CERVICAL: ICD-10-CM

## 2022-01-23 DIAGNOSIS — M19.019 OSTEOARTHRITIS OF SHOULDER, UNSPECIFIED LATERALITY, UNSPECIFIED OSTEOARTHRITIS TYPE: ICD-10-CM

## 2022-01-24 RX ORDER — CYCLOBENZAPRINE HCL 10 MG
TABLET ORAL
Qty: 60 TABLET | Refills: 0 | Status: SHIPPED | OUTPATIENT
Start: 2022-01-24 | End: 2022-02-18

## 2022-01-28 DIAGNOSIS — R60.9 PERIPHERAL EDEMA: ICD-10-CM

## 2022-01-28 DIAGNOSIS — I25.10 CORONARY ARTERY DISEASE INVOLVING NATIVE CORONARY ARTERY OF NATIVE HEART WITHOUT ANGINA PECTORIS: ICD-10-CM

## 2022-01-28 RX ORDER — FUROSEMIDE 40 MG/1
40 TABLET ORAL DAILY
Qty: 90 TABLET | Refills: 0 | Status: SHIPPED | OUTPATIENT
Start: 2022-01-28 | End: 2022-04-19

## 2022-02-03 DIAGNOSIS — M54.16 LUMBAR RADICULOPATHY: ICD-10-CM

## 2022-02-03 DIAGNOSIS — M48.061 SPINAL STENOSIS OF LUMBAR REGION WITHOUT NEUROGENIC CLAUDICATION: ICD-10-CM

## 2022-02-03 DIAGNOSIS — M51.26 PROTRUDED LUMBAR DISC: ICD-10-CM

## 2022-02-03 DIAGNOSIS — M51.36 LUMBAR DEGENERATIVE DISC DISEASE: ICD-10-CM

## 2022-02-03 DIAGNOSIS — S33.6XXD SPRAIN OF SACROILIAC LIGAMENT, SUBSEQUENT ENCOUNTER: ICD-10-CM

## 2022-02-03 RX ORDER — PREGABALIN 150 MG/1
CAPSULE ORAL
Qty: 90 CAPSULE | Refills: 0 | Status: SHIPPED | OUTPATIENT
Start: 2022-02-03 | End: 2022-03-07 | Stop reason: SDUPTHER

## 2022-02-03 NOTE — TELEPHONE ENCOUNTER
Please Approve or Refuse.   Send to Pharmacy per Pt's Request: [facility]     Next Visit Date:  4/14/2022   Last Visit Date: 1/13/2022    Hemoglobin A1C (%)   Date Value   01/13/2022 5.8   07/26/2021 6.0   07/01/2020 5.7             ( goal A1C is < 7)   BP Readings from Last 3 Encounters:   01/13/22 102/60   01/11/22 120/75   01/10/22 101/67          (goal 120/80)  BUN   Date Value Ref Range Status   10/26/2021 18 8 - 23 mg/dL Final     CREATININE   Date Value Ref Range Status   10/26/2021 0.92 (H) 0.50 - 0.90 mg/dL Final     Potassium   Date Value Ref Range Status   10/26/2021 4.2 3.7 - 5.3 mmol/L Final

## 2022-02-15 ENCOUNTER — HOSPITAL ENCOUNTER (OUTPATIENT)
Age: 62
Discharge: HOME | End: 2022-02-15
Payer: MEDICARE

## 2022-02-15 DIAGNOSIS — R60.9 PERIPHERAL EDEMA: ICD-10-CM

## 2022-02-15 LAB
ANION GAP SERPL CALCULATED.3IONS-SCNC: 8 MMOL/L (ref 9–17)
BUN BLDV-MCNC: 16 MG/DL (ref 8–23)
BUN/CREAT BLD: ABNORMAL (ref 9–20)
CALCIUM SERPL-MCNC: 10 MG/DL (ref 8.6–10.4)
CHLORIDE BLD-SCNC: 104 MMOL/L (ref 98–107)
CO2: 30 MMOL/L (ref 20–31)
CREAT SERPL-MCNC: 0.94 MG/DL (ref 0.5–0.9)
GFR AFRICAN AMERICAN: >60 ML/MIN
GFR NON-AFRICAN AMERICAN: >60 ML/MIN
GFR SERPL CREATININE-BSD FRML MDRD: ABNORMAL ML/MIN/{1.73_M2}
GFR SERPL CREATININE-BSD FRML MDRD: ABNORMAL ML/MIN/{1.73_M2}
GLUCOSE BLD-MCNC: 103 MG/DL (ref 70–99)
POTASSIUM SERPL-SCNC: 5.1 MMOL/L (ref 3.7–5.3)
SODIUM BLD-SCNC: 142 MMOL/L (ref 135–144)

## 2022-02-15 PROCEDURE — 80048 BASIC METABOLIC PNL TOTAL CA: CPT

## 2022-02-15 PROCEDURE — 36415 COLL VENOUS BLD VENIPUNCTURE: CPT

## 2022-02-18 DIAGNOSIS — M50.30 DEGENERATIVE DISC DISEASE, CERVICAL: ICD-10-CM

## 2022-02-18 DIAGNOSIS — M19.019 OSTEOARTHRITIS OF SHOULDER, UNSPECIFIED LATERALITY, UNSPECIFIED OSTEOARTHRITIS TYPE: ICD-10-CM

## 2022-02-18 RX ORDER — CYCLOBENZAPRINE HCL 10 MG
TABLET ORAL
Qty: 60 TABLET | Refills: 0 | Status: SHIPPED | OUTPATIENT
Start: 2022-02-18 | End: 2022-03-21

## 2022-02-20 DIAGNOSIS — J30.9 ALLERGIC SINUSITIS: ICD-10-CM

## 2022-02-21 RX ORDER — CETIRIZINE HYDROCHLORIDE 10 MG/1
TABLET ORAL
Qty: 30 TABLET | Refills: 3 | Status: SHIPPED | OUTPATIENT
Start: 2022-02-21 | End: 2022-08-30

## 2022-03-01 DIAGNOSIS — M51.26 PROTRUDED LUMBAR DISC: ICD-10-CM

## 2022-03-01 DIAGNOSIS — M51.36 LUMBAR DEGENERATIVE DISC DISEASE: ICD-10-CM

## 2022-03-01 DIAGNOSIS — S33.6XXD SPRAIN OF SACROILIAC LIGAMENT, SUBSEQUENT ENCOUNTER: ICD-10-CM

## 2022-03-02 ENCOUNTER — OFFICE VISIT (OUTPATIENT)
Dept: NEUROSURGERY | Age: 62
End: 2022-03-02
Payer: MEDICARE

## 2022-03-02 ENCOUNTER — HOSPITAL ENCOUNTER (OUTPATIENT)
Dept: GENERAL RADIOLOGY | Age: 62
Discharge: HOME | End: 2022-03-04
Payer: MEDICARE

## 2022-03-02 ENCOUNTER — HOSPITAL ENCOUNTER (OUTPATIENT)
Age: 62
Discharge: HOME | End: 2022-03-04
Payer: MEDICARE

## 2022-03-02 VITALS
DIASTOLIC BLOOD PRESSURE: 77 MMHG | HEART RATE: 91 BPM | SYSTOLIC BLOOD PRESSURE: 116 MMHG | OXYGEN SATURATION: 93 % | BODY MASS INDEX: 34.93 KG/M2 | WEIGHT: 191 LBS | TEMPERATURE: 97.9 F | RESPIRATION RATE: 20 BRPM

## 2022-03-02 DIAGNOSIS — Q76.49 SPINAL DEFORMITY: ICD-10-CM

## 2022-03-02 DIAGNOSIS — M48.062 LUMBAR STENOSIS WITH NEUROGENIC CLAUDICATION: ICD-10-CM

## 2022-03-02 DIAGNOSIS — M48.062 LUMBAR STENOSIS WITH NEUROGENIC CLAUDICATION: Primary | ICD-10-CM

## 2022-03-02 PROCEDURE — 1036F TOBACCO NON-USER: CPT | Performed by: NURSE PRACTITIONER

## 2022-03-02 PROCEDURE — G8417 CALC BMI ABV UP PARAM F/U: HCPCS | Performed by: NURSE PRACTITIONER

## 2022-03-02 PROCEDURE — 99214 OFFICE O/P EST MOD 30 MIN: CPT | Performed by: NURSE PRACTITIONER

## 2022-03-02 PROCEDURE — G8427 DOCREV CUR MEDS BY ELIG CLIN: HCPCS | Performed by: NURSE PRACTITIONER

## 2022-03-02 PROCEDURE — 72082 X-RAY EXAM ENTIRE SPI 2/3 VW: CPT

## 2022-03-02 PROCEDURE — 72120 X-RAY BEND ONLY L-S SPINE: CPT

## 2022-03-02 PROCEDURE — G8482 FLU IMMUNIZE ORDER/ADMIN: HCPCS | Performed by: NURSE PRACTITIONER

## 2022-03-02 PROCEDURE — 3017F COLORECTAL CA SCREEN DOC REV: CPT | Performed by: NURSE PRACTITIONER

## 2022-03-02 NOTE — PROGRESS NOTES
[address]  [facility] [place] [address] [facility] [address]  Dept: [phone]    Patient:  Catherene Cover  YOB: 1960  Date: 3/2/22    The patient is a 64 y.o. female who presents today for consult of the following problems:     Chief Complaint   Patient presents with    Follow-up         HPI:     Kortney Navarro is a 64 y.o. female who presents for follow-up of lumbar stenosis with claudication, spinal deformity. Patient was previously evaluated here back in May, had similar symptoms however they were being fairly well managed with multimodal physical therapy, injections. Patient states that over the last several months, pain has been worsening, she is no longer appreciating the same amount of relief from the injections through her pain specialist.  Feels that with standing and walking symptoms are actually much worse. Pain ranges from 0-8/10 depending on activity. Walking, bending over worsens the pain. Rest helps. Lidocaine also helpful. Last injection was in December, right L4 and left L5 TFESI. Unable to stand long enough to load/unload the . Has a hard time walking to office from parking lot. Symptoms are significantly interfering with her overall quality of life daily living.     History:     Past Medical History:   Diagnosis Date    ARIADNE (acute kidney injury) ([facility] [address] 9/4/2019    Anxiety     Asthma     CAD (coronary artery disease)     Class 1 obesity due to excess calories with serious comorbidity and body mass index (BMI) of 32.0 to 32.9 in adult 6/30/2020    Depression     Examination of participant in clinical trial 6/23/11    End date 2/2015    HTN (hypertension)     Lumbar radiculopathy     Mixed hyperlipidemia 1/15/2018    OA (osteoarthritis)     PVD (peripheral vascular disease) with claudication ([address] 7/27/2015     Past Surgical History:   Procedure DOSE) 81 MG EC tablet TAKE 1 TABLET BY MOUTH DAILY 90 tablet 2    metFORMIN (GLUCOPHAGE-XR) 500 MG extended release tablet Take 1 tablet by mouth daily (with breakfast) 60 tablet 3    meloxicam (MOBIC) 15 MG tablet TAKE 1 TABLET BY MOUTH DAILY 90 tablet 2    metoprolol succinate (TOPROL XL) 50 MG extended release tablet TAKE 1 TABLET BY MOUTH DAILY 90 tablet 2    simvastatin (ZOCOR) 40 MG tablet TAKE 1 TABLET BY MOUTH EVERY NIGHT 90 tablet 3    isosorbide mononitrate (IMDUR) 30 MG extended release tablet TAKE 1 TABLET BY MOUTH EVERY DAY 90 tablet 2     No current facility-administered medications on file prior to visit. Social History     Tobacco Use    Smoking status: Former Smoker     Packs/day: 0.25     Years: 25.00     Pack years: 6.25     Types: Cigarettes     Quit date: 2009     Years since quittin.7    Smokeless tobacco: Never Used   Vaping Use    Vaping Use: Never used   Substance Use Topics    Alcohol use: No     Alcohol/week: 0.0 standard drinks    Drug use: No       Allergies   Allergen Reactions    Claritin [Loratadine]      2010 Heart palpitations  2010 Heart palpitations    Codeine Nausea Only       Review of Systems  Constitutional: Negative for activity change and appetite change. HENT: Negative for ear pain and facial swelling. Eyes: Negative for discharge and itching. Respiratory: Negative for choking and chest tightness. Cardiovascular: Negative for chest pain and leg swelling. Gastrointestinal: Negative for nausea and abdominal pain. Endocrine: Negative for cold intolerance and heat intolerance. Genitourinary: Negative for frequency and flank pain. Musculoskeletal: Negative for myalgias and joint swelling. Skin: Negative for rash and wound. Allergic/Immunologic: Negative for environmental allergies and food allergies. Hematological: Negative for adenopathy. Does not bruise/bleed easily. Psychiatric/Behavioral: Negative for self-injury.  The patient is not nervous/anxious. Physical Exam:      /77 (Site: Left Upper Arm, Position: Sitting, Cuff Size: Large Adult)   Pulse 91   Temp 97.9 °F (36.6 °C) (Temporal)   Resp 20   Wt 191 lb (86.6 kg)   LMP 11/07/2012   SpO2 93%   BMI 34.93 kg/m²   Estimated body mass index is 34.93 kg/m² as calculated from the following:    Height as of 1/13/22: 5' 2\" (1.575 m). Weight as of this encounter: 191 lb (86.6 kg). General:  Katty Izquierdo is a 64y.o. year old female who appears her stated age. HEENT: Normocephalic atraumatic. Neck supple. Chest: regular rate; pulses equal  Abdomen: Soft nontender nondistended. Ext: DP and PT pulses 2+, good cap refill  Neuro    Mentation  Appropriate affect  Registration intact  Orientation intact  Judgement intact to situation    Cranial Nerves:   Pupils equal and reactive to light  Extraocular motion intact  Face and shrug symmetric  Tongue midline  No dysarthria  v1-3 sensation symmetric, masseter tone symmetric  Hearing symmetric    Sensation: Intact    Motor  L deltoid 5/5; R deltoid 5/5  L biceps 5/5; R biceps 5/5  L triceps 5/5; R triceps 5/5  L wrist extension 5/5; R wrist extension 5/5  L intrinsics 5/5; R intrinsics 5/5     L iliopsoas 5/5 , R iliopsoas 5/5  L quadriceps 5/5; R quadriceps 5/5  L Dorsiflexion 5/5; R dorsiflexion 5/5  L Plantarflexion 5/5; R plantarflexion 5/5  L EHL 5/5; R EHL 5/5    Reflexes  L Brachioradialis 2+/4; R brachioradialis 2+/4  L Biceps 2+/4; R Biceps 2+/4  L Triceps 2+/4; R Triceps 2+/4  L Patellar 2+/4: R Patellar 2+/4  L Achilles 2+/4; R Achilles 2+/4    hoffmans L: neg  hoffmans R: neg  Clonus L: neg  Clonus R: neg  Babinski L: neg  Babinski R: neg    Studies Review:     No recent imaging    Assessment and Plan:      1. Lumbar stenosis with neurogenic claudication    2. Spinal deformity          Plan: Patient with known history of lumbar stenosis with claudication and spinal deformity.   Conservative measures were previously managing symptoms and allowing her to participate in her activities of daily living, however symptoms are worsening and that is no longer the case. Recommend obtaining updated imaging including MRI, flexion-extension x-rays as well as scoliosis x-rays. Patient to return for follow-up visit with Dr. Kar Spencer. Followup: Return in about 4 weeks (around 3/30/2022), or if symptoms worsen or fail to improve. Prescriptions Ordered:  No orders of the defined types were placed in this encounter. Orders Placed:  Orders Placed This Encounter   Procedures    MRI LUMBAR SPINE WO CONTRAST     Standing Status:   Future     Standing Expiration Date:   3/2/2023     Order Specific Question:   Reason for exam:     Answer:   eval progression of stenosis     Order Specific Question:   What is the sedation requirement? Answer:   None    XR SPINE ENTIRE (2-3 VIEWS)     Standing Status:   Future     Number of Occurrences:   1     Standing Expiration Date:   3/2/2023     Scheduling Instructions:      STANDING -- AP and Lateral; Include C2 to Pelvis & both femoral heads     Order Specific Question:   Reason for exam:     Answer:   scoliosis    XR LUMBAR SPINE FLEXION AND EXTENSION ONLY     Standing lateral Flexion, Neutral, extension  Include both femoral heads on neutral imaging     Standing Status:   Future     Number of Occurrences:   1     Standing Expiration Date:   3/2/2023     Order Specific Question:   Reason for exam:     Answer:   evaluate for instability        Electronically signed by ADELA Morocho CNP on 3/4/2022 at 8:12 AM    Please note that this chart was generated using voice recognition Dragon dictation software. Although every effort was made to ensure the accuracy of this automated transcription, some errors in transcription may have occurred.

## 2022-03-07 DIAGNOSIS — M54.16 LUMBAR RADICULOPATHY: ICD-10-CM

## 2022-03-07 DIAGNOSIS — M48.061 SPINAL STENOSIS OF LUMBAR REGION WITHOUT NEUROGENIC CLAUDICATION: ICD-10-CM

## 2022-03-07 RX ORDER — PREGABALIN 150 MG/1
150 CAPSULE ORAL 3 TIMES DAILY
Qty: 90 CAPSULE | Refills: 0 | Status: SHIPPED | OUTPATIENT
Start: 2022-03-07 | End: 2022-04-06

## 2022-03-15 ENCOUNTER — HOSPITAL ENCOUNTER (OUTPATIENT)
Dept: MRI IMAGING | Age: 62
Discharge: HOME | End: 2022-03-17
Payer: MEDICARE

## 2022-03-15 DIAGNOSIS — M48.062 LUMBAR STENOSIS WITH NEUROGENIC CLAUDICATION: ICD-10-CM

## 2022-03-15 DIAGNOSIS — Q76.49 SPINAL DEFORMITY: ICD-10-CM

## 2022-03-15 PROCEDURE — 72148 MRI LUMBAR SPINE W/O DYE: CPT

## 2022-03-17 RX ORDER — ISOSORBIDE MONONITRATE 30 MG/1
TABLET, EXTENDED RELEASE ORAL
Qty: 90 TABLET | Refills: 2 | Status: SHIPPED | OUTPATIENT
Start: 2022-03-17

## 2022-03-17 NOTE — TELEPHONE ENCOUNTER
Please Approve or Refuse.   Send to Pharmacy per Pt's Request: lorrie     Next Visit Date:  4/14/2022   Last Visit Date: 1/13/2022    Hemoglobin A1C (%)   Date Value   01/13/2022 5.8   07/26/2021 6.0   07/01/2020 5.7             ( goal A1C is < 7)   BP Readings from Last 3 Encounters:   03/02/22 116/77   01/13/22 102/60   01/11/22 120/75          (goal 120/80)  BUN   Date Value Ref Range Status   02/15/2022 16 8 - 23 mg/dL Final     CREATININE   Date Value Ref Range Status   02/15/2022 0.94 (H) 0.50 - 0.90 mg/dL Final     Potassium   Date Value Ref Range Status   02/15/2022 5.1 3.7 - 5.3 mmol/L Final

## 2022-03-20 DIAGNOSIS — M50.30 DEGENERATIVE DISC DISEASE, CERVICAL: ICD-10-CM

## 2022-03-20 DIAGNOSIS — M19.019 OSTEOARTHRITIS OF SHOULDER, UNSPECIFIED LATERALITY, UNSPECIFIED OSTEOARTHRITIS TYPE: ICD-10-CM

## 2022-03-21 RX ORDER — CYCLOBENZAPRINE HCL 10 MG
TABLET ORAL
Qty: 60 TABLET | Refills: 0 | Status: SHIPPED | OUTPATIENT
Start: 2022-03-21 | End: 2022-04-19

## 2022-03-24 DIAGNOSIS — M51.36 LUMBAR DEGENERATIVE DISC DISEASE: ICD-10-CM

## 2022-03-24 DIAGNOSIS — M51.26 PROTRUDED LUMBAR DISC: ICD-10-CM

## 2022-03-24 DIAGNOSIS — S33.6XXD SPRAIN OF SACROILIAC LIGAMENT, SUBSEQUENT ENCOUNTER: ICD-10-CM

## 2022-04-06 DIAGNOSIS — M48.061 SPINAL STENOSIS OF LUMBAR REGION WITHOUT NEUROGENIC CLAUDICATION: ICD-10-CM

## 2022-04-06 DIAGNOSIS — M54.16 LUMBAR RADICULOPATHY: ICD-10-CM

## 2022-04-06 RX ORDER — PREGABALIN 150 MG/1
CAPSULE ORAL
Qty: 90 CAPSULE | Refills: 0 | Status: SHIPPED | OUTPATIENT
Start: 2022-04-06 | End: 2022-05-13 | Stop reason: SDUPTHER

## 2022-04-14 ENCOUNTER — OFFICE VISIT (OUTPATIENT)
Dept: FAMILY MEDICINE CLINIC | Age: 62
End: 2022-04-14
Payer: MEDICARE

## 2022-04-14 VITALS
WEIGHT: 189 LBS | HEIGHT: 62 IN | SYSTOLIC BLOOD PRESSURE: 136 MMHG | BODY MASS INDEX: 34.78 KG/M2 | OXYGEN SATURATION: 96 % | DIASTOLIC BLOOD PRESSURE: 76 MMHG | TEMPERATURE: 97.6 F | HEART RATE: 96 BPM

## 2022-04-14 DIAGNOSIS — R73.03 PREDIABETES: ICD-10-CM

## 2022-04-14 DIAGNOSIS — I73.9 PVD (PERIPHERAL VASCULAR DISEASE) WITH CLAUDICATION (HCC): ICD-10-CM

## 2022-04-14 DIAGNOSIS — M51.36 LUMBAR DEGENERATIVE DISC DISEASE: ICD-10-CM

## 2022-04-14 DIAGNOSIS — E78.2 MIXED HYPERLIPIDEMIA: ICD-10-CM

## 2022-04-14 DIAGNOSIS — E66.09 CLASS 1 OBESITY DUE TO EXCESS CALORIES WITH SERIOUS COMORBIDITY AND BODY MASS INDEX (BMI) OF 32.0 TO 32.9 IN ADULT: ICD-10-CM

## 2022-04-14 DIAGNOSIS — M54.16 LUMBAR RADICULOPATHY: ICD-10-CM

## 2022-04-14 DIAGNOSIS — I10 ESSENTIAL HYPERTENSION: Primary | ICD-10-CM

## 2022-04-14 DIAGNOSIS — Z23 NEED FOR VACCINATION WITH 13-POLYVALENT PNEUMOCOCCAL CONJUGATE VACCINE: ICD-10-CM

## 2022-04-14 DIAGNOSIS — Z12.31 SCREENING MAMMOGRAM FOR HIGH-RISK PATIENT: ICD-10-CM

## 2022-04-14 LAB — HBA1C MFR BLD: 5.2 %

## 2022-04-14 PROCEDURE — 1036F TOBACCO NON-USER: CPT | Performed by: FAMILY MEDICINE

## 2022-04-14 PROCEDURE — 3017F COLORECTAL CA SCREEN DOC REV: CPT | Performed by: FAMILY MEDICINE

## 2022-04-14 PROCEDURE — G8417 CALC BMI ABV UP PARAM F/U: HCPCS | Performed by: FAMILY MEDICINE

## 2022-04-14 PROCEDURE — 99214 OFFICE O/P EST MOD 30 MIN: CPT | Performed by: FAMILY MEDICINE

## 2022-04-14 PROCEDURE — G8427 DOCREV CUR MEDS BY ELIG CLIN: HCPCS | Performed by: FAMILY MEDICINE

## 2022-04-14 PROCEDURE — 83036 HEMOGLOBIN GLYCOSYLATED A1C: CPT | Performed by: FAMILY MEDICINE

## 2022-04-14 PROCEDURE — 90471 IMMUNIZATION ADMIN: CPT | Performed by: FAMILY MEDICINE

## 2022-04-14 PROCEDURE — 90670 PCV13 VACCINE IM: CPT | Performed by: FAMILY MEDICINE

## 2022-04-14 SDOH — ECONOMIC STABILITY: FOOD INSECURITY: WITHIN THE PAST 12 MONTHS, YOU WORRIED THAT YOUR FOOD WOULD RUN OUT BEFORE YOU GOT MONEY TO BUY MORE.: NEVER TRUE

## 2022-04-14 SDOH — ECONOMIC STABILITY: FOOD INSECURITY: WITHIN THE PAST 12 MONTHS, THE FOOD YOU BOUGHT JUST DIDN'T LAST AND YOU DIDN'T HAVE MONEY TO GET MORE.: NEVER TRUE

## 2022-04-14 ASSESSMENT — PATIENT HEALTH QUESTIONNAIRE - PHQ9
5. POOR APPETITE OR OVEREATING: 0
7. TROUBLE CONCENTRATING ON THINGS, SUCH AS READING THE NEWSPAPER OR WATCHING TELEVISION: 1
9. THOUGHTS THAT YOU WOULD BE BETTER OFF DEAD, OR OF HURTING YOURSELF: 0
8. MOVING OR SPEAKING SO SLOWLY THAT OTHER PEOPLE COULD HAVE NOTICED. OR THE OPPOSITE, BEING SO FIGETY OR RESTLESS THAT YOU HAVE BEEN MOVING AROUND A LOT MORE THAN USUAL: 0
SUM OF ALL RESPONSES TO PHQ QUESTIONS 1-9: 4
10. IF YOU CHECKED OFF ANY PROBLEMS, HOW DIFFICULT HAVE THESE PROBLEMS MADE IT FOR YOU TO DO YOUR WORK, TAKE CARE OF THINGS AT HOME, OR GET ALONG WITH OTHER PEOPLE: 1
2. FEELING DOWN, DEPRESSED OR HOPELESS: 0
SUM OF ALL RESPONSES TO PHQ QUESTIONS 1-9: 4
4. FEELING TIRED OR HAVING LITTLE ENERGY: 3
SUM OF ALL RESPONSES TO PHQ QUESTIONS 1-9: 4
SUM OF ALL RESPONSES TO PHQ QUESTIONS 1-9: 4
3. TROUBLE FALLING OR STAYING ASLEEP: 0
6. FEELING BAD ABOUT YOURSELF - OR THAT YOU ARE A FAILURE OR HAVE LET YOURSELF OR YOUR FAMILY DOWN: 0
1. LITTLE INTEREST OR PLEASURE IN DOING THINGS: 0
SUM OF ALL RESPONSES TO PHQ9 QUESTIONS 1 & 2: 0

## 2022-04-14 ASSESSMENT — ENCOUNTER SYMPTOMS
STRIDOR: 0
WHEEZING: 0
CHEST TIGHTNESS: 0
BACK PAIN: 1
SINUS PRESSURE: 0
DIARRHEA: 0
SHORTNESS OF BREATH: 0
ABDOMINAL PAIN: 0
EYE REDNESS: 0
BLOOD IN STOOL: 0
COUGH: 0
ABDOMINAL DISTENTION: 0
COLOR CHANGE: 0
CONSTIPATION: 0
RHINORRHEA: 0

## 2022-04-14 ASSESSMENT — SOCIAL DETERMINANTS OF HEALTH (SDOH): HOW HARD IS IT FOR YOU TO PAY FOR THE VERY BASICS LIKE FOOD, HOUSING, MEDICAL CARE, AND HEATING?: NOT HARD AT ALL

## 2022-04-14 NOTE — PROGRESS NOTES
Visit Information    Have you changed or started any medications since your last visit including any over-the-counter medicines, vitamins, or herbal medicines? no   Have you stopped taking any of your medications? Is so, why? -  no  Are you having any side effects from any of your medications? - no    Have you seen any other physician or provider since your last visit? yes -    Have you had any other diagnostic tests since your last visit? yes -    Have you been seen in the emergency room and/or had an admission in a hospital since we last saw you?  no   Have you had your routine dental cleaning in the past 6 months?  no     Do you have an active MyChart account? If no, what is the barrier?   Yes    Patient Care Team:  Mame Luis MD as PCP - General (Family Medicine)  Mame Luis MD as PCP - [facility]    Medical History Review  Past Medical, Family, and Social History reviewed and does contribute to the patient presenting condition    Health Maintenance   Topic Date Due    Pneumococcal 0-64 years Vaccine (2 - PCV) 04/06/2018    Lipid screen  07/26/2022    Breast cancer screen  10/08/2022    A1C test (Diabetic or Prediabetic)  01/13/2023    Depression Monitoring  01/13/2023    Potassium monitoring  02/15/2023    Creatinine monitoring  02/15/2023    Cervical cancer screen  04/16/2023    Colorectal Cancer Screen  05/06/2024    DTaP/Tdap/Td vaccine (2 - Td or Tdap) 04/16/2028    Flu vaccine  Completed    Shingles Vaccine  Completed    COVID-19 Vaccine  Completed    Hepatitis C screen  Completed    HIV screen  Completed    Hepatitis A vaccine  Aged Out    Hepatitis B vaccine  Aged Out    Hib vaccine  Aged Out    Meningococcal (ACWY) vaccine  Aged Out

## 2022-04-16 DIAGNOSIS — I10 ESSENTIAL HYPERTENSION: ICD-10-CM

## 2022-04-16 DIAGNOSIS — M50.30 DEGENERATIVE DISC DISEASE, CERVICAL: ICD-10-CM

## 2022-04-18 RX ORDER — CALCIUM CARBONATE/VITAMIN D3 600 MG-10
TABLET ORAL
Qty: 90 TABLET | Refills: 0 | Status: SHIPPED | OUTPATIENT
Start: 2022-04-18 | End: 2022-07-18

## 2022-04-18 RX ORDER — ENALAPRIL MALEATE 10 MG/1
TABLET ORAL
Qty: 90 TABLET | Refills: 2 | Status: SHIPPED | OUTPATIENT
Start: 2022-04-18 | End: 2022-06-13

## 2022-04-18 NOTE — TELEPHONE ENCOUNTER
Please Approve or Refuse.   Send to Pharmacy per Pt's Request: lorrie     Next Visit Date:  7/18/2022   Last Visit Date: 4/14/2022    Hemoglobin A1C (%)   Date Value   04/14/2022 5.2   01/13/2022 5.8   07/26/2021 6.0             ( goal A1C is < 7)   BP Readings from Last 3 Encounters:   04/14/22 136/76   03/02/22 116/77   01/13/22 102/60          (goal 120/80)  BUN   Date Value Ref Range Status   02/15/2022 16 8 - 23 mg/dL Final     CREATININE   Date Value Ref Range Status   02/15/2022 0.94 (H) 0.50 - 0.90 mg/dL Final     Potassium   Date Value Ref Range Status   02/15/2022 5.1 3.7 - 5.3 mmol/L Final

## 2022-04-19 DIAGNOSIS — M19.019 OSTEOARTHRITIS OF SHOULDER, UNSPECIFIED LATERALITY, UNSPECIFIED OSTEOARTHRITIS TYPE: ICD-10-CM

## 2022-04-19 DIAGNOSIS — M51.36 LUMBAR DEGENERATIVE DISC DISEASE: ICD-10-CM

## 2022-04-19 DIAGNOSIS — R60.9 PERIPHERAL EDEMA: ICD-10-CM

## 2022-04-19 DIAGNOSIS — R73.03 PREDIABETES: ICD-10-CM

## 2022-04-19 DIAGNOSIS — F41.9 ANXIETY: ICD-10-CM

## 2022-04-19 DIAGNOSIS — S33.6XXD SPRAIN OF SACROILIAC LIGAMENT, SUBSEQUENT ENCOUNTER: ICD-10-CM

## 2022-04-19 DIAGNOSIS — M50.30 DEGENERATIVE DISC DISEASE, CERVICAL: ICD-10-CM

## 2022-04-19 DIAGNOSIS — I25.10 CORONARY ARTERY DISEASE INVOLVING NATIVE CORONARY ARTERY OF NATIVE HEART WITHOUT ANGINA PECTORIS: ICD-10-CM

## 2022-04-19 DIAGNOSIS — M51.26 PROTRUDED LUMBAR DISC: ICD-10-CM

## 2022-04-19 RX ORDER — BUPROPION HYDROCHLORIDE 100 MG/1
TABLET, EXTENDED RELEASE ORAL
Qty: 60 TABLET | Refills: 3 | Status: SHIPPED | OUTPATIENT
Start: 2022-04-19 | End: 2022-08-30

## 2022-04-19 RX ORDER — CYCLOBENZAPRINE HCL 10 MG
TABLET ORAL
Qty: 60 TABLET | Refills: 0 | Status: SHIPPED | OUTPATIENT
Start: 2022-04-19 | End: 2022-05-16

## 2022-04-19 RX ORDER — FUROSEMIDE 40 MG/1
40 TABLET ORAL DAILY
Qty: 90 TABLET | Refills: 0 | Status: SHIPPED | OUTPATIENT
Start: 2022-04-19 | End: 2022-07-05

## 2022-04-20 RX ORDER — METFORMIN HYDROCHLORIDE 500 MG/1
TABLET, EXTENDED RELEASE ORAL
Qty: 60 TABLET | Refills: 3 | Status: SHIPPED | OUTPATIENT
Start: 2022-04-20

## 2022-05-09 DIAGNOSIS — M19.019 OSTEOARTHRITIS OF SHOULDER, UNSPECIFIED LATERALITY, UNSPECIFIED OSTEOARTHRITIS TYPE: ICD-10-CM

## 2022-05-09 DIAGNOSIS — M50.30 DEGENERATIVE DISC DISEASE, CERVICAL: ICD-10-CM

## 2022-05-09 DIAGNOSIS — I10 ESSENTIAL HYPERTENSION: ICD-10-CM

## 2022-05-10 RX ORDER — SIMVASTATIN 40 MG
TABLET ORAL
Qty: 90 TABLET | Refills: 3 | OUTPATIENT
Start: 2022-05-10

## 2022-05-10 RX ORDER — METOPROLOL SUCCINATE 50 MG/1
TABLET, EXTENDED RELEASE ORAL
Qty: 90 TABLET | Refills: 2 | Status: SHIPPED | OUTPATIENT
Start: 2022-05-10 | End: 2022-05-19

## 2022-05-10 RX ORDER — SIMVASTATIN 40 MG
40 TABLET ORAL NIGHTLY
Qty: 90 TABLET | Refills: 3 | Status: SHIPPED | OUTPATIENT
Start: 2022-05-10

## 2022-05-10 RX ORDER — MELOXICAM 15 MG/1
TABLET ORAL
Qty: 90 TABLET | Refills: 2 | Status: SHIPPED | OUTPATIENT
Start: 2022-05-10 | End: 2022-05-16

## 2022-05-13 DIAGNOSIS — M54.16 LUMBAR RADICULOPATHY: ICD-10-CM

## 2022-05-13 DIAGNOSIS — M48.061 SPINAL STENOSIS OF LUMBAR REGION WITHOUT NEUROGENIC CLAUDICATION: ICD-10-CM

## 2022-05-13 RX ORDER — PREGABALIN 150 MG/1
150 CAPSULE ORAL 3 TIMES DAILY
Qty: 90 CAPSULE | Refills: 0 | Status: SHIPPED | OUTPATIENT
Start: 2022-05-13 | End: 2022-06-13 | Stop reason: SDUPTHER

## 2022-05-16 DIAGNOSIS — M50.30 DEGENERATIVE DISC DISEASE, CERVICAL: ICD-10-CM

## 2022-05-16 DIAGNOSIS — M19.019 OSTEOARTHRITIS OF SHOULDER, UNSPECIFIED LATERALITY, UNSPECIFIED OSTEOARTHRITIS TYPE: ICD-10-CM

## 2022-05-16 DIAGNOSIS — M79.605 LEG PAIN, LEFT: ICD-10-CM

## 2022-05-16 RX ORDER — MELOXICAM 15 MG/1
TABLET ORAL
Qty: 90 TABLET | Refills: 2 | Status: SHIPPED | OUTPATIENT
Start: 2022-05-16 | End: 2022-06-24 | Stop reason: HOSPADM

## 2022-05-16 RX ORDER — CYCLOBENZAPRINE HCL 10 MG
TABLET ORAL
Qty: 60 TABLET | Refills: 0 | Status: SHIPPED | OUTPATIENT
Start: 2022-05-16 | End: 2022-06-16

## 2022-05-16 RX ORDER — PSEUDOEPHED/ACETAMINOPH/DIPHEN 30MG-500MG
TABLET ORAL
Qty: 120 TABLET | Refills: 3 | Status: SHIPPED | OUTPATIENT
Start: 2022-05-16 | End: 2022-09-12

## 2022-05-16 NOTE — TELEPHONE ENCOUNTER
Please Approve or Refuse.   Send to Pharmacy per Pt's Request:      Next Visit Date:  7/18/2022   Last Visit Date: 4/14/2022    Hemoglobin A1C (%)   Date Value   04/14/2022 5.2   01/13/2022 5.8   07/26/2021 6.0             ( goal A1C is < 7)   BP Readings from Last 3 Encounters:   04/14/22 136/76   03/02/22 116/77   01/13/22 102/60          (goal 120/80)  BUN   Date Value Ref Range Status   02/15/2022 16 8 - 23 mg/dL Final     CREATININE   Date Value Ref Range Status   02/15/2022 0.94 (H) 0.50 - 0.90 mg/dL Final     Potassium   Date Value Ref Range Status   02/15/2022 5.1 3.7 - 5.3 mmol/L Final

## 2022-05-18 RX ORDER — ALBUTEROL SULFATE 90 UG/1
AEROSOL, METERED RESPIRATORY (INHALATION)
Qty: 36 G | Refills: 3 | Status: SHIPPED | OUTPATIENT
Start: 2022-05-18

## 2022-05-19 DIAGNOSIS — I10 ESSENTIAL HYPERTENSION: ICD-10-CM

## 2022-05-19 RX ORDER — METOPROLOL SUCCINATE 50 MG/1
TABLET, EXTENDED RELEASE ORAL
Qty: 90 TABLET | Refills: 2 | Status: SHIPPED | OUTPATIENT
Start: 2022-05-19

## 2022-05-31 DIAGNOSIS — S33.6XXD SPRAIN OF SACROILIAC LIGAMENT, SUBSEQUENT ENCOUNTER: ICD-10-CM

## 2022-05-31 DIAGNOSIS — M51.26 PROTRUDED LUMBAR DISC: ICD-10-CM

## 2022-05-31 DIAGNOSIS — M51.36 LUMBAR DEGENERATIVE DISC DISEASE: ICD-10-CM

## 2022-06-11 DIAGNOSIS — I10 ESSENTIAL HYPERTENSION: ICD-10-CM

## 2022-06-13 DIAGNOSIS — M54.16 LUMBAR RADICULOPATHY: ICD-10-CM

## 2022-06-13 DIAGNOSIS — M48.061 SPINAL STENOSIS OF LUMBAR REGION WITHOUT NEUROGENIC CLAUDICATION: ICD-10-CM

## 2022-06-13 RX ORDER — ENALAPRIL MALEATE 10 MG/1
TABLET ORAL
Qty: 90 TABLET | Refills: 2 | Status: SHIPPED | OUTPATIENT
Start: 2022-06-13 | End: 2022-10-10

## 2022-06-14 RX ORDER — PREGABALIN 150 MG/1
150 CAPSULE ORAL 3 TIMES DAILY
Qty: 90 CAPSULE | Refills: 0 | Status: SHIPPED | OUTPATIENT
Start: 2022-06-14 | End: 2022-07-13 | Stop reason: SDUPTHER

## 2022-06-16 DIAGNOSIS — M50.30 DEGENERATIVE DISC DISEASE, CERVICAL: ICD-10-CM

## 2022-06-16 DIAGNOSIS — M19.019 OSTEOARTHRITIS OF SHOULDER, UNSPECIFIED LATERALITY, UNSPECIFIED OSTEOARTHRITIS TYPE: ICD-10-CM

## 2022-06-16 DIAGNOSIS — I10 ESSENTIAL HYPERTENSION: ICD-10-CM

## 2022-06-16 RX ORDER — CYCLOBENZAPRINE HCL 10 MG
TABLET ORAL
Qty: 60 TABLET | Refills: 0 | Status: SHIPPED | OUTPATIENT
Start: 2022-06-16 | End: 2022-07-11

## 2022-06-16 RX ORDER — ASPIRIN 81 MG/1
TABLET ORAL
Qty: 90 TABLET | Refills: 2 | Status: SHIPPED | OUTPATIENT
Start: 2022-06-16

## 2022-06-18 DIAGNOSIS — R60.9 PERIPHERAL EDEMA: ICD-10-CM

## 2022-06-20 RX ORDER — POTASSIUM CHLORIDE 750 MG/1
10 TABLET, EXTENDED RELEASE ORAL DAILY
Qty: 90 TABLET | Refills: 1 | Status: SHIPPED | OUTPATIENT
Start: 2022-06-20

## 2022-06-24 ENCOUNTER — HOSPITAL ENCOUNTER (OUTPATIENT)
Age: 62
Discharge: HOME | End: 2022-06-24
Payer: MEDICARE

## 2022-06-24 DIAGNOSIS — I10 ESSENTIAL HYPERTENSION: ICD-10-CM

## 2022-06-24 DIAGNOSIS — R73.03 PREDIABETES: ICD-10-CM

## 2022-06-24 DIAGNOSIS — E78.2 MIXED HYPERLIPIDEMIA: ICD-10-CM

## 2022-06-24 DIAGNOSIS — E66.09 CLASS 1 OBESITY DUE TO EXCESS CALORIES WITH SERIOUS COMORBIDITY AND BODY MASS INDEX (BMI) OF 32.0 TO 32.9 IN ADULT: ICD-10-CM

## 2022-06-24 LAB
ALBUMIN SERPL-MCNC: 3.6 G/DL (ref 3.5–5.2)
ALP BLD-CCNC: 74 U/L (ref 35–104)
ALT SERPL-CCNC: 10 U/L (ref 5–33)
ANION GAP SERPL CALCULATED.3IONS-SCNC: 12 MMOL/L (ref 9–17)
AST SERPL-CCNC: 15 U/L
BILIRUB SERPL-MCNC: 0.18 MG/DL (ref 0.3–1.2)
BUN BLDV-MCNC: 12 MG/DL (ref 8–23)
CALCIUM SERPL-MCNC: 9 MG/DL (ref 8.6–10.4)
CHLORIDE BLD-SCNC: 104 MMOL/L (ref 98–107)
CHOLESTEROL/HDL RATIO: 2.4
CHOLESTEROL: 113 MG/DL
CO2: 25 MMOL/L (ref 20–31)
CREAT SERPL-MCNC: 0.96 MG/DL (ref 0.5–0.9)
GFR AFRICAN AMERICAN: >60 ML/MIN
GFR NON-AFRICAN AMERICAN: 59 ML/MIN
GFR SERPL CREATININE-BSD FRML MDRD: ABNORMAL ML/MIN/{1.73_M2}
GLUCOSE BLD-MCNC: 93 MG/DL (ref 70–99)
HCT VFR BLD CALC: 35.2 % (ref 36–46)
HDLC SERPL-MCNC: 47 MG/DL
HEMOGLOBIN: 12 G/DL (ref 12–16)
LDL CHOLESTEROL: 48 MG/DL (ref 0–130)
MCH RBC QN AUTO: 32 PG (ref 26–34)
MCHC RBC AUTO-ENTMCNC: 34.3 G/DL (ref 31–37)
MCV RBC AUTO: 93.3 FL (ref 80–100)
PDW BLD-RTO: 14.2 % (ref 11.5–14.9)
PLATELET # BLD: 274 K/UL (ref 150–450)
PMV BLD AUTO: 8.7 FL (ref 6–12)
POTASSIUM SERPL-SCNC: 4.6 MMOL/L (ref 3.7–5.3)
RBC # BLD: 3.77 M/UL (ref 4–5.2)
SODIUM BLD-SCNC: 141 MMOL/L (ref 135–144)
TOTAL PROTEIN: 6.7 G/DL (ref 6.4–8.3)
TRIGL SERPL-MCNC: 90 MG/DL
TSH SERPL DL<=0.05 MIU/L-ACNC: 2.01 UIU/ML (ref 0.3–5)
VITAMIN D 25-HYDROXY: 56 NG/ML
WBC # BLD: 9.1 K/UL (ref 3.5–11)

## 2022-06-24 PROCEDURE — 82306 VITAMIN D 25 HYDROXY: CPT

## 2022-06-24 PROCEDURE — 36415 COLL VENOUS BLD VENIPUNCTURE: CPT

## 2022-06-24 PROCEDURE — 85027 COMPLETE CBC AUTOMATED: CPT

## 2022-06-24 PROCEDURE — 80061 LIPID PANEL: CPT

## 2022-06-24 PROCEDURE — 84443 ASSAY THYROID STIM HORMONE: CPT

## 2022-06-24 PROCEDURE — 80053 COMPREHEN METABOLIC PANEL: CPT

## 2022-06-24 NOTE — RESULT ENCOUNTER NOTE
Please notify patient: Mild kidney injury and dehydration, to stop meloxicam as it could also worsen the kidney function  Mild anemia which is new      To stop meloxicam and absolutely avoid ibuprofen, naproxen, Aleve, Motrin because she can develop kidney failure  Increase fluids 8 x 8 ounce glasses of water every day    Regarding anemia to discuss with her PCP at the appointment possible GI referral for work-up  Otherwise labs within normal limits  continue current treatment    Future Appointments  7/6/2022   3:30 PM    DO Renata Goddard Neuro           [facility]  7/18/2022  3:00 PM    Mame Luis MD          [facility]  8/17/2022  3:30 PM    [address] [place], [place] [address], MD      Neuro [facility]

## 2022-07-02 DIAGNOSIS — M51.36 LUMBAR DEGENERATIVE DISC DISEASE: ICD-10-CM

## 2022-07-02 DIAGNOSIS — S33.6XXD SPRAIN OF SACROILIAC LIGAMENT, SUBSEQUENT ENCOUNTER: ICD-10-CM

## 2022-07-02 DIAGNOSIS — M51.26 PROTRUDED LUMBAR DISC: ICD-10-CM

## 2022-07-03 DIAGNOSIS — R60.9 PERIPHERAL EDEMA: ICD-10-CM

## 2022-07-03 DIAGNOSIS — I25.10 CORONARY ARTERY DISEASE INVOLVING NATIVE CORONARY ARTERY OF NATIVE HEART WITHOUT ANGINA PECTORIS: ICD-10-CM

## 2022-07-05 RX ORDER — FUROSEMIDE 40 MG/1
40 TABLET ORAL DAILY
Qty: 90 TABLET | Refills: 0 | Status: SHIPPED | OUTPATIENT
Start: 2022-07-05 | End: 2022-07-18

## 2022-07-05 NOTE — TELEPHONE ENCOUNTER
Please Approve or Refuse.   Send to Pharmacy per Pt's Request:      Next Visit Date:  7/18/2022   Last Visit Date: 4/14/2022    Hemoglobin A1C (%)   Date Value   04/14/2022 5.2   01/13/2022 5.8   07/26/2021 6.0             ( goal A1C is < 7)   BP Readings from Last 3 Encounters:   04/14/22 136/76   03/02/22 116/77   01/13/22 102/60          (goal 120/80)  BUN   Date Value Ref Range Status   06/24/2022 12 8 - 23 mg/dL Final     CREATININE   Date Value Ref Range Status   06/24/2022 0.96 (H) 0.50 - 0.90 mg/dL Final     Potassium   Date Value Ref Range Status   06/24/2022 4.6 3.7 - 5.3 mmol/L Final

## 2022-07-05 NOTE — TELEPHONE ENCOUNTER
Please Approve or Refuse.   Send to Pharmacy per Pt's Request:      Next Visit Date:  7/3/2022   Last Visit Date: 4/14/2022    Hemoglobin A1C (%)   Date Value   04/14/2022 5.2   01/13/2022 5.8   07/26/2021 6.0             ( goal A1C is < 7)   BP Readings from Last 3 Encounters:   04/14/22 136/76   03/02/22 116/77   01/13/22 102/60          (goal 120/80)  BUN   Date Value Ref Range Status   06/24/2022 12 8 - 23 mg/dL Final     CREATININE   Date Value Ref Range Status   06/24/2022 0.96 (H) 0.50 - 0.90 mg/dL Final     Potassium   Date Value Ref Range Status   06/24/2022 4.6 3.7 - 5.3 mmol/L Final

## 2022-07-06 ENCOUNTER — OFFICE VISIT (OUTPATIENT)
Dept: NEUROSURGERY | Age: 62
End: 2022-07-06
Payer: MEDICARE

## 2022-07-06 VITALS
OXYGEN SATURATION: 98 % | HEIGHT: 62 IN | WEIGHT: 175.8 LBS | HEART RATE: 93 BPM | BODY MASS INDEX: 32.35 KG/M2 | DIASTOLIC BLOOD PRESSURE: 62 MMHG | SYSTOLIC BLOOD PRESSURE: 87 MMHG | TEMPERATURE: 97.2 F

## 2022-07-06 DIAGNOSIS — Q76.49 SPINAL DEFORMITY: ICD-10-CM

## 2022-07-06 DIAGNOSIS — M48.062 LUMBAR STENOSIS WITH NEUROGENIC CLAUDICATION: Primary | ICD-10-CM

## 2022-07-06 PROCEDURE — 3017F COLORECTAL CA SCREEN DOC REV: CPT | Performed by: NEUROLOGICAL SURGERY

## 2022-07-06 PROCEDURE — 99213 OFFICE O/P EST LOW 20 MIN: CPT | Performed by: NEUROLOGICAL SURGERY

## 2022-07-06 PROCEDURE — 1036F TOBACCO NON-USER: CPT | Performed by: NEUROLOGICAL SURGERY

## 2022-07-06 PROCEDURE — G8417 CALC BMI ABV UP PARAM F/U: HCPCS | Performed by: NEUROLOGICAL SURGERY

## 2022-07-06 PROCEDURE — G8427 DOCREV CUR MEDS BY ELIG CLIN: HCPCS | Performed by: NEUROLOGICAL SURGERY

## 2022-07-06 RX ORDER — CLOPIDOGREL BISULFATE 75 MG/1
75 TABLET ORAL DAILY
COMMUNITY
End: 2022-07-18 | Stop reason: SDUPTHER

## 2022-07-06 NOTE — PATIENT INSTRUCTIONS
Patient Education        Low Back Pain: Exercises  Introduction  Here are some examples of exercises for you to try. The exercises may be suggested for a condition or for rehabilitation. Start each exercise slowly. Ease off the exercises if you start to have pain. You will be told when to start these exercises and which ones will work bestfor you. How to do the exercises  Press-up    1. Lie on your stomach, supporting your body with your forearms. 2. Press your elbows down into the floor to raise your upper back. As you do this, relax your stomach muscles and allow your back to arch without using your back muscles. As your press up, do not let your hips or pelvis come off the floor. 3. Hold for 15 to 30 seconds, then relax. 4. Repeat 2 to 4 times. Alternate arm and leg (bird dog) exercise    Do this exercise slowly. Try to keep your body straight at all times, and donot let one hip drop lower than the other. 1. Start on the floor, on your hands and knees. 2. Tighten your belly muscles. 3. Raise one leg off the floor, and hold it straight out behind you. Be careful not to let your hip drop down, because that will twist your trunk. 4. Hold for about 6 seconds, then lower your leg and switch to the other leg. 5. Repeat 8 to 12 times on each leg. 6. Over time, work up to holding for 10 to 30 seconds each time. 7. If you feel stable and secure with your leg raised, try raising the opposite arm straight out in front of you at the same time. Knee-to-chest exercise    1. Lie on your back with your knees bent and your feet flat on the floor. 2. Bring one knee to your chest, keeping the other foot flat on the floor (or keeping the other leg straight, whichever feels better on your lower back). 3. Keep your lower back pressed to the floor. Hold for at least 15 to 30 seconds. 4. Relax, and lower the knee to the starting position. 5. Repeat with the other leg. Repeat 2 to 4 times with each leg.   6. To get more the floor and the other heel touching the wall. 4. Repeat with your other leg. 5. Do 2 to 4 times for each leg. Hip flexor stretch    1. Kneel on the floor with one knee bent and one leg behind you. Place your forward knee over your foot. Keep your other knee touching the floor. 2. Slowly push your hips forward until you feel a stretch in the upper thigh of your rear leg. 3. Hold the stretch for at least 15 to 30 seconds. Repeat with your other leg. 4. Do 2 to 4 times on each side. Wall sit    1. Stand with your back 10 to 12 inches away from a wall. 2. Lean into the wall until your back is flat against it. 3. Slowly slide down until your knees are slightly bent, pressing your lower back into the wall. 4. Hold for about 6 seconds, then slide back up the wall. 5. Repeat 8 to 12 times. Follow-up care is a key part of your treatment and safety. Be sure to make and go to all appointments, and call your doctor if you are having problems. It's also a good idea to know your test results and keep alist of the medicines you take. Where can you learn more? Go to [email or web address]. org and sign in to your [employer] account. Enter J291 in the [facility] box to learn more about \"Low Back Pain: Exercises. \"     If you do not have an account, please click on the \"Sign Up Now\" link. Current as of: March 9, 2022               Content Version: 13.3  © 2006-2022 [employer]wise[employer] Incorporated. Care instructions adapted under license by [facility] ([facility]). If you have questions about a medical condition or this instruction, always ask your healthcare professional. [place] [address] any warranty or liability for your use of this information.

## 2022-07-06 NOTE — PROGRESS NOTES
(PLAVIX) 75 MG tablet Take 75 mg by mouth daily      diclofenac sodium (VOLTAREN) 1 % GEL APPLY 2 GRAMS TOPICALLY TO THE AFFECTED AREA FOUR TIMES DAILY 200 g 1    furosemide (LASIX) 40 MG tablet TAKE 1 TABLET BY MOUTH DAILY 90 tablet 0    potassium chloride (KLOR-CON M) 10 MEQ extended release tablet TAKE 1 TABLET BY MOUTH DAILY 90 tablet 1    aspirin (ASPIRIN LOW DOSE) 81 MG EC tablet TAKE 1 TABLET BY MOUTH DAILY 90 tablet 2    cyclobenzaprine (FLEXERIL) 10 MG tablet TAKE 1 TABLET BY MOUTH TWICE DAILY AS NEEDED FOR MUSCLE SPASMS 60 tablet 0    pregabalin (LYRICA) 150 MG capsule Take 1 capsule by mouth in the morning, at noon, and at bedtime for 30 days.  TAKE 1 CAPSULE BY MOUTH EVERY MORNING, AT NOON AND AT BEDTIME 90 capsule 0    enalapril (VASOTEC) 10 MG tablet TAKE 2 TABLET BY MOUTH EVERY MORNING AND 1 TABLET EVERY EVENING 90 tablet 2    metoprolol succinate (TOPROL XL) 50 MG extended release tablet TAKE 1 TABLET BY MOUTH DAILY 90 tablet 2    albuterol sulfate HFA (VENTOLIN HFA) 108 (90 Base) MCG/ACT inhaler INHALE 2 PUFFS INTO THE LUNGS EVERY 6 HOURS AS NEEDED FOR WHEEZING 36 g 3    ACETAMINOPHEN EXTRA STRENGTH 500 MG tablet TAKE 1 TABLET BY MOUTH EVERY 6 HOURS AS NEEDED FOR PAIN 120 tablet 3    simvastatin (ZOCOR) 40 MG tablet Take 1 tablet by mouth nightly TAKE 1 TABLET BY MOUTH EVERY NIGHT 90 tablet 3    metFORMIN (GLUCOPHAGE-XR) 500 MG extended release tablet TAKE 1 TABLET BY MOUTH DAILY WITH BREAKFAST 60 tablet 3    buPROPion (WELLBUTRIN SR) 100 MG extended release tablet TAKE 1 TABLET BY MOUTH TWICE DAILY 60 tablet 3    Calcium Carb-Cholecalciferol (CALCIUM-VITAMIN D) 600-400 MG-UNIT TABS TAKE 1 TABLET BY MOUTH DAILY 90 tablet 0    isosorbide mononitrate (IMDUR) 30 MG extended release tablet TAKE 1 TABLET BY MOUTH EVERY DAY 90 tablet 2    cetirizine (ZYRTEC) 10 MG tablet TAKE 1 TABLET BY MOUTH DAILY 30 tablet 3    busPIRone (BUSPAR) 15 MG tablet TAKE 1 TABLET BY MOUTH EVERY 12 HOURS AS NEEDED 120 tablet 3    cilostazol (PLETAL) 50 MG tablet Take 50 mg by mouth 2 times daily      nitroGLYCERIN (NITROSTAT) 0.4 MG SL tablet DISSOLVE 1 TABLET UNDER THE TONGUE EVERY 5 MINUTES AS NEEDED FOR CHEST PAIN. IF NO RELIEF AFTER 1 DOSE, CALL 911 (Patient not taking: Reported on 2022) 25 tablet 0     No current facility-administered medications on file prior to visit. Social History     Tobacco Use    Smoking status: Former Smoker     Packs/day: 0.25     Years: 25.00     Pack years: 6.25     Types: Cigarettes     Quit date: 2009     Years since quittin.1    Smokeless tobacco: Never Used   Vaping Use    Vaping Use: Never used   Substance Use Topics    Alcohol use: No     Alcohol/week: 0.0 standard drinks    Drug use: No       Allergies   Allergen Reactions    Claritin [Loratadine]      2010 Heart palpitations  2010 Heart palpitations    Codeine Nausea Only       Review of Systems  ROS: back pain and leg pain    Physical Exam:      BP 87/62 (Site: Right Upper Arm, Position: Sitting)   Pulse 93   Temp 97.2 °F (36.2 °C) (Temporal)   Ht 5' 2\" (1.575 m)   Wt 175 lb 12.8 oz (79.7 kg)   LMP 2012   SpO2 98%   BMI 32.15 kg/m²   Estimated body mass index is 32.15 kg/m² as calculated from the following:    Height as of this encounter: 5' 2\" (1.575 m). Weight as of this encounter: 175 lb 12.8 oz (79.7 kg). General:  Ching Lynn is a 58y.o. year old female who appears her stated age. HEENT: Normocephalic atraumatic. Neck supple. Chest: regular rate; pulses equal. Equal chest rise and fall  Abdomen: Soft nondistended.    Ext: DP equal with good capillary refill  Neuro    Mentation  Appropriate affect   oriented    Cranial Nerves:   Pupils equal and reactive to light  Extraocular motion intact  Face symmetric  No dysarthria  v1-3 sensation symmetric, masseter tone symmetric  Hearing symmetric and intact to finger rub    Sensation:   Diminished     Motor  L deltoid 5/5; R deltoid 5/5  L biceps 5/5; R biceps 5/5  L triceps 5/5; R triceps 5/5  L wrist extension 5/5; R wrist extension 5/5  L intrinsics 5/5; R intrinsics 5/5     L iliopsoas 5/5 , R iliopsoas 5/5  L quadriceps 5/5; R quadriceps 5/5  L Dorsiflexion 5/5; R dorsiflexion 5/5  L Plantarflexion 5/5; R plantarflexion 5/5  L EHL 5/5; R EHL 5/5    Reflexes  L Brachioradialis 2+/4; R brachioradialis 2+/4  L Biceps 2+/4; R Biceps 2+/4  L Triceps 2+/4; R Triceps 2+/4  L Patellar 2+/4: R Patellar 2+/4  L Achilles 2+/4; R Achilles 2+/4    hoffmans L: neg  hoffmans R: neg  Clonus L: neg  Clonus R: neg  Babinski L: up  Babinski R; up    Studies Review:     Scoliosis x-rays show a 10 cm SVA. MRI with multilevel spondylosis as well as stenosis from L3 down S1 and flatback deformity. Assessment and Plan:      1. Lumbar stenosis with neurogenic claudication    2. Spinal deformity          Plan: She has a combination of spinal deformity as well as neurogenic claudication affecting both her lower extremities but mainly with axial back pain. I did explain to her that surgery in order to treat axial symptoms and spinal deformity involves the anterior posterior procedure with correction of the sagittal plane deformity and posterior fixation. This would be a fairly extensive surgery approximately 7 hours with a recovery of approximately 3 months. Patient is not interested in any surgical invention at this time. I recommended that we give her home exercise regimen to continue on an recommend that she try to lose a least 15 to 20 pounds and see if she gets any medication of her axial symptoms. Followup: No follow-ups on file. Prescriptions Ordered:  No orders of the defined types were placed in this encounter. Orders Placed:  No orders of the defined types were placed in this encounter.        Electronically signed by Margarette Hodgkin, DO on 7/6/2022 at 4:24 PM    Please note that this chart was generated using voice recognition Dragon dictation software. Although every effort was made to ensure the accuracy of this automated transcription, some errors in transcription may have occurred.

## 2022-07-10 DIAGNOSIS — M19.019 OSTEOARTHRITIS OF SHOULDER, UNSPECIFIED LATERALITY, UNSPECIFIED OSTEOARTHRITIS TYPE: ICD-10-CM

## 2022-07-10 DIAGNOSIS — M50.30 DEGENERATIVE DISC DISEASE, CERVICAL: ICD-10-CM

## 2022-07-11 RX ORDER — CYCLOBENZAPRINE HCL 10 MG
TABLET ORAL
Qty: 60 TABLET | Refills: 0 | Status: SHIPPED | OUTPATIENT
Start: 2022-07-11 | End: 2022-09-01 | Stop reason: SDUPTHER

## 2022-07-13 DIAGNOSIS — M54.16 LUMBAR RADICULOPATHY: ICD-10-CM

## 2022-07-13 DIAGNOSIS — M48.061 SPINAL STENOSIS OF LUMBAR REGION WITHOUT NEUROGENIC CLAUDICATION: ICD-10-CM

## 2022-07-14 RX ORDER — PREGABALIN 150 MG/1
150 CAPSULE ORAL 3 TIMES DAILY
Qty: 90 CAPSULE | Refills: 0 | Status: SHIPPED | OUTPATIENT
Start: 2022-07-14 | End: 2022-08-11 | Stop reason: SDUPTHER

## 2022-07-17 DIAGNOSIS — M50.30 DEGENERATIVE DISC DISEASE, CERVICAL: ICD-10-CM

## 2022-07-18 ENCOUNTER — OFFICE VISIT (OUTPATIENT)
Dept: FAMILY MEDICINE CLINIC | Age: 62
End: 2022-07-18
Payer: MEDICARE

## 2022-07-18 VITALS
DIASTOLIC BLOOD PRESSURE: 72 MMHG | TEMPERATURE: 97.4 F | SYSTOLIC BLOOD PRESSURE: 94 MMHG | OXYGEN SATURATION: 94 % | HEART RATE: 84 BPM | BODY MASS INDEX: 32.5 KG/M2 | WEIGHT: 176.6 LBS | HEIGHT: 62 IN

## 2022-07-18 DIAGNOSIS — M54.16 LUMBAR RADICULOPATHY: ICD-10-CM

## 2022-07-18 DIAGNOSIS — I10 ESSENTIAL HYPERTENSION: Primary | ICD-10-CM

## 2022-07-18 DIAGNOSIS — R73.03 PREDIABETES: ICD-10-CM

## 2022-07-18 DIAGNOSIS — M48.062 SPINAL STENOSIS OF LUMBAR REGION WITH NEUROGENIC CLAUDICATION: Chronic | ICD-10-CM

## 2022-07-18 DIAGNOSIS — I73.9 PVD (PERIPHERAL VASCULAR DISEASE) WITH CLAUDICATION (HCC): ICD-10-CM

## 2022-07-18 DIAGNOSIS — R60.9 PERIPHERAL EDEMA: ICD-10-CM

## 2022-07-18 DIAGNOSIS — I25.10 CORONARY ARTERY DISEASE INVOLVING NATIVE CORONARY ARTERY OF NATIVE HEART WITHOUT ANGINA PECTORIS: ICD-10-CM

## 2022-07-18 LAB — HBA1C MFR BLD: 5.3 %

## 2022-07-18 PROCEDURE — 99214 OFFICE O/P EST MOD 30 MIN: CPT | Performed by: FAMILY MEDICINE

## 2022-07-18 PROCEDURE — 83036 HEMOGLOBIN GLYCOSYLATED A1C: CPT | Performed by: FAMILY MEDICINE

## 2022-07-18 RX ORDER — CALCIUM CARBONATE/VITAMIN D3 600 MG-10
TABLET ORAL
Qty: 90 TABLET | Refills: 0 | Status: SHIPPED | OUTPATIENT
Start: 2022-07-18 | End: 2022-10-11

## 2022-07-18 RX ORDER — CLOPIDOGREL BISULFATE 75 MG/1
75 TABLET ORAL DAILY
COMMUNITY
Start: 2022-07-05

## 2022-07-18 RX ORDER — NITROGLYCERIN 0.4 MG/1
TABLET SUBLINGUAL
Qty: 25 TABLET | Refills: 0 | Status: SHIPPED | OUTPATIENT
Start: 2022-07-18 | End: 2022-09-21

## 2022-07-18 RX ORDER — FUROSEMIDE 20 MG/1
20 TABLET ORAL DAILY
Qty: 60 TABLET | Refills: 0 | Status: SHIPPED | OUTPATIENT
Start: 2022-07-18 | End: 2022-10-10

## 2022-07-18 NOTE — PROGRESS NOTES
Visit Information    Have you changed or started any medications since your last visit including any over-the-counter medicines, vitamins, or herbal medicines? no   Have you stopped taking any of your medications? Is so, why? -  no  Are you having any side effects from any of your medications? - no    Have you seen any other physician or provider since your last visit? yes -    Have you had any other diagnostic tests since your last visit?  no   Have you been seen in the emergency room and/or had an admission in a hospital since we last saw you?  no   Have you had your routine dental cleaning in the past 6 months?  no     Do you have an active MyChart account? If no, what is the barrier?   Yes    Patient Care Team:  Sven Pack MD as PCP - General (Family Medicine)  Sven Pack MD as PCP - [facility]    Medical History Review  Past Medical, Family, and Social History reviewed and does contribute to the patient presenting condition    Health Maintenance   Topic Date Due    COVID-19 Vaccine (4 - Booster for Moderna series) 03/05/2022    Flu vaccine (1) 09/01/2022    Breast cancer screen  10/08/2022    A1C test (Diabetic or Prediabetic)  04/14/2023    Depression Monitoring  04/14/2023    Cervical cancer screen  04/16/2023    Lipids  06/24/2023    Colorectal Cancer Screen  05/06/2024    Pneumococcal 0-64 years Vaccine (3 - PPSV23 or PCV20) 05/02/2025    DTaP/Tdap/Td vaccine (2 - Td or Tdap) 04/16/2028    Shingles vaccine  Completed    Hepatitis C screen  Completed    HIV screen  Completed    Hepatitis A vaccine  Aged Out    Hepatitis B vaccine  Aged Out    Hib vaccine  Aged Out    Meningococcal (ACWY) vaccine  Aged Out

## 2022-07-18 NOTE — PATIENT INSTRUCTIONS
New Updates for [facility] MyChart/ [facility] ([facility] ALLIE    Thank you for choosing US to give you the best care! [employer] ([facility]) is always trying to think of new ways to help their patients. We are asking all patients to try out the new digital registration that is now available through your [facility] account or the new ALLIE, [facility] ([facility]). Via the allie you're now able to update your personal and registration information prior to your upcoming appointment. This will save you time once you arrive at the office to check-in, not to mention your information remains safe!! Many other perks come from signing up for an account, such as:  Requesting refills  Scheduling an appointment  Completing an E-Visit  Sending a message to the office/provider  Having access to your medication list  Paying your bill/copay prior to your appointment  Scheduling your yearly mammogram  Review your test results    If you are not familiar with [facility] or the [facility] ([facility] ALLIE, please ask one of us and we will be happy to answer any questions or help you set-up your account.       Your [facility] office,  Mel[place]

## 2022-07-18 NOTE — PROGRESS NOTES
Chief Complaint   Patient presents with    Hypertension    Other     HANDICAP PLACARD DUE TO [address]  here today for follow up on chronic medical problems, go over labs and/or diagnostic studies, and medication refills. Hypertension and Other (HANDICAP PLACARD DUE TO TROUBLE WALKING)      HPI: Patient is scheduled for follow-up on chronic medical conditions. Hypertension controlled denies any chest pain shortness of breath dyspnea on exertion. Patient is currently on 3 medications including metoprolol enalapril Lasix. Blood pressure is running low blood 100 Lasix was increased to 40 mg due to leg swelling and pedal edema. Creatinine has been also mildly increased. Patient denies any lightheadedness or dizziness. Coronary artery disease stable on multiple medications follows with cardiologist.  Patient is on aspirin statins beta-blockers. Prediabetes stable A1c has improved is on metformin and diet control. Patient denies any side effects able to tolerate. Patient has history of peripheral vascular disease, on left side reports she had a stent placed in May. Surgery went well. Spinal stenosis with lumbar radiculopathy follows with pain management is currently on Lyrica and epidural injections. Controlled Substance Monitoring:    Acute and Chronic Pain Monitoring:   RX Monitoring 7/18/2022   Acute Pain Prescriptions -   Periodic Controlled Substance Monitoring No signs of potential drug abuse or diversion identified. ;Assessed functional status. Chronic Pain > 50 MEDD Considered consultation with a specialist.   Chronic Pain > 80 MEDD Obtained or confirmed \"Medication Contract\" on file. Patient had mammogram ordered she has not scheduled that. BP 94/72   Pulse 84   Temp 97.4 °F (36.3 °C)   Ht 5' 2\" (1.575 m)   Wt 176 lb 9.6 oz (80.1 kg)   LMP 11/07/2012   SpO2 94%   BMI 32.30 kg/m²    Body mass index is 32.3 kg/m².   Wt Readings from Last 3 Encounters:   07/18/22 176 lb 9.6 oz (80.1 kg)   07/06/22 175 lb 12.8 oz (79.7 kg)   04/14/22 189 lb (85.7 kg)        []Negative depression screening. PHQ Scores 4/14/2022 1/13/2022 2/8/2021 3/25/2019 9/25/2018 4/16/2018   PHQ2 Score 0 0 0 0 0 0   PHQ9 Score 4 0 0 0 0 0      [x]1-4 = Minimal depression   []5-9 = Milddepression   []10-14 = Moderate depression   []15-19 = Moderately severe depression   []20-27 = Severe depression    Discussed testing with the patient and all questions fully answered.     Hospital Outpatient Visit on 06/24/2022   Component Date Value Ref Range Status    WBC 06/24/2022 9.1  3.5 - 11.0 k/uL Final    RBC 06/24/2022 3.77 (A) 4.0 - 5.2 m/uL Final    Hemoglobin 06/24/2022 12.0  12.0 - 16.0 g/dL Final    Hematocrit 06/24/2022 35.2 (A) 36 - 46 % Final    MCV 06/24/2022 93.3  80 - 100 fL Final    MCH 06/24/2022 32.0  26 - 34 pg Final    MCHC 06/24/2022 34.3  31 - 37 g/dL Final    RDW 06/24/2022 14.2  11.5 - 14.9 % Final    Platelets 67/19/6410 274  150 - 450 k/uL Final    MPV 06/24/2022 8.7  6.0 - 12.0 fL Final    Glucose 06/24/2022 93  70 - 99 mg/dL Final    BUN 06/24/2022 12  8 - 23 mg/dL Final    CREATININE 06/24/2022 0.96 (A) 0.50 - 0.90 mg/dL Final    Calcium 06/24/2022 9.0  8.6 - 10.4 mg/dL Final    Sodium 06/24/2022 141  135 - 144 mmol/L Final    Potassium 06/24/2022 4.6  3.7 - 5.3 mmol/L Final    Chloride 06/24/2022 104  98 - 107 mmol/L Final    CO2 06/24/2022 25  20 - 31 mmol/L Final    Anion Gap 06/24/2022 12  9 - 17 mmol/L Final    Alkaline Phosphatase 06/24/2022 74  35 - 104 U/L Final    ALT 06/24/2022 10  5 - 33 U/L Final    AST 06/24/2022 15  <32 U/L Final    Total Bilirubin 06/24/2022 0.18 (A) 0.3 - 1.2 mg/dL Final    Total Protein 06/24/2022 6.7  6.4 - 8.3 g/dL Final    Albumin 06/24/2022 3.6  3.5 - 5.2 g/dL Final    GFR Non- 06/24/2022 59 (A) >60 mL/min Final    GFR  06/24/2022 >60  >60 mL/min Final    GFR Comment 06/24/2022        Final Comment: Average GFR for 61-76 years old:   80 mL/min/1.73sq m  Chronic Kidney Disease:   <60 mL/min/1.73sq m  Kidney failure:   <15 mL/min/1.73sq m              eGFR calculated using average adult body mass. Additional eGFR calculator available at:        [email or web address]            Cholesterol 06/24/2022 113  <200 mg/dL Final    Comment:    Cholesterol Guidelines:      <200  Desirable   200-240  Borderline      >240  Undesirable         HDL 06/24/2022 47  >40 mg/dL Final    Comment:    HDL Guidelines:    <40     Undesirable   40-59    Borderline    >59     Desirable         LDL Cholesterol 06/24/2022 48  0 - 130 mg/dL Final    Comment:    LDL Guidelines:     <100    Desirable   100-129   Near to/above Desirable   130-159   Borderline      >159   Undesirable     Direct (measured) LDL and calculated LDL are not interchangeable tests. Chol/HDL Ratio 06/24/2022 2.4  <5 Final            Triglycerides 06/24/2022 90  <150 mg/dL Final    Comment:    Triglyceride Guidelines:     <150   Desirable   150-199  Borderline   200-499  High     >499   Very high   Based on AHA Guidelines for fasting triglyceride, October 2012.          Vit D, 25-Hydroxy 06/24/2022 56.0  >29.9 ng/mL Final    Comment:    Reference Range:  Vitamin D status         Range   Deficiency              <20 ng/mL   Mild Deficiency       20-30 ng/mL   Sufficiency           ng/mL   Toxicity               >100 ng/mL      TSH 06/24/2022 2.01  0.30 - 5.00 uIU/mL Final         Most recent labs reviewed:     Lab Results   Component Value Date    WBC 9.1 06/24/2022    HGB 12.0 06/24/2022    HCT 35.2 (L) 06/24/2022    MCV 93.3 06/24/2022     06/24/2022       @BRIEFLAB(NA,K,CL,CO2,BUN,CREATININE,GLUCOSE,CALCIUM)@     Lab Results   Component Value Date    ALT 10 06/24/2022    AST 15 06/24/2022    ALKPHOS 74 06/24/2022    BILITOT 0.18 (L) 06/24/2022       Lab Results   Component Value Date    TSH 2.01 06/24/2022       Lab Results   Component Value Date    CHOL 113 06/24/2022    CHOL 136 07/26/2021    CHOL 116 03/26/2019     Lab Results   Component Value Date    TRIG 90 06/24/2022    TRIG 104 07/26/2021    TRIG 91 03/26/2019     Lab Results   Component Value Date    HDL 47 06/24/2022    HDL 50 07/26/2021    HDL 59 07/01/2020     Lab Results   Component Value Date    LDLCHOLESTEROL 48 06/24/2022    LDLCHOLESTEROL 65 07/26/2021    LDLCHOLESTEROL 67 07/01/2020     Lab Results   Component Value Date    VLDL NOT REPORTED 07/26/2021    VLDL NOT REPORTED 07/01/2020    VLDL NOT REPORTED 03/26/2019     Lab Results   Component Value Date    CHOLHDLRATIO 2.4 06/24/2022    CHOLHDLRATIO 2.7 07/26/2021    CHOLHDLRATIO 2.4 07/01/2020       Lab Results   Component Value Date    LABA1C 5.3 07/18/2022       No results found for: PQLOIGOI19    No results found for: FOLATE    No results found for: IRON, TIBC, FERRITIN    Lab Results   Component Value Date    VITD25 56.0 06/24/2022             Current Outpatient Medications   Medication Sig Dispense Refill    clopidogrel (PLAVIX) 75 MG tablet Take 75 mg by mouth in the morning. nitroGLYCERIN (NITROSTAT) 0.4 MG SL tablet up to max of 3 total doses. If no relief after 1 dose, call 911. 25 tablet 0    furosemide (LASIX) 20 MG tablet Take 1 tablet by mouth in the morning. 60 tablet 0    Handicap Placard MISC by Does not apply route Expires on 12/31/25 1 each 0    pregabalin (LYRICA) 150 MG capsule Take 1 capsule by mouth in the morning, at noon, and at bedtime for 30 days.  TAKE 1 CAPSULE BY MOUTH EVERY MORNING, AT NOON AND AT BEDTIME 90 capsule 0    cyclobenzaprine (FLEXERIL) 10 MG tablet TAKE 1 TABLET BY MOUTH TWICE DAILY AS NEEDED FOR MUSCLE SPASMS 60 tablet 0    diclofenac sodium (VOLTAREN) 1 % GEL APPLY 2 GRAMS TOPICALLY TO THE AFFECTED AREA FOUR TIMES DAILY 200 g 1    potassium chloride (KLOR-CON M) 10 MEQ extended release tablet TAKE 1 TABLET BY MOUTH DAILY 90 tablet 1    aspirin (ASPIRIN LOW DOSE) 81 MG EC tablet TAKE 1 TABLET BY MOUTH DAILY 90 tablet 2    enalapril (VASOTEC) 10 MG tablet TAKE 2 TABLET BY MOUTH EVERY MORNING AND 1 TABLET EVERY EVENING 90 tablet 2    metoprolol succinate (TOPROL XL) 50 MG extended release tablet TAKE 1 TABLET BY MOUTH DAILY 90 tablet 2    albuterol sulfate HFA (VENTOLIN HFA) 108 (90 Base) MCG/ACT inhaler INHALE 2 PUFFS INTO THE LUNGS EVERY 6 HOURS AS NEEDED FOR WHEEZING 36 g 3    ACETAMINOPHEN EXTRA STRENGTH 500 MG tablet TAKE 1 TABLET BY MOUTH EVERY 6 HOURS AS NEEDED FOR PAIN 120 tablet 3    simvastatin (ZOCOR) 40 MG tablet Take 1 tablet by mouth nightly TAKE 1 TABLET BY MOUTH EVERY NIGHT 90 tablet 3    metFORMIN (GLUCOPHAGE-XR) 500 MG extended release tablet TAKE 1 TABLET BY MOUTH DAILY WITH BREAKFAST 60 tablet 3    buPROPion (WELLBUTRIN SR) 100 MG extended release tablet TAKE 1 TABLET BY MOUTH TWICE DAILY 60 tablet 3    isosorbide mononitrate (IMDUR) 30 MG extended release tablet TAKE 1 TABLET BY MOUTH EVERY DAY 90 tablet 2    cetirizine (ZYRTEC) 10 MG tablet TAKE 1 TABLET BY MOUTH DAILY 30 tablet 3    busPIRone (BUSPAR) 15 MG tablet TAKE 1 TABLET BY MOUTH EVERY 12 HOURS AS NEEDED 120 tablet 3    cilostazol (PLETAL) 50 MG tablet Take 50 mg by mouth 2 times daily      Calcium Carb-Cholecalciferol (CALCIUM-VITAMIN D) 600-400 MG-UNIT TABS TAKE 1 TABLET BY MOUTH DAILY 90 tablet 0     No current facility-administered medications for this visit.              Social History     Socioeconomic History    Marital status:      Spouse name: Not on file    Number of children: Not on file    Years of education: Not on file    Highest education level: Not on file   Occupational History    Not on file   Tobacco Use    Smoking status: Former     Packs/day: 0.25     Years: 25.00     Pack years: 6.25     Types: Cigarettes     Quit date: 2009     Years since quittin.1    Smokeless tobacco: Never   Vaping Use    Vaping Use: Never used   Substance and Sexual Activity    Alcohol wound. Allergic/Immunologic: Negative for food allergies and immunocompromised state. Neurological:  Positive for weakness and numbness. Negative for dizziness, speech difficulty, light-headedness and headaches. Psychiatric/Behavioral:  Negative for agitation, behavioral problems, decreased concentration, dysphoric mood, hallucinations, sleep disturbance and suicidal ideas. The patient is nervous/anxious. Physical Exam  Vitals and nursing note reviewed. Constitutional:       General: She is not in acute distress. Appearance: Normal appearance. She is well-developed. She is obese. She is not diaphoretic. HENT:      Head: Normocephalic and atraumatic. Nose: Nose normal.   Eyes:      General:         Right eye: No discharge. Left eye: No discharge. Extraocular Movements: Extraocular movements intact. Conjunctiva/sclera: Conjunctivae normal.      Pupils: Pupils are equal, round, and reactive to light. Neck:      Thyroid: No thyromegaly. Cardiovascular:      Rate and Rhythm: Normal rate and regular rhythm. Pulses:           Dorsalis pedis pulses are 3+ on the right side and 3+ on the left side. Heart sounds: Normal heart sounds. No murmur heard. Pulmonary:      Effort: Pulmonary effort is normal. No respiratory distress. Breath sounds: Normal breath sounds. No wheezing or rhonchi. Abdominal:      General: Bowel sounds are normal. There is no distension. Palpations: Abdomen is soft. There is no mass. Tenderness: There is no abdominal tenderness. There is no guarding or rebound. Musculoskeletal:      Cervical back: Normal range of motion and neck supple. Spasms present. No rigidity. No pain with movement. Thoracic back: No spasms. Normal range of motion. Lumbar back: Spasms and tenderness present. Decreased range of motion. Right foot: Normal range of motion. Left foot: Normal range of motion.    Lymphadenopathy: Cervical: No cervical adenopathy. Skin:     Coloration: Skin is not jaundiced or pale. Findings: No bruising, erythema or rash. Neurological:      General: No focal deficit present. Mental Status: She is alert and oriented to person, place, and time. Cranial Nerves: No cranial nerve deficit. Sensory: Sensory deficit present. Motor: No weakness or tremor. Coordination: Coordination normal.      Gait: Gait abnormal. Tandem walk normal.      Deep Tendon Reflexes: Reflexes are normal and symmetric. Comments: Uses cane for walking   Psychiatric:         Attention and Perception: Attention and perception normal. She is attentive. Mood and Affect: Mood is anxious. Mood is not depressed. Affect is not tearful. Speech: She is communicative. Speech is not rapid and pressured, delayed or slurred. Behavior: Behavior normal. Behavior is not agitated or slowed. Thought Content: Thought content normal.         Judgment: Judgment normal.           ASSESSMENT AND PLAN      1. Essential hypertension  Running low, decrease Lasix to 20 mg. Continue all other medications monitor blood pressure at home    2. Coronary artery disease involving native coronary artery of native heart without angina pectoris  Fairly stable continue current treatment, continue beta-blocker statins ACE inhibitor's  - nitroGLYCERIN (NITROSTAT) 0.4 MG SL tablet; up to max of 3 total doses. If no relief after 1 dose, call 911. Dispense: 25 tablet; Refill: 0  - furosemide (LASIX) 20 MG tablet; Take 1 tablet by mouth in the morning. Dispense: 60 tablet; Refill: 0    3. Prediabetes  Improving continue metformin continue monitoring diet  - POCT glycosylated hemoglobin (Hb A1C)    4. PVD (peripheral vascular disease) with claudication (HCC)  Status post stent placed continue aspirin continue statins  - Handicap Placard MISC; by Does not apply route Expires on 12/31/25  Dispense: 1 each;  Refill: 0    5. Peripheral edema  Improving decrease Lasix to 20 mg continue to monitor  - furosemide (LASIX) 20 MG tablet; Take 1 tablet by mouth in the morning. Dispense: 60 tablet; Refill: 0    6. Spinal stenosis of lumbar region with neurogenic claudication  Stable follow-up with pain management continue Lyrica  - Handicap Placard MISC; by Does not apply route Expires on 12/31/25  Dispense: 1 each; Refill: 0    7. Lumbar radiculopathy  Continue Lyrica follow-up with pain management  - Handicap Placard MISC; by Does not apply route Expires on 12/31/25  Dispense: 1 each; Refill: 0      Orders Placed This Encounter   Procedures    POCT glycosylated hemoglobin (Hb A1C)         Medications Discontinued During This Encounter   Medication Reason    clopidogrel (PLAVIX) 75 MG tablet DUPLICATE    nitroGLYCERIN (NITROSTAT) 0.4 MG SL tablet REORDER    furosemide (LASIX) 40 MG tablet        Christina received counseling on the following healthy behaviors: nutrition, exercise, and medication adherence  Reviewed prior labs and health maintenance  Continue current medications, diet and exercise. Discussed use, benefit, and side effects of prescribed medications. Barriers to medication compliance addressed. Patient given educational materials - see patient instructions  Was a self-tracking handout given in paper form or via [email or web address]? Yes    Requested Prescriptions     Signed Prescriptions Disp Refills    nitroGLYCERIN (NITROSTAT) 0.4 MG SL tablet 25 tablet 0     Sig: up to max of 3 total doses. If no relief after 1 dose, call 911.    furosemide (LASIX) 20 MG tablet 60 tablet 0     Sig: Take 1 tablet by mouth in the morning. Handicap Placard MISC 1 each 0     Sig: by Does not apply route Expires on 12/31/25       All patient questions answered. Patient voiced understanding. Quality Measures    Body mass index is 32.3 kg/m². Elevated.  Weight control planned discussed daily exercise regimen and Healthy diet and regular exercise. BP: 94/72 Blood pressure is low. Treatment plan consists of Weight Reduction, DASH Eating Plan, and No treatment change needed. Lab Results   Component Value Date    LDLCHOLESTEROL 48 06/24/2022    (goal LDL reduction with dx if diabetes is 50% LDL reduction)      PHQ Scores 4/14/2022 1/13/2022 2/8/2021 3/25/2019 9/25/2018 4/16/2018   PHQ2 Score 0 0 0 0 0 0   PHQ9 Score 4 0 0 0 0 0     Interpretation of Total Score Depression Severity: 1-4 = Minimal depression, 5-9 = Mild depression, 10-14 = Moderate depression, 15-19 = Moderately severe depression, 20-27 = Severe depression    The patient'spast medical, surgical, social, and family history as well as her   current medications and allergies were reviewed as documented in today's encounter. Medications, labs, diagnostic studies, consultations andfollow-up as documented in this encounter. Return in about 3 months (around 10/18/2022). Patient wasseen with total face to face time of 30 minutes. More than 50% of this visit was counseling and education. Future Appointments   Date Time Provider Matt Diez   8/17/2022  3:30 PM Skye Salmeron MD [facility]   10/18/2022  2:15 PM [facility]     This note was completed by using the assistance of a speech-recognition program. However, inadvertent computerized transcription errors may be present. Althoughevery effort was made to ensure accuracy, no guarantees can be provided that every mistake has been identified and corrected by editing.   Electronically signed by Sarah Dominguez MD on 7/19/2022  7:49 AM

## 2022-07-18 NOTE — TELEPHONE ENCOUNTER
Please Approve or Refuse.   Send to Pharmacy per Pt's Request: [facility]     Next Visit Date:  7/18/2022   Last Visit Date: 4/14/2022    Hemoglobin A1C (%)   Date Value   04/14/2022 5.2   01/13/2022 5.8   07/26/2021 6.0             ( goal A1C is < 7)   BP Readings from Last 3 Encounters:   07/06/22 87/62   04/14/22 136/76   03/02/22 116/77          (goal 120/80)  BUN   Date Value Ref Range Status   06/24/2022 12 8 - 23 mg/dL Final     CREATININE   Date Value Ref Range Status   06/24/2022 0.96 (H) 0.50 - 0.90 mg/dL Final     Potassium   Date Value Ref Range Status   06/24/2022 4.6 3.7 - 5.3 mmol/L Final

## 2022-07-19 ASSESSMENT — ENCOUNTER SYMPTOMS
BACK PAIN: 1
TROUBLE SWALLOWING: 0
CONSTIPATION: 0
ABDOMINAL PAIN: 0
SHORTNESS OF BREATH: 0
CHEST TIGHTNESS: 0
COLOR CHANGE: 0
VOMITING: 0
WHEEZING: 0
SINUS PRESSURE: 0
NAUSEA: 0
STRIDOR: 0
SORE THROAT: 0
BLOOD IN STOOL: 0
RHINORRHEA: 0
EYE REDNESS: 0
ABDOMINAL DISTENTION: 0
COUGH: 0
DIARRHEA: 0
RECTAL PAIN: 0

## 2022-07-28 ENCOUNTER — HOSPITAL ENCOUNTER (OUTPATIENT)
Dept: WOMENS IMAGING | Age: 62
Discharge: HOME | End: 2022-07-30
Payer: MEDICARE

## 2022-07-28 DIAGNOSIS — Z12.31 SCREENING MAMMOGRAM FOR HIGH-RISK PATIENT: ICD-10-CM

## 2022-07-28 PROCEDURE — 77063 BREAST TOMOSYNTHESIS BI: CPT

## 2022-08-02 DIAGNOSIS — M51.36 LUMBAR DEGENERATIVE DISC DISEASE: ICD-10-CM

## 2022-08-02 DIAGNOSIS — S33.6XXD SPRAIN OF SACROILIAC LIGAMENT, SUBSEQUENT ENCOUNTER: ICD-10-CM

## 2022-08-02 DIAGNOSIS — M51.26 PROTRUDED LUMBAR DISC: ICD-10-CM

## 2022-08-11 DIAGNOSIS — M54.16 LUMBAR RADICULOPATHY: ICD-10-CM

## 2022-08-11 DIAGNOSIS — M48.061 SPINAL STENOSIS OF LUMBAR REGION WITHOUT NEUROGENIC CLAUDICATION: ICD-10-CM

## 2022-08-12 RX ORDER — PREGABALIN 150 MG/1
150 CAPSULE ORAL 3 TIMES DAILY
Qty: 90 CAPSULE | Refills: 0 | Status: SHIPPED | OUTPATIENT
Start: 2022-08-12 | End: 2022-09-11 | Stop reason: SDUPTHER

## 2022-08-17 ENCOUNTER — OFFICE VISIT (OUTPATIENT)
Dept: NEUROLOGY | Age: 62
End: 2022-08-17
Payer: MEDICARE

## 2022-08-17 VITALS
BODY MASS INDEX: 33.23 KG/M2 | TEMPERATURE: 97.1 F | HEIGHT: 62 IN | OXYGEN SATURATION: 97 % | HEART RATE: 94 BPM | SYSTOLIC BLOOD PRESSURE: 107 MMHG | WEIGHT: 180.6 LBS | DIASTOLIC BLOOD PRESSURE: 73 MMHG

## 2022-08-17 DIAGNOSIS — M54.41 CHRONIC BILATERAL LOW BACK PAIN WITH RIGHT-SIDED SCIATICA: Primary | ICD-10-CM

## 2022-08-17 DIAGNOSIS — G89.29 CHRONIC BILATERAL LOW BACK PAIN WITH RIGHT-SIDED SCIATICA: Primary | ICD-10-CM

## 2022-08-17 PROCEDURE — 1036F TOBACCO NON-USER: CPT | Performed by: STUDENT IN AN ORGANIZED HEALTH CARE EDUCATION/TRAINING PROGRAM

## 2022-08-17 PROCEDURE — 99212 OFFICE O/P EST SF 10 MIN: CPT | Performed by: STUDENT IN AN ORGANIZED HEALTH CARE EDUCATION/TRAINING PROGRAM

## 2022-08-17 PROCEDURE — 3017F COLORECTAL CA SCREEN DOC REV: CPT | Performed by: STUDENT IN AN ORGANIZED HEALTH CARE EDUCATION/TRAINING PROGRAM

## 2022-08-17 PROCEDURE — G8427 DOCREV CUR MEDS BY ELIG CLIN: HCPCS | Performed by: STUDENT IN AN ORGANIZED HEALTH CARE EDUCATION/TRAINING PROGRAM

## 2022-08-17 PROCEDURE — G8417 CALC BMI ABV UP PARAM F/U: HCPCS | Performed by: STUDENT IN AN ORGANIZED HEALTH CARE EDUCATION/TRAINING PROGRAM

## 2022-08-17 NOTE — PROGRESS NOTES
[address] [place] [address] # [address] [id number]  Dept: [phone]  Dept Fax: [phone]    NEUROLOGY CLINIC NOTE       PATIENT NAME: Jack Luis  PATIENT MRN: [id number]  PRIMARY CARE PHYSICIAN: Sergio Hernandez MD      HPI:      Jack Luis  64years old female patient, right-handed came to the clinic today for follow up. She follows with our neurology clinic as a case of low back pain with right leg numbness. She also has hx of lumbar scoliosis, obesity, DM, HTN, HLD, CAD sp 2 stents, PAD sp 3 stenting April/2022, on asa and statins. In summary, it seems her low back pain is a combination of  scoliosis deformity in addition to degenerative changes. She saw neurosurgery in July 2022 with an option for surgery but the patient doesn't want to seek this option at this point of time. She is also trying exercising, weight reduction and Lyrica, no red flags for low back pain, no bladder or bowel incontinence. No saddle anesthesia. Briefly, her clinical story started about 2 years ago when she started to experience gradual low back pain with right leg numbness. also, she was experiencing numbness on her right groin down to the anterolateral right thigh. Low back Pain severity ranges between 4-7/10. She saw pain management and had epidural injection which actually helped a lot with the pain and it went away. A year later ago, this low back pain started to recur again. She denies any changes in bowel or bladder habits. She did have an MRI of the lumbar spine in February which showed multilevel degenerative changes at L2-3, L4-5 and L5-S1. She saw Dr. Jeff Scott Neurosurgery in July 2022 with option for surgery but it was explained to her that its an extensive surgery and the patient didnt want to seek this option at this time. She uses a cane to ambulate at times.      PREVIOUS WORKUP:     Lab Results   Component Value Date    WBC 9.1 2022    HGB 12.0 2022    HCT 35.2 (L) 2022    MCV 93.3 2022     2022       Past Medical History:   Diagnosis Date    [facility]ADNE ([facility]) ([facility] 2019    Anxiety     Asthma     CAD (coronary artery disease)     Class 1 obesity due to excess calories with serious comorbidity and body mass index (BMI) of 32.0 to 32.9 in adult 2020    Depression     Examination of participant in clinical trial 11    End date 2015    HTN (hypertension)     Lumbar radiculopathy     Mixed hyperlipidemia 1/15/2018    OA (osteoarthritis)     PVD (peripheral vascular disease) with claudication ([facility] 2015        Past Surgical History:   Procedure Laterality Date     SECTION      COLONOSCOPY N/A 2019    COLORECTAL CANCER SCREENING, NOT HIGH RISK performed by Jerod Walker MD at [address] [place] [address]    2 stents    DILATION AND CURETTAGE OF UTERUS N/A     uterine polyp        Social History     Socioeconomic History    Marital status:      Spouse name: Not on file    Number of children: Not on file    Years of education: Not on file    Highest education level: Not on file   Occupational History    Not on file   Tobacco Use    Smoking status: Former     Packs/day: 0.25     Years: 25.00     Pack years: 6.25     Types: Cigarettes     Quit date: 2009     Years since quittin.2    Smokeless tobacco: Never   Vaping Use    Vaping Use: Never used   Substance and Sexual Activity    Alcohol use: No     Alcohol/week: 0.0 standard drinks    Drug use: No    Sexual activity: Yes   Other Topics Concern    Not on file   Social History Narrative    Not on file     Social Determinants of Health     Financial Resource Strain: Low Risk     Difficulty of Paying Living Expenses: Not hard at all   Food Insecurity: No Food Insecurity    Worried About Running Out of Food in the Last Year: Never true    Ran Out of Food in the Last Year: Never true Transportation Needs: Not on file   Physical Activity: Not on file   Stress: Not on file   Social Connections: Not on file   Intimate Partner Violence: Not on file   Housing Stability: Not on file        Current Outpatient Medications   Medication Sig Dispense Refill    pregabalin (LYRICA) 150 MG capsule Take 1 capsule by mouth in the morning, at noon, and at bedtime for 30 days. TAKE 1 CAPSULE BY MOUTH EVERY MORNING, AT NOON AND AT BEDTIME 90 capsule 0    diclofenac sodium (VOLTAREN) 1 % GEL APPLY 2 GRAMS TOPICALLY TO THE AFFECTED AREA FOUR TIMES DAILY 200 g 0    Calcium Carb-Cholecalciferol (CALCIUM-VITAMIN D) 600-400 MG-UNIT TABS TAKE 1 TABLET BY MOUTH DAILY 90 tablet 0    clopidogrel (PLAVIX) 75 MG tablet Take 75 mg by mouth in the morning. nitroGLYCERIN (NITROSTAT) 0.4 MG SL tablet up to max of 3 total doses. If no relief after 1 dose, call 911. 25 tablet 0    furosemide (LASIX) 20 MG tablet Take 1 tablet by mouth in the morning.  60 tablet 0    Handicap Placard MISC by Does not apply route Expires on 12/31/25 1 each 0    cyclobenzaprine (FLEXERIL) 10 MG tablet TAKE 1 TABLET BY MOUTH TWICE DAILY AS NEEDED FOR MUSCLE SPASMS 60 tablet 0    potassium chloride (KLOR-CON M) 10 MEQ extended release tablet TAKE 1 TABLET BY MOUTH DAILY 90 tablet 1    aspirin (ASPIRIN LOW DOSE) 81 MG EC tablet TAKE 1 TABLET BY MOUTH DAILY 90 tablet 2    enalapril (VASOTEC) 10 MG tablet TAKE 2 TABLET BY MOUTH EVERY MORNING AND 1 TABLET EVERY EVENING 90 tablet 2    metoprolol succinate (TOPROL XL) 50 MG extended release tablet TAKE 1 TABLET BY MOUTH DAILY 90 tablet 2    albuterol sulfate HFA (VENTOLIN HFA) 108 (90 Base) MCG/ACT inhaler INHALE 2 PUFFS INTO THE LUNGS EVERY 6 HOURS AS NEEDED FOR WHEEZING 36 g 3    ACETAMINOPHEN EXTRA STRENGTH 500 MG tablet TAKE 1 TABLET BY MOUTH EVERY 6 HOURS AS NEEDED FOR PAIN 120 tablet 3    simvastatin (ZOCOR) 40 MG tablet Take 1 tablet by mouth nightly TAKE 1 TABLET BY MOUTH EVERY NIGHT 90 tablet 3 metFORMIN (GLUCOPHAGE-XR) 500 MG extended release tablet TAKE 1 TABLET BY MOUTH DAILY WITH BREAKFAST 60 tablet 3    buPROPion (WELLBUTRIN SR) 100 MG extended release tablet TAKE 1 TABLET BY MOUTH TWICE DAILY 60 tablet 3    isosorbide mononitrate (IMDUR) 30 MG extended release tablet TAKE 1 TABLET BY MOUTH EVERY DAY 90 tablet 2    cetirizine (ZYRTEC) 10 MG tablet TAKE 1 TABLET BY MOUTH DAILY 30 tablet 3    busPIRone (BUSPAR) 15 MG tablet TAKE 1 TABLET BY MOUTH EVERY 12 HOURS AS NEEDED 120 tablet 3    cilostazol (PLETAL) 50 MG tablet Take 50 mg by mouth 2 times daily       No current facility-administered medications for this visit. Allergies   Allergen Reactions    Claritin [Loratadine]      02/2010 Heart palpitations  02/2010 Heart palpitations    Codeine Nausea Only        REVIEW OF SYSTEMS:     Review of Systems     Review of Systems    Constitutional: Negative for appetite change, chills, fever and unexpected weight change. Eyes: Negative for photophobia and pain. Negative for visual disturbance. Respiratory: Negative for cough and shortness of breath. Cardiovascular: Negative  for chest pain. Negative for palpitations. Gastrointestinal: Negative for abdominal pain, constipation, diarrhea, nausea and vomiting. Endocrine: Negative for polydipsia and polyuria. Genitourinary: Negative for difficulty urinating, dysuria and hematuria. Musculoskeletal: Negative for back pain and neck pain. Skin: Negative for pallor and rash. Neurological: Negative for facial asymmetry, weakness, numbness and headaches. Negative for dizziness, tremors, seizures, syncope, speech difficulty and light-headedness. Psychiatric/Behavioral: Negative for behavioral problems and hallucinations. VITALS  [facility] 11/07/2012      PHYSICAL EXAMINATION:     Physical Exam   General appearance: cooperative  Skin: no rash or skin lesions.   HEENT: normocephalic  Optic Fundi: deferred  Neck: supple: no cervcical adenopathy or carotid bruit  Lungs: clear to auscultation  Heart: Regular rate and rhythm, normal S1, S2. No murmurs, clicks or gallops.   Peripheral pulses: radial pulses palpable  Abdominal: BS present, soft, NT, ND  Extremities: no edema    NEUROLOGICAL EXAMINATION:     GENERAL  Appears comfortable and in no distress   HEENT  NC/ AT   HEART  S1 and S2 heard; palpation of pulses: radial pulse    NECK  Supple and no bruits heard   MENTAL STATUS:  Alert, oriented, intact memory, no confusion, normal speech, normal language, no hallucination or delusion   CRANIAL NERVES: II     -      Visual fields intact to confrontation  III,IV,VI -  PERR, EOMs full, no ptosis  V     -     Normal facial sensation   VII    -     Normal facial symmetry  VIII   -     Intact hearing   IX,X -     Symmetrical palate  XI    -     Symmetrical shoulder shrug  XII   -     Midline tongue, no atrophy    MOTOR FUNCTION: RUE: Significant for good strength of grade 5/5 in proximal and distal muscle groups   LUE: Significant for good strength of grade 5/5 in proximal and distal muscle groups   RLE: Significant for good strength of grade 5/5 in proximal and distal muscle groups   LLE: Significant for good strength of grade 5/5 in proximal and distal muscle groups      Normal bulk, normal tone and no involuntary movements, no tremor   SENSORY FUNCTION:  Normal touch, normal pin, normal vibration, normal proprioception   CEREBELLAR FUNCTION:  Intact fine motor control over upper limbs and lower limbs   REFLEX FUNCTION:  Symmetric in upper and lower extremities, no Babinski sign   STATION and GAIT  Normal gait and tandem station, normal tip toes and heel walking     IMAGING:     Imaging/Diagnostics:  [facility] RUT DIGITAL SCREEN BILATERAL    Result Date: 7/29/2022  EXAMINATION: SCREENING DIGITAL BILATERAL MAMMOGRAM WITH TOMOSYNTHESIS 7/28/2022 TECHNIQUE: Screening mammography was performed with tomosynthesis including MLO and CC views of the bilateral breasts. Computer aided detection was used for the interpretation of this exam. Screening mammography was performed with tomosynthesis including MLO and CC views of the bilateral breasts. Computer aided detection was used for the interpretation of this exam.  The technologist performing the study indicated some disruption of the skin beneath the left breast in the inframammary region prior to this evaluation. COMPARISON: Mammographic imaging with the most recent prior dated October 8, 2020. HISTORY: Screening. FINDINGS: There are scattered areas of fibroglandular density. There is no new dominant mass, suspicious microcalcification, or area of architectural distortion. Unremarkable study of the breasts. No evidence of significant interval change. BIRADS: BIRADS - CATEGORY 1 Negative, no evidence of malignancy. Normal interval follow-up is recommended in 12 months. OVERALL ASSESSMENT - NEGATIVE A letter of notification will be sent to the patient regarding the results. The [employer] recommends annual mammograms for women 40 years and older. ASSESSMENT:     58years old female patient with low back pain case of a combination of both scoliosis and degenerative changes. No red flags for back pain. PLAN:     Will order PT; patient felt back pain improvement with exercises before  Keep Lyrica 150 mg TID orally   Patient will see pain management   Surgery option is still present   RTC in 6 months. Ms. Chantale Merritt received counseling on the following healthy behaviors: medical compliance, smoking cessation, blood pressure control, regular follow up with primary doctor.         Electronically signed by Dolly Luis MD on 8/17/2022 at 3:33 PM

## 2022-08-26 DIAGNOSIS — M51.36 LUMBAR DEGENERATIVE DISC DISEASE: ICD-10-CM

## 2022-08-26 DIAGNOSIS — M51.26 PROTRUDED LUMBAR DISC: ICD-10-CM

## 2022-08-26 DIAGNOSIS — S33.6XXD SPRAIN OF SACROILIAC LIGAMENT, SUBSEQUENT ENCOUNTER: ICD-10-CM

## 2022-08-26 NOTE — TELEPHONE ENCOUNTER
Please Approve or Refuse.   Send to Pharmacy per Pt's Request: [facility]     Next Visit Date:  10/18/2022   Last Visit Date: 7/18/2022    Hemoglobin A1C (%)   Date Value   07/18/2022 5.3   04/14/2022 5.2   01/13/2022 5.8             ( goal A1C is < 7)   BP Readings from Last 3 Encounters:   08/17/22 107/73   07/18/22 94/72   07/06/22 87/62          (goal 120/80)  BUN   Date Value Ref Range Status   06/24/2022 12 8 - 23 mg/dL Final     Creatinine   Date Value Ref Range Status   06/24/2022 0.96 (H) 0.50 - 0.90 mg/dL Final     Potassium   Date Value Ref Range Status   06/24/2022 4.6 3.7 - 5.3 mmol/L Final

## 2022-08-29 ENCOUNTER — HOSPITAL ENCOUNTER (OUTPATIENT)
Dept: PHYSICAL THERAPY | Age: 62
Setting detail: THERAPIES SERIES
Discharge: HOME | End: 2022-08-29
Payer: MEDICARE

## 2022-08-29 PROCEDURE — 97161 PT EVAL LOW COMPLEX 20 MIN: CPT

## 2022-08-29 PROCEDURE — 97110 THERAPEUTIC EXERCISES: CPT

## 2022-08-29 NOTE — CONSULTS
[facility]   Outpatient Physical Therapy  Physical Therapy Lumbar Evaluation    Date:  2022  Patient: Rhett Lucio  : 1960  MRN: [id number]  Physician: Amari Gallardo MD (resident physician)  Devora Chavez MD (attending physician)    Insurance: [place] [employer]. Auth required after 30 visits  Medical Diagnosis: M54.41, G89.29 (ICD-10-CM) - Chronic bilateral low back pain with right-sided sciatica   Rehab Codes: M54.41, R53.1, M25.60  Onset Date:    Next 's appt: 23 with neurology    Subjective: Goes by [employer]  CC: Chronic LBP   HPI: Pt reports chronic ~4 year hx of LBP with insidious onset and known L convex scoliosis in thoracolumbar spine. States that pain has progressively worsened over time and has also developed mild numbness from her R groin down her R anterolateral thigh which has been constant for the past 3-4 years. Unsure if the numbness in her R thigh is associated with her back pain versus from the vascular issues that she's had in her R LE. Previously received conservative mgmt in the form of LESIs in pain mgmt as well as outpatient PT at this facility last year with good results at the time, but pt states that pain has gradually returned over the past year. Pt was also previously experiencing nerve pain in her R posterior thigh but denies any recent issues with this in the last year since starting Lyrica TID. Lumbar MRI has shown multilevel degenerative changes and stenosis most pronounced at lower lumbar levels (L3-S1) and consulted with neurosurgery who provided an option for surgical mgmt, but pt expresses that she is not interested in surgical intervention at this time. Discussed with neurologist at recent follow up appt who referred pt back to PT at this facility given good prior results. Pt reports that her back feels very stiff and painful, especially first thing in the morning trying to get out of bed.  Expresses pain and difficulty with forward bending, lifting, and any kind of standing/walking for an extended period of time. Would like to restart PT to improve her ability to move better with less pain. PMHx: [] Unremarkable [] Diabetes [x] HTN  [] Pacemaker   [x] MI/Heart Problems (CAD) [] Cancer [x] Arthritis [x] Other: scoliosis, HLD              [] Refer to full medical chart  In EPIC     Comorbidities:   [x] Obesity [] Dialysis  [x] Other: PVD (3 stents placed this past April)   [x] Asthma/COPD [] Dementia [] Other:   [] Stroke [] Sleep apnea [] Other:   [] Vascular disease [] Rheumatic disease [] Other:     Preferred Language:   [x] English           [] Other:    Prior Imaging: Lumbar MRI from March 2022 showed multilevel degenerative changes with facet and ligamentous hypertrophy and central stenosis most pronounced at L3-L4, L4-L5 and L5-S1. Previous Treatment: Previously attended 12 PT visits at this facility last year through April 2021 with good results at the time. Prior LESIs with good relief temporarily. Home Environment: Pt lives with her  in a ground level apartment. Medications: [] Refer to full medical record [] None [x] Other: Lyrica TID, roll on lidocaine PRN, voltaren gel PRN  Allergies:      [x] Refer to full medical record [] None [] Other:    Work Status: Not employed    Prior Level of Function: Pt reports chronic hx of lower back pain for several years but approximately 1 year ago after finishing PT and receiving injections in pain mgmt, pt felt like she was able to stand a little longer and be able to run her community errands more easily.      Pain:  [x] Yes  [] No Location: B sides of low back/hips Pain Rating: (0-10 scale) 2/10 currently at rest. Elevates up to 8/10 at its worst  Pain altered Tx:  [] Yes  [x] No  Action:    Symptoms:  [] Improving [x] Worsening [] Same  Aggravating factors: Extended standing/walking, bending  Alleviating factors: Sitting, heat      Functional Status Previous level of function Current level of function Comments   Sitting [x] Independent  [] Deficit [x] Independent  [] Deficit    Standing/walking [x] Independent  [] Deficit [] Independent  [x] Deficit Uses cane in community. 10-15 minutes max. Tries to use motorized cart for grocery errands   Driving [] Independent  [] Deficit [] Independent  [] Deficit    Housekeeping/Meal Preparation [x] Independent  [] Deficit [] Independent  [x] Deficit Difficulty with repetitive bending (unloading , laundry)   Lifting/Carrying [x] Independent  [] Deficit [] Independent  [x] Deficit Limits due to pain   Bending/Reaching [x] Independent  [] Deficit [] Independent  [x] Deficit Pain with bending   Stair climbing [] Independent  [] Deficit [] Independent  [] Deficit Does not have stairs in home   Pivoting [x] Independent  [] Deficit [] Independent  [x] Deficit Painful/limited   Squatting [] Independent  [] Deficit [] Independent  [] Deficit Limited in depth   Other [] Independent  [] Deficit [] Independent  [] Deficit      Patient Goals/Rehab Expectations: Move better with less pain      Objective:      OBSERVATION No Deficit Deficit Not Tested Comments   Forward Head [] [x] []    Rounded Shoulders [] [x] []    Kyphosis [] [x] [] Increased in thoracic spine   Lordosis [] [x] [] Reduced   Scoliosis [] [x] [] L convex in thoracolumbar spine   Innominate Alignment [] [] [x]    NEUROLOGICAL       Reflexes [] [] [x]    Compression/Distraction [x] [] []    Sensation [] [x] [] Chronic numbness in R anterior thigh   FALL RISK?  [x] []     Comments:        Range of Motion  Left Range of Motion  Right Strength  Left Strength  Right   Lumbar Flexion 75%  L sided LBP 75%     Lumbar Extension 15% 15%     Lumbar Rotation 75% 25%     Lumbar Side Bend 25% 75%     Hip Flexion   4-/5 4-/5   Hip Abduction   4-/5 4-/5   Hip Adduction   4+/5 4+/5   Hip Extension   4-/5 4-/5   Hip ER       Hip IR       Knee Flexion   4+/5 4+/5   Knee Extension   5/5 5/5 Dorsiflexion   5/5 5/5   Plantar flexion   4+/5 4+/5   Inversion       Eversion       *All LE MMT performed in modified (seated) positioning    Core Strength: Lost lumbopelvic stability at 60 deg on bilateral straight leg lowering test    LUMBAR/SIJ SPECIAL TESTS Left Right   Standing Flexion + [x]         - []           NT []  + [x]         - []           NT []    Repeated Flexion + []         - []           NT [x]  + []         - []           NT [x]    Repeated Extension + []         - [x]           NT []   Stiffness only + []         - [x]           NT []   Stiffness only   SLR + []         - [x]           NT []  + []         - [x]           NT []    Slump Test + []         - []           NT [x]  + []         - []           NT [x]    Prone Instability Test + []         - []           NT [x]  + []         - []           NT [x]    Anterior Gapping + []         - [x]           NT []  + []         - []           NT []    Posterior Gapping + []         - [x]           NT []  + []         - [x]           NT []    Sacral Thrust + []         - [x]           NT []  + []         - []           NT []    Thigh Thrust + []         - [x]           NT []  + []         - [x]           NT []    Gaenslen's + []         - []           NT [x]  + []         - []           NT [x]    Other:  + []         - []           NT []  + []         - []           NT []      HIP SPECIAL TESTS Left Right   ZORAIDA + []         - [x]           NT []  + []         - []           NT []    FADIR + []         - [x]           NT []  + []         - [x]           NT []    Scour + []         - []           NT [x]  + []         - []           NT [x]    Trendelenburg Sign + [x]         - []           NT []  + [x]         - []           NT []    Keisha + []         - []           NT [x]  + []         - []           NT [x]    Long axis traction + []         - [x]           NT []  + []         - [x]           NT []    Other: + []         - []           NT []  + []         - []           NT []        NEURAL/VASCULAR TESTS Left Right   Sciatic Nerve Tensioning + []         - [x]           NT []  + []         - [x]           NT []    Tibial Nerve Tensioning + []         - [x]           NT []  + []         - [x]           NT []    Sural Nerve Tensioning + []         - [x]           NT []  + []         - [x]           NT []    Common Peroneal Nerve Tensioning + []         - [x]           NT []  + []         - [x]           NT []    Femoral Nerve Tensioning + []         - [x]           NT []  + []         - [x]           NT []    Other: + []         - []           NT []  + []         - []           NT []      MUSCLE LENGTH TESTING Normal Left Tight Right Tight   Glutes []  [x]  [x]    Piriformis []  [x]  [x]    ITB/TFL []  []  []    Hip Flexors []  []  []    Quads []  [x]  [x]    Hamstrings []  []  []    Gastrocnemius []  []  []    Soleus []  []  []     []  []  []     []  []  []        Joint Mobility: Diffusely hypomobile at all lumbar segments, non-painful to assessment    Palpation: Non-tender to palpation along lumbar/glute region bilaterally. Notable L convex scoliotic curve in thoracolumbar spine    Gait: Independent []           Modified Independent [x]            Not independent []  Comments: Modified independent with a SPC. Minimal trunk rotation noted through gait cycle. Slow edgard and shortened step length bilaterally    Functional Testing:   Bilateral Squat: 1/4 depth with excessive anterior knee translation, weakness upon returning back to a standing position   30 second timed chair stand: 8 repetitions with no UE support        Assessment:  Problems:    [x] ? Pain:  [x] ? ROM:  [x] ? Strength:  [x] ? Function:  [] Other:    Pt is a 57 y/o F referred to PT for her chronic LBP and known L convex scoliosis in thoracolumbar spine.  Pt presents today with no pain at rest, but diffuse discomfort across B lumbar and hip region with forward bending and any kind of prolonged standing/walking. Pt presents today with moderate lumbar AROM restrictions, flexibility limitations in B hips, and significant weakness in B hips and core. Pt has previously reported neuropathic pain in her R posterior thigh but denies any issues in the past year since starting Lyrica TID. Does demonstrate mild numbness at R anterior thigh which she states has been constant over the past 3 years which does not seem to change with positioning or movement. Unable to aggravate or worsen dysesthesia upon clinical exam with no obvious signs of lumbar radiculopathy at evaluation today. Pt is ambulatory with a SPC, but ambulates slowly and is quick to fatigue in B LEs and her lower back. Skilled PT intervention is required to improve pain free mobility of lumbar spine and maximize strength/stability in B hips and core/lumbar stabilizers necessary to improve functional tolerances and stamina through all ADLs and community activities at her prior level of function. Initial written HEP provided today with good understanding and will continue to progress as clinically appropriate.      STG: (to be met in 6 treatments)  Pt will self report worst pain no greater than 3/10 in order to better tolerate ADLs/work activities with minimal dysfunction  Pt will improve AROM in lumbar spine to 75% or greater without increased pain in order to demonstrate ability to move/reach in all planes unrestricted at PLOF  Pt will improve 30 second timed chair stand test to 10 repetitions or greater without UE support to show improved efficiency with functional movements in home  LTG: (to be met in 12 treatments)  Pt will demonstrate improved B LE strength to 4/5 or greater in order to demonstrate improved stability/strength necessary for unrestricted ADLs/work activities  Pt will improve core strength evidenced by maintaining lumbopelvic stability on bilateral straight leg lowering test to 45 deg or less to show improved support/stability to lumbar spine with less pain  Pt will self report ability to stand/walk for at least 30 minutes or greater to show improved community tolerances at her prior level of function  Pt will self report ability to lift and carry light household items (ex: laundry basket, grocery bags) between rooms to show improved functional tolerances through ADL tasks at her PLOF. Pt will decrease mod MARYCRUZ to 15% impaired or less in order to demonstrate improved functional tolerances at PLOF with minimal restriction/dysfunction  Pt will demonstrate independence with a long term HEP for continued progress/maintenance after completion of PT      Functional Assessment Used: Mod. MARYCRUZ  Current Status Score: 38% impaired  Goal Status Score: 15% impaired    Evaluation Complexity:  History (Personal factors, comorbidities) [] 0 [] 1-2 [x] 3+   Exam (limitations, restrictions) [x] 1-2 [] 3 [] 4+   Clinical presentation (progression) [x] Stable [] Evolving  [] Unstable   Decision Making [x] Low [] Moderate [] High    [x] Low Complexity [] Moderate Complexity [] High Complexity     Rehab Potential:  [x] Good  [] Fair  [] Poor   Suggested Professional Referral:  [x] No  [] Yes:  Barriers to Goal Achievement[de-identified]  [] No  [] Yes:  Domestic Concerns:  [x] No  [] Yes:    Pt. Education:  [x] Plans/Goals, Risks/Benefits discussed  [x] Home exercise program  Method of Education: [x] Verbal  [x] Demo  [x] Written ([employer] Access Code [email or web address])  Comprehension of Education:  [x] Verbalizes understanding. [x] Demonstrates understanding. [] Needs Review. [] Demonstrates/verbalizes understanding of HEP/Ed previously given.     Treatment Plan:  [x] Therapeutic Exercise   61625  [] Iontophoresis: 4 mg/mL Dexamethasone Sodium Phosphate  mAmin  44522   [x] Therapeutic Activity  87824 [] Vasopneumatic cold with compression  85823    [] Gait Training   73454 [] Ultrasound   64639   [x] Neuromuscular Re-education  30358 [] Electrical Stimulation

## 2022-08-30 DIAGNOSIS — J30.9 ALLERGIC SINUSITIS: ICD-10-CM

## 2022-08-30 DIAGNOSIS — F41.9 ANXIETY: ICD-10-CM

## 2022-08-30 RX ORDER — BUSPIRONE HYDROCHLORIDE 15 MG/1
TABLET ORAL
Qty: 120 TABLET | Refills: 0 | Status: SHIPPED | OUTPATIENT
Start: 2022-08-30 | End: 2022-10-31 | Stop reason: SDUPTHER

## 2022-08-30 RX ORDER — BUPROPION HYDROCHLORIDE 100 MG/1
TABLET, EXTENDED RELEASE ORAL
Qty: 60 TABLET | Refills: 0 | Status: SHIPPED | OUTPATIENT
Start: 2022-08-30 | End: 2022-09-26

## 2022-08-30 RX ORDER — CETIRIZINE HYDROCHLORIDE 10 MG/1
TABLET ORAL
Qty: 30 TABLET | Refills: 0 | Status: SHIPPED | OUTPATIENT
Start: 2022-08-30 | End: 2022-09-26

## 2022-08-31 ENCOUNTER — HOSPITAL ENCOUNTER (OUTPATIENT)
Dept: PHYSICAL THERAPY | Age: 62
Setting detail: THERAPIES SERIES
Discharge: HOME | End: 2022-08-31
Payer: MEDICARE

## 2022-08-31 PROCEDURE — 97110 THERAPEUTIC EXERCISES: CPT

## 2022-08-31 NOTE — FLOWSHEET NOTE
[facility] Outpatient Physical Therapy    [address] [place] [address] [address]   Phone: [phone]   Fax: [phone]    Physical Therapy Daily Treatment Note      Date:  2022  Patient Name:  Les Stock    :  1960  MRN: [id number]  Physician: Bandar Giang MD (resident physician)  Linette Armstrong MD (attending physician)                                     Insurance: [place] [employer]. Auth required after 30 visits  Medical Diagnosis: M54.41, G89.29 (ICD-10-CM) - Chronic bilateral low back pain with right-sided sciatica       Rehab Codes: M54.41, R53.1, M25.60  Onset Date:                    Next 's appt: 23 with neurology  Visit# / total visits:   Cancels/No Shows: 0/0    Precautions:    Subjective:   Patient arrives with [facility] and states she is feeling pretty good today. Patient reports compliance with HEP given at [facility] and states she has trouble with bridging, but all other exercises are going well.        Pain:  [x] Yes  [] No Location: Low back Pain Rating: (0-10 scale) 1/10  Pain altered Tx:  [x] No  [] Yes  Action:  Comments:    Objective:  INTERVENTIONS  INTERVENTIONS  Reps/ Time Weight/ Level Completed  Today Comments   MANUAL                      EXERCISES        Mat level:       Piriformis stretch 2x30''  x    LTR* 10x3''  x    SKTC* 2x30''  x    Bridges* 5x  x Limited ROM   Prone press ups* 10x      PPT 10x5''  x Visual cues for correct execution   Seated Figure 4 stretch* 2x30''  x    Seated sciatic nerve glide* 15x  x Demo'd for use at home depending at symptoms          Standing:       SB stretch 2x30''  x Completed after pt reported calf muscle cramping   3 way hip 10x ea  x Cues for upright posture/ decrease compensation   Palloff press 10x ea Red x    Marching 10x ea  x Cues for slow and controlled movement   Good mornings 10x  x Arms crossed at chest, limited trunk flexion   Mini squat to elevated mat 10x  x    Other: * HEP    Specific Instructions for show improved efficiency with functional movements in home  LTG: (to be met in 12 treatments)  Pt will demonstrate improved B LE strength to 4/5 or greater in order to demonstrate improved stability/strength necessary for unrestricted ADLs/work activities  Pt will improve core strength evidenced by maintaining lumbopelvic stability on bilateral straight leg lowering test to 45 deg or less to show improved support/stability to lumbar spine with less pain  Pt will self report ability to stand/walk for at least 30 minutes or greater to show improved community tolerances at her prior level of function  Pt will self report ability to lift and carry light household items (ex: laundry basket, grocery bags) between rooms to show improved functional tolerances through ADL tasks at her PLOF. Pt will decrease mod MARYCRUZ to 15% impaired or less in order to demonstrate improved functional tolerances at PLOF with minimal restriction/dysfunction  Pt will demonstrate independence with a long term HEP for continued progress/maintenance after completion of PT        Pt. Education:  [x] Yes  [] No  [x] Reviewed Prior HEP/Ed  Method of Education: [] Verbal  [] Demo  [] Written  Comprehension of Education:  [] Verbalizes understanding. [] Demonstrates understanding. [] Needs review. [x] Demonstrates/verbalizes HEP/Ed previously given. Plan: [x] Continue per plan of care.    [] Other:      Treatment Charges: Mins Units   []  Modalities     [x]  Ther Exercise 38 3   []  Manual Therapy     []  Ther Activities     []  Aquatics     []  Neuromuscular     [] Vasocompression     [] Gait Training     [] Dry needling        [] 1 or 2 muscles        [] 3 or more muscles     []  Other     Total Treatment time 38 3     Time In: 11:57am            Time Out: 12:35pm    Electronically signed by:  Haven Weldon PTA

## 2022-09-01 DIAGNOSIS — M19.019 OSTEOARTHRITIS OF SHOULDER, UNSPECIFIED LATERALITY, UNSPECIFIED OSTEOARTHRITIS TYPE: ICD-10-CM

## 2022-09-01 DIAGNOSIS — M50.30 DEGENERATIVE DISC DISEASE, CERVICAL: ICD-10-CM

## 2022-09-01 RX ORDER — CYCLOBENZAPRINE HCL 10 MG
TABLET ORAL
Qty: 60 TABLET | Refills: 0 | Status: SHIPPED | OUTPATIENT
Start: 2022-09-01 | End: 2022-09-26

## 2022-09-01 NOTE — TELEPHONE ENCOUNTER
Please Approve or Refuse.   Send to Pharmacy per Pt's Request:      Next Visit Date:  10/18/2022   Last Visit Date: 7/18/2022    Hemoglobin A1C (%)   Date Value   07/18/2022 5.3   04/14/2022 5.2   01/13/2022 5.8             ( goal A1C is < 7)   BP Readings from Last 3 Encounters:   08/17/22 107/73   07/18/22 94/72   07/06/22 87/62          (goal 120/80)  BUN   Date Value Ref Range Status   06/24/2022 12 8 - 23 mg/dL Final     Creatinine   Date Value Ref Range Status   06/24/2022 0.96 (H) 0.50 - 0.90 mg/dL Final     Potassium   Date Value Ref Range Status   06/24/2022 4.6 3.7 - 5.3 mmol/L Final

## 2022-09-06 ENCOUNTER — HOSPITAL ENCOUNTER (OUTPATIENT)
Dept: PHYSICAL THERAPY | Age: 62
Setting detail: THERAPIES SERIES
Discharge: HOME | End: 2022-09-06
Payer: MEDICARE

## 2022-09-06 DIAGNOSIS — M51.26 PROTRUDED LUMBAR DISC: ICD-10-CM

## 2022-09-06 DIAGNOSIS — M51.36 LUMBAR DEGENERATIVE DISC DISEASE: ICD-10-CM

## 2022-09-06 DIAGNOSIS — S33.6XXD SPRAIN OF SACROILIAC LIGAMENT, SUBSEQUENT ENCOUNTER: ICD-10-CM

## 2022-09-06 PROCEDURE — 97110 THERAPEUTIC EXERCISES: CPT

## 2022-09-06 NOTE — FLOWSHEET NOTE
[facility] Outpatient Physical Therapy   [address]   Phone: [phone]   Fax: [phone]    Physical Therapy Daily Treatment Note      Date:  2022  Patient Name:  Sandro Ortiz    :  1960  MRN: [id number]  Physician: Basilia Ernandez MD (resident physician)  Negin Tinajero MD (attending physician)                                     Insurance: [place] [employer]. Auth required after 30 visits  Medical Diagnosis: M54.41, G89.29 (ICD-10-CM) - Chronic bilateral low back pain with right-sided sciatica       Rehab Codes: M54.41, R53.1, M25.60  Onset Date:                      Next 's appt: 23 with neurology  Visit# / total visits: 3/12   Cancels/No Shows: 0/0    Precautions:    Subjective:   : Pt denies any back pain currently. Verbalizes good compliance with her home exercises, simply stating that some are more challenging others. Denies any specific pain with her home exercises, just fatiguing to complete. Pain:  [] Yes  [x] No Location: Low back Pain Rating: (0-10 scale) 0/10  Pain altered Tx:  [x] No  [] Yes  Action:  Comments:    Objective:  INTERVENTIONS  INTERVENTIONS  Reps/ Time Weight/ Level Completed  Today Comments   MANUAL                      EXERCISES        Mat level:       Piriformis stretch 2x30''  x    LTR* 15x  x    SKTC* 2x30''  x R/L ea   Bridges* 5 reps x 2  x Limited ROM.  Facilitation at B distal femurs   Prone press ups* 10x      PPT 10x5''  x Visual cues for correct execution   SLRs with TrA engagement  10 reps x 2 R/L  x    Seated Figure 4 stretch* 2x30''  x    Seated sciatic nerve glide* 15x   Demo'd for use at home depending at symptoms          Standing:       SB stretch 2x30''   Completed after pt reported calf muscle cramping   3 way hip 10x ea  x Cues for upright posture/ decrease compensation   Palloff press 10 reps ea x 2 Red x    Pallof step outs 10x ea Red x Isometric hold at chest   Marching 10x ea  x Cues for slow and controlled movement   Good mornings 10x   Arms crossed at chest, limited trunk flexion   Mini squats in bars 10 reps x 2  x 1/2 depth. Cues for glute activation and shifting hips posteriorly versus excessively anteriorly translating at knees   Other: * HEP    Specific Instructions for next treatment  Emphasis on pain free mobility in lower back and hips with progressive strengthening to B hips and core/lumbar stabilizers. Transition to more standing/CKC activities as able to work on improving functional standing tolerances and stamina. Assessment:  9/6: Back pain seems to be improving with no complaints of discomfort through visit today, but continues to be stiff and moderately guarded through most movement. Active range through bridges continues to be significantly limited but was able to improve slightly with facilitation at B distal femurs. Pt continues to require cues to avoid compensatory trunk lean during standing 3-way kicks and slow marches. Able to perform mini squats away from target, but still requires cues on how to utilize glutes versus relying excessively on quads/knees. No complaints of back pain at end of session, just general fatigue. [x] Progressing toward goals. [] No change.      [] Other:    [x] Patient would continue to benefit from skilled physical therapy services in order to: improve pain free mobility of lumbar spine and maximize strength/stability in B hips and core/lumbar stabilizers necessary to improve functional tolerances and stamina through all ADLs and community activities at her prior level of function    GOALS:   STG: (to be met in 6 treatments)  Pt will self report worst pain no greater than 3/10 in order to better tolerate ADLs/work activities with minimal dysfunction  Pt will improve AROM in lumbar spine to 75% or greater without increased pain in order to demonstrate ability to move/reach in all planes unrestricted at PLOF  Pt will improve 30 second timed chair stand test to 10 repetitions or greater without UE support to show improved efficiency with functional movements in home  LTG: (to be met in 12 treatments)  Pt will demonstrate improved B LE strength to 4/5 or greater in order to demonstrate improved stability/strength necessary for unrestricted ADLs/work activities  Pt will improve core strength evidenced by maintaining lumbopelvic stability on bilateral straight leg lowering test to 45 deg or less to show improved support/stability to lumbar spine with less pain  Pt will self report ability to stand/walk for at least 30 minutes or greater to show improved community tolerances at her prior level of function  Pt will self report ability to lift and carry light household items (ex: laundry basket, grocery bags) between rooms to show improved functional tolerances through ADL tasks at her PLOF. Pt will decrease mod MARYCRUZ to 15% impaired or less in order to demonstrate improved functional tolerances at PLOF with minimal restriction/dysfunction  Pt will demonstrate independence with a long term HEP for continued progress/maintenance after completion of PT        Pt. Education:  [x] Yes  [] No  [x] Reviewed Prior HEP/Ed  Method of Education: [] Verbal  [] Demo  [] Written  Comprehension of Education:  [] Verbalizes understanding. [] Demonstrates understanding. [] Needs review. [x] Demonstrates/verbalizes HEP/Ed previously given. Plan: [x] Continue per plan of care.    [] Other:      Treatment Charges: Mins Units   []  Modalities     [x]  Ther Exercise 38 3   []  Manual Therapy     []  Ther Activities     []  Aquatics     []  Neuromuscular     [] Vasocompression     [] Gait Training     [] Dry needling        [] 1 or 2 muscles        [] 3 or more muscles     []  Other     Total Treatment time 38 3     Time In: 3:00pm            Time Out: 3:38pm    Electronically signed by:  Coco Nolan, PT

## 2022-09-08 ENCOUNTER — HOSPITAL ENCOUNTER (OUTPATIENT)
Dept: PHYSICAL THERAPY | Age: 62
Setting detail: THERAPIES SERIES
Discharge: HOME | End: 2022-09-08
Payer: MEDICARE

## 2022-09-08 PROCEDURE — 97110 THERAPEUTIC EXERCISES: CPT

## 2022-09-08 NOTE — FLOWSHEET NOTE
033 [facility] Outpatient Physical Therapy   [address] [facility] [address]   Phone: [phone]   Fax: [phone]    Physical Therapy Daily Treatment Note      Date:  2022  Patient Name:  Mukund Cardona    :  1960  MRN: [id number]  Physician: Partha Renteria MD (resident physician)  Evelina Prajapati MD (attending physician)                                     Insurance: [employer]. Auth required after 30 visits  Medical Diagnosis: M54.41, G89.29 (ICD-10-CM) - Chronic bilateral low back pain with right-sided sciatica       Rehab Codes: M54.41, R53.1, M25.60  Onset Date:                      Next 's appt: 23 with neurology  Visit# / total visits:    Cancels/No Shows: 0/0    Precautions: None    Subjective:   : Pt states that she feels generalized soreness through B legs currently residual from her last visit, but denies any back pain. Expresses that it just feels like she's \"used muscles that haven't been used in a while. \"    Pain:  [] Yes  [x] No Location: Low back Pain Rating: (0-10 scale) 0/10  Pain altered Tx:  [x] No  [] Yes  Action:  Comments:    Objective:  INTERVENTIONS  INTERVENTIONS  Reps/ Time Weight/ Level Completed  Today Comments   MANUAL                      EXERCISES        Mat level:       Piriformis stretch 2x30''  x    LTR* 15x  x    SKTC* 2x30''  x R/L ea   Bridges* 5 reps x 2   Limited ROM.  Facilitation at B distal femurs   Prone press ups* 10x      PPT 10x5''   Visual cues for correct execution   SLRs with TrA engagement  10 reps x 2 R/L  x    Modified myke stretch 30'' x 2 R/L  x Light support from therapist due to a lot of tightness letting leg fully hang off edge of table   Clams 10 reps x 2 R/L  x    Seated Figure 4 stretch* 2x30''      Seated sciatic nerve glide* 15x   Demo'd for use at home depending at symptoms          Standing:       SB stretch 2x30''   Completed after pt reported calf muscle cramping   3 way hip 10x ea  x Cues for upright posture/ decrease compensation   Palloff press 10 reps ea x 2 Red x    Pallof step outs 10x ea Red x Isometric hold slightly in front of chest   Marching 10x ea   Cues for slow and controlled movement   Good mornings 10x   Arms crossed at chest, limited trunk flexion   Mini squats in bars 10 reps x 2   1/2 depth. Cues for glute activation and shifting hips posteriorly versus excessively anteriorly translating at knees   Forward step ups 10 reps R/L 8'' step x    Lateral step ups 10 reps R/L 8'' step x    Other: * HEP    Specific Instructions for next treatment  Emphasis on pain free mobility in lower back and hips with progressive strengthening to B hips and core/lumbar stabilizers. Transition to more standing/CKC activities as able to work on improving functional standing tolerances and stamina. Assessment:  9/8: Diffuse soreness/fatigue present in B LEs, especially in thighs, likely residual from previous visit. Added modified Derrick stretching in supine as pt was very tight/restricted in this position, also verbally adding this to pt's home program. No complaints of back pain through visit today but did verbalize some tightness/soreness in B glutes with resisted pallof step outs, especially with progression of keeping hands positions slightly in front of chest. Fatigue evident by end of session but overall pt seems to be progressing well towards goals. [x] Progressing toward goals. [] No change.      [] Other:    [x] Patient would continue to benefit from skilled physical therapy services in order to: improve pain free mobility of lumbar spine and maximize strength/stability in B hips and core/lumbar stabilizers necessary to improve functional tolerances and stamina through all ADLs and community activities at her prior level of function    GOALS:   STG: (to be met in 6 treatments)  Pt will self report worst pain no greater than 3/10 in order to better tolerate ADLs/work activities with minimal dysfunction  Pt will improve AROM in lumbar spine to 75% or greater without increased pain in order to demonstrate ability to move/reach in all planes unrestricted at PLOF  Pt will improve 30 second timed chair stand test to 10 repetitions or greater without UE support to show improved efficiency with functional movements in home  LTG: (to be met in 12 treatments)  Pt will demonstrate improved B LE strength to 4/5 or greater in order to demonstrate improved stability/strength necessary for unrestricted ADLs/work activities  Pt will improve core strength evidenced by maintaining lumbopelvic stability on bilateral straight leg lowering test to 45 deg or less to show improved support/stability to lumbar spine with less pain  Pt will self report ability to stand/walk for at least 30 minutes or greater to show improved community tolerances at her prior level of function  Pt will self report ability to lift and carry light household items (ex: laundry basket, grocery bags) between rooms to show improved functional tolerances through ADL tasks at her PLOF. Pt will decrease mod MARYCRUZ to 15% impaired or less in order to demonstrate improved functional tolerances at PLOF with minimal restriction/dysfunction  Pt will demonstrate independence with a long term HEP for continued progress/maintenance after completion of PT        Pt. Education:  [x] Yes  [] No  [x] Reviewed Prior HEP/Ed  Method of Education: [x] Verbal  [x] Demo  [] Written  Comprehension of Education:  [] Verbalizes understanding. [] Demonstrates understanding. [] Needs review. [x] Demonstrates/verbalizes HEP/Ed previously given. Plan: [x] Continue per plan of care.    [] Other:      Treatment Charges: Mins Units   []  Modalities     [x]  Ther Exercise 38 3   []  Manual Therapy     []  Ther Activities     []  Aquatics     []  Neuromuscular     [] Vasocompression     [] Gait Training     [] Dry needling        [] 1 or 2 muscles        [] 3 or more muscles     []  Other     Total Treatment time 38 3     Time In: 2:00pm            Time Out: 2:38pm    Electronically signed by:  Jeannette Owens PT

## 2022-09-11 DIAGNOSIS — M79.605 LEG PAIN, LEFT: ICD-10-CM

## 2022-09-11 DIAGNOSIS — M54.16 LUMBAR RADICULOPATHY: ICD-10-CM

## 2022-09-11 DIAGNOSIS — M48.061 SPINAL STENOSIS OF LUMBAR REGION WITHOUT NEUROGENIC CLAUDICATION: ICD-10-CM

## 2022-09-12 DIAGNOSIS — M79.605 LEG PAIN, LEFT: ICD-10-CM

## 2022-09-12 RX ORDER — PSEUDOEPHED/ACETAMINOPH/DIPHEN 30MG-500MG
TABLET ORAL
Qty: 120 TABLET | Refills: 0 | Status: SHIPPED | OUTPATIENT
Start: 2022-09-12 | End: 2022-10-10

## 2022-09-12 RX ORDER — PSEUDOEPHED/ACETAMINOPH/DIPHEN 30MG-500MG
TABLET ORAL
Qty: 120 TABLET | Refills: 0 | OUTPATIENT
Start: 2022-09-12

## 2022-09-12 RX ORDER — PREGABALIN 150 MG/1
150 CAPSULE ORAL 3 TIMES DAILY
Qty: 90 CAPSULE | Refills: 0 | Status: SHIPPED | OUTPATIENT
Start: 2022-09-12 | End: 2022-10-07 | Stop reason: SDUPTHER

## 2022-09-13 ENCOUNTER — HOSPITAL ENCOUNTER (OUTPATIENT)
Dept: PHYSICAL THERAPY | Age: 62
Setting detail: THERAPIES SERIES
Discharge: HOME | End: 2022-09-13
Payer: MEDICARE

## 2022-09-13 PROCEDURE — 97110 THERAPEUTIC EXERCISES: CPT

## 2022-09-13 NOTE — FLOWSHEET NOTE
[address] [facility] Outpatient Physical Therapy   [facility]   Phone: [phone]   Fax: [phone]    Physical Therapy Daily Treatment Note      Date:  2022  Patient Name:  Princess Souza    :  1960  MRN: [id number]  Physician: Marcella Tomlinson MD (resident physician)  Morteza Henderson MD (attending physician)                                     Insurance: [employer]. Auth required after 30 visits  Medical Diagnosis: M54.41, G89.29 (ICD-10-CM) - Chronic bilateral low back pain with right-sided sciatica       Rehab Codes: M54.41, R53.1, M25.60  Onset Date:                      Next 's appt: 23 with neurology  Visit# / total visits:    Cancels/No Shows: 0/0    Precautions: None    Subjective:   : Patient arrives with [address] but not using it. Patient states she is feeling good and has no complaints of pain presently. Patient also reporting her HEP is going well but she is still having difficulty with the bridging exercise. Pain:  [] Yes  [x] No Location: Low back Pain Rating: (0-10 scale) 0/10  Pain altered Tx:  [x] No  [] Yes  Action:  Comments:    Objective:  INTERVENTIONS  INTERVENTIONS  Reps/ Time Weight/ Level Completed  Today Comments          EXERCISES        Mat level:       Piriformis stretch 2x30''  x    LTR* 15x  x    SKTC* 2x30''   R/L ea   Bridges* 10x2   x Limited ROM.  Facilitation at B distal femurs   Prone press ups* 10x      PPT 10x5''   Visual cues for correct execution   SLRs with TrA engagement  10 reps x 2 R/L  x    Modified myke stretch 30'' x 2 R/L   Light support from therapist due to a lot of tightness letting leg fully hang off edge of table   Clams 10 reps x 2 R/L      Seated Figure 4 stretch* 2x30''      Seated sciatic nerve glide* 15x   Demo'd for use at home depending at symptoms          Standing:       SB stretch 2x30''  x Completed after pt noted tension with step ups   3 way hip 10x ea  x    Palloff press 10 reps ea x 2 Red x    Pallof step outs 10x ea Red  Isometric hold slightly in front of chest   Marching 10x2 ea 2# x Cues for slow and controlled movement   Good mornings 10x  x Arms crossed at chest, cues to keep neutral spine   Mini squats in bars 10 reps x 2   1/2 depth. Cues for glute activation and shifting hips posteriorly versus excessively anteriorly translating at knees   Forward step ups 10 reps R/L 8'' step x 1 UE support   Lateral step ups 10 reps R/L 8'' step x 1 UE support   KB lateral trunk flexion 10x ea 5# KB x Bending to green chair, cues to decrease trunk fwd flexion   Lifting from floor to elevated mat (waist lvl) 10x 10# x 10# plate in blue crate; edu on proper lifting technique   Other: * HEP    Specific Instructions for next treatment  Emphasis on pain free mobility in lower back and hips with progressive strengthening to B hips and core/lumbar stabilizers. Transition to more standing/CKC activities as able to work on improving functional standing tolerances and stamina. Assessment:  9/13: Initiated session with mat level stretching prior to strengthening exercises. With patient feeling well and denying pain, today's session focused on challenging standing tolerance with strengthening BLE and trunk/core. Added ankle weights to marching exercise and plan to add weight to 3 way hip at next session as pt did not require cueing for posture/compensation this date. Added Lateral trunk flexion with kettle bell for additional trunk strengthening with good tolerance. Patient continues to deny pain throughout session but fatigued with step up exercise and required a seated rest break. Able to add lifting activity with education on lifting techniques in order to decrease stress on the spine and use BLE. Patient required verbal cues and a demonstration for proper lifting, and was able to complete 7 reps before needing a standing rest break, then completed the last 3 reps.  Encouraged patient to continue

## 2022-09-15 ENCOUNTER — HOSPITAL ENCOUNTER (OUTPATIENT)
Dept: PHYSICAL THERAPY | Age: 62
Setting detail: THERAPIES SERIES
Discharge: HOME | End: 2022-09-15
Payer: MEDICARE

## 2022-09-15 PROCEDURE — 97110 THERAPEUTIC EXERCISES: CPT

## 2022-09-15 NOTE — FLOWSHEET NOTE
[address] [place] [address] Outpatient Physical Therapy   [facility] [facility] [address]   Phone: [phone]   Fax: [phone]    Physical Therapy Daily Treatment Note      Date:  9/15/2022  Patient Name:  Meenakshi Alfaro    :  1960  MRN: [id number]  Physician: Mingo Yeh MD (resident physician)  Edmond Duran MD (attending physician)                                     Insurance: [employer]. Auth required after 30 visits  Medical Diagnosis: M54.41, G89.29 (ICD-10-CM) - Chronic bilateral low back pain with right-sided sciatica       Rehab Codes: M54.41, R53.1, M25.60  Onset Date:                      Next 's appt: 23 with neurology  Visit# / total visits:    Cancels/No Shows: 0/0    Precautions: None    Subjective:   Patient reports having some hamstring tightness in the L LE, but states it isn't painful. Patient denies any pain associated with her low back/sciatica issue. Pain:  [] Yes  [x] No Location: L Leg Pain Rating: (0-10 scale) 1-2/10  Pain altered Tx:  [x] No  [] Yes  Action:  Comments:    Objective:  INTERVENTIONS  INTERVENTIONS  Reps/ Time Weight/ Level Completed  Today Comments          EXERCISES        Mat level:       HS stretch 2x30''  x L only    Piriformis stretch 2x30''  x    LTR* 15x  x    SKTC* 2x30''   R/L ea   Bridges* 10x2    Limited ROM.  Facilitation at B distal femurs   Prone press ups* 10x      BKFO 10x ea Red x    PPT 10x5''   Visual cues for correct execution   SLRs with TrA engagement  10 reps x 2 R/L  x    Modified myke stretch 30'' x 2 R/L   Light support from therapist due to a lot of tightness letting leg fully hang off edge of table   Clams 10 reps x 2 R/L      Seated Figure 4 stretch* 2x30''      Seated sciatic nerve glide* 10x  x    Seated back extension 10x5''  x With towel roll at lumbar region   PB roll outs 10x5''  x    HS stretch 3x30''  x           Standing:       SB stretch 2x30''   Completed after pt noted tension with step ups   3 way hip 10x ea 2# x    Palloff press 10 reps ea x 2 Red x    Pallof step outs 10x ea Red  Isometric hold slightly in front of chest   Marching 10x2 ea 2#  Cues for slow and controlled movement   Good mornings 10x   Arms crossed at chest, cues to keep neutral spine   Mini squats  10 reps x 2  x 1/2 depth. Cues for glute activation and shifting hips posteriorly versus excessively anteriorly translating at knees   Forward step ups 10 reps R/L 8'' step  1 UE support   Lateral step ups 10 reps R/L 8'' step  1 UE support   KB lateral trunk flexion 10x ea 10# KB x Bending to green chair, cues to decrease trunk fwd flexion   Lifting from floor to elevated mat (waist lvl) 10x 10#  10# plate in blue crate; edu on proper lifting technique   Other: * HEP    Specific Instructions for next treatment  Emphasis on pain free mobility in lower back and hips with progressive strengthening to B hips and core/lumbar stabilizers. Transition to more standing/CKC activities as able to work on improving functional standing tolerances and stamina. Assessment:  9/13: Initiated session with mat level stretching and core strengthening exercises with addition of resisted BKFO. Patient noted some low back discomfort upon completing BKFO exercise. Patient completed seated back extensions and sciatic nerve glides with good response initially, nearly eliminating the back pain noted. Continued with standing exercises with addition of ankle weights to 3 way hip and patient noting the L low back pain had re-produced. Added physioball roll outs in attempt to stretch and relieve pain, with minimal success. Patient noted the pain as 5/10 and stated her L hamstring was still bothering her. Added seated HS stretch for further stretching and encouraged patient to complete this stretch at home as well. [x] Progressing toward goals. [] No change.      [] Other:    [x] Patient would continue to benefit from skilled physical therapy services in order to: improve pain free mobility of lumbar spine and maximize strength/stability in B hips and core/lumbar stabilizers necessary to improve functional tolerances and stamina through all ADLs and community activities at her prior level of function    GOALS:   STG: (to be met in 6 treatments)  Pt will self report worst pain no greater than 3/10 in order to better tolerate ADLs/work activities with minimal dysfunction  Pt will improve AROM in lumbar spine to 75% or greater without increased pain in order to demonstrate ability to move/reach in all planes unrestricted at PLOF  Pt will improve 30 second timed chair stand test to 10 repetitions or greater without UE support to show improved efficiency with functional movements in home  LTG: (to be met in 12 treatments)  Pt will demonstrate improved B LE strength to 4/5 or greater in order to demonstrate improved stability/strength necessary for unrestricted ADLs/work activities  Pt will improve core strength evidenced by maintaining lumbopelvic stability on bilateral straight leg lowering test to 45 deg or less to show improved support/stability to lumbar spine with less pain  Pt will self report ability to stand/walk for at least 30 minutes or greater to show improved community tolerances at her prior level of function  Pt will self report ability to lift and carry light household items (ex: laundry basket, grocery bags) between rooms to show improved functional tolerances through ADL tasks at her PLOF. Pt will decrease mod MARYCRUZ to 15% impaired or less in order to demonstrate improved functional tolerances at PLOF with minimal restriction/dysfunction  Pt will demonstrate independence with a long term HEP for continued progress/maintenance after completion of PT        Pt. Education:  [x] Yes  [] No  [x] Reviewed Prior HEP/Ed  Method of Education: [x] Verbal  [x] Demo  [] Written  Comprehension of Education:  [] Verbalizes understanding.   [] Demonstrates understanding. [] Needs review. [x] Demonstrates/verbalizes HEP/Ed previously given. Plan: [x] Continue per plan of care.    [] Other:      Treatment Charges: Mins Units   []  Modalities     [x]  Ther Exercise 40 3   []  Manual Therapy     []  Ther Activities     []  Aquatics     []  Neuromuscular     [] Vasocompression     [] Gait Training     [] Dry needling        [] 1 or 2 muscles        [] 3 or more muscles     []  Other     Total Treatment time 40 3     Time In: 1:15pm            Time Out: 1:55pm    Electronically signed by:  Malick Gan PTA

## 2022-09-20 ENCOUNTER — HOSPITAL ENCOUNTER (OUTPATIENT)
Dept: PHYSICAL THERAPY | Age: 62
Setting detail: THERAPIES SERIES
Discharge: HOME | End: 2022-09-20
Payer: MEDICARE

## 2022-09-20 PROCEDURE — 97110 THERAPEUTIC EXERCISES: CPT

## 2022-09-20 NOTE — FLOWSHEET NOTE
[address] [facility] Outpatient Physical Therapy   [address]   Phone: [phone]   Fax: [phone]    Physical Therapy Daily Treatment Note      Date:  2022  Patient Name:  Vanessa Traore    :  1960  MRN: [id number]  Physician: Wilton Barclay MD (resident physician)  Nena Monteiro MD (attending physician)                                     Insurance: [employer]. Auth required after 30 visits  Medical Diagnosis: M54.41, G89.29 (ICD-10-CM) - Chronic bilateral low back pain with right-sided sciatica       Rehab Codes: M54.41, R53.1, M25.60  Onset Date:                      Next 's appt: 23 with neurology  Visit# / total visits:    Cancels/No Shows: 0/0    Precautions: None    Subjective:   Patient reports no pain this date, but states she continues to have some occasional L LE pain/tightness. Patient states she saw her vascular doctor Friday and she is going to have some testing done to make sure she doesn't have any clotting that is causing this. Pain:  [] Yes  [x] No Location: L Leg Pain Rating: (0-10 scale) 0/10  Pain altered Tx:  [x] No  [] Yes  Action:  Comments:    Objective:  INTERVENTIONS  INTERVENTIONS  Reps/ Time Weight/ Level Completed  Today Comments          EXERCISES        Mat level:       Piriformis stretch 2x30''  x    LTR* 15x  x    SKTC* 2x30''   R/L ea   Bridges* 10x2    Limited ROM.  Facilitation at B distal femurs   Prone press ups* 10x      BKFO 10x2 ea Red x    PPT 10x5''   Visual cues for correct execution   SLRs with TrA engagement  10 reps x 2 R/L      Modified myke stretch 30'' x 2 R/L   Light support from therapist due to a lot of tightness letting leg fully hang off edge of table   Clams 10 reps x 2 R/L      Seated Figure 4 stretch* 2x30''      Seated sciatic nerve glide* 10x      Seated back extension 10x5''   With towel roll at lumbar region   PB roll outs 10x5''  x           Standing:       HS (B) stretch on step 5x10''  x    SB stretch 2x30''   Completed after pt noted tension with step ups   3 way hip 15x ea Red x    Palloff press 15 reps ea x 2 Red x    Pallof step outs 10x ea Red x Isometric hold slightly in front of chest   Marching 10x2 ea 2#  Cues for slow and controlled movement   Good mornings 10x 10#KB x Arms crossed at chest, cues to keep neutral spine   Mini squats  10 reps x 2  x 1/2 depth. Cues for glute activation and shifting hips posteriorly versus excessively anteriorly translating at knees   Forward step ups 10 reps R/L 8'' step x 1 UE support   Lateral step ups 10 reps R/L 8'' step x 1 UE support   KB lateral trunk flexion 10x ea 10# KB x Bending to green chair, cues to decrease trunk fwd flexion   Lifting from floor to elevated mat (waist lvl) 10x 10#  10# plate in blue crate; edu on proper lifting technique   Other: * HEP    Specific Instructions for next treatment  Emphasis on pain free mobility in lower back and hips with progressive strengthening to B hips and core/lumbar stabilizers. Transition to more standing/CKC activities as able to work on improving functional standing tolerances and stamina. Assessment:  9/20: Began session with mat level stretching and core strengthening with good tolerance to BKFO and additional set of 10 reps added. Continued focus on standing exercises to improve standing tolerance and stamina. Increased reps with paloff press with good tolerance noted. Patient had production of low back pain with lateral step ups, but noted it was tolerable. Switched to resistance band for 3 way hip in order to further challenge both control and strength. Patient fatigued at end of session and noted a pain level of 2/10 for her low back. [x] Progressing toward goals. [] No change.      [] Other:    [x] Patient would continue to benefit from skilled physical therapy services in order to: improve pain free mobility of lumbar spine and maximize strength/stability in B hips and core/lumbar stabilizers necessary to improve functional tolerances and stamina through all ADLs and community activities at her prior level of function    GOALS:   STG: (to be met in 6 treatments)  Pt will self report worst pain no greater than 3/10 in order to better tolerate ADLs/work activities with minimal dysfunction  Pt will improve AROM in lumbar spine to 75% or greater without increased pain in order to demonstrate ability to move/reach in all planes unrestricted at PLOF  Pt will improve 30 second timed chair stand test to 10 repetitions or greater without UE support to show improved efficiency with functional movements in home  LTG: (to be met in 12 treatments)  Pt will demonstrate improved B LE strength to 4/5 or greater in order to demonstrate improved stability/strength necessary for unrestricted ADLs/work activities  Pt will improve core strength evidenced by maintaining lumbopelvic stability on bilateral straight leg lowering test to 45 deg or less to show improved support/stability to lumbar spine with less pain  Pt will self report ability to stand/walk for at least 30 minutes or greater to show improved community tolerances at her prior level of function  Pt will self report ability to lift and carry light household items (ex: laundry basket, grocery bags) between rooms to show improved functional tolerances through ADL tasks at her PLOF. Pt will decrease mod MARYCRUZ to 15% impaired or less in order to demonstrate improved functional tolerances at PLOF with minimal restriction/dysfunction  Pt will demonstrate independence with a long term HEP for continued progress/maintenance after completion of PT        Pt. Education:  [x] Yes  [] No  [x] Reviewed Prior HEP/Ed  Method of Education: [x] Verbal  [x] Demo  [] Written  Comprehension of Education:  [] Verbalizes understanding. [] Demonstrates understanding. [] Needs review. [x] Demonstrates/verbalizes HEP/Ed previously given.      Plan: [x]

## 2022-09-21 DIAGNOSIS — I25.10 CORONARY ARTERY DISEASE INVOLVING NATIVE CORONARY ARTERY OF NATIVE HEART WITHOUT ANGINA PECTORIS: ICD-10-CM

## 2022-09-21 RX ORDER — NITROGLYCERIN 0.4 MG/1
TABLET SUBLINGUAL
Qty: 25 TABLET | Refills: 0 | Status: SHIPPED | OUTPATIENT
Start: 2022-09-21 | End: 2022-11-23

## 2022-09-22 ENCOUNTER — HOSPITAL ENCOUNTER (OUTPATIENT)
Dept: PHYSICAL THERAPY | Age: 62
Setting detail: THERAPIES SERIES
Discharge: HOME | End: 2022-09-22
Payer: MEDICARE

## 2022-09-22 PROCEDURE — 97110 THERAPEUTIC EXERCISES: CPT

## 2022-09-22 NOTE — FLOWSHEET NOTE
[facility] Therapy   [address]   Phone: [phone]   Fax: [phone]    Physical Therapy Daily Treatment Note      Date:  2022  Patient Name:  Jack Luis    :  1960  MRN: [id number]  Physician: Herve Khan MD (resident physician)  Jass Best MD (attending physician)                                     Insurance: [place] [employer]. Auth required after 30 visits  Medical Diagnosis: M54.41, G89.29 (ICD-10-CM) - Chronic bilateral low back pain with right-sided sciatica       Rehab Codes: M54.41, R53.1, M25.60  Onset Date:                      Next 's appt: 23 with neurology  Visit# / total visits:    Cancels/No Shows: 0/0    Precautions: None    Subjective:   Patient reports having L low back/L LE pain since last session and is more evident when she is up walking vs. Sitting or laying down. Pain:  [] Yes  [x] No Location: L Leg, low back Pain Rating: (0-10 scale) 2/10  Pain altered Tx:  [x] No  [] Yes  Action:  Comments:    Objective:  INTERVENTIONS  INTERVENTIONS  Reps/ Time Weight/ Level Completed  Today Comments          EXERCISES        Mat level:       Piriformis stretch 2x30''  x    LTR* 15x  x    SKTC* 10x5'' ea  x R/L ea   Bridges* 10x2    Limited ROM.  Facilitation at B distal femurs   Prone press ups* 10x      TrA marching 10x2  x Up, Up, both down   Cat/Cow 15x ea  x    BKFO 10x2 ea Red     PPT 10x5''   Visual cues for correct execution   SLRs with TrA engagement  10 reps x 2 R/L  x    Modified myke stretch 30'' x 2 R/L   Light support from therapist due to a lot of tightness letting leg fully hang off edge of table   Clams 10 reps x 2 R/L      Seated Figure 4 stretch* 2x30''  x    Seated sciatic nerve glide* 10x  x    Seated back extension 10x5''  x With towel roll at lumbar region   PB roll outs 10x5''             Standing:       HS (B) stretch on step 10x10''  x    SB stretch 2x30''   Completed after pt noted tension with step ups   3 way hip 15x ea Red     Palloff press 15 reps ea x 2 Red     Pallof step outs 10x ea Red  Isometric hold slightly in front of chest   Horizontal Chops 10x ea Red x Cues for posture and foot placement   Low>High Chops 10x ea Yellow x Inc pain - did only one side today   Marching 10x2 ea 2#  Cues for slow and controlled movement   Good mornings 10x 10#KB  Arms crossed at chest, cues to keep neutral spine   Mini squats  10 reps x 2   1/2 depth. Cues for glute activation and shifting hips posteriorly versus excessively anteriorly translating at knees   Forward step ups 10 reps R/L 8'' step  1 UE support   Lateral step ups 10 reps R/L 8'' step  1 UE support   KB lateral trunk flexion 10x ea 10# KB  Bending to green chair, cues to decrease trunk fwd flexion   Lifting from floor to elevated mat (waist lvl) 10x 10#  10# plate in blue crate; edu on proper lifting technique   Other: * HEP    Specific Instructions for next treatment  Emphasis on pain free mobility in lower back and hips with progressive strengthening to B hips and core/lumbar stabilizers. Transition to more standing/CKC activities as able to work on improving functional standing tolerances and stamina. Assessment:  9/22: Today's session focused more on stretching and improving pain free mobility through low back and BLE, as well as core strengthening. Added cat/cow exercise and TrA marching with good tolerance and denying increased pain. Added horizontal chops with Red band, patient tolerating well but required verbal and tactile cues for posture to prevent trunk flexion. Attempted low>high chops with yellow band, but pt noted increased pain to 4/10 with grimacing facial expressions. Had pt sit down and complete back extensions and sciatic flossing to decrease symptoms. Good response to these two interventions with pain returning to 2/10. Encouraged pt to apply heat when she gets home for further pain management. HEP/Ed  Method of Education: [x] Verbal  [] Demo  [] Written  Comprehension of Education:  [] Verbalizes understanding. [] Demonstrates understanding. [] Needs review. [x] Demonstrates/verbalizes HEP/Ed previously given. Plan: [x] Continue per plan of care.    [] Other:      Treatment Charges: Mins Units   []  Modalities     [x]  Ther Exercise 41 3   []  Manual Therapy     []  Ther Activities     []  Aquatics     []  Neuromuscular     [] Vasocompression     [] Gait Training     [] Dry needling        [] 1 or 2 muscles        [] 3 or more muscles     []  Other     Total Treatment time 41 3     Time In: 1:29pm            Time Out: 2:10pm    Electronically signed by:  Adenike Dubon PTA

## 2022-09-26 DIAGNOSIS — M19.019 OSTEOARTHRITIS OF SHOULDER, UNSPECIFIED LATERALITY, UNSPECIFIED OSTEOARTHRITIS TYPE: ICD-10-CM

## 2022-09-26 DIAGNOSIS — M50.30 DEGENERATIVE DISC DISEASE, CERVICAL: ICD-10-CM

## 2022-09-26 DIAGNOSIS — S33.6XXD SPRAIN OF SACROILIAC LIGAMENT, SUBSEQUENT ENCOUNTER: ICD-10-CM

## 2022-09-26 DIAGNOSIS — M51.36 LUMBAR DEGENERATIVE DISC DISEASE: ICD-10-CM

## 2022-09-26 DIAGNOSIS — M51.26 PROTRUDED LUMBAR DISC: ICD-10-CM

## 2022-09-26 DIAGNOSIS — J30.9 ALLERGIC SINUSITIS: ICD-10-CM

## 2022-09-26 DIAGNOSIS — F41.9 ANXIETY: ICD-10-CM

## 2022-09-26 RX ORDER — BUPROPION HYDROCHLORIDE 100 MG/1
TABLET, EXTENDED RELEASE ORAL
Qty: 60 TABLET | Refills: 0 | Status: SHIPPED | OUTPATIENT
Start: 2022-09-26 | End: 2022-10-24

## 2022-09-26 RX ORDER — CYCLOBENZAPRINE HCL 10 MG
TABLET ORAL
Qty: 60 TABLET | Refills: 0 | Status: SHIPPED | OUTPATIENT
Start: 2022-09-26 | End: 2022-10-24

## 2022-09-26 RX ORDER — CETIRIZINE HYDROCHLORIDE 10 MG/1
TABLET ORAL
Qty: 30 TABLET | Refills: 0 | Status: SHIPPED | OUTPATIENT
Start: 2022-09-26 | End: 2022-10-24

## 2022-09-26 NOTE — TELEPHONE ENCOUNTER
Boy Pollard is calling to request a refill on the following medication(s):    Medication Request:  Requested Prescriptions     Pending Prescriptions Disp Refills    cetirizine (ZYRTEC) 10 MG tablet [Pharmacy Med Name: CETIRIZINE 10MG TABLETS] 30 tablet 0     Sig: TAKE 1 TABLET BY MOUTH DAILY    buPROPion (WELLBUTRIN SR) 100 MG extended release tablet [Pharmacy Med Name: BUPROPION SR 100MG TABLETS (12 H)] 60 tablet 0     Sig: TAKE 1 TABLET BY MOUTH TWICE DAILY    cyclobenzaprine (FLEXERIL) 10 MG tablet [Pharmacy Med Name: CYCLOBENZAPRINE 10MG TABLETS] 60 tablet 0     Sig: TAKE 1 TABLET BY MOUTH TWICE DAILY AS NEEDED FOR MUSCLE SPASMS       Last Visit Date (If Applicable):  2/10/2800    Next Visit Date:    10/18/2022

## 2022-09-27 ENCOUNTER — HOSPITAL ENCOUNTER (OUTPATIENT)
Dept: PHYSICAL THERAPY | Age: 62
Setting detail: THERAPIES SERIES
Discharge: HOME | End: 2022-09-27
Payer: MEDICARE

## 2022-09-27 PROCEDURE — 97110 THERAPEUTIC EXERCISES: CPT

## 2022-09-27 NOTE — PROGRESS NOTES
[facility] Physical [facility]   [address]   Phone: [phone]   Fax: [phone]    Physical Therapy Daily Treatment Note      Date:  2022  Patient Name:  Nancy Orosco    :  1960  MRN: [id number]  Physician: Vidhya Rocha MD (resident physician)  Mahesh Pedraza MD (attending physician)                                      Insurance: [place] [employer]. Auth required after 30 visits  Medical Diagnosis: M54.41, G89.29 (ICD-10-CM) - Chronic bilateral low back pain with right-sided sciatica       Rehab Codes: M54.41, R53.1, M25.60  Onset Date:                      Next 's appt: 23 with neurology  Visit# / total visits:    Cancels/No Shows: 0/0    Precautions: None    Subjective:   Pt states that overall she feels like she's made good progress since starting therapy with able to do more around her home/community and less pain in her back. Denies much issue with extended walking to do her grocery errands, but still has some difficulty and back pain with repetitive bending activities (laundry, loading/unloading , and carrying grocery bags for long distances). Progress note performed today for reporting period - to reassess progress and all goals to date. Minimal pain (1/10) at start of visit today. Pain:  [] Yes  [x] No Location: L posterior hip Pain Rating: (0-10 scale) 1/10  Pain altered Tx:  [x] No  [] Yes  Action:  Comments:    Objective:  All below objective measures updated  by Garry Augustin PT       Range of Motion  Left Range of Motion  Right Strength  Left Strength  Right   Lumbar Flexion 90%  HS tightness only 90%  HS tightness only       Lumbar Extension 50% 50%       Lumbar Rotation 90% 90%       Lumbar Side Bend 75% 75%       Hip Flexion     4-/5 4-/5   Hip Abduction     4/5 4/5   Hip Adduction     4+/5 4+/5   Hip Extension     4/5 4/5   Hip ER           Hip IR           Knee Flexion     4+/5 4+/5 Knee Extension     5/5 5/5   Dorsiflexion     5/5 5/5   Plantar flexion     4+/5 4+/5   Inversion           Eversion           *All LE MMT performed in modified (seated) positioning     Core Strength: Lost lumbopelvic stability at 60 deg on bilateral straight leg lowering test    Functional Testin second timed chair stand: 11 repetitions with no UE support        INTERVENTIONS  INTERVENTIONS  Reps/ Time Weight/ Level Completed  Today Comments          EXERCISES        Mat level:       Piriformis stretch 2x30''      LTR* 15x  x    SKTC* 10x5'' ea   R/L ea   Bridges* 10x2    Limited ROM. Facilitation at B distal femurs   Prone press ups* 10x      TrA marching 10x2  x Up, Up, both down   Cat/Cow 15x ea      BKFO 10x2 ea Red     PPT 10x5''   Visual cues for correct execution   SLRs with TrA engagement  10 reps x 2 R/L  x    Modified myke stretch 30'' x 2 R/L   Light support from therapist due to a lot of tightness letting leg fully hang off edge of table   Clams 10 reps x 2 R/L      Seated Figure 4 stretch* 2x30''      Seated sciatic nerve glide* 10x      Seated back extension 10x5''   With towel roll at lumbar region   PB roll outs 10x5''             Standing:       HS (B) stretch on step 10x10''      SB stretch 2x30''   Completed after pt noted tension with step ups   3 way hip 15x ea Red     Palloff press 15 reps ea  Red x    Pallof step outs 10x ea Red  Isometric hold slightly in front of chest   Horizontal Chops 10x ea Red  Cues for posture and foot placement   Low>High Chops 10x ea Yellow  Inc pain - did only one side today   Marching 10x2 ea 2#  Cues for slow and controlled movement   Good mornings 10x 10#KB  Arms crossed at chest, cues to keep neutral spine   Mini squats  10 reps x 2   1/2 depth.  Cues for glute activation and shifting hips posteriorly versus excessively anteriorly translating at knees   Forward step ups 10 reps R/L 8'' step  1 UE support   Lateral step ups 10 reps R/L 8'' step  1 UE support   KB lateral trunk flexion 10x ea 10# KB  Bending to green chair, cues to decrease trunk fwd flexion   Lifting from floor to elevated mat (waist lvl) 10x 10#  10# plate in blue crate; edu on proper lifting technique   Unilateral KB dead lifts from 8'' step 5 reps x 2 7.5# x Emphasis on hip hinging pattern and avoiding compensatory rotation   Unilateral KB dead lifts from 8'' step + pivot to overhead reach 5 reps x 2 7.5# x Cues to turn body rather than twisting spine under load. Simulating unloading heavier dishware from  and placing in overhead cabinets   Unilateral KB hold + march 20 reps L/R x 2  x 1 set holding in L hand, 1 set holding in R hand          Re-assessment of all subjective & objective measures, all goals 15'  x 9/27 by primary PT to determine progress to date and additional rehab needs   Other: * HEP    Specific Instructions for next treatment  Emphasis on pain free mobility in lower back and hips with progressive strengthening to B hips and core/lumbar stabilizers. Transition to more standing/CKC activities as able to work on improving functional standing tolerances and stamina. Continue to reinforce lifting mechanics, simulating strength and stability through functional home-making tasks      Assessment:  9/27: Pt has been seen for 9 PT visits to date for her chronic LBP and known L convex scoliosis in her thoracolumbar spine. Overall pt is progressing well towards goals with improving pain levels, ROM, strength, and overall functional mobility. Very good improvement in thoracolumbar mobility compared to initial evaluation with no increased pain present upon assessment today, just still moderately limited into lumbar extension. Strength in B LEs and core is improved but still weak in B hips and core/trunk stabilizers.  Pt is now able to be on her feet for longer periods of time but still has the most difficulty with repetitive bending/lifting activities such as unloading her  and carrying her grocery bags. Significant time spent on patient education today emphasizing proper lifting mechanics, and incorporated unilateral KB lifting series for core/trunk strength through functional tasks. Difficulty with combination task of lift to pivot to overhead reach, with pt demonstrating tendency to twist spine under load. Cued patient to pivot from hips by moving her feet, to reduce strain on low back with good improvement in sx but will likely require additional reinforcement. POC extended x 4 additional visits (16 visits on current POC) to continue to emphasize functional strengthening and stabilization, focusing on more functional tasks to improve tolerances through homemaking activities. Mild soreness/tightness in low back but no increase in pain. [x] Progressing toward goals. [] No change.      [] Other:    [x] Patient would continue to benefit from skilled physical therapy services in order to: improve pain free mobility of lumbar spine and maximize strength/stability in B hips and core/lumbar stabilizers necessary to improve functional tolerances and stamina through all ADLs and community activities at her prior level of function    GOALS:   STG: (to be met in 6 treatments)  Pt will self report worst pain no greater than 3/10 in order to better tolerate ADLs/work activities with minimal dysfunction GOAL PROGRESSING AND EXTENDED THROUGH POC 9/27 (6/10 PAIN AT WORST)  Pt will improve AROM in lumbar spine to 75% or greater without increased pain in order to demonstrate ability to move/reach in all planes unrestricted at [facility] POC 9/27  Pt will improve 30 second timed chair stand test to 10 repetitions or greater without UE support to show improved efficiency with functional movements in home GOAL MET 9/27  LTG: (to be met in 16 treatments)  Pt will demonstrate improved B LE strength to 4/5 or greater in order to demonstrate improved stability/strength necessary for unrestricted ADLs/work activities GOAL PROGRESSING AND EXTENDED 9/27  Pt will improve core strength evidenced by maintaining lumbopelvic stability on bilateral straight leg lowering test to 45 deg or less to show improved support/stability to lumbar spine with less pain  Pt will self report ability to stand/walk for at least 30 minutes or greater to show improved community tolerances at her prior level of function GOAL MET 9/27  Pt will self report ability to lift and carry light household items (ex: laundry basket, grocery bags) between rooms to show improved functional tolerances through ADL tasks at her PLOF. GOAL PROGRESSING 9/27  Pt will decrease mod MARYCRUZ to 15% impaired or less in order to demonstrate improved functional tolerances at PLOF with minimal restriction/dysfunction  Pt will demonstrate independence with a long term HEP for continued progress/maintenance after completion of PT        Pt. Education:  [x] Yes  [] No  [x] Reviewed Prior HEP/Ed  Method of Education: [x] Verbal  [] Demo  [] Written  Comprehension of Education:  [] Verbalizes understanding. [] Demonstrates understanding. [] Needs review. [x] Demonstrates/verbalizes HEP/Ed previously given. Plan: [x] Continue per plan of care.    [x] Other: See below      Treatment Plan:  [x] Therapeutic Exercise   59278  [] Iontophoresis: 4 mg/mL Dexamethasone Sodium Phosphate  mAmin  16801   [x] Therapeutic Activity  39586 [] Vasopneumatic cold with compression  41674    [] Gait Training   91658 [] Ultrasound   47331   [] Neuromuscular Re-education  58695 [] Electrical Stimulation Unattended  92086   [x] Manual Therapy  16639 [] Electrical Stimulation Attended  97866   [x] Instruction in HEP  [] Lumbar/Cervical Traction  42225   [] Aquatic Therapy   45214 [] Cold/hotpack    [] Massage   72979      [] Dry Needling, 1 or 2 muscles  80227   [] Biofeedback, first 15 minutes   02180  [] Biofeedback, additional 15 minutes   47805 [] Dry Needling, 3 or more muscles  31165      Patient Status:     [] Continue per initial plan of care. [x] Additional visits necessary.     [] Other:     Requested Frequency/Duration: 2 times per week for 4 additional treatments (16 total visits on POC)          Treatment Charges: Mins Units   []  Modalities     [x]  Ther Exercise 39 3   []  Manual Therapy     []  Ther Activities     []  Aquatics     []  Neuromuscular     [] Vasocompression     [] Gait Training     [] Dry needling        [] 1 or 2 muscles        [] 3 or more muscles     []  Other     Total Treatment time 39 3     Time In: 2:40pm            Time Out: 3:19pm    Electronically signed by:  Flo Henderson PT

## 2022-09-29 ENCOUNTER — HOSPITAL ENCOUNTER (OUTPATIENT)
Dept: PHYSICAL THERAPY | Age: 62
Setting detail: THERAPIES SERIES
Discharge: HOME | End: 2022-09-29
Payer: MEDICARE

## 2022-09-29 DIAGNOSIS — R60.9 PERIPHERAL EDEMA: ICD-10-CM

## 2022-09-29 DIAGNOSIS — I25.10 CORONARY ARTERY DISEASE INVOLVING NATIVE CORONARY ARTERY OF NATIVE HEART WITHOUT ANGINA PECTORIS: ICD-10-CM

## 2022-09-29 PROCEDURE — 97110 THERAPEUTIC EXERCISES: CPT

## 2022-09-29 RX ORDER — FUROSEMIDE 40 MG/1
40 TABLET ORAL DAILY
Qty: 90 TABLET | Refills: 0 | Status: SHIPPED | OUTPATIENT
Start: 2022-09-29 | End: 2022-10-18 | Stop reason: DRUGHIGH

## 2022-09-29 NOTE — TELEPHONE ENCOUNTER
Please Approve or Refuse.   Send to Pharmacy per Pt's Request: [facility]      Next Visit Date:  10/18/2022   Last Visit Date: 7/18/2022    Hemoglobin A1C (%)   Date Value   07/18/2022 5.3   04/14/2022 5.2   01/13/2022 5.8             ( goal A1C is < 7)   BP Readings from Last 3 Encounters:   08/17/22 107/73   07/18/22 94/72   07/06/22 87/62          (goal 120/80)  BUN   Date Value Ref Range Status   06/24/2022 12 8 - 23 mg/dL Final     Creatinine   Date Value Ref Range Status   06/24/2022 0.96 (H) 0.50 - 0.90 mg/dL Final     Potassium   Date Value Ref Range Status   06/24/2022 4.6 3.7 - 5.3 mmol/L Final

## 2022-09-29 NOTE — PROGRESS NOTES
[facility]    [address]   Phone: [phone]   Fax: [phone]    Physical Therapy Daily Treatment Note      Date:  2022  Patient Name:  Sandro Ortiz    :  1960  MRN: [id number]  Physician: Basilia Ernandez MD (resident physician)  Negin Tinajero MD (attending physician)                                      Insurance: [place] [employer]. Auth required after 30 visits  Medical Diagnosis: M54.41, G89.29 (ICD-10-CM) - Chronic bilateral low back pain with right-sided sciatica       Rehab Codes: M54.41, R53.1, M25.60  Onset Date:                      Next 's appt: 23 with neurology  Visit# / total visits: 10/16   Cancels/No Shows: 0/0    Precautions: None    Subjective:     Pain:  [x] Yes  [] No Location: L posterior hip Pain Rating: (0-10 scale) 1/10  Pain altered Tx:  [x] No  [] Yes  Action:  Comments: Patient reports minimal pain today, but states it increases with activity. Objective:   INTERVENTIONS  INTERVENTIONS  Reps/ Time Weight/ Level Completed  Today Comments   EXERCISES        Mat level:       Piriformis stretch 2x30''      LTR* 15x  x    SKTC* 10x5'' ea  x R/L ea   Bridges* 10x2    Limited ROM.  Facilitation at B distal femurs   Prone press ups* 10x      TrA marching 10x2  x Up, Up, both down   Cat/Cow 15x ea  x    BKFO 10x2 ea Red     PPT 10x5''   Visual cues for correct execution   SLRs with TrA engagement  10 reps x 2 R/L      Modified myke stretch 30'' x 2 R/L   Light support from therapist due to a lot of tightness letting leg fully hang off edge of table   Clams 10 reps x 2 R/L      Seated Figure 4 stretch* 2x30''      Seated sciatic nerve glide* 10x      Seated back extension 10x5''   With towel roll at lumbar region   PB roll outs 10x5''             Standing:       HS (B) stretch on step 10x10''  x    SB stretch 2x30''   Completed after pt noted tension with step ups   3 way hip 15x ea Red Palloff press 15 reps ea  Green x    Pallof step outs 10x ea Red  Isometric hold slightly in front of chest   Horizontal Chops 10x ea Red  Cues for posture and foot placement   Low>High Chops 10x ea Yellow  Inc pain - did only one side today   Marching 10x2 ea 2#  Cues for slow and controlled movement   Good mornings 10x 10#KB  Arms crossed at chest, cues to keep neutral spine   Mini squats to elevated mat 10 reps x 2  x 1/2 depth. Cues for glute activation and shifting hips posteriorly versus excessively anteriorly translating at knees   Forward step ups 10 reps R/L 8'' step x 1 UE support   Lateral step ups 10 reps R/L 8'' step x 1 UE support   KB lateral trunk flexion 10x ea 10# KB  Bending to green chair, cues to decrease trunk fwd flexion   Lifting from floor to elevated mat (waist lvl) 10x 10#  10# plate in blue crate; edu on proper lifting technique   Unilateral KB dead lifts from 8'' step 5 reps x 2 7.5# x Emphasis on hip hinging pattern and avoiding compensatory rotation   Unilateral KB dead lifts from 8'' step + pivot to overhead reach 5 reps x 2 7.5# x Cues to turn body rather than twisting spine under load. Simulating unloading heavier dishware from  and placing in overhead cabinets   Unilateral KB hold + march 20 reps L/R x 2  x 1 set holding in L hand, 1 set holding in R hand   Unilateral reach to cone from 8'' step + pivot to [facility] reach 10x ea  x Completed to focus on body mechanics vs. Heavy lifting          Re-assessment of all subjective & objective measures, all goals 15'   9/27 by primary PT to determine progress to date and additional rehab needs   Other: * HEP    Specific Instructions for next treatment  Emphasis on pain free mobility in lower back and hips with progressive strengthening to B hips and core/lumbar stabilizers. Transition to more standing/CKC activities as able to work on improving functional standing tolerances and stamina.  Continue to reinforce lifting mechanics, simulating strength and stability through functional home-making tasks      Assessment:  9/29: Initiated session with supine stretching and exercises with focus on improving trunk and LE mobility. Continued with standing exercises focused on lifting mechanics with weight, but incorporating the same exercise with cones in order to focus on proper body mechanics without loading the LE/trunk. Patient noted increased low back pain with lifting exercises and required a seated rest break. Continued with further standing exercises and encouraged patient to complete mini squats at home and ensuring proper mechanics with cues and demonstrations given today. [x] Progressing toward goals. [] No change.      [] Other:    [x] Patient would continue to benefit from skilled physical therapy services in order to: improve pain free mobility of lumbar spine and maximize strength/stability in B hips and core/lumbar stabilizers necessary to improve functional tolerances and stamina through all ADLs and community activities at her prior level of function    GOALS:   STG: (to be met in 6 treatments)  Pt will self report worst pain no greater than 3/10 in order to better tolerate ADLs/work activities with minimal dysfunction GOAL PROGRESSING AND EXTENDED THROUGH POC 9/27 (6/10 PAIN AT WORST)  Pt will improve AROM in lumbar spine to 75% or greater without increased pain in order to demonstrate ability to move/reach in all planes unrestricted at [facility] 9/27  Pt will improve 30 second timed chair stand test to 10 repetitions or greater without UE support to show improved efficiency with functional movements in home GOAL MET 9/27  LTG: (to be met in 16 treatments)  Pt will demonstrate improved B LE strength to 4/5 or greater in order to demonstrate improved stability/strength necessary for unrestricted ADLs/work activities GOAL PROGRESSING AND EXTENDED 9/27  Pt will improve core strength evidenced by maintaining lumbopelvic stability on bilateral straight leg lowering test to 45 deg or less to show improved support/stability to lumbar spine with less pain  Pt will self report ability to stand/walk for at least 30 minutes or greater to show improved community tolerances at her prior level of function GOAL MET 9/27  Pt will self report ability to lift and carry light household items (ex: laundry basket, grocery bags) between rooms to show improved functional tolerances through ADL tasks at her PLOF. GOAL PROGRESSING 9/27  Pt will decrease mod MARYCRUZ to 15% impaired or less in order to demonstrate improved functional tolerances at PLOF with minimal restriction/dysfunction  Pt will demonstrate independence with a long term HEP for continued progress/maintenance after completion of PT        Pt. Education:  [x] Yes  [] No  [x] Reviewed Prior HEP/Ed  Method of Education: [x] Verbal  [] Demo  [] Written  Comprehension of Education:  [] Verbalizes understanding. [] Demonstrates understanding. [] Needs review. [x] Demonstrates/verbalizes HEP/Ed previously given. Plan: [x] Continue per plan of care.    [x] Other: See below      Treatment Charges: Mins Units   []  Modalities     [x]  Ther Exercise 42 3   []  Manual Therapy     []  Ther Activities     []  Aquatics     []  Neuromuscular     [] Vasocompression     [] Gait Training     [] Dry needling        [] 1 or 2 muscles        [] 3 or more muscles     []  Other     Total Treatment time 42 3     Time In: 1:13 pm            Time Out: 1:55 pm    Electronically signed by:  Benji Callejas PTA

## 2022-10-04 ENCOUNTER — HOSPITAL ENCOUNTER (OUTPATIENT)
Dept: PHYSICAL THERAPY | Age: 62
Setting detail: THERAPIES SERIES
Discharge: HOME | End: 2022-10-04
Payer: MEDICARE

## 2022-10-04 PROCEDURE — 97110 THERAPEUTIC EXERCISES: CPT

## 2022-10-04 NOTE — FLOWSHEET NOTE
[address] [facility] Outpatient Physical Therapy   [address]   Phone: [phone]   Fax: [phone]    Physical Therapy Daily Treatment Note      Date:  10/4/2022  Patient Name:  Brandie Leija    :  1960  MRN: [id number]  Physician: Sruthi Chaves MD (resident physician)  Maggie Dwyer MD (attending physician)                                      Insurance: [place] [employer]. Auth required after 30 visits  Medical Diagnosis: M54.41, G89.29 (ICD-10-CM) - Chronic bilateral low back pain with right-sided sciatica       Rehab Codes: M54.41, R53.1, M25.60  Onset Date:                      Next 's appt: 23 with neurology  Visit# / total visits:    Cancels/No Shows: 0/0    Precautions: None    Subjective:     Pain:  [x] Yes  [] No Location: L posterior hip Pain Rating: (0-10 scale) 1/10  Pain altered Tx:  [x] No  [] Yes  Action:  Comments: 10/4: Pt states that her back is feeling alright with no major issues or complaints currently. Reports that all of her circulation tests for her lower legs recently came back clear. Objective:   INTERVENTIONS  INTERVENTIONS  Reps/ Time Weight/ Level Completed  Today Comments   EXERCISES        Mat level:       Piriformis stretch 2x30''      LTR* 15x      SKTC* 10x5'' ea   R/L ea   Bridges* 10x2    Limited ROM.  Facilitation at B distal femurs   Prone press ups* 10x      TrA marching 10x2  x Up, Up, both down   Cat/Cow 15x ea  x    Quadruped alt LE 10x R/L  x Wrist discomfort noted afterwards 10/4   BKFO 10x2 ea Red     PPT 10x5''   Visual cues for correct execution   SLRs with TrA engagement  10 reps x 2 R/L      Modified myke stretch 30'' x 2 R/L   Light support from therapist due to a lot of tightness letting leg fully hang off edge of table   Clams 10 reps x 2 R/L      Sidelying book openers 10 reps R/L  x    Seated Figure 4 stretch* 2x30''      Seated sciatic nerve glide* 10x      Seated back extension 10x5''   With towel roll at lumbar region   PB roll outs 10x5''             Standing:       HS (B) stretch on step 10x10''      SB stretch 2x30''   Completed after pt noted tension with step ups   3 way hip 15x ea Red     Palloff press 15 reps ea  Green     Pallof step outs 10x ea, 2 sets Red x Isometric hold slightly in front of chest   Horizontal Chops 10x ea Red  Cues for posture and foot placement   Low>High Chops 10x ea Yellow x    Marching 10x2 ea 2#  Cues for slow and controlled movement   Good mornings 10x 10#KB  Arms crossed at chest, cues to keep neutral spine   Mini squats to elevated mat 10 reps x 2   1/2 depth. Cues for glute activation and shifting hips posteriorly versus excessively anteriorly translating at knees   Forward step ups 10 reps R/L 8'' step x 1 UE support with brief SLS hold at top   Lateral step ups 10 reps R/L 8'' step x 1 UE support with brief SLS hold at top   KB lateral trunk flexion 10x ea 10# KB  Bending to green chair, cues to decrease trunk fwd flexion   Lifting from floor to elevated mat (waist lvl) 10x 10#  10# plate in blue crate; edu on proper lifting technique   Unilateral KB dead lifts from 6'' step 5 reps x 2 7.5# x Emphasis on hip hinging pattern and avoiding compensatory rotation   Unilateral KB dead lifts from 8'' step + pivot to overhead reach 5 reps x 2 7.5#  Cues to turn body rather than twisting spine under load.  Simulating unloading heavier dishware from  and placing in overhead cabinets   Unilateral KB hold + march 20 reps L/R x 2 7.5# x 1 set holding in L hand, 1 set holding in R hand   Unilateral reach to cone from 8'' step + pivot to [place] reach 10x ea   Completed to focus on body mechanics vs. Heavy lifting          Re-assessment of all subjective & objective measures, all goals 15'   9/27 by primary PT to determine progress to date and additional rehab needs   Other: * HEP    Specific Instructions for next treatment  Emphasis on pain free mobility in lower back and hips with progressive strengthening to B hips and core/lumbar stabilizers. Transition to more standing/CKC activities as able to work on improving functional standing tolerances and stamina. Continue to reinforce lifting mechanics, simulating strength and stability through functional home-making tasks      Assessment:  10/4: Two brief seated rest breaks required during visit today, primarily due to bilateral hip fatigue with standing activities but denied as any specific increase in back pain. Revisited low to high banded chops today with no increased discomfort compared to the last time pt performed, but still required heavy cueing for technique and how to pivot slightly at hips to perform without excessive loaded lumbar rotation. Better overall mental understanding of how to lift safely, but lacks body awareness to execute into proper mechanics. Will continue to reinforce over remaining visits to improve ability to perform loaded household tasks with less pain. [x] Progressing toward goals. [] No change.      [] Other:    [x] Patient would continue to benefit from skilled physical therapy services in order to: improve pain free mobility of lumbar spine and maximize strength/stability in B hips and core/lumbar stabilizers necessary to improve functional tolerances and stamina through all ADLs and community activities at her prior level of function    GOALS:   STG: (to be met in 6 treatments)  Pt will self report worst pain no greater than 3/10 in order to better tolerate ADLs/work activities with minimal dysfunction GOAL PROGRESSING AND EXTENDED THROUGH POC 9/27 (6/10 PAIN AT WORST)  Pt will improve AROM in lumbar spine to 75% or greater without increased pain in order to demonstrate ability to move/reach in all planes unrestricted at [facility] POC 9/27  Pt will improve 30 second timed chair stand test to 10 repetitions or greater without UE support to show improved efficiency with functional movements in home GOAL MET 9/27  LTG: (to be met in 16 treatments)  Pt will demonstrate improved B LE strength to 4/5 or greater in order to demonstrate improved stability/strength necessary for unrestricted ADLs/work activities [address] 906 9/27  Pt will improve core strength evidenced by maintaining lumbopelvic stability on bilateral straight leg lowering test to 45 deg or less to show improved support/stability to lumbar spine with less pain  Pt will self report ability to stand/walk for at least 30 minutes or greater to show improved community tolerances at her prior level of function GOAL MET 9/27  Pt will self report ability to lift and carry light household items (ex: laundry basket, grocery bags) between rooms to show improved functional tolerances through ADL tasks at her PLOF. GOAL PROGRESSING 9/27  Pt will decrease mod MARYCRUZ to 15% impaired or less in order to demonstrate improved functional tolerances at PLOF with minimal restriction/dysfunction  Pt will demonstrate independence with a long term HEP for continued progress/maintenance after completion of PT        Pt. Education:  [x] Yes  [] No  [x] Reviewed Prior HEP/Ed  Method of Education: [x] Verbal  [] Demo  [] Written  Comprehension of Education:  [] Verbalizes understanding. [] Demonstrates understanding. [] Needs review. [x] Demonstrates/verbalizes HEP/Ed previously given. Plan: [x] Continue per plan of care.    [] Other:       Treatment Charges: Mins Units   []  Modalities     [x]  Ther Exercise 42 3   []  Manual Therapy     []  Ther Activities     []  Aquatics     []  Neuromuscular     [] Vasocompression     [] Gait Training     [] Dry needling        [] 1 or 2 muscles        [] 3 or more muscles     []  Other     Total Treatment time 42 3     Time In: 1:58pm            Time Out: 2:40pm    Electronically signed by:  Obi La, PT

## 2022-10-06 ENCOUNTER — HOSPITAL ENCOUNTER (OUTPATIENT)
Dept: PHYSICAL THERAPY | Age: 62
Setting detail: THERAPIES SERIES
Discharge: HOME | End: 2022-10-06
Payer: MEDICARE

## 2022-10-06 PROCEDURE — 97110 THERAPEUTIC EXERCISES: CPT

## 2022-10-06 NOTE — FLOWSHEET NOTE
[facility] Outpatient Physical Therapy   [address]   Phone: [phone]   Fax: [phone]    Physical Therapy Daily Treatment Note      Date:  10/6/2022  Patient Name:  Rosa Sylvester    :  1960  MRN: [id number]  Physician: Juan M Carlos MD (resident physician)  Tameka Pope MD (attending physician)                                      Insurance: [employer] [employer]. Auth required after 30 visits  Medical Diagnosis: M54.41, G89.29 (ICD-10-CM) - Chronic bilateral low back pain with right-sided sciatica       Rehab Codes: M54.41, R53.1, M25.60  Onset Date:                      Next 's appt: 23 with neurology  Visit# / total visits:    Cancels/No Shows: 0/0    Precautions: None    Subjective:     Pain:  [x] Yes  [] No Location: L posterior hip Pain Rating: (0-10 scale) 1/10  Pain altered Tx:  [x] No  [] Yes  Action:  Comments: 10/6: Patient reports no pain upon arrival and states she felt sore at the end of the last session, but the pain eased up throughout the evening. Patient also reports compliance with HEP but admits she has difficulty with the bird dog exercise and wants to review it here today. Objective:   INTERVENTIONS  INTERVENTIONS  Reps/ Time Weight/ Level Completed  Today Comments   EXERCISES        Mat level:       Piriformis stretch 2x30''  x    LTR* 15x  x    SKTC* 10x5'' ea   R/L ea   Bridges* 10x2    Limited ROM.  Facilitation at B distal femurs   Prone press ups* 10x      TrA marching 10x2  x Up, Up, both down   Cat/Cow 15x ea      Quadruped alt LE 10x R/L  x Wrist discomfort noted afterwards 10/4   BKFO 10x2 ea Red     PPT 10x5''   Visual cues for correct execution   SLRs with TrA engagement  10 reps x 2 R/L      Modified myke stretch 30'' x 2 R/L   Light support from therapist due to a lot of tightness letting leg fully hang off edge of table   Clams 10 reps x 2 R/L      Sidelying book openers 15 reps R/L  x    Prone Supermans 10x  x Added to HEP 10/6          Seated Figure 4 stretch* 2x30''      Seated sciatic nerve glide* 10x      Seated back extension 10x5''   With towel roll at lumbar region   PB roll outs 10x5''             Standing:       HS (B) stretch on step 10x10''      SB stretch 2x30''   Completed after pt noted tension with step ups   3 way hip 15x ea Red     Palloff press 15 reps ea  Green x    Pallof step outs 10x ea, 2 sets Red  Isometric hold slightly in front of chest   Horizontal Chops 10x ea Red x Cues for posture and foot placement   Low>High Chops 10x ea Red x    Marching 10x2 ea 2#  Cues for slow and controlled movement   Good mornings 10x 10#KB  Arms crossed at chest, cues to keep neutral spine   Mini squats to elevated mat 10 reps x 2  x 1/2 depth. Cues for glute activation and shifting hips posteriorly versus excessively anteriorly translating at knees   Forward step ups 10 reps R/L 8'' step  1 UE support with brief SLS hold at top   Lateral step ups 10 reps R/L 8'' step  1 UE support with brief SLS hold at top   KB lateral trunk flexion 10x ea 10# KB  Bending to green chair, cues to decrease trunk fwd flexion   Lifting from floor to elevated mat (waist lvl) 10x 10# x 10# plate in blue crate; edu on proper lifting technique   Unilateral KB dead lifts from 6'' step 5 reps x 2 7.5#  Emphasis on hip hinging pattern and avoiding compensatory rotation   Unilateral KB dead lifts from 8'' step + pivot to overhead reach 5 reps x 2 7.5#  Cues to turn body rather than twisting spine under load.  Simulating unloading heavier dishware from  and placing in overhead cabinets   Unilateral KB hold + march 20 reps L/R x 2 7.5#  1 set holding in L hand, 1 set holding in R hand   Unilateral reach to cone from 8'' step + pivot to [facility] reach 10x ea   Completed to focus on body mechanics vs. Heavy lifting          Re-assessment of all subjective & objective measures, all goals 15'   9/27 by primary PT to determine progress to date and additional rehab needs   Other: * HEP    Specific Instructions for next treatment  Emphasis on pain free mobility in lower back and hips with progressive strengthening to B hips and core/lumbar stabilizers. Transition to more standing/CKC activities as able to work on improving functional standing tolerances and stamina. Continue to reinforce lifting mechanics, simulating strength and stability through functional home-making tasks      Assessment:  10/6: Initiated session with supine stretching and trunk mobility exercises, with additional core/trunk exercises given. Reviewed quadruped alternating LE with addition of pillow under hands for comfort. Continued with standing exercises focused on lifting techniques, posture, and turning rather than twisting. Patient required occasional cue for correct technique with lifting from the floor, but improved with verbal cues. Verbally added superman exercise to HEP as well. [x] Progressing toward goals. [] No change.      [] Other:    [x] Patient would continue to benefit from skilled physical therapy services in order to: improve pain free mobility of lumbar spine and maximize strength/stability in B hips and core/lumbar stabilizers necessary to improve functional tolerances and stamina through all ADLs and community activities at her prior level of function    GOALS:   STG: (to be met in 6 treatments)  Pt will self report worst pain no greater than 3/10 in order to better tolerate ADLs/work activities with minimal dysfunction GOAL PROGRESSING AND EXTENDED THROUGH POC 9/27 (6/10 PAIN AT WORST)  Pt will improve AROM in lumbar spine to 75% or greater without increased pain in order to demonstrate ability to move/reach in all planes unrestricted at [facility] POC 9/27  Pt will improve 30 second timed chair stand test to 10 repetitions or greater without UE support to show improved efficiency with functional movements in home GOAL MET 9/27  LTG: (to be met in 16 treatments)  Pt will demonstrate improved B LE strength to 4/5 or greater in order to demonstrate improved stability/strength necessary for unrestricted ADLs/work activities [address] 906 9/27  Pt will improve core strength evidenced by maintaining lumbopelvic stability on bilateral straight leg lowering test to 45 deg or less to show improved support/stability to lumbar spine with less pain  Pt will self report ability to stand/walk for at least 30 minutes or greater to show improved community tolerances at her prior level of function GOAL MET 9/27  Pt will self report ability to lift and carry light household items (ex: laundry basket, grocery bags) between rooms to show improved functional tolerances through ADL tasks at her PLOF. GOAL PROGRESSING 9/27  Pt will decrease mod MARYCRUZ to 15% impaired or less in order to demonstrate improved functional tolerances at PLOF with minimal restriction/dysfunction  Pt will demonstrate independence with a long term HEP for continued progress/maintenance after completion of PT        Pt. Education:  [x] Yes  [] No  [x] Reviewed Prior HEP/Ed  Method of Education: [x] Verbal  [] Demo  [] Written  Comprehension of Education:  [] Verbalizes understanding. [] Demonstrates understanding. [] Needs review. [x] Demonstrates/verbalizes HEP/Ed previously given. Plan: [x] Continue per plan of care.    [] Other:       Treatment Charges: Mins Units   []  Modalities     [x]  Ther Exercise 40 3   []  Manual Therapy     []  Ther Activities     []  Aquatics     []  Neuromuscular     [] Vasocompression     [] Gait Training     [] Dry needling        [] 1 or 2 muscles        [] 3 or more muscles     []  Other     Total Treatment time 40 3     Time In: 1:35pm            Time Out: 2:15pm    Electronically signed by:  Mayelin Thompson PTA

## 2022-10-07 DIAGNOSIS — I25.10 CORONARY ARTERY DISEASE INVOLVING NATIVE CORONARY ARTERY OF NATIVE HEART WITHOUT ANGINA PECTORIS: ICD-10-CM

## 2022-10-07 DIAGNOSIS — R60.9 PERIPHERAL EDEMA: ICD-10-CM

## 2022-10-07 DIAGNOSIS — M48.061 SPINAL STENOSIS OF LUMBAR REGION WITHOUT NEUROGENIC CLAUDICATION: ICD-10-CM

## 2022-10-07 DIAGNOSIS — M54.16 LUMBAR RADICULOPATHY: ICD-10-CM

## 2022-10-08 DIAGNOSIS — R60.9 PERIPHERAL EDEMA: ICD-10-CM

## 2022-10-08 DIAGNOSIS — I25.10 CORONARY ARTERY DISEASE INVOLVING NATIVE CORONARY ARTERY OF NATIVE HEART WITHOUT ANGINA PECTORIS: ICD-10-CM

## 2022-10-09 DIAGNOSIS — M79.605 LEG PAIN, LEFT: ICD-10-CM

## 2022-10-09 DIAGNOSIS — I10 ESSENTIAL HYPERTENSION: ICD-10-CM

## 2022-10-10 RX ORDER — PREGABALIN 150 MG/1
150 CAPSULE ORAL 3 TIMES DAILY
Qty: 90 CAPSULE | Refills: 0 | Status: SHIPPED | OUTPATIENT
Start: 2022-10-10 | End: 2022-11-08 | Stop reason: SDUPTHER

## 2022-10-10 RX ORDER — FUROSEMIDE 20 MG/1
20 TABLET ORAL DAILY
Qty: 60 TABLET | Refills: 0 | Status: SHIPPED | OUTPATIENT
Start: 2022-10-10 | End: 2022-12-08

## 2022-10-10 RX ORDER — FUROSEMIDE 20 MG/1
20 TABLET ORAL DAILY
Qty: 60 TABLET | Refills: 0 | Status: SHIPPED | OUTPATIENT
Start: 2022-10-10 | End: 2022-10-18 | Stop reason: SDUPTHER

## 2022-10-10 RX ORDER — ENALAPRIL MALEATE 10 MG/1
TABLET ORAL
Qty: 90 TABLET | Refills: 0 | Status: SHIPPED | OUTPATIENT
Start: 2022-10-10 | End: 2022-10-18

## 2022-10-10 RX ORDER — PSEUDOEPHED/ACETAMINOPH/DIPHEN 30MG-500MG
TABLET ORAL
Qty: 120 TABLET | Refills: 0 | Status: SHIPPED | OUTPATIENT
Start: 2022-10-10 | End: 2022-11-07

## 2022-10-11 ENCOUNTER — HOSPITAL ENCOUNTER (OUTPATIENT)
Dept: PHYSICAL THERAPY | Age: 62
Setting detail: THERAPIES SERIES
Discharge: HOME | End: 2022-10-11
Payer: MEDICARE

## 2022-10-11 DIAGNOSIS — M50.30 DEGENERATIVE DISC DISEASE, CERVICAL: ICD-10-CM

## 2022-10-11 DIAGNOSIS — M51.26 PROTRUDED LUMBAR DISC: ICD-10-CM

## 2022-10-11 DIAGNOSIS — S33.6XXD SPRAIN OF SACROILIAC LIGAMENT, SUBSEQUENT ENCOUNTER: ICD-10-CM

## 2022-10-11 DIAGNOSIS — M51.36 LUMBAR DEGENERATIVE DISC DISEASE: ICD-10-CM

## 2022-10-11 PROCEDURE — 97110 THERAPEUTIC EXERCISES: CPT

## 2022-10-11 NOTE — FLOWSHEET NOTE
[facility] Outpatient Physical Therapy   [facility]   Phone: [phone]   Fax: [phone]    Physical Therapy Daily Treatment Note      Date:  10/11/2022  Patient Name:  Maribel Valentine    :  1960  MRN: [id number]  Physician: Xavier Saravia MD (resident physician)  Jessica Villarreal MD (attending physician)                                      Insurance: [place] [employer]. Auth required after 30 visits  Medical Diagnosis: M54.41, G89.29 (ICD-10-CM) - Chronic bilateral low back pain with right-sided sciatica       Rehab Codes: M54.41, R53.1, M25.60  Onset Date:                      Next 's appt: 23 with neurology  Visit# / total visits:    Cancels/No Shows: 0/0    Precautions: None    Subjective:     Pain:  [x] Yes  [] No Location: L posterior hip Pain Rating: (0-10 scale) 0/10  Pain altered Tx:  [x] No  [] Yes  Action:  Comments: 10/11: Pt states that her back is doing fairly well today with no significant pain at start of session. No complaints after last visit and feels like things have been going well. Objective:   INTERVENTIONS  INTERVENTIONS  Reps/ Time Weight/ Level Completed  Today Comments   EXERCISES        Mat level:       Piriformis stretch 2x30''  x    LTR* 15x  x    SKTC* 10x5'' ea   R/L ea   Bridges* 10x2    Limited ROM.  Facilitation at B distal femurs   Prone press ups* 10x      TrA marching 10 reps  x Up, Up, both down   Supine alt LE extensions (heel taps) with TrA activation 10x  x    Cat/Cow 15x ea      Quadruped alt LE 10x R/L   Wrist discomfort noted afterwards 10/4   Bird dogs 6 reps R/L  x    BKFO 10x2 ea Red     PPT 10x5''   Visual cues for correct execution   SLRs with TrA engagement  10 reps x 2 R/L      Modified myke stretch 30'' x 2 R/L   Light support from therapist due to a lot of tightness letting leg fully hang off edge of table   Clams 10 reps x 2 R/L      Sidelying book openers 15 reps R/L  x    Prone Supermans 10x  x Added to HEP 10/6          Seated Figure 4 stretch* 2x30''      Seated sciatic nerve glide* 10x      Seated back extension 10x5''   With towel roll at lumbar region   PB roll outs 10x5''             Standing:       HS (B) stretch on step 10x10''      SB stretch 2x30''   Completed after pt noted tension with step ups   3 way hip 15x ea Red     Palloff press 15 reps ea  Green x    Pallof step outs 10x ea, 2 sets Red  Isometric hold slightly in front of chest   Horizontal Chops 10x ea Red  Cues for posture and foot placement   Low>High Chops 10x ea Red     Marching 10x2 ea 2#  Cues for slow and controlled movement   Good mornings 10x 10#KB  Arms crossed at chest, cues to keep neutral spine   Mini squats to elevated mat 10 reps x 2  x 1/2 depth. Cues for glute activation and shifting hips posteriorly versus excessively anteriorly translating at knees   Forward step ups 10 reps R/L 8'' step  1 UE support with brief SLS hold at top   Lateral step ups 10 reps R/L 8'' step  1 UE support with brief SLS hold at top   KB lateral trunk flexion 10x ea 10# KB  Bending to green chair, cues to decrease trunk fwd flexion   Lifting from floor to waist 10x 15# x FCE crate; edu on proper lifting technique   Lifting from waist to overhead shelf with pivot 3x R/L 12# x FCE crate   Unilateral KB dead lifts from 6'' step 5 reps x 2 7.5#  Emphasis on hip hinging pattern and avoiding compensatory rotation   Unilateral KB dead lifts from 8'' step + pivot to overhead reach 5 reps x 2 7.5#  Cues to turn body rather than twisting spine under load.  Simulating unloading heavier dishware from  and placing in overhead cabinets   Unilateral KB hold + march 20 reps L/R x 2 7.5# x 1 set holding in L hand, 1 set holding in R hand   Unilateral reach to cone from 8'' step + pivot to [place] reach 10x ea   Completed to focus on body mechanics vs. Heavy lifting          Re-assessment of all subjective & objective measures, all goals 15'   9/27 by primary PT to determine progress to date and additional rehab needs   Other: * HEP    Specific Instructions for next treatment  Emphasis on pain free mobility in lower back and hips with progressive strengthening to B hips and core/lumbar stabilizers. Transition to more standing/CKC activities as able to work on improving functional standing tolerances and stamina. Continue to reinforce lifting mechanics, simulating strength and stability through functional home-making tasks      Assessment:  10/11: Pt continues to fatigue quickly through all functional lifting activities, but overall shows a much better understanding of proper body mechanics to reduce strain on lower back. Good understanding noted of importance of pivoting at hips and feet rather than twisting on planted feet, showing good carryover knowledge. High levels of difficulty noted with addition of bird dogs today, and will continue to address core stabilization and functional task training over remaining visits on current POC. [x] Progressing toward goals. [] No change.      [] Other:    [x] Patient would continue to benefit from skilled physical therapy services in order to: improve pain free mobility of lumbar spine and maximize strength/stability in B hips and core/lumbar stabilizers necessary to improve functional tolerances and stamina through all ADLs and community activities at her prior level of function    GOALS:   STG: (to be met in 6 treatments)  Pt will self report worst pain no greater than 3/10 in order to better tolerate ADLs/work activities with minimal dysfunction GOAL PROGRESSING AND EXTENDED THROUGH POC 9/27 (6/10 PAIN AT WORST)  Pt will improve AROM in lumbar spine to 75% or greater without increased pain in order to demonstrate ability to move/reach in all planes unrestricted at [facility] POC 9/27  Pt will improve 30 second timed chair stand test to 10 repetitions or greater without UE support to show improved efficiency with functional movements in home GOAL MET 9/27  LTG: (to be met in 16 treatments)  Pt will demonstrate improved B LE strength to 4/5 or greater in order to demonstrate improved stability/strength necessary for unrestricted ADLs/work activities [address]uez 906 9/27  Pt will improve core strength evidenced by maintaining lumbopelvic stability on bilateral straight leg lowering test to 45 deg or less to show improved support/stability to lumbar spine with less pain  Pt will self report ability to stand/walk for at least 30 minutes or greater to show improved community tolerances at her prior level of function GOAL MET 9/27  Pt will self report ability to lift and carry light household items (ex: laundry basket, grocery bags) between rooms to show improved functional tolerances through ADL tasks at her PLOF. GOAL PROGRESSING 9/27  Pt will decrease mod MARYCRUZ to 15% impaired or less in order to demonstrate improved functional tolerances at PLOF with minimal restriction/dysfunction  Pt will demonstrate independence with a long term HEP for continued progress/maintenance after completion of PT        Pt. Education:  [x] Yes  [] No  [x] Reviewed Prior HEP/Ed  Method of Education: [x] Verbal  [] Demo  [] Written  Comprehension of Education:  [] Verbalizes understanding. [] Demonstrates understanding. [] Needs review. [x] Demonstrates/verbalizes HEP/Ed previously given. Plan: [x] Continue per plan of care.    [] Other:       Treatment Charges: Mins Units   []  Modalities     [x]  Ther Exercise 38 3   []  Manual Therapy     []  Ther Activities     []  Aquatics     []  Neuromuscular     [] Vasocompression     [] Gait Training     [] Dry needling        [] 1 or 2 muscles        [] 3 or more muscles     []  Other     Total Treatment time 38 3     Time In: 3:32pm            Time Out: 4:10pm    Electronically signed by:  Zeke Bustamante PT

## 2022-10-13 ENCOUNTER — HOSPITAL ENCOUNTER (OUTPATIENT)
Dept: PHYSICAL THERAPY | Age: 62
Setting detail: THERAPIES SERIES
Discharge: HOME | End: 2022-10-13
Payer: MEDICARE

## 2022-10-13 PROCEDURE — 97110 THERAPEUTIC EXERCISES: CPT

## 2022-10-13 NOTE — FLOWSHEET NOTE
[facility] Outpatient Physical Therapy   [address]   Phone: [phone]   Fax: [phone]    Physical Therapy Daily Treatment Note      Date:  10/13/2022  Patient Name:  Loretta Corado    :  1960  MRN: [id number]  Physician: Yusuf Marsh MD (resident physician)  Fifi Zafar MD (attending physician)                                      Insurance: [place] [employer]. Auth required after 30 visits  Medical Diagnosis: M54.41, G89.29 (ICD-10-CM) - Chronic bilateral low back pain with right-sided sciatica       Rehab Codes: M54.41, R53.1, M25.60  Onset Date:                      Next 's appt: 23 with neurology  Visit# / total visits:    Cancels/No Shows: 0/0    Precautions: None    Subjective:     Pain:  [x] Yes  [] No Location: L posterior hip Pain Rating: (0-10 scale) 0/10  Pain altered Tx:  [x] No  [] Yes  Action:  Comments: 10/13: Pt states that her back was slightly achy and legs sore after her last visit but denied as any significant pain. Still reports residual soreness in B thighs and especially calves from previous visit but no complaints in lower back. Objective:   INTERVENTIONS  INTERVENTIONS  Reps/ Time Weight/ Level Completed  Today Comments   EXERCISES        Mat level:       Piriformis stretch 2x30''  x    LTR* 15x  x    SKTC* 10x5'' ea   R/L ea   Bridges* 10x2    Limited ROM.  Facilitation at B distal femurs   Prone press ups* 10x      TrA marching 10 reps   Up, Up, both down   Supine alt LE extensions (heel taps) with TrA activation 10 reps x 2  x    Cat/Cow 15x ea      Quadruped alt LE 10x R/L   Wrist discomfort noted afterwards 10/4   Bird dogs 6 reps R/L  x    BKFO 10x2 ea Red     PPT 10x5''   Visual cues for correct execution   SLRs with TrA engagement  10 reps x 2 R/L      Modified myke stretch 30'' x 2 R/L   Light support from therapist due to a lot of tightness letting leg fully hang off edge of table   Clams 10 reps x 2 R/L      Sidelying book openers 15 reps R/L  x    Prone Supermans 10x   Added to HEP 10/6          Seated Figure 4 stretch* 2x30''      Seated sciatic nerve glide* 10x      Seated back extension 10x5''   With towel roll at lumbar region   PB roll outs 10x5''             Standing:       HS (B) stretch on step 10x10''      SB stretch 3x30''  x Completed after pt noted tension with step ups   3 way hip 15x ea Red     Palloff press 15 reps ea x 2 Green x    Pallof step outs 10x ea, 2 sets Red  Isometric hold slightly in front of chest   Horizontal Chops 10x ea Red  Cues for posture and foot placement   Low>High Chops 10x ea Red     Marching 10x2 ea 2#  Cues for slow and controlled movement   Good mornings 10x 10#KB  Arms crossed at chest, cues to keep neutral spine   Mini squats to elevated mat 10 reps x 2   1/2 depth. Cues for glute activation and shifting hips posteriorly versus excessively anteriorly translating at knees   Forward step ups 10 reps R/L 8'' step x 1 UE support with brief SLS hold at top   Lateral step ups 10 reps R/L 8'' step x 1 UE support with brief SLS hold at top   KB lateral trunk flexion 10x ea 10# KB  Bending to green chair, cues to decrease trunk fwd flexion   Lifting from floor to waist 10x 15#  FCE crate; edu on proper lifting technique   Lifting from waist to overhead shelf with pivot 3x R/L 12#  FCE crate   Unilateral KB dead lifts from 6'' step 5 reps x 2 10# x Emphasis on hip hinging pattern and avoiding compensatory rotation   Unilateral KB dead lifts from 8'' step + pivot to overhead reach 5 reps x 2 7.5#  Cues to turn body rather than twisting spine under load.  Simulating unloading heavier dishware from  and placing in overhead cabinets   Unilateral KB hold + march 20 reps L/R x 2 7.5#  1 set holding in L hand, 1 set holding in R hand   Unilateral reach to cone from 8'' step + pivot to [place] reach 10x ea   Completed to focus on body mechanics vs. Heavy lifting Re-assessment of all subjective & objective measures, all goals 15' 9/27 by primary PT to determine progress to date and additional rehab needs   Other: * HEP    Specific Instructions for next treatment  Emphasis on pain free mobility in lower back and hips with progressive strengthening to B hips and core/lumbar stabilizers. Transition to more standing/CKC activities as able to work on improving functional standing tolerances and stamina. Continue to reinforce lifting mechanics, simulating strength and stability through functional home-making tasks      Assessment:  10/13: Residual LE soreness present from previous visit with pt reporting frequent cramping in B calves through most standing exercises. Alleviated some with slant board stretching with pt able to continue the rest of her session with minimal additional cramping episodes. Still required intermittent cues to avoid compensatory rotation with suitcase KB dead lifts but was able to progress to a 10 lb KB without any back pain. Anticipate good potential to successfully transition to an independent home program after completion of remaining 2 visits on POC. [x] Progressing toward goals. [] No change.      [] Other:    [x] Patient would continue to benefit from skilled physical therapy services in order to: improve pain free mobility of lumbar spine and maximize strength/stability in B hips and core/lumbar stabilizers necessary to improve functional tolerances and stamina through all ADLs and community activities at her prior level of function    GOALS:   STG: (to be met in 6 treatments)  Pt will self report worst pain no greater than 3/10 in order to better tolerate ADLs/work activities with minimal dysfunction GOAL PROGRESSING AND EXTENDED THROUGH POC 9/27 (6/10 PAIN AT WORST)  Pt will improve AROM in lumbar spine to 75% or greater without increased pain in order to demonstrate ability to move/reach in all planes unrestricted at PLOF GOAL PROGRESSING AND EXTENDED THROUGH POC 9/27  Pt will improve 30 second timed chair stand test to 10 repetitions or greater without UE support to show improved efficiency with functional movements in home GOAL MET 9/27  LTG: (to be met in 16 treatments)  Pt will demonstrate improved B LE strength to 4/5 or greater in order to demonstrate improved stability/strength necessary for unrestricted ADLs/work activities GOAL PROGRESSING AND EXTENDED 9/27  Pt will improve core strength evidenced by maintaining lumbopelvic stability on bilateral straight leg lowering test to 45 deg or less to show improved support/stability to lumbar spine with less pain  Pt will self report ability to stand/walk for at least 30 minutes or greater to show improved community tolerances at her prior level of function GOAL MET 9/27  Pt will self report ability to lift and carry light household items (ex: laundry basket, grocery bags) between rooms to show improved functional tolerances through ADL tasks at her PLOF. GOAL PROGRESSING 9/27  Pt will decrease mod MARYCRUZ to 15% impaired or less in order to demonstrate improved functional tolerances at PLOF with minimal restriction/dysfunction  Pt will demonstrate independence with a long term HEP for continued progress/maintenance after completion of PT        Pt. Education:  [x] Yes  [] No  [x] Reviewed Prior HEP/Ed  Method of Education: [x] Verbal  [] Demo  [] Written  Comprehension of Education:  [] Verbalizes understanding. [] Demonstrates understanding. [] Needs review. [x] Demonstrates/verbalizes HEP/Ed previously given. Plan: [x] Continue per plan of care.    [] Other:       Treatment Charges: Mins Units   []  Modalities     [x]  Ther Exercise 38 3   []  Manual Therapy     []  Ther Activities     []  Aquatics     []  Neuromuscular     [] Vasocompression     [] Gait Training     [] Dry needling        [] 1 or 2 muscles        [] 3 or more muscles     []  Other     Total Treatment time 38 3 Time In: 3:51pm            Time Out: 4:29pm    Electronically signed by:  Payam Pérez PT

## 2022-10-18 ENCOUNTER — HOSPITAL ENCOUNTER (OUTPATIENT)
Dept: PHYSICAL THERAPY | Age: 62
Setting detail: THERAPIES SERIES
Discharge: HOME | End: 2022-10-18
Payer: MEDICARE

## 2022-10-18 ENCOUNTER — OFFICE VISIT (OUTPATIENT)
Dept: FAMILY MEDICINE CLINIC | Age: 62
End: 2022-10-18
Payer: MEDICARE

## 2022-10-18 ENCOUNTER — HOSPITAL ENCOUNTER (OUTPATIENT)
Age: 62
Discharge: HOME | End: 2022-10-18
Payer: MEDICARE

## 2022-10-18 VITALS
BODY MASS INDEX: 33.68 KG/M2 | OXYGEN SATURATION: 96 % | HEART RATE: 82 BPM | DIASTOLIC BLOOD PRESSURE: 82 MMHG | TEMPERATURE: 97.9 F | WEIGHT: 183 LBS | SYSTOLIC BLOOD PRESSURE: 98 MMHG | HEIGHT: 62 IN

## 2022-10-18 DIAGNOSIS — R73.03 PREDIABETES: ICD-10-CM

## 2022-10-18 DIAGNOSIS — E78.2 MIXED HYPERLIPIDEMIA: ICD-10-CM

## 2022-10-18 DIAGNOSIS — Z95.828 S/P INSERTION OF ILIAC ARTERY STENT: ICD-10-CM

## 2022-10-18 DIAGNOSIS — F41.9 ANXIETY: ICD-10-CM

## 2022-10-18 DIAGNOSIS — I25.10 CORONARY ARTERY DISEASE INVOLVING NATIVE CORONARY ARTERY OF NATIVE HEART WITHOUT ANGINA PECTORIS: ICD-10-CM

## 2022-10-18 DIAGNOSIS — M48.062 SPINAL STENOSIS OF LUMBAR REGION WITH NEUROGENIC CLAUDICATION: ICD-10-CM

## 2022-10-18 DIAGNOSIS — M16.10 HIP ARTHRITIS: ICD-10-CM

## 2022-10-18 DIAGNOSIS — Z23 NEED FOR INFLUENZA VACCINATION: ICD-10-CM

## 2022-10-18 DIAGNOSIS — F32.4 MAJOR DEPRESSIVE DISORDER WITH SINGLE EPISODE, IN PARTIAL REMISSION (HCC): ICD-10-CM

## 2022-10-18 DIAGNOSIS — I10 ESSENTIAL HYPERTENSION: Primary | ICD-10-CM

## 2022-10-18 DIAGNOSIS — D64.9 MILD ANEMIA: ICD-10-CM

## 2022-10-18 DIAGNOSIS — E66.09 CLASS 1 OBESITY DUE TO EXCESS CALORIES WITH SERIOUS COMORBIDITY AND BODY MASS INDEX (BMI) OF 32.0 TO 32.9 IN ADULT: ICD-10-CM

## 2022-10-18 PROBLEM — I73.9 PERIPHERAL ARTERY DISEASE (HCC): Status: ACTIVE | Noted: 2017-06-28

## 2022-10-18 LAB
ABSOLUTE EOS #: 0.1 K/UL (ref 0–0.4)
ABSOLUTE LYMPH #: 2.8 K/UL (ref 1–4.8)
ABSOLUTE MONO #: 0.8 K/UL (ref 0.1–1.3)
BASOPHILS # BLD: 1 % (ref 0–2)
BASOPHILS ABSOLUTE: 0 K/UL (ref 0–0.2)
EOSINOPHILS RELATIVE PERCENT: 2 % (ref 0–4)
HBA1C MFR BLD: 5.3 %
HCT VFR BLD CALC: 41.9 % (ref 36–46)
HEMOGLOBIN: 14.1 G/DL (ref 12–16)
LYMPHOCYTES # BLD: 35 % (ref 24–44)
MCH RBC QN AUTO: 31.6 PG (ref 26–34)
MCHC RBC AUTO-ENTMCNC: 33.6 G/DL (ref 31–37)
MCV RBC AUTO: 93.9 FL (ref 80–100)
MONOCYTES # BLD: 9 % (ref 1–7)
PDW BLD-RTO: 14.1 % (ref 11.5–14.9)
PLATELET # BLD: 258 K/UL (ref 150–450)
PMV BLD AUTO: 8.8 FL (ref 6–12)
RBC # BLD: 4.46 M/UL (ref 4–5.2)
SEG NEUTROPHILS: 53 % (ref 36–66)
SEGMENTED NEUTROPHILS ABSOLUTE COUNT: 4.4 K/UL (ref 1.3–9.1)
WBC # BLD: 8.2 K/UL (ref 3.5–11)

## 2022-10-18 PROCEDURE — 90674 CCIIV4 VAC NO PRSV 0.5 ML IM: CPT | Performed by: FAMILY MEDICINE

## 2022-10-18 PROCEDURE — 85025 COMPLETE CBC W/AUTO DIFF WBC: CPT

## 2022-10-18 PROCEDURE — 83036 HEMOGLOBIN GLYCOSYLATED A1C: CPT | Performed by: FAMILY MEDICINE

## 2022-10-18 PROCEDURE — 36415 COLL VENOUS BLD VENIPUNCTURE: CPT

## 2022-10-18 PROCEDURE — 99214 OFFICE O/P EST MOD 30 MIN: CPT | Performed by: FAMILY MEDICINE

## 2022-10-18 PROCEDURE — 90471 IMMUNIZATION ADMIN: CPT | Performed by: FAMILY MEDICINE

## 2022-10-18 PROCEDURE — 97110 THERAPEUTIC EXERCISES: CPT

## 2022-10-18 RX ORDER — VIT A/VIT C/VIT E/ZINC/COPPER 4296-226
CAPSULE ORAL
COMMUNITY
Start: 2022-09-26

## 2022-10-18 RX ORDER — ENALAPRIL MALEATE 10 MG/1
TABLET ORAL
Qty: 90 TABLET | Refills: 0 | Status: SHIPPED | OUTPATIENT
Start: 2022-10-18

## 2022-10-18 ASSESSMENT — ENCOUNTER SYMPTOMS
NAUSEA: 0
COUGH: 0
DIARRHEA: 0
SORE THROAT: 0
TROUBLE SWALLOWING: 0
RHINORRHEA: 0
EYE REDNESS: 0
ABDOMINAL DISTENTION: 0
SHORTNESS OF BREATH: 0
SINUS PRESSURE: 0
WHEEZING: 0
CHEST TIGHTNESS: 0
CONSTIPATION: 0
BLOOD IN STOOL: 0
STRIDOR: 0
ABDOMINAL PAIN: 0
COLOR CHANGE: 0
BACK PAIN: 1
RECTAL PAIN: 0
VOMITING: 0

## 2022-10-18 NOTE — PROGRESS NOTES
Chief Complaint   Patient presents with    Hypertension         Ciara Masters  here today for follow up on chronic medical problems, go over labs and/or diagnostic studies, and medication refills. Hypertension      HPI: Patient is scheduled for follow-up on chronic medical conditions. Hypertension controlled, patient is on Lasix 20 mg which was decreased on previous appointment and also on ACE inhibitor's visit back to 20 mg which was also decreased. Patient blood pressure still running low, blood pressure from home on average in 110/70. Patient denies any lightheadedness any dizziness any headaches. Prediabetes A1c is normal at 5.3 is on diet control. Patient is not taking any medications. Spinal lumbar stenosis is on Lyrica, pain is fairly stable. Patient denies any recent flareups. Controlled Substance Monitoring:    Acute and Chronic Pain Monitoring:   RX Monitoring 7/18/2022   Acute Pain Prescriptions -   Periodic Controlled Substance Monitoring No signs of potential drug abuse or diversion identified. ;Assessed functional status. Chronic Pain > 50 MEDD Considered consultation with a specialist.   Chronic Pain > 80 MEDD Obtained or confirmed \"Medication Contract\" on file. Patient has history of anxiety and depression which is stable. Patient reports changes in her day-to-day activities. Patient is on Wellbutrin and BuSpar follows with psychiatrist    History of coronary artery disease, stable follows with cardiologist Dr. Luiz Maxwell. Denies any chest pain dyspnea on exertion. Currently patient is on statins beta-blockers ACE inhibitor's. BP 98/82   Pulse 82   Temp 97.9 °F (36.6 °C)   Ht 5' 2\" (1.575 m)   Wt 183 lb (83 kg)   LMP 11/07/2012   SpO2 96%   BMI 33.47 kg/m²    Body mass index is 33.47 kg/m².   Wt Readings from Last 3 Encounters:   10/18/22 183 lb (83 kg)   08/17/22 180 lb 9.6 oz (81.9 kg)   07/18/22 176 lb 9.6 oz (80.1 kg)        []Negative depression screening. PHQ Scores 4/14/2022 1/13/2022 2/8/2021 3/25/2019 9/25/2018 4/16/2018   PHQ2 Score 0 0 0 0 0 0   PHQ9 Score 4 0 0 0 0 0      [x]1-4 = Minimal depression   []5-9 = Milddepression   []10-14 = Moderate depression   []15-19 = Moderately severe depression   []20-27 = Severe depression    Discussed testing with the patient and all questions fully answered.     Office Visit on 07/18/2022   Component Date Value Ref Range Status    Hemoglobin A1C 07/18/2022 5.3  % Final         Most recent labs reviewed:     Lab Results   Component Value Date    WBC 8.2 10/18/2022    HGB 14.1 10/18/2022    HCT 41.9 10/18/2022    MCV 93.9 10/18/2022     10/18/2022       @BRIEFLAB(NA,K,CL,CO2,BUN,CREATININE,GLUCOSE,CALCIUM)@     Lab Results   Component Value Date    ALT 10 06/24/2022    AST 15 06/24/2022    ALKPHOS 74 06/24/2022    BILITOT 0.18 (L) 06/24/2022       Lab Results   Component Value Date    TSH 2.01 06/24/2022       Lab Results   Component Value Date    CHOL 113 06/24/2022    CHOL 136 07/26/2021    CHOL 116 03/26/2019     Lab Results   Component Value Date    TRIG 90 06/24/2022    TRIG 104 07/26/2021    TRIG 91 03/26/2019     Lab Results   Component Value Date    HDL 47 06/24/2022    HDL 50 07/26/2021    HDL 59 07/01/2020     Lab Results   Component Value Date    LDLCHOLESTEROL 48 06/24/2022    LDLCHOLESTEROL 65 07/26/2021    LDLCHOLESTEROL 67 07/01/2020     Lab Results   Component Value Date    VLDL NOT REPORTED 07/26/2021    VLDL NOT REPORTED 07/01/2020    VLDL NOT REPORTED 03/26/2019     Lab Results   Component Value Date    CHOLHDLRATIO 2.4 06/24/2022    CHOLHDLRATIO 2.7 07/26/2021    CHOLHDLRATIO 2.4 07/01/2020       Lab Results   Component Value Date    LABA1C 5.3 10/18/2022       No results found for: [id number]    No results found for: FOLATE    No results found for: IRON, TIBC, FERRITIN    Lab Results   Component Value Date    VITD25 56.0 06/24/2022             Current Outpatient Medications   Medication Sig Dispense Refill    Multiple Vitamins-Minerals (PRESERVISION AREDS) CAPS TAKE 1 CAPSULE BY MOUTH TWICE DAILY      enalapril (VASOTEC) 10 MG tablet TAKE 1TABLETS BY MOUTH EVERY MORNING, 90 tablet 0    diclofenac sodium (VOLTAREN) 1 % GEL APPLY 2 GRAMS TOPICALLY TO THE AFFECTED AREA FOUR TIMES DAILY 200 g 0    CALCIUM 600+D3 600-400 MG-UNIT TABS per tab TAKE 1 TABLET BY MOUTH DAILY 90 tablet 0    furosemide (LASIX) 20 MG tablet Take 1 tablet by mouth daily 60 tablet 0    pregabalin (LYRICA) 150 MG capsule Take 1 capsule by mouth in the morning, at noon, and at bedtime for 30 days. TAKE 1 CAPSULE BY MOUTH EVERY MORNING, AT NOON AND AT BEDTIME 90 capsule 0    ACETAMINOPHEN EXTRA STRENGTH 500 MG tablet TAKE 1 TABLET BY MOUTH EVERY 6 HOURS AS NEEDED FOR PAIN 120 tablet 0    cetirizine (ZYRTEC) 10 MG tablet TAKE 1 TABLET BY MOUTH DAILY 30 tablet 0    buPROPion (WELLBUTRIN SR) 100 MG extended release tablet TAKE 1 TABLET BY MOUTH TWICE DAILY 60 tablet 0    cyclobenzaprine (FLEXERIL) 10 MG tablet TAKE 1 TABLET BY MOUTH TWICE DAILY AS NEEDED FOR MUSCLE SPASMS 60 tablet 0    nitroGLYCERIN (NITROSTAT) 0.4 MG SL tablet UP TO MAX OF 3 TOTAL DOSES. IF NO RELIEF AFTER 1 DOSE, CALL 911 25 tablet 0    busPIRone (BUSPAR) 15 MG tablet TAKE 1 TABLET BY MOUTH EVERY 12 HOURS AS NEEDED 120 tablet 0    clopidogrel (PLAVIX) 75 MG tablet Take 75 mg by mouth in the morning.       Handicap Placard MISC by Does not apply route Expires on 12/31/25 1 each 0    potassium chloride (KLOR-CON M) 10 MEQ extended release tablet TAKE 1 TABLET BY MOUTH DAILY 90 tablet 1    aspirin (ASPIRIN LOW DOSE) 81 MG EC tablet TAKE 1 TABLET BY MOUTH DAILY 90 tablet 2    metoprolol succinate (TOPROL XL) 50 MG extended release tablet TAKE 1 TABLET BY MOUTH DAILY 90 tablet 2    albuterol sulfate HFA (VENTOLIN HFA) 108 (90 Base) MCG/ACT inhaler INHALE 2 PUFFS INTO THE LUNGS EVERY 6 HOURS AS NEEDED FOR WHEEZING 36 g 3    simvastatin (ZOCOR) 40 MG tablet Take 1 tablet by mouth nightly TAKE 1 TABLET BY MOUTH EVERY NIGHT 90 tablet 3    metFORMIN (GLUCOPHAGE-XR) 500 MG extended release tablet TAKE 1 TABLET BY MOUTH DAILY WITH BREAKFAST 60 tablet 3    isosorbide mononitrate (IMDUR) 30 MG extended release tablet TAKE 1 TABLET BY MOUTH EVERY DAY 90 tablet 2    cilostazol (PLETAL) 50 MG tablet Take 50 mg by mouth 2 times daily       No current facility-administered medications for this visit.              Social History     Socioeconomic History    Marital status:      Spouse name: Not on file    Number of children: Not on file    Years of education: Not on file    Highest education level: Not on file   Occupational History    Not on file   Tobacco Use    Smoking status: Former     Packs/day: 0.25     Years: 25.00     Pack years: 6.25     Types: Cigarettes     Quit date: 2009     Years since quittin.3    Smokeless tobacco: Never   Vaping Use    Vaping Use: Never used   Substance and Sexual Activity    Alcohol use: No     Alcohol/week: 0.0 standard drinks    Drug use: No    Sexual activity: Yes   Other Topics Concern    Not on file   Social History Narrative    Not on file     Social Determinants of Health     Financial Resource Strain: Low Risk     Difficulty of Paying Living Expenses: Not hard at all   Food Insecurity: No Food Insecurity    Worried About Running Out of Food in the Last Year: Never true    Ran Out of Food in the Last Year: Never true   Transportation Needs: Not on file   Physical Activity: Not on file   Stress: Not on file   Social Connections: Not on file   Intimate Partner Violence: Not on file   Housing Stability: Not on file     Counseling given: Not Answered        Family History   Problem Relation Age of Onset    Emphysema Mother     Breast Cancer Paternal Aunt              -rest of complaints with corresponding details per ROS    The patient's past medical, surgical, social, and family history as well as her current medications and allergies were reviewed as documented intoday's encounter. Review of Systems   Constitutional:  Negative for activity change, appetite change, fatigue, fever and unexpected weight change. HENT:  Negative for congestion, ear pain, postnasal drip, rhinorrhea, sinus pressure, sore throat and trouble swallowing. Eyes:  Negative for redness and visual disturbance. Respiratory:  Negative for cough, chest tightness, shortness of breath, wheezing and stridor. Cardiovascular:  Negative for chest pain, palpitations and leg swelling. Gastrointestinal:  Negative for abdominal distention, abdominal pain, blood in stool, constipation, diarrhea, nausea, rectal pain and vomiting. Endocrine: Negative for polydipsia, polyphagia and polyuria. Genitourinary:  Negative for difficulty urinating, flank pain, frequency and urgency. Musculoskeletal:  Positive for arthralgias and back pain. Negative for gait problem, myalgias and neck pain. Skin:  Negative for color change, rash and wound. Allergic/Immunologic: Negative for food allergies and immunocompromised state. Neurological:  Positive for numbness. Negative for dizziness, speech difficulty, weakness, light-headedness and headaches. Psychiatric/Behavioral:  Negative for agitation, behavioral problems, decreased concentration, dysphoric mood, hallucinations, sleep disturbance and suicidal ideas. The patient is nervous/anxious. Physical Exam  Vitals and nursing note reviewed. Constitutional:       General: She is not in acute distress. Appearance: Normal appearance. She is well-developed. She is not diaphoretic. HENT:      Head: Normocephalic and atraumatic. Nose: Nose normal.   Eyes:      General:         Right eye: No discharge. Left eye: No discharge. Extraocular Movements: Extraocular movements intact. Conjunctiva/sclera: Conjunctivae normal.      Pupils: Pupils are equal, round, and reactive to light.    Neck:      Thyroid: No thyromegaly. Cardiovascular:      Rate and Rhythm: Normal rate and regular rhythm. Heart sounds: Normal heart sounds. No murmur heard. Pulmonary:      Effort: Pulmonary effort is normal. No respiratory distress. Breath sounds: Normal breath sounds. No wheezing or rhonchi. Abdominal:      General: Bowel sounds are normal. There is no distension. Palpations: Abdomen is soft. There is no mass. Tenderness: There is no abdominal tenderness. There is no right CVA tenderness, left CVA tenderness, guarding or rebound. Musculoskeletal:         General: No tenderness. Cervical back: Neck supple. Spasms present. No rigidity. Decreased range of motion. Thoracic back: Spasms present. Normal range of motion. Lumbar back: Deformity and spasms present. Decreased range of motion. Negative right straight leg raise test and negative left straight leg raise test.   Lymphadenopathy:      Cervical: No cervical adenopathy. Skin:     Coloration: Skin is not jaundiced or pale. Findings: No bruising, erythema or rash. Neurological:      General: No focal deficit present. Mental Status: She is alert and oriented to person, place, and time. Cranial Nerves: No cranial nerve deficit. Sensory: Sensory deficit present. Motor: No weakness or tremor. Coordination: Coordination normal.      Gait: Gait and tandem walk normal.      Deep Tendon Reflexes: Reflexes are normal and symmetric. Psychiatric:         Attention and Perception: Attention and perception normal. She is attentive. Mood and Affect: Mood is anxious. Mood is not depressed. Affect is not tearful. Speech: She is communicative. Speech is not rapid and pressured, delayed or slurred. Behavior: Behavior normal. Behavior is not agitated or slowed. Thought Content: Thought content normal.         Judgment: Judgment normal.           ASSESSMENT AND PLAN      1.  Essential hypertension  Controlled but her blood pressure running low, decrease enalapril to 10 mg daily. Monitor blood pressure daily  - enalapril (VASOTEC) 10 MG tablet; TAKE 1TABLETS BY MOUTH EVERY MORNING,  Dispense: 90 tablet; Refill: 0    2. Prediabetes  A1c is normal continue to monitor, continue redirection continue watching diet. - POCT glycosylated hemoglobin (Hb A1C)    3. Spinal stenosis of lumbar region with neurogenic claudication  Fairly stable continue Lyrica continue muscle relaxant NSAIDs as needed    4. Mixed hyperlipidemia  Stable continue statins continue weight reduction. Continue aspirin  5. Hip arthritis  Stable follow-up with orthopedic. Continue NSAIDs muscle relaxant as needed    6. continue S/P insertion of iliac artery stent  Stable follow-up with vascular surgeon    7. Mild anemia  Repeat CBC likely due to surgery  - CBC with Auto Differential; Future    8. Coronary artery disease involving native coronary artery of native heart without angina pectoris  Stable continue statins, beta-blockers ACE inhibitor's. 9. Major depressive disorder with single episode, in partial remission ([facility] [address]  Stable continue Wellbutrin BuSpar continue relaxation techniques    10. Anxiety  Continue BuSpar Wellbutrin  Avoid triggers  11. Class 1 obesity due to excess calories with serious comorbidity and body mass index (BMI) of 32.0 to 32.9 in adult  Fairly stable continue weight reduction continue low-carb low-fat diet    12.  Need for influenza vaccination    - Influenza, FLUCELVAX, (age 10 mo+), IM, Preservative Free, 0.5 mL      Orders Placed This Encounter   Procedures    Influenza, FLUCELVAX, (age 10 mo+), IM, Preservative Free, 0.5 mL    CBC with Auto Differential     Standing Status:   Future     Standing Expiration Date:   10/19/2023    POCT glycosylated hemoglobin (Hb A1C)         Medications Discontinued During This Encounter   Medication Reason    furosemide (LASIX) 20 MG tablet DUPLICATE    furosemide (LASIX) 40 MG tablet DOSE ADJUSTMENT    enalapril (VASOTEC) 10 MG tablet        Christina received counseling on the following healthy behaviors: nutrition, exercise, and medication adherence  Reviewed prior labs and health maintenance  Continue current medications, diet and exercise. Discussed use, benefit, and side effects of prescribed medications. Barriers to medication compliance addressed. Patient given educational materials - see patient instructions  Was a self-tracking handout given in paper form or via [employer]? Yes    Requested Prescriptions     Signed Prescriptions Disp Refills    enalapril (VASOTEC) 10 MG tablet 90 tablet 0     Sig: TAKE 1TABLETS BY MOUTH EVERY MORNING,       All patient questions answered. Patient voiced understanding. Quality Measures    Body mass index is 33.47 kg/m². Elevated. Weight control planned discussed daily exercise regimen and Healthy diet and regular exercise. BP: 98/82 Blood pressure is Normal. Treatment plan consists of Weight Reduction, DASH Eating Plan, Dietary Sodium Restriction, Increased Physical Activity, and No treatment change needed. Lab Results   Component Value Date    LDLCHOLESTEROL 48 06/24/2022    (goal LDL reduction with dx if diabetes is 50% LDL reduction)      PHQ Scores 4/14/2022 1/13/2022 2/8/2021 3/25/2019 9/25/2018 4/16/2018   PHQ2 Score 0 0 0 0 0 0   PHQ9 Score 4 0 0 0 0 0     Interpretation of Total Score Depression Severity: 1-4 = Minimal depression, 5-9 = Mild depression, 10-14 = Moderate depression, 15-19 = Moderately severe depression, 20-27 = Severe depression    The patient'spast medical, surgical, social, and family history as well as her   current medications and allergies were reviewed as documented in today's encounter. Medications, labs, diagnostic studies, consultations andfollow-up as documented in this encounter. Return in about 4 months (around 2/18/2023) for ,always chronic conditons.     Patient wasseen with total

## 2022-10-18 NOTE — FLOWSHEET NOTE
[facility]   [address]   Phone: [phone]   Fax: [phone]    Physical Therapy Daily Treatment Note      Date:  10/18/2022  Patient Name:  Boy Pollard    :  1960  MRN: [id number]  Physician: Tha Person MD (resident physician)  Carlee Starkey MD (attending physician)                                      Insurance: [employer]. Auth required after 30 visits  Medical Diagnosis: M54.41, G89.29 (ICD-10-CM) - Chronic bilateral low back pain with right-sided sciatica       Rehab Codes: M54.41, R53.1, M25.60  Onset Date:                      Next 's appt: 23 with neurology  Visit# / total visits: 15/16   Cancels/No Shows: 0/0    Precautions: None    Subjective:     Pain:  [x] Yes  [] No Location: L posterior hip Pain Rating: (0-10 scale) 0/10  Pain altered Tx:  [x] No  [] Yes  Action:  Comments: 10/18: Patient arrives with no reports of pain and states she is still having LE cramping, and no changes since last session. Objective:   INTERVENTIONS  INTERVENTIONS  Reps/ Time Weight/ Level Completed  Today Comments   EXERCISES        Mat level:       Piriformis stretch 2x30''      LTR* 15x  x    SKTC* 10x5'' ea  x R/L ea   Bridges* 10x2    Limited ROM.  Facilitation at B distal femurs   Prone press ups* 10x      TrA marching 10 reps   Up, Up, both down   Supine alt LE extensions (heel taps) with TrA activation 10 reps x 2  x    Cat/Cow 15x ea      Quadruped alt LE 10x R/L   Wrist discomfort noted afterwards 10/4   Bird dogs 6 reps R/L      BKFO 10x2 ea Red     PPT 10x5''   Visual cues for correct execution   SLRs with TrA engagement  10 reps x 2 R/L  x    Modified myke stretch 30'' x 2 R/L   Light support from therapist due to a lot of tightness letting leg fully hang off edge of table   Clams 10 reps x 2 R/L      Sidelying book openers 15 reps R/L  x    Prone Supermans 10x   Added to HEP 10/6          Seated Figure 4 stretch* 2x30''      Seated sciatic nerve glide* 10x      Seated back extension 10x5''   With towel roll at lumbar region   PB roll outs 10x5''             Standing:       HS (B) stretch on step 10x10''  x    SB stretch 3x30''  x Completed after pt noted tension with step ups   3 way hip 15x ea Red     Palloff press 15 reps ea x 2 Green x    Pallof step outs 10x ea, 2 sets Red  Isometric hold slightly in front of chest   Horizontal Chops 10x ea Red  Cues for posture and foot placement   Low>High Chops 10x ea Red     Marching 10x2 ea 2#  Cues for slow and controlled movement   Good mornings 10x 10#KB  Arms crossed at chest, cues to keep neutral spine   Mini squats to elevated mat 10 reps x 2 7.5# KB x 1/2 depth. Cues for glute activation and shifting hips posteriorly versus excessively anteriorly translating at knees   Forward step ups 10 reps R/L 8'' step  1 UE support with brief SLS hold at top   Lateral step ups 10 reps R/L 8'' step  1 UE support with brief SLS hold at top   KB lateral trunk flexion 10x ea 10# KB  Bending to green chair, cues to decrease trunk fwd flexion   Lifting from floor to waist 10x 15#  FCE crate; edu on proper lifting technique   Lifting from waist to overhead shelf with pivot 3x R/L 12#  FCE crate   Unilateral KB dead lifts from 6'' step 5 reps x 3 10# x Emphasis on hip hinging pattern and avoiding compensatory rotation   Unilateral KB dead lifts from 8'' step + pivot to overhead reach 5 reps x 2 7.5#  Cues to turn body rather than twisting spine under load.  Simulating unloading heavier dishware from  and placing in overhead cabinets   Unilateral KB hold + march 20 reps L/R x 2 7.5#  1 set holding in L hand, 1 set holding in R hand   Unilateral reach to cone from 8'' step + pivot to [place] reach 10x ea   Completed to focus on body mechanics vs. Heavy lifting   Monster walks 15ftx2 Green x Band above knees   Resisted side stepping 15ftx2 ea Green x Band above knees   Retro walking 15ftx4 Green x Band above knees          Re-assessment of all subjective & objective measures, all goals 15'   9/27 by primary PT to determine progress to date and additional rehab needs   Other: * HEP    Specific Instructions for next treatment  Emphasis on pain free mobility in lower back and hips with progressive strengthening to B hips and core/lumbar stabilizers. Transition to more standing/CKC activities as able to work on improving functional standing tolerances and stamina. Continue to reinforce lifting mechanics, simulating strength and stability through functional home-making tasks      Assessment:  10/18: Began session with stretching and core strengthening at mat, followed by LE stretching in standing in order to prevent cramping that patient has noted in past sessions. Continued to standing exercises with ability to increase activity with resisted walking fwd, lateral, and retro. No LOB demonstrated. Patient denied any LE cramps throughout entirety of session. Low back pain increased while standing in one spot, but improved with activity and seated rest breaks. Discussed with pt that next visit is her final visit within POC and to write down any questions she may have in order to continue progressing after discharge and these will be addressed at last visit. [x] Progressing toward goals. [] No change.      [] Other:    [x] Patient would continue to benefit from skilled physical therapy services in order to: improve pain free mobility of lumbar spine and maximize strength/stability in B hips and core/lumbar stabilizers necessary to improve functional tolerances and stamina through all ADLs and community activities at her prior level of function    GOALS:   STG: (to be met in 6 treatments)  Pt will self report worst pain no greater than 3/10 in order to better tolerate ADLs/work activities with minimal dysfunction GOAL PROGRESSING AND EXTENDED THROUGH POC 9/27 (6/10 PAIN AT WORST)  Pt will improve AROM in lumbar spine to 75% or greater without increased pain in order to demonstrate ability to move/reach in all planes unrestricted at [facility] 9/27  Pt will improve 30 second timed chair stand test to 10 repetitions or greater without UE support to show improved efficiency with functional movements in home GOAL MET 9/27  LTG: (to be met in 16 treatments)  Pt will demonstrate improved B LE strength to 4/5 or greater in order to demonstrate improved stability/strength necessary for unrestricted ADLs/work activities GOAL PROGRESSING AND EXTENDED 9/27  Pt will improve core strength evidenced by maintaining lumbopelvic stability on bilateral straight leg lowering test to 45 deg or less to show improved support/stability to lumbar spine with less pain  Pt will self report ability to stand/walk for at least 30 minutes or greater to show improved community tolerances at her prior level of function GOAL MET 9/27  Pt will self report ability to lift and carry light household items (ex: laundry basket, grocery bags) between rooms to show improved functional tolerances through ADL tasks at her PLOF. GOAL PROGRESSING 9/27  Pt will decrease mod MARYCRUZ to 15% impaired or less in order to demonstrate improved functional tolerances at PLOF with minimal restriction/dysfunction  Pt will demonstrate independence with a long term HEP for continued progress/maintenance after completion of PT        Pt. Education:  [x] Yes  [] No  [x] Reviewed Prior HEP/Ed  Method of Education: [x] Verbal  [] Demo  [] Written  Comprehension of Education:  [] Verbalizes understanding. [] Demonstrates understanding. [] Needs review. [x] Demonstrates/verbalizes HEP/Ed previously given. Plan: [x] Continue per plan of care.    [] Other:       Treatment Charges: Mins Units   []  Modalities     [x]  Ther Exercise 42 3   []  Manual Therapy     []  Ther Activities     []  Aquatics     []  Neuromuscular [] Vasocompression     [] Gait Training     [] Dry needling        [] 1 or 2 muscles        [] 3 or more muscles     []  Other     Total Treatment time 42 3     Time In: 3:30pm            Time Out: 4:12pm    Electronically signed by:  Shannon Rodriguez PTA

## 2022-10-18 NOTE — PATIENT INSTRUCTIONS
New Updates for [facility] MyChart/ [facility] ([facility]) ALLIE    Thank you for choosing US to give you the best care! [email or web address] ([facility]) is always trying to think of new ways to help their patients. We are asking all patients to try out the new digital registration that is now available through your [facility] account or the new ALLIE, [employer] ([facility]). Via the allie you're now able to update your personal and registration information prior to your upcoming appointment. This will save you time once you arrive at the office to check-in, not to mention your information remains safe!! Many other perks come from signing up for an account, such as:  Requesting refills  Scheduling an appointment  Completing an E-Visit  Sending a message to the office/provider  Having access to your medication list  Paying your bill/copay prior to your appointment  Scheduling your yearly mammogram  Review your test results    If you are not familiar with [facility] or the [facility] [employer] ([facility]) ALLIE, please ask one of us and we will be happy to answer any questions or help you set-up your account.       Your [facility] office,  Melania

## 2022-10-18 NOTE — PROGRESS NOTES
Visit Information    Have you changed or started any medications since your last visit including any over-the-counter medicines, vitamins, or herbal medicines? no   Have you stopped taking any of your medications? Is so, why? -  yes  Are you having any side effects from any of your medications? - no    Have you seen any other physician or provider since your last visit? yes -    Have you had any other diagnostic tests since your last visit? yes  Have you been seen in the emergency room and/or had an admission in a hospital since we last saw you?  no   Have you had your routine dental cleaning in the past 6 months?  no     Do you have an active MyChart account? If no, what is the barrier?   Yes    Patient Care Team:  Kar Jaeger MD as PCP - General (Family Medicine)  Kar Jeager MD as PCP - [facility]    Medical History Review  Past Medical, Family, and Social History reviewed and does contribute to the patient presenting condition    Health Maintenance   Topic Date Due    Flu vaccine (1) 08/01/2022    Depression Monitoring  04/14/2023    Cervical cancer screen  04/16/2023    Lipids  06/24/2023    A1C test (Diabetic or Prediabetic)  07/18/2023    Colorectal Cancer Screen  05/06/2024    Breast cancer screen  07/28/2024    Pneumococcal 0-64 years Vaccine (3 - PPSV23 or PCV20) 05/02/2025    DTaP/Tdap/Td vaccine (2 - Td or Tdap) 04/16/2028    Shingles vaccine  Completed    COVID-19 Vaccine  Completed    Hepatitis C screen  Completed    HIV screen  Completed    Hepatitis A vaccine  Aged Out    Hib vaccine  Aged Out    Meningococcal (ACWY) vaccine  Aged Out

## 2022-10-20 ENCOUNTER — HOSPITAL ENCOUNTER (OUTPATIENT)
Dept: PHYSICAL THERAPY | Age: 62
Setting detail: THERAPIES SERIES
Discharge: HOME | End: 2022-10-20
Payer: MEDICARE

## 2022-10-20 PROCEDURE — 97110 THERAPEUTIC EXERCISES: CPT

## 2022-10-20 NOTE — DISCHARGE SUMMARY
[facility]   [facility]   Phone: [phone]   Fax: [phone]    Physical Therapy Progress Note and Discharge Summary      Date:  10/20/2022  Patient Name:  Christo Diaz    :  1960  MRN: [id number]  Physician: Yolanda Ventura MD (resident physician)  Kim Meza MD (attending physician)                                      Insurance: [employer] [employer]. Auth required after 30 visits  Medical Diagnosis: M54.41, G89.29 (ICD-10-CM) - Chronic bilateral low back pain with right-sided sciatica       Rehab Codes: M54.41, R53.1, M25.60  Onset Date:                      Next 's appt: 23 with neurology  Visit# / total visits:    Cancels/No Shows: 0/0    Precautions: None    Subjective:     Pain:  [x] Yes  [] No Location: L posterior hip Pain Rating: (0-10 scale) 1/10 currently at rest.   Pain altered Tx:  [x] No  [] Yes  Action:  Comments: 10/20: Pt states that she's been doing well with significantly less pain in her back. 1/10 soreness/tightness in L posterior hip currently but otherwise states that she's been able to do a lot more without issues and that even her  commented to her the other day about how much better she's getting around. Still has the most difficulty with prolonged standing in one place >30 minutes but otherwise sx resolve after a minute of seated rest. Has been trying to be mindful of lifting mechanics with unloading  and laundry which pt states has gotten much more tolerable. Progress note performed today for reporting period -10/20 to reassess all goals and readiness to d/c to an independent [facility]    MARYCRUZ score (10/20): 10% impaired      Objective:  All below objective measures updated 10/20 by Dianna Mcgowan PT       Range of Motion  Left Range of Motion  Right Strength  Left Strength  Right   Lumbar Flexion 90%  Tightness only 90%  Tightness only       Lumbar Extension 50% 50% Lumbar Rotation 90% 100%       Lumbar Side Bend 90% 90%       Hip Flexion     4/5 4/5   Hip Abduction     4/5 4/5   Hip Adduction     4+/5 4+/5   Hip Extension     4/5 4/5   Hip ER           Hip IR           Knee Flexion     4+/5 4+/5   Knee Extension     5/5 5/5   Dorsiflexion     5/5 5/5   Plantar flexion     4+/5 4+/5   Inversion           Eversion           *All LE MMT performed in modified (seated) positioning     Core Strength: Lost lumbopelvic stability at 45 deg on bilateral straight leg lowering test      INTERVENTIONS  INTERVENTIONS  Reps/ Time Weight/ Level Completed  Today Comments   EXERCISES        Mat level:       Piriformis stretch 2x30''      LTR* 15x      SKTC* 10x5'' ea   R/L ea   Bridges* 10x2    Limited ROM.  Facilitation at B distal femurs   Prone press ups* 10x      TrA marching 10 reps   Up, Up, both down   Supine alt LE extensions (heel taps) with TrA activation 10 reps x 2      Cat/Cow 15x ea      Quadruped alt LE 10x R/L   Wrist discomfort noted afterwards 10/4   Bird dogs 6 reps R/L      BKFO 10x2 ea Red     PPT 10x5''   Visual cues for correct execution   SLRs with TrA engagement  10 reps x 2 R/L      Modified myke stretch 30'' x 2 R/L   Light support from therapist due to a lot of tightness letting leg fully hang off edge of table   Clams 10 reps x 2 R/L      Sidelying book openers 15 reps R/L      Prone Supermans 10x   Added to HEP 10/6          Seated Figure 4 stretch* 2x30''      Seated sciatic nerve glide* 10x      Seated back extension 10x5''   With towel roll at lumbar region   PB roll outs 10x5''             Standing:       HS (B) stretch on step 10x10''      SB stretch 3x30''   Completed after pt noted tension with step ups   3 way hip 15x ea Red     Palloff press 15 reps ea x 2 Green     Pallof step outs 10x ea, 2 sets Red  Isometric hold slightly in front of chest   Horizontal Chops 10x ea Red  Cues for posture and foot placement   Low>High Chops 10x ea Red     Marching 10x2 ea 2#  Cues for slow and controlled movement   Good mornings 10x 10#KB  Arms crossed at chest, cues to keep neutral spine   Mini squats to elevated mat 10 reps x 2 7.5# KB  1/2 depth. Cues for glute activation and shifting hips posteriorly versus excessively anteriorly translating at knees   Forward step ups 10 reps R/L 8'' step  1 UE support with brief SLS hold at top   Lateral step ups 10 reps R/L 8'' step  1 UE support with brief SLS hold at top   KB lateral trunk flexion 10x ea 10# KB  Bending to green chair, cues to decrease trunk fwd flexion   Lifting from floor to waist 10x 15#  FCE crate; edu on proper lifting technique   Lifting from waist to overhead shelf with pivot 3x R/L 12#  FCE crate   Unilateral KB dead lifts from 6'' step 5 reps x 3 10#  Emphasis on hip hinging pattern and avoiding compensatory rotation   Unilateral KB dead lifts from 8'' step + pivot to overhead reach 5 reps x 2 7.5#  Cues to turn body rather than twisting spine under load. Simulating unloading heavier dishware from  and placing in overhead cabinets   Unilateral KB hold + march 20 reps L/R x 2 7.5#  1 set holding in L hand, 1 set holding in R hand   Unilateral reach to cone from 8'' step + pivot to [place] reach 10x ea   Completed to focus on body mechanics vs. Heavy lifting   Monster walks 15ftx2 Green  Band above knees   Resisted side stepping 15ftx2 ea Green  Band above knees   Retro walking 15ftx4 Green  Band above knees          Re-assessment of all subjective & objective measures, all goals 25'  x 10/20 by primary PT including final HEP review and d/c planning   Other: * HEP        Assessment:  10/20: Pt has been seen for 16 PT visits to date for her chronic back pain and known scoliosis. Overall, pt demonstrates excellent progress since previous assessment with decreased pain levels and improved self-reported functional tolerances with all ADL and community tasks.  Also demonstrates good improvement in all objective measure re-evaluation today, with lumbar AROM, LE strength and core stability now safe and WFL. Pt is able to move in all planes of motion with minimal discomfort in spine, and states that she has been able to complete her housework with less discomfort and minimal breaks. Pt expresses that she has been trying to incorporate proper lifting mechanics with her house work which has made a considerable difference with tasks such as laundry and unloading . Still reports difficulty with static standing in one place more than 30 minutes but able to alleviate sx completely with seated rest for 1-2 min. At this time, pt is able to complete all ADLs and desired activities with minimal to no discomfort or limitations. Majority of all PT goals have been met and is appropriate to transition to an independent HEP for continued mgmt. Updated written home program instructions were provided with good understanding. Anticipate good long term prognosis with continued self-mgmt and pt will be discharged from outpatient PT at this time. Red therabands dispensed for home use. [x] Progressing toward goals. [] No change.      [x] Other: Appropriate to d/c to an independent HEP for continued maintenance    [] Patient would continue to benefit from skilled physical therapy services in order to: improve pain free mobility of lumbar spine and maximize strength/stability in B hips and core/lumbar stabilizers necessary to improve functional tolerances and stamina through all ADLs and community activities at her prior level of function    GOALS:   STG: (to be met in 6 treatments)  Pt will self report worst pain no greater than 3/10 in order to better tolerate ADLs/work activities with minimal dysfunction GOAL MOSTLY MET (BRIEF EPISODES UP TO 7/10 WITH PROLONGED STANDING BUT RESOLVE COMPLETELY WITH 1-2 MIN OF REST) 10/20  Pt will improve AROM in lumbar spine to 75% or greater without increased pain in order to demonstrate ability to move/reach in all planes unrestricted at PLOF GOAL MET 10/20  Pt will improve 30 second timed chair stand test to 10 repetitions or greater without UE support to show improved efficiency with functional movements in home GOAL MET 9/27  LTG: (to be met in 16 treatments)  Pt will demonstrate improved B LE strength to 4/5 or greater in order to demonstrate improved stability/strength necessary for unrestricted ADLs/work activities GOAL MET 10/20  Pt will improve core strength evidenced by maintaining lumbopelvic stability on bilateral straight leg lowering test to 45 deg or less to show improved support/stability to lumbar spine with less pain GOAL MET 10/20  Pt will self report ability to stand/walk for at least 30 minutes or greater to show improved community tolerances at her prior level of function GOAL MET 9/27  Pt will self report ability to lift and carry light household items (ex: laundry basket, grocery bags) between rooms to show improved functional tolerances through ADL tasks at her PLOF. GOAL MET 10/20  Pt will decrease mod MARYCRUZ to 15% impaired or less in order to demonstrate improved functional tolerances at PLOF with minimal restriction/dysfunction GOAL MET 10/20  Pt will demonstrate independence with a long term HEP for continued progress/maintenance after completion of PT GOAL MET 10/20        Pt. Education:  [x] Yes  [] No  [x] Reviewed Prior HEP/Ed  Method of Education: [x] Verbal  [] Demo  [x] Written ([address] [place] [address] Access Code [email or web address])- updated 10/20  Comprehension of Education:  [x] Verbalizes understanding. [x] Demonstrates understanding. [] Needs review. [] Demonstrates/verbalizes HEP/Ed previously given. Plan: [] Continue per plan of care.    [x] Other: Discharge to independent HEP       Treatment Charges: Mins Units   []  Modalities     [x]  Ther Exercise 25 2   []  Manual Therapy     []  Ther Activities     []  Aquatics     []  Neuromuscular     [] Vasocompression

## 2022-10-24 DIAGNOSIS — M51.36 LUMBAR DEGENERATIVE DISC DISEASE: ICD-10-CM

## 2022-10-24 DIAGNOSIS — M50.30 DEGENERATIVE DISC DISEASE, CERVICAL: ICD-10-CM

## 2022-10-24 DIAGNOSIS — F41.9 ANXIETY: ICD-10-CM

## 2022-10-24 DIAGNOSIS — M51.26 PROTRUDED LUMBAR DISC: ICD-10-CM

## 2022-10-24 DIAGNOSIS — M19.019 OSTEOARTHRITIS OF SHOULDER, UNSPECIFIED LATERALITY, UNSPECIFIED OSTEOARTHRITIS TYPE: ICD-10-CM

## 2022-10-24 DIAGNOSIS — S33.6XXD SPRAIN OF SACROILIAC LIGAMENT, SUBSEQUENT ENCOUNTER: ICD-10-CM

## 2022-10-24 DIAGNOSIS — J30.9 ALLERGIC SINUSITIS: ICD-10-CM

## 2022-10-24 RX ORDER — CYCLOBENZAPRINE HCL 10 MG
TABLET ORAL
Qty: 60 TABLET | Refills: 0 | Status: SHIPPED | OUTPATIENT
Start: 2022-10-24 | End: 2022-11-21

## 2022-10-24 RX ORDER — BUPROPION HYDROCHLORIDE 100 MG/1
TABLET, EXTENDED RELEASE ORAL
Qty: 60 TABLET | Refills: 0 | Status: SHIPPED | OUTPATIENT
Start: 2022-10-24 | End: 2022-11-21

## 2022-10-24 RX ORDER — CETIRIZINE HYDROCHLORIDE 10 MG/1
TABLET ORAL
Qty: 30 TABLET | Refills: 0 | Status: SHIPPED | OUTPATIENT
Start: 2022-10-24 | End: 2022-11-21

## 2022-10-24 NOTE — TELEPHONE ENCOUNTER
Please Approve or Refuse.   Send to Pharmacy per Pt's Request:      Next Visit Date:  2/16/2023   Last Visit Date: 10/18/2022    Hemoglobin A1C (%)   Date Value   10/18/2022 5.3   07/18/2022 5.3   04/14/2022 5.2             ( goal A1C is < 7)   BP Readings from Last 3 Encounters:   10/18/22 98/82   08/17/22 107/73   07/18/22 94/72          (goal 120/80)  BUN   Date Value Ref Range Status   06/24/2022 12 8 - 23 mg/dL Final     Creatinine   Date Value Ref Range Status   06/24/2022 0.96 (H) 0.50 - 0.90 mg/dL Final     Potassium   Date Value Ref Range Status   06/24/2022 4.6 3.7 - 5.3 mmol/L Final

## 2022-10-31 DIAGNOSIS — F41.9 ANXIETY: ICD-10-CM

## 2022-10-31 RX ORDER — BUSPIRONE HYDROCHLORIDE 15 MG/1
TABLET ORAL
Qty: 120 TABLET | Refills: 0 | Status: SHIPPED | OUTPATIENT
Start: 2022-10-31

## 2022-11-07 DIAGNOSIS — I10 ESSENTIAL HYPERTENSION: ICD-10-CM

## 2022-11-07 DIAGNOSIS — M79.605 LEG PAIN, LEFT: ICD-10-CM

## 2022-11-07 RX ORDER — ENALAPRIL MALEATE 10 MG/1
TABLET ORAL
Qty: 90 TABLET | Refills: 0 | Status: SHIPPED | OUTPATIENT
Start: 2022-11-07

## 2022-11-07 RX ORDER — PSEUDOEPHED/ACETAMINOPH/DIPHEN 30MG-500MG
TABLET ORAL
Qty: 120 TABLET | Refills: 0 | Status: SHIPPED | OUTPATIENT
Start: 2022-11-07

## 2022-11-07 NOTE — TELEPHONE ENCOUNTER
Please Approve or Refuse.   Send to Pharmacy per Pt's Request: [email or web address]      Next Visit Date:  2/16/2023   Last Visit Date: 10/18/2022    Hemoglobin A1C (%)   Date Value   10/18/2022 5.3   07/18/2022 5.3   04/14/2022 5.2             ( goal A1C is < 7)   BP Readings from Last 3 Encounters:   10/18/22 98/82   08/17/22 107/73   07/18/22 94/72          (goal 120/80)  BUN   Date Value Ref Range Status   06/24/2022 12 8 - 23 mg/dL Final     Creatinine   Date Value Ref Range Status   06/24/2022 0.96 (H) 0.50 - 0.90 mg/dL Final     Potassium   Date Value Ref Range Status   06/24/2022 4.6 3.7 - 5.3 mmol/L Final

## 2022-11-08 DIAGNOSIS — M54.16 LUMBAR RADICULOPATHY: ICD-10-CM

## 2022-11-08 DIAGNOSIS — M48.061 SPINAL STENOSIS OF LUMBAR REGION WITHOUT NEUROGENIC CLAUDICATION: ICD-10-CM

## 2022-11-08 RX ORDER — PREGABALIN 150 MG/1
150 CAPSULE ORAL 3 TIMES DAILY
Qty: 90 CAPSULE | Refills: 0 | Status: SHIPPED | OUTPATIENT
Start: 2022-11-08 | End: 2022-12-08

## 2022-11-08 NOTE — TELEPHONE ENCOUNTER
Please Approve or Refuse.   Send to Pharmacy per Pt's Request: [email or web address]      Next Visit Date:  2/16/2023   Last Visit Date: 10/18/2022    Hemoglobin A1C (%)   Date Value   10/18/2022 5.3   07/18/2022 5.3   04/14/2022 5.2             ( goal A1C is < 7)   BP Readings from Last 3 Encounters:   10/18/22 98/82   08/17/22 107/73   07/18/22 94/72          (goal 120/80)  BUN   Date Value Ref Range Status   06/24/2022 12 8 - 23 mg/dL Final     Creatinine   Date Value Ref Range Status   06/24/2022 0.96 (H) 0.50 - 0.90 mg/dL Final     Potassium   Date Value Ref Range Status   06/24/2022 4.6 3.7 - 5.3 mmol/L Final          lorrie

## 2022-11-20 DIAGNOSIS — S33.6XXD SPRAIN OF SACROILIAC LIGAMENT, SUBSEQUENT ENCOUNTER: ICD-10-CM

## 2022-11-20 DIAGNOSIS — M19.019 OSTEOARTHRITIS OF SHOULDER, UNSPECIFIED LATERALITY, UNSPECIFIED OSTEOARTHRITIS TYPE: ICD-10-CM

## 2022-11-20 DIAGNOSIS — M51.26 PROTRUDED LUMBAR DISC: ICD-10-CM

## 2022-11-20 DIAGNOSIS — M50.30 DEGENERATIVE DISC DISEASE, CERVICAL: ICD-10-CM

## 2022-11-20 DIAGNOSIS — M51.36 LUMBAR DEGENERATIVE DISC DISEASE: ICD-10-CM

## 2022-11-20 DIAGNOSIS — J30.9 ALLERGIC SINUSITIS: ICD-10-CM

## 2022-11-21 DIAGNOSIS — F41.9 ANXIETY: ICD-10-CM

## 2022-11-21 RX ORDER — CYCLOBENZAPRINE HCL 10 MG
TABLET ORAL
Qty: 60 TABLET | Refills: 0 | Status: SHIPPED | OUTPATIENT
Start: 2022-11-21

## 2022-11-21 RX ORDER — CETIRIZINE HYDROCHLORIDE 10 MG/1
TABLET ORAL
Qty: 30 TABLET | Refills: 0 | Status: SHIPPED | OUTPATIENT
Start: 2022-11-21

## 2022-11-21 RX ORDER — BUPROPION HYDROCHLORIDE 100 MG/1
TABLET, EXTENDED RELEASE ORAL
Qty: 60 TABLET | Refills: 0 | Status: SHIPPED | OUTPATIENT
Start: 2022-11-21

## 2022-11-22 DIAGNOSIS — I25.10 CORONARY ARTERY DISEASE INVOLVING NATIVE CORONARY ARTERY OF NATIVE HEART WITHOUT ANGINA PECTORIS: ICD-10-CM

## 2022-11-23 RX ORDER — NITROGLYCERIN 0.4 MG/1
TABLET SUBLINGUAL
Qty: 25 TABLET | Refills: 0 | Status: SHIPPED | OUTPATIENT
Start: 2022-11-23

## 2022-12-04 DIAGNOSIS — M48.061 SPINAL STENOSIS OF LUMBAR REGION WITHOUT NEUROGENIC CLAUDICATION: ICD-10-CM

## 2022-12-04 DIAGNOSIS — M54.16 LUMBAR RADICULOPATHY: ICD-10-CM

## 2022-12-04 DIAGNOSIS — M79.605 LEG PAIN, LEFT: ICD-10-CM

## 2022-12-05 RX ORDER — ALBUTEROL SULFATE 90 UG/1
AEROSOL, METERED RESPIRATORY (INHALATION)
Qty: 36 G | Refills: 3 | Status: SHIPPED | OUTPATIENT
Start: 2022-12-05

## 2022-12-05 RX ORDER — PREGABALIN 150 MG/1
150 CAPSULE ORAL 3 TIMES DAILY
Qty: 90 CAPSULE | Refills: 0 | Status: SHIPPED | OUTPATIENT
Start: 2022-12-05 | End: 2023-01-04

## 2022-12-05 RX ORDER — PSEUDOEPHED/ACETAMINOPH/DIPHEN 30MG-500MG
TABLET ORAL
Qty: 120 TABLET | Refills: 0 | Status: SHIPPED | OUTPATIENT
Start: 2022-12-05

## 2022-12-08 DIAGNOSIS — I25.10 CORONARY ARTERY DISEASE INVOLVING NATIVE CORONARY ARTERY OF NATIVE HEART WITHOUT ANGINA PECTORIS: ICD-10-CM

## 2022-12-08 DIAGNOSIS — R60.9 PERIPHERAL EDEMA: ICD-10-CM

## 2022-12-08 RX ORDER — FUROSEMIDE 20 MG/1
20 TABLET ORAL DAILY
Qty: 60 TABLET | Refills: 0 | Status: SHIPPED | OUTPATIENT
Start: 2022-12-08

## 2022-12-08 NOTE — TELEPHONE ENCOUNTER
Mir Whitaker is calling to request a refill on the following medication(s):    Medication Request:  Requested Prescriptions     Pending Prescriptions Disp Refills    furosemide (LASIX) 20 MG tablet [Pharmacy Med Name: FUROSEMIDE 20MG TABLETS] 60 tablet 0     Sig: TAKE 1 TABLET BY MOUTH DAILY       Last Visit Date (If Applicable):  54/48/3766    Next Visit Date:    2/16/2023

## 2022-12-19 DIAGNOSIS — R60.9 PERIPHERAL EDEMA: ICD-10-CM

## 2022-12-19 DIAGNOSIS — M51.36 LUMBAR DEGENERATIVE DISC DISEASE: ICD-10-CM

## 2022-12-19 DIAGNOSIS — R73.03 PREDIABETES: ICD-10-CM

## 2022-12-19 DIAGNOSIS — M51.26 PROTRUDED LUMBAR DISC: ICD-10-CM

## 2022-12-19 DIAGNOSIS — M50.30 DEGENERATIVE DISC DISEASE, CERVICAL: ICD-10-CM

## 2022-12-19 DIAGNOSIS — S33.6XXD SPRAIN OF SACROILIAC LIGAMENT, SUBSEQUENT ENCOUNTER: ICD-10-CM

## 2022-12-19 DIAGNOSIS — I10 ESSENTIAL HYPERTENSION: ICD-10-CM

## 2022-12-19 DIAGNOSIS — M19.019 OSTEOARTHRITIS OF SHOULDER, UNSPECIFIED LATERALITY, UNSPECIFIED OSTEOARTHRITIS TYPE: ICD-10-CM

## 2022-12-19 DIAGNOSIS — J30.9 ALLERGIC SINUSITIS: ICD-10-CM

## 2022-12-19 DIAGNOSIS — F41.9 ANXIETY: ICD-10-CM

## 2022-12-19 RX ORDER — METFORMIN HYDROCHLORIDE 500 MG/1
500 TABLET, EXTENDED RELEASE ORAL
Qty: 90 TABLET | Refills: 0 | Status: SHIPPED | OUTPATIENT
Start: 2022-12-19

## 2022-12-19 RX ORDER — CYCLOBENZAPRINE HCL 10 MG
TABLET ORAL
Qty: 60 TABLET | Refills: 0 | Status: SHIPPED | OUTPATIENT
Start: 2022-12-19 | End: 2023-01-18 | Stop reason: SDUPTHER

## 2022-12-19 RX ORDER — ISOSORBIDE MONONITRATE 30 MG/1
TABLET, EXTENDED RELEASE ORAL
Qty: 90 TABLET | Refills: 0 | Status: SHIPPED | OUTPATIENT
Start: 2022-12-19

## 2022-12-19 RX ORDER — METOPROLOL SUCCINATE 50 MG/1
TABLET, EXTENDED RELEASE ORAL
Qty: 90 TABLET | Refills: 0 | Status: SHIPPED | OUTPATIENT
Start: 2022-12-19

## 2022-12-19 RX ORDER — BUPROPION HYDROCHLORIDE 100 MG/1
100 TABLET, EXTENDED RELEASE ORAL 2 TIMES DAILY
Qty: 60 TABLET | Refills: 0 | Status: SHIPPED | OUTPATIENT
Start: 2022-12-19 | End: 2023-01-18 | Stop reason: SDUPTHER

## 2022-12-19 RX ORDER — CETIRIZINE HYDROCHLORIDE 10 MG/1
10 TABLET ORAL DAILY
Qty: 90 TABLET | Refills: 0 | Status: SHIPPED | OUTPATIENT
Start: 2022-12-19

## 2022-12-19 RX ORDER — ASPIRIN 81 MG/1
TABLET ORAL
Qty: 90 TABLET | Refills: 0 | Status: SHIPPED | OUTPATIENT
Start: 2022-12-19

## 2022-12-19 RX ORDER — POTASSIUM CHLORIDE 750 MG/1
10 TABLET, EXTENDED RELEASE ORAL DAILY
Qty: 90 TABLET | Refills: 0 | Status: SHIPPED | OUTPATIENT
Start: 2022-12-19

## 2022-12-28 DIAGNOSIS — F41.9 ANXIETY: ICD-10-CM

## 2022-12-28 RX ORDER — BUSPIRONE HYDROCHLORIDE 15 MG/1
TABLET ORAL
Qty: 120 TABLET | Refills: 0 | Status: SHIPPED | OUTPATIENT
Start: 2022-12-28

## 2023-01-09 DIAGNOSIS — M54.16 LUMBAR RADICULOPATHY: ICD-10-CM

## 2023-01-09 DIAGNOSIS — M79.605 LEG PAIN, LEFT: ICD-10-CM

## 2023-01-09 DIAGNOSIS — I25.10 CORONARY ARTERY DISEASE INVOLVING NATIVE CORONARY ARTERY OF NATIVE HEART WITHOUT ANGINA PECTORIS: ICD-10-CM

## 2023-01-09 DIAGNOSIS — M50.30 DEGENERATIVE DISC DISEASE, CERVICAL: ICD-10-CM

## 2023-01-09 DIAGNOSIS — M48.061 SPINAL STENOSIS OF LUMBAR REGION WITHOUT NEUROGENIC CLAUDICATION: ICD-10-CM

## 2023-01-09 RX ORDER — PREGABALIN 150 MG/1
150 CAPSULE ORAL 3 TIMES DAILY
Qty: 90 CAPSULE | Refills: 0 | Status: SHIPPED | OUTPATIENT
Start: 2023-01-09 | End: 2023-02-08

## 2023-01-09 RX ORDER — ACETAMINOPHEN 500 MG
500 TABLET ORAL EVERY 8 HOURS PRN
Qty: 120 TABLET | Refills: 1 | Status: SHIPPED | OUTPATIENT
Start: 2023-01-09

## 2023-01-09 RX ORDER — NITROGLYCERIN 0.4 MG/1
TABLET SUBLINGUAL
Qty: 25 TABLET | Refills: 0 | Status: SHIPPED | OUTPATIENT
Start: 2023-01-09

## 2023-01-09 RX ORDER — CALCIUM CARBONATE/VITAMIN D3 600 MG-10
2 TABLET ORAL DAILY
Qty: 90 TABLET | Refills: 0 | Status: SHIPPED | OUTPATIENT
Start: 2023-01-09

## 2023-01-18 DIAGNOSIS — M51.36 LUMBAR DEGENERATIVE DISC DISEASE: ICD-10-CM

## 2023-01-18 DIAGNOSIS — M19.019 OSTEOARTHRITIS OF SHOULDER, UNSPECIFIED LATERALITY, UNSPECIFIED OSTEOARTHRITIS TYPE: ICD-10-CM

## 2023-01-18 DIAGNOSIS — M50.30 DEGENERATIVE DISC DISEASE, CERVICAL: ICD-10-CM

## 2023-01-18 DIAGNOSIS — M51.26 PROTRUDED LUMBAR DISC: ICD-10-CM

## 2023-01-18 DIAGNOSIS — F41.9 ANXIETY: ICD-10-CM

## 2023-01-18 DIAGNOSIS — S33.6XXD SPRAIN OF SACROILIAC LIGAMENT, SUBSEQUENT ENCOUNTER: ICD-10-CM

## 2023-01-18 RX ORDER — BUPROPION HYDROCHLORIDE 100 MG/1
100 TABLET, EXTENDED RELEASE ORAL 2 TIMES DAILY
Qty: 180 TABLET | Refills: 1 | Status: SHIPPED | OUTPATIENT
Start: 2023-01-18

## 2023-01-18 RX ORDER — CYCLOBENZAPRINE HCL 10 MG
TABLET ORAL
Qty: 180 TABLET | Refills: 0 | Status: SHIPPED | OUTPATIENT
Start: 2023-01-18

## 2023-01-18 NOTE — TELEPHONE ENCOUNTER
Please Approve or Refuse.   Send to Pharmacy per Pt's Request: Leti     Next Visit Date:  2/16/2023   Last Visit Date: 10/18/2022    Hemoglobin A1C (%)   Date Value   10/18/2022 5.3   07/18/2022 5.3   04/14/2022 5.2             ( goal A1C is < 7)   BP Readings from Last 3 Encounters:   10/18/22 98/82   08/17/22 107/73   07/18/22 94/72          (goal 120/80)  BUN   Date Value Ref Range Status   06/24/2022 12 8 - 23 mg/dL Final     Creatinine   Date Value Ref Range Status   06/24/2022 0.96 (H) 0.50 - 0.90 mg/dL Final     Potassium   Date Value Ref Range Status   06/24/2022 4.6 3.7 - 5.3 mmol/L Final

## 2023-02-02 DIAGNOSIS — R60.9 PERIPHERAL EDEMA: ICD-10-CM

## 2023-02-02 DIAGNOSIS — I25.10 CORONARY ARTERY DISEASE INVOLVING NATIVE CORONARY ARTERY OF NATIVE HEART WITHOUT ANGINA PECTORIS: ICD-10-CM

## 2023-02-02 RX ORDER — FUROSEMIDE 20 MG/1
20 TABLET ORAL DAILY
Qty: 60 TABLET | Refills: 0 | Status: SHIPPED | OUTPATIENT
Start: 2023-02-02

## 2023-02-12 DIAGNOSIS — M48.061 SPINAL STENOSIS OF LUMBAR REGION WITHOUT NEUROGENIC CLAUDICATION: ICD-10-CM

## 2023-02-12 DIAGNOSIS — M54.16 LUMBAR RADICULOPATHY: ICD-10-CM

## 2023-02-14 RX ORDER — PREGABALIN 150 MG/1
150 CAPSULE ORAL 3 TIMES DAILY
Qty: 90 CAPSULE | Refills: 0 | Status: SHIPPED | OUTPATIENT
Start: 2023-02-14 | End: 2023-03-16

## 2023-02-14 SDOH — ECONOMIC STABILITY: FOOD INSECURITY: WITHIN THE PAST 12 MONTHS, THE FOOD YOU BOUGHT JUST DIDN'T LAST AND YOU DIDN'T HAVE MONEY TO GET MORE.: NEVER TRUE

## 2023-02-14 SDOH — ECONOMIC STABILITY: FOOD INSECURITY: WITHIN THE PAST 12 MONTHS, YOU WORRIED THAT YOUR FOOD WOULD RUN OUT BEFORE YOU GOT MONEY TO BUY MORE.: NEVER TRUE

## 2023-02-14 SDOH — ECONOMIC STABILITY: HOUSING INSECURITY
IN THE LAST 12 MONTHS, WAS THERE A TIME WHEN YOU DID NOT HAVE A STEADY PLACE TO SLEEP OR SLEPT IN A SHELTER (INCLUDING NOW)?: NO

## 2023-02-14 SDOH — ECONOMIC STABILITY: INCOME INSECURITY: HOW HARD IS IT FOR YOU TO PAY FOR THE VERY BASICS LIKE FOOD, HOUSING, MEDICAL CARE, AND HEATING?: NOT VERY HARD

## 2023-02-16 ENCOUNTER — OFFICE VISIT (OUTPATIENT)
Dept: FAMILY MEDICINE CLINIC | Age: 63
End: 2023-02-16

## 2023-02-16 VITALS
HEART RATE: 92 BPM | OXYGEN SATURATION: 97 % | BODY MASS INDEX: 33.13 KG/M2 | HEIGHT: 62 IN | DIASTOLIC BLOOD PRESSURE: 80 MMHG | WEIGHT: 180 LBS | SYSTOLIC BLOOD PRESSURE: 110 MMHG | TEMPERATURE: 97.8 F

## 2023-02-16 DIAGNOSIS — E66.09 CLASS 1 OBESITY DUE TO EXCESS CALORIES WITH SERIOUS COMORBIDITY AND BODY MASS INDEX (BMI) OF 32.0 TO 32.9 IN ADULT: ICD-10-CM

## 2023-02-16 DIAGNOSIS — R73.03 PREDIABETES: ICD-10-CM

## 2023-02-16 DIAGNOSIS — Z51.81 MEDICATION MONITORING ENCOUNTER: ICD-10-CM

## 2023-02-16 DIAGNOSIS — F43.23 ADJUSTMENT DISORDER WITH MIXED ANXIETY AND DEPRESSED MOOD: ICD-10-CM

## 2023-02-16 DIAGNOSIS — M48.062 SPINAL STENOSIS OF LUMBAR REGION WITH NEUROGENIC CLAUDICATION: Chronic | ICD-10-CM

## 2023-02-16 DIAGNOSIS — I10 ESSENTIAL HYPERTENSION: Primary | ICD-10-CM

## 2023-02-16 DIAGNOSIS — I25.10 CORONARY ARTERY DISEASE INVOLVING NATIVE CORONARY ARTERY OF NATIVE HEART WITHOUT ANGINA PECTORIS: ICD-10-CM

## 2023-02-16 DIAGNOSIS — F32.4 MAJOR DEPRESSIVE DISORDER WITH SINGLE EPISODE, IN PARTIAL REMISSION (HCC): ICD-10-CM

## 2023-02-16 DIAGNOSIS — I73.9 PVD (PERIPHERAL VASCULAR DISEASE) WITH CLAUDICATION (HCC): ICD-10-CM

## 2023-02-16 LAB
ALCOHOL URINE: NEGATIVE
AMPHETAMINE SCREEN, URINE: NEGATIVE
BARBITURATE SCREEN, URINE: NEGATIVE
BENZODIAZEPINE SCREEN, URINE: NEGATIVE
BUPRENORPHINE URINE: NEGATIVE
COCAINE METABOLITE SCREEN URINE: NEGATIVE
FENTANYL SCREEN, URINE: NEGATIVE
GABAPENTIN SCREEN, URINE: NEGATIVE
HBA1C MFR BLD: 5.1 %
MDMA URINE: NEGATIVE
METHADONE SCREEN, URINE: NEGATIVE
METHAMPHETAMINE, URINE: NEGATIVE
OPIATE SCREEN URINE: NEGATIVE
OXYCODONE SCREEN URINE: NEGATIVE
PHENCYCLIDINE SCREEN URINE: NEGATIVE
PROPOXYPHENE SCREEN, URINE: NEGATIVE
SYNTHETIC CANNABINOIDS(K2) SCREEN, URINE: NEGATIVE
THC SCREEN, URINE: NEGATIVE
TRAMADOL SCREEN URINE: NEGATIVE
TRICYCLIC ANTIDEPRESSANTS, UR: POSITIVE

## 2023-02-16 ASSESSMENT — PATIENT HEALTH QUESTIONNAIRE - PHQ9
SUM OF ALL RESPONSES TO PHQ QUESTIONS 1-9: 0
10. IF YOU CHECKED OFF ANY PROBLEMS, HOW DIFFICULT HAVE THESE PROBLEMS MADE IT FOR YOU TO DO YOUR WORK, TAKE CARE OF THINGS AT HOME, OR GET ALONG WITH OTHER PEOPLE: 0
5. POOR APPETITE OR OVEREATING: 0
7. TROUBLE CONCENTRATING ON THINGS, SUCH AS READING THE NEWSPAPER OR WATCHING TELEVISION: 0
2. FEELING DOWN, DEPRESSED OR HOPELESS: 0
6. FEELING BAD ABOUT YOURSELF - OR THAT YOU ARE A FAILURE OR HAVE LET YOURSELF OR YOUR FAMILY DOWN: 0
SUM OF ALL RESPONSES TO PHQ QUESTIONS 1-9: 0
8. MOVING OR SPEAKING SO SLOWLY THAT OTHER PEOPLE COULD HAVE NOTICED. OR THE OPPOSITE, BEING SO FIGETY OR RESTLESS THAT YOU HAVE BEEN MOVING AROUND A LOT MORE THAN USUAL: 0
3. TROUBLE FALLING OR STAYING ASLEEP: 0
SUM OF ALL RESPONSES TO PHQ9 QUESTIONS 1 & 2: 0
SUM OF ALL RESPONSES TO PHQ QUESTIONS 1-9: 0
4. FEELING TIRED OR HAVING LITTLE ENERGY: 0
SUM OF ALL RESPONSES TO PHQ QUESTIONS 1-9: 0
1. LITTLE INTEREST OR PLEASURE IN DOING THINGS: 0
9. THOUGHTS THAT YOU WOULD BE BETTER OFF DEAD, OR OF HURTING YOURSELF: 0

## 2023-02-16 ASSESSMENT — ENCOUNTER SYMPTOMS
RECTAL PAIN: 0
STRIDOR: 0
SINUS PRESSURE: 0
BLOOD IN STOOL: 0
ABDOMINAL DISTENTION: 0
EYE REDNESS: 0
CONSTIPATION: 0
BACK PAIN: 1
NAUSEA: 0
COUGH: 0
RHINORRHEA: 0
SHORTNESS OF BREATH: 0
DIARRHEA: 0
ABDOMINAL PAIN: 0
WHEEZING: 0
CHEST TIGHTNESS: 0
TROUBLE SWALLOWING: 0
SORE THROAT: 0
VOMITING: 0
COLOR CHANGE: 0

## 2023-02-16 NOTE — PROGRESS NOTES
Chief Complaint   Patient presents with    Hypertension    Follow-up Chronic Condition         Melida Cuevas  here today for follow up on chronic medical problems, go over labs and/or diagnostic studies, and medication refills. Hypertension and Follow-up Chronic Condition      HPI: Patient is scheduled for follow-up on chronic medical conditions. Hypertension controlled, patient is currently on diuretics beta-blockers and Vasotec. Patient monitors her blood pressure at home and usually runs within normal range. Patient denies any side effects from medication. Coronary artery disease stable on current treatment denies any palpitation chest pain dyspnea on exertion follows with cardiologist on regular basis. Prediabetes resolved A1c is normal 5.1 controlled on metformin. Patient also watches her diet. Peripheral artery disease status post stent placed in right lower extremity follows with vascular surgeon. Patient denies any decreased sensation, or any pain. Currently patient is on aspirin and Plavix and also takes Imdur. Depression and anxiety stable on Wellbutrin and BuSpar. Patient denies any anhedonia insomnia. Patient has history of lumbar spinal stenosis and is on Lyrica, does not follow with pain management. Patient is due for urine drug screen. Patient reports pain is fairly stable denies any recent flareups. /80   Pulse 92   Temp 97.8 °F (36.6 °C)   Ht 5' 2\" (1.575 m)   Wt 180 lb (81.6 kg)   LMP 11/07/2012   SpO2 97%   BMI 32.92 kg/m²    Body mass index is 32.92 kg/m². Wt Readings from Last 3 Encounters:   02/16/23 180 lb (81.6 kg)   10/18/22 183 lb (83 kg)   08/17/22 180 lb 9.6 oz (81.9 kg)        []Negative depression screening.   PHQ Scores 2/16/2023 4/14/2022 1/13/2022 2/8/2021 3/25/2019 9/25/2018 4/16/2018   PHQ2 Score 0 0 0 0 0 0 0   PHQ9 Score 0 4 0 0 0 0 0      []1-4 = Minimal depression   []5-9 = Milddepression   []10-14 = Moderate depression   []15-19 = Moderately severe depression   []20-27 = Severe depression    Discussed testing with the patient and all questions fully answered.     Hospital Outpatient Visit on 10/18/2022   Component Date Value Ref Range Status    WBC 10/18/2022 8.2  3.5 - 11.0 k/uL Final    RBC 10/18/2022 4.46  4.0 - 5.2 m/uL Final    Hemoglobin 10/18/2022 14.1  12.0 - 16.0 g/dL Final    Hematocrit 10/18/2022 41.9  36 - 46 % Final    MCV 10/18/2022 93.9  80 - 100 fL Final    MCH 10/18/2022 31.6  26 - 34 pg Final    MCHC 10/18/2022 33.6  31 - 37 g/dL Final    RDW 10/18/2022 14.1  11.5 - 14.9 % Final    Platelets 85/83/4680 258  150 - 450 k/uL Final    MPV 10/18/2022 8.8  6.0 - 12.0 fL Final    Seg Neutrophils 10/18/2022 53  36 - 66 % Final    Lymphocytes 10/18/2022 35  24 - 44 % Final    Monocytes 10/18/2022 9 (A)  1 - 7 % Final    Eosinophils % 10/18/2022 2  0 - 4 % Final    Basophils 10/18/2022 1  0 - 2 % Final    Segs Absolute 10/18/2022 4.40  1.3 - 9.1 k/uL Final    Absolute Lymph # 10/18/2022 2.80  1.0 - 4.8 k/uL Final    Absolute Mono # 10/18/2022 0.80  0.1 - 1.3 k/uL Final    Absolute Eos # 10/18/2022 0.10  0.0 - 0.4 k/uL Final    Basophils Absolute 10/18/2022 0.00  0.0 - 0.2 k/uL Final         Most recent labs reviewed:     Lab Results   Component Value Date    WBC 8.2 10/18/2022    HGB 14.1 10/18/2022    HCT 41.9 10/18/2022    MCV 93.9 10/18/2022     10/18/2022       @BRIEFLAB(NA,K,CL,CO2,BUN,CREATININE,GLUCOSE,CALCIUM)@     Lab Results   Component Value Date    ALT 10 06/24/2022    AST 15 06/24/2022    ALKPHOS 74 06/24/2022    BILITOT 0.18 (L) 06/24/2022       Lab Results   Component Value Date    TSH 2.01 06/24/2022       Lab Results   Component Value Date    CHOL 113 06/24/2022    CHOL 136 07/26/2021    CHOL 116 03/26/2019     Lab Results   Component Value Date    TRIG 90 06/24/2022    TRIG 104 07/26/2021    TRIG 91 03/26/2019     Lab Results   Component Value Date    HDL 47 06/24/2022    HDL 50 07/26/2021    HDL 59 07/01/2020 Lab Results   Component Value Date    LDLCHOLESTEROL 48 06/24/2022    LDLCHOLESTEROL 65 07/26/2021    LDLCHOLESTEROL 67 07/01/2020     Lab Results   Component Value Date    VLDL NOT REPORTED 07/26/2021    VLDL NOT REPORTED 07/01/2020    VLDL NOT REPORTED 03/26/2019     Lab Results   Component Value Date    CHOLHDLRATIO 2.4 06/24/2022    CHOLHDLRATIO 2.7 07/26/2021    CHOLHDLRATIO 2.4 07/01/2020       Lab Results   Component Value Date    LABA1C 5.1 02/16/2023       No results found for: [id number]    No results found for: FOLATE    No results found for: IRON, TIBC, FERRITIN    Lab Results   Component Value Date    VITD25 56.0 06/24/2022             Current Outpatient Medications   Medication Sig Dispense Refill    pregabalin (LYRICA) 150 MG capsule Take 1 capsule by mouth in the morning, at noon, and at bedtime for 30 days. TAKE 1 CAPSULE BY MOUTH EVERY MORNING, AT NOON AND AT BEDTIME 90 capsule 0    furosemide (LASIX) 20 MG tablet TAKE 1 TABLET BY MOUTH DAILY 60 tablet 0    buPROPion (WELLBUTRIN SR) 100 MG extended release tablet Take 1 tablet by mouth 2 times daily TAKE 1 TABLET BY MOUTH TWICE DAILY 180 tablet 1    cyclobenzaprine (FLEXERIL) 10 MG tablet TAKE 1 TABLET BY MOUTH TWICE DAILY AS NEEDED FOR MUSCLE SPASMS 180 tablet 0    diclofenac sodium (VOLTAREN) 1 % GEL APPLY 2 GRAMS TOPICALLY TO THE AFFECTED AREA FOUR TIMES DAILY 200 g 3    calcium carb-cholecalciferol (CALCIUM 600+D3) 600-10 MG-MCG TABS per tab Take 2 tablets by mouth daily TAKE 1 TABLET BY MOUTH DAILY 90 tablet 0    nitroGLYCERIN (NITROSTAT) 0.4 MG SL tablet DISSOLVE 1 TABLET UNDER THE TONGUE AS NEEDED FOR CHEST PAIN, IF NO RELIEF AFTER 1 DOSE CALL 911.  MAX OF 3 DOSES 25 tablet 0    acetaminophen (ACETAMINOPHEN EXTRA STRENGTH) 500 MG tablet Take 1 tablet by mouth every 8 hours as needed for Pain TAKE 1 TABLET BY MOUTH EVERY 6 HOURS AS NEEDED FOR PAIN 120 tablet 1    busPIRone (BUSPAR) 15 MG tablet TAKE 1 TABLET BY MOUTH EVERY 12 HOURS AS NEEDED 120 tablet 0    isosorbide mononitrate (IMDUR) 30 MG extended release tablet TAKE 1 TABLET BY MOUTH EVERY DAY 90 tablet 0    metFORMIN (GLUCOPHAGE-XR) 500 MG extended release tablet Take 1 tablet by mouth daily (with breakfast) TAKE 1 TABLET BY MOUTH DAILY WITH BREAKFAST 90 tablet 0    metoprolol succinate (TOPROL XL) 50 MG extended release tablet TAKE 1 TABLET BY MOUTH DAILY 90 tablet 0    aspirin (ASPIRIN LOW DOSE) 81 MG EC tablet TAKE 1 TABLET BY MOUTH DAILY 90 tablet 0    potassium chloride (KLOR-CON M) 10 MEQ extended release tablet Take 1 tablet by mouth daily 90 tablet 0    cetirizine (ZYRTEC) 10 MG tablet Take 1 tablet by mouth daily TAKE 1 TABLET BY MOUTH DAILY 90 tablet 0    albuterol sulfate HFA (VENTOLIN HFA) 108 (90 Base) MCG/ACT inhaler INHALE 2 PUFFS INTO THE LUNGS EVERY 6 HOURS AS NEEDED FOR WHEEZING 36 g 3    enalapril (VASOTEC) 10 MG tablet TAKE 2 TABLETS BY MOUTH EVERY MORNING THEN TAKE 1 TABLET BY MOUTH EVERY EVENING 90 tablet 0    Multiple Vitamins-Minerals (PRESERVISION AREDS) CAPS TAKE 1 CAPSULE BY MOUTH TWICE DAILY      clopidogrel (PLAVIX) 75 MG tablet Take 75 mg by mouth in the morning. Handicap Placard MISC by Does not apply route Expires on 25 1 each 0    simvastatin (ZOCOR) 40 MG tablet Take 1 tablet by mouth nightly TAKE 1 TABLET BY MOUTH EVERY NIGHT 90 tablet 3    cilostazol (PLETAL) 50 MG tablet Take 50 mg by mouth 2 times daily       No current facility-administered medications for this visit.              Social History     Socioeconomic History    Marital status:      Spouse name: Not on file    Number of children: Not on file    Years of education: Not on file    Highest education level: Not on file   Occupational History    Not on file   Tobacco Use    Smoking status: Former     Packs/day: 0.25     Years: 25.00     Pack years: 6.25     Types: Cigarettes     Quit date: 2009     Years since quittin.7    Smokeless tobacco: Never   Vaping Use Vaping Use: Never used   Substance and Sexual Activity    Alcohol use: No     Alcohol/week: 0.0 standard drinks    Drug use: No    Sexual activity: Yes   Other Topics Concern    Not on file   Social History Narrative    Not on file     Social Determinants of Health     Financial Resource Strain: Low Risk     Difficulty of Paying Living Expenses: Not very hard   Food Insecurity: No Food Insecurity    Worried About Running Out of Food in the Last Year: Never true    Ran Out of Food in the Last Year: Never true   Transportation Needs: Unknown    Lack of Transportation (Medical): Not on file    Lack of Transportation (Non-Medical): No   Physical Activity: Not on file   Stress: Not on file   Social Connections: Not on file   Intimate Partner Violence: Not on file   Housing Stability: Unknown    Unable to Pay for Housing in the Last Year: Not on file    Number of Places Lived in the Last Year: Not on file    Unstable Housing in the Last Year: No     Counseling given: Not Answered        Family History   Problem Relation Age of Onset    Emphysema Mother     Breast Cancer Paternal Aunt              -rest of complaints with corresponding details per ROS    The patient's past medical, surgical, social, and family history as well as her current medications and allergies were reviewed as documented intoday's encounter. Review of Systems   Constitutional:  Negative for activity change, appetite change, fatigue, fever and unexpected weight change. HENT:  Negative for congestion, ear pain, postnasal drip, rhinorrhea, sinus pressure, sore throat and trouble swallowing. Eyes:  Negative for redness and visual disturbance. Respiratory:  Negative for cough, chest tightness, shortness of breath, wheezing and stridor. Cardiovascular:  Negative for chest pain, palpitations and leg swelling.    Gastrointestinal:  Negative for abdominal distention, abdominal pain, blood in stool, constipation, diarrhea, nausea, rectal pain and vomiting. Endocrine: Negative for polydipsia, polyphagia and polyuria. Genitourinary:  Negative for difficulty urinating, flank pain, frequency and urgency. Musculoskeletal:  Positive for arthralgias, back pain, gait problem, joint swelling and myalgias. Negative for neck pain. Skin:  Negative for color change, rash and wound. Allergic/Immunologic: Negative for food allergies and immunocompromised state. Neurological:  Positive for weakness and numbness. Negative for dizziness, speech difficulty, light-headedness and headaches. Psychiatric/Behavioral:  Negative for agitation, behavioral problems, decreased concentration, dysphoric mood, hallucinations, sleep disturbance and suicidal ideas. The patient is nervous/anxious. Physical Exam  Vitals and nursing note reviewed. Constitutional:       General: She is not in acute distress. Appearance: Normal appearance. She is well-developed. She is obese. She is not diaphoretic. HENT:      Head: Normocephalic and atraumatic. Nose: Nose normal.   Eyes:      General:         Right eye: No discharge. Left eye: No discharge. Extraocular Movements: Extraocular movements intact. Conjunctiva/sclera: Conjunctivae normal.      Pupils: Pupils are equal, round, and reactive to light. Neck:      Thyroid: No thyromegaly. Cardiovascular:      Rate and Rhythm: Normal rate and regular rhythm. Heart sounds: Normal heart sounds. No murmur heard. Pulmonary:      Effort: Pulmonary effort is normal. No respiratory distress. Breath sounds: Normal breath sounds. No wheezing or rhonchi. Abdominal:      General: Bowel sounds are normal. There is no distension. Palpations: Abdomen is soft. There is no mass. Tenderness: There is no abdominal tenderness. There is no right CVA tenderness, left CVA tenderness, guarding or rebound. Musculoskeletal:         General: No tenderness.       Cervical back: Normal range of motion and neck supple. Spasms present. No rigidity. Thoracic back: No spasms. Normal range of motion. Lumbar back: Spasms present. Decreased range of motion. Right lower leg: No edema. Left lower leg: No edema. Lymphadenopathy:      Cervical: No cervical adenopathy. Skin:     Coloration: Skin is not jaundiced or pale. Findings: No bruising, erythema or rash. Neurological:      General: No focal deficit present. Mental Status: She is alert and oriented to person, place, and time. Cranial Nerves: No cranial nerve deficit. Sensory: Sensory deficit present. Motor: No weakness or tremor. Coordination: Coordination normal.      Gait: Gait and tandem walk normal.      Deep Tendon Reflexes: Reflexes are normal and symmetric. Psychiatric:         Attention and Perception: Attention and perception normal. She is attentive. Mood and Affect: Mood is anxious. Mood is not depressed. Affect is not tearful. Speech: She is communicative. Speech is not rapid and pressured, delayed or slurred. Behavior: Behavior normal. Behavior is not agitated or slowed. Thought Content: Thought content normal.         Judgment: Judgment normal.           ASSESSMENT AND PLAN      1. Essential hypertension  Controlled on current treatment continue beta-blockers ACE inhibitor's and diuretics. Continue to monitor blood pressure at home  Discussed and advised low-salt diet  2. Coronary artery disease involving native coronary artery of native heart without angina pectoris  Stable continue statins beta-blockers and nitrates. Follow-up with cardiologist as needed    3. Spinal stenosis of lumbar region with neurogenic claudication  Chronic problem fairly stable on Lyrica not follows with pain management, will do urine drug screen. Pain is controlled on current treatment    4. Prediabetes  Normal A1c continue metformin. Continue low-carb low-fat diet.   - POCT glycosylated hemoglobin (Hb A1C)    5. PVD (peripheral vascular disease) with claudication (HCC)  Chronic problem status post stent placement follow-up with vascular surgeon. Continue aspirin Plavix    6. Class 1 obesity due to excess calories with serious comorbidity and body mass index (BMI) of 32.0 to 32.9 in adult  Improving continue to work on physical activity dietary changes. 7. Major depressive disorder with single episode, in partial remission ([facility] [address]  Stable continue Wellbutrin BuSpar. Discussed and advised  Relaxation techniques    8. Adjustment disorder with mixed anxiety and depressed mood  Stable on BuSpar and Wellbutrin continue current treatment    9. Medication monitoring encounter    - POCT Rapid Drug Screen      Orders Placed This Encounter   Procedures    POCT glycosylated hemoglobin (Hb A1C)    POCT Rapid Drug Screen         There are no discontinued medications. Christina received counseling on the following healthy behaviors: nutrition, exercise, and medication adherence  Reviewed prior labs and health maintenance  Continue current medications, diet and exercise. Discussed use, benefit, and side effects of prescribed medications. Barriers to medication compliance addressed. Patient given educational materials - see patient instructions  Was a self-tracking handout given in paper form or via [employer])? Yes    Requested Prescriptions      No prescriptions requested or ordered in this encounter       All patient questions answered. Patient voiced understanding. Quality Measures    Body mass index is 32.92 kg/m². Elevated. Weight control planned discussed daily exercise regimen and Healthy diet and regular exercise. BP: 110/80 Blood pressure is Normal. Treatment plan consists of Weight Reduction, DASH Eating Plan, Dietary Sodium Restriction, Increased Physical Activity, and No treatment change needed.     Lab Results   Component Value Date    LDLCHOLESTEROL 48 06/24/2022    (goal LDL reduction with dx if diabetes is 50% LDL reduction)      PHQ Scores 2/16/2023 4/14/2022 1/13/2022 2/8/2021 3/25/2019 9/25/2018 4/16/2018   PHQ2 Score 0 0 0 0 0 0 0   PHQ9 Score 0 4 0 0 0 0 0     Interpretation of Total Score Depression Severity: 1-4 = Minimal depression, 5-9 = Mild depression, 10-14 = Moderate depression, 15-19 = Moderately severe depression, 20-27 = Severe depression    The patient'spast medical, surgical, social, and family history as well as her   current medications and allergies were reviewed as documented in today's encounter. Medications, labs, diagnostic studies, consultations andfollow-up as documented in this encounter. Return in about 4 months (around 6/16/2023) for annual exam 30min. Patient wasseen with total face to face time of 35 minutes. More than 50% of this visit was counseling and education. Future Appointments   Date Time Provider Matt Rodriguezi   2/22/2023  3:30 PM Jasmine Desai MD [facility] -   6/27/2023  1:30 PM Zita White MD [facility] [address] [facility]     This note was completed by using the assistance of a speech-recognition program. However, inadvertent computerized transcription errors may be present. Althoughevery effort was made to ensure accuracy, no guarantees can be provided that every mistake has been identified and corrected by editing.   Electronically signed by Zita White MD on 2/16/2023  2:30 PM

## 2023-02-16 NOTE — PROGRESS NOTES
Visit Information    Have you changed or started any medications since your last visit including any over-the-counter medicines, vitamins, or herbal medicines? no   Are you having any side effects from any of your medications? -  no  Have you stopped taking any of your medications? Is so, why? -  no    Have you seen any other physician or provider since your last visit? No  Have you had any other diagnostic tests since your last visit? No  Have you been seen in the emergency room and/or had an admission to a hospital since we last saw you? No  Have you had your routine dental cleaning in the past 6 months? no    Have you activated your [employer] account? If not, what are your barriers?  Yes     Patient Care Team:  Randolph Crowley MD as PCP - General (Family Medicine)  [facility] Crow[facility]    Medical History Review  Past Medical, Family, and Social History reviewed and does contribute to the patient presenting condition    Health Maintenance   Topic Date Due    Depression Monitoring  04/14/2023    Cervical cancer screen  04/16/2023    Lipids  06/24/2023    A1C test (Diabetic or Prediabetic)  10/18/2023    Colorectal Cancer Screen  05/06/2024    Breast cancer screen  07/28/2024    Pneumococcal 0-64 years Vaccine (3 - PPSV23 if available, else PCV20) 05/02/2025    DTaP/Tdap/Td vaccine (2 - Td or Tdap) 04/16/2028    Flu vaccine  Completed    Shingles vaccine  Completed    COVID-19 Vaccine  Completed    Hepatitis C screen  Completed    HIV screen  Completed    Hepatitis A vaccine  Aged Out    Hib vaccine  Aged Out    Meningococcal (ACWY) vaccine  Aged Out

## 2023-02-16 NOTE — PATIENT INSTRUCTIONS
New Updates for [facility]/ [facility]    Thank you for choosing [facility] to give you the best care! [facility]) is always trying to think of new ways to help their patients. We are asking all patients to try out the new digital registration that is now available through your [facility] account or the new ALLIE, [employer] ([facility]). Via the allie you're now able to update your personal and registration information prior to your upcoming appointment. This will save you time once you arrive at the office to check-in, not to mention your information remains safe!! Many other perks come from signing up for an account, such as:  Requesting refills  Scheduling an appointment  Completing an E-Visit  Sending a message to the office/provider  Having access to your medication list  Paying your bill/copay prior to your appointment  Scheduling your yearly mammogram  Review your test results    If you are not familiar with [facility] or the [facility] ALLIE, please ask one of us and we will be happy to answer any questions or help you set-up your account. Your [facility] Bird office,  [address] Updates for [facility]/ [employer] ([facility] ALLIE    Thank you for choosing [facility] to give you the best care! [facility] ([facility]) is always trying to think of new ways to help their patients. We are asking all patients to try out the new digital registration that is now available through your [facility] account or the new ALLIE, [facility] Via the allie you're now able to update your personal and registration information prior to your upcoming appointment.  This will save you time once you arrive at the office to check-in, not to mention your information remains safe!! Many other perks come from signing up for an account, such as:  Requesting refills  Scheduling an appointment  Completing an E-Visit  Sending a message to the office/provider  Having access to your medication list  Paying your bill/copay prior to your appointment  Scheduling your yearly mammogram  Review your test results    If you are not familiar with [facility] or the [facility] ([facility], please ask one of us and we will be happy to answer any questions or help you set-up your account.       Your [facility] office,  [place]

## 2023-02-22 ENCOUNTER — OFFICE VISIT (OUTPATIENT)
Dept: NEUROLOGY | Age: 63
End: 2023-02-22
Payer: MEDICAID

## 2023-02-22 VITALS
RESPIRATION RATE: 22 BRPM | OXYGEN SATURATION: 97 % | SYSTOLIC BLOOD PRESSURE: 109 MMHG | HEART RATE: 94 BPM | WEIGHT: 180 LBS | BODY MASS INDEX: 32.92 KG/M2 | TEMPERATURE: 98.6 F | DIASTOLIC BLOOD PRESSURE: 70 MMHG

## 2023-02-22 DIAGNOSIS — M48.061 SPINAL STENOSIS OF LUMBAR REGION WITHOUT NEUROGENIC CLAUDICATION: ICD-10-CM

## 2023-02-22 DIAGNOSIS — G89.29 CHRONIC LOW BACK PAIN WITH SCIATICA, SCIATICA LATERALITY UNSPECIFIED, UNSPECIFIED BACK PAIN LATERALITY: Primary | ICD-10-CM

## 2023-02-22 DIAGNOSIS — M54.40 CHRONIC LOW BACK PAIN WITH SCIATICA, SCIATICA LATERALITY UNSPECIFIED, UNSPECIFIED BACK PAIN LATERALITY: Primary | ICD-10-CM

## 2023-02-22 DIAGNOSIS — F41.9 ANXIETY: ICD-10-CM

## 2023-02-22 PROCEDURE — 3078F DIAST BP <80 MM HG: CPT | Performed by: STUDENT IN AN ORGANIZED HEALTH CARE EDUCATION/TRAINING PROGRAM

## 2023-02-22 PROCEDURE — 99214 OFFICE O/P EST MOD 30 MIN: CPT | Performed by: STUDENT IN AN ORGANIZED HEALTH CARE EDUCATION/TRAINING PROGRAM

## 2023-02-22 PROCEDURE — 3074F SYST BP LT 130 MM HG: CPT | Performed by: STUDENT IN AN ORGANIZED HEALTH CARE EDUCATION/TRAINING PROGRAM

## 2023-02-22 RX ORDER — BUSPIRONE HYDROCHLORIDE 15 MG/1
TABLET ORAL
Qty: 120 TABLET | Refills: 3 | Status: SHIPPED | OUTPATIENT
Start: 2023-02-22

## 2023-02-22 ASSESSMENT — ENCOUNTER SYMPTOMS
APNEA: 0
SINUS PAIN: 0
BACK PAIN: 1
EYE PAIN: 0
SHORTNESS OF BREATH: 0
NAUSEA: 0
ABDOMINAL DISTENTION: 0
VOMITING: 0
CHEST TIGHTNESS: 0
COUGH: 0
ABDOMINAL PAIN: 0

## 2023-02-22 ASSESSMENT — VISUAL ACUITY: VA_NORMAL: 1

## 2023-02-22 NOTE — PROGRESS NOTES
[address] [place] [address],  [address], [facility], [facility], [address] [place] [address]  P: [phone]  F: [phone]    NEUROLOGY CLINIC NOTE     PATIENT NAME: Del Meade  PATIENT MRN: [id number]  PRIMARY CARE PHYSICIAN: Kristin De La Paz MD    Interval outpatient clinic visit 2/22/23     Del Meade is a 58 y.o. female who presents to clinic today for routine follow up for chronic low back pain with sciatica into both lower extremity's X 3 years. PMH significant for lumbar scoliosis, obesity, DM2, HTN, CAD S/P 2 coronary stents, peripheral vascular disease with claudication s/p 3 fem stenting April 2022 on asa and statin. Since her last visit patient has been taking lyrica 150mg BID (ordered as TID however patient feels afternoon dose dose not make much difference and misses it most days however occasionally does require it for breakthrough pain if increased activity. She feels this has relieved her low back pain and sciatica by 40% and continues to to do PT/OT exercises at home. Pain well controlled she states 2/10 when resting and if increased activity can have bouts of 6-7/10 with shooting pain down back of both legs. Denies any other complaints at this time. Radiology review and neuro workup  MRI Lumbar spine WO 3/16/22  Impression   Multilevel degenerative change with facet and ligamentous hypertrophy as well   as prominent epidural fat resulting in canal stenosis most pronounced at   L3-4, L4-5, and L5-S1. Bilateral foraminal narrowing as described above. HPI: 8/18/22      Del Meade  64years old female patient, right-handed came to the clinic today for follow up. She follows with our neurology clinic as a case of low back pain with right leg numbness. She also has hx of lumbar scoliosis, obesity, DM, HTN, HLD, CAD sp 2 stents, PAD sp 3 stenting April/2022, on asa and statins.       In summary, it seems her low back pain is a combination of  scoliosis deformity in addition to degenerative changes. She saw neurosurgery in July 2022 with an option for surgery but the patient doesn't want to seek this option at this point of time. She is also trying exercising, weight reduction and Lyrica, no red flags for low back pain, no bladder or bowel incontinence. No saddle anesthesia. Briefly, her clinical story started about 2 years ago when she started to experience gradual low back pain with right leg numbness. also, she was experiencing numbness on her right groin down to the anterolateral right thigh. Low back Pain severity ranges between 4-7/10. She saw pain management and had epidural injection which actually helped a lot with the pain and it went away. A year later ago, this low back pain started to recur again. She denies any changes in bowel or bladder habits. She did have an MRI of the lumbar spine in February which showed multilevel degenerative changes at L2-3, L4-5 and L5-S1. She saw Dr. Florence Hull Neurosurgery in July 2022 with option for surgery but it was explained to her that its an extensive surgery and the patient didnt want to seek this option at this time. She uses a cane to ambulate at times.    PREVIOUS WORKUP:     Lab Results   Component Value Date    WBC 8.2 10/18/2022    HGB 14.1 10/18/2022    HCT 41.9 10/18/2022    MCV 93.9 10/18/2022     10/18/2022       Past Medical History:   Diagnosis Date    ARIADNE (acute kidney injury) ([facility] [address] 9/4/2019    Anxiety     Asthma     CAD (coronary artery disease)     Class 1 obesity due to excess calories with serious comorbidity and body mass index (BMI) of 32.0 to 32.9 in adult 6/30/2020    Depression     Examination of participant in clinical trial 6/23/11    End date 2/2015    HTN (hypertension)     Lumbar radiculopathy     Mixed hyperlipidemia 1/15/2018    OA (osteoarthritis)     PVD (peripheral vascular disease) with claudication ([address] 7/27/2015        Past Surgical History:   Procedure Laterality Date     SECTION      COLONOSCOPY N/A 2019    COLORECTAL CANCER SCREENING, NOT HIGH RISK performed by Vianca Pelletier MD at [facility]  2015    2 stents    DILATION AND CURETTAGE OF UTERUS N/A     uterine polyp        Social History     Socioeconomic History    Marital status:      Spouse name: Not on file    Number of children: Not on file    Years of education: Not on file    Highest education level: Not on file   Occupational History    Not on file   Tobacco Use    Smoking status: Former     Packs/day: 0.25     Years: 25.00     Pack years: 6.25     Types: Cigarettes     Quit date: 2009     Years since quittin.7    Smokeless tobacco: Never   Vaping Use    Vaping Use: Never used   Substance and Sexual Activity    Alcohol use: No     Alcohol/week: 0.0 standard drinks    Drug use: No    Sexual activity: Yes   Other Topics Concern    Not on file   Social History Narrative    Not on file     Social Determinants of Health     Financial Resource Strain: Low Risk     Difficulty of Paying Living Expenses: Not very hard   Food Insecurity: No Food Insecurity    Worried About Running Out of Food in the Last Year: Never true    Ran Out of Food in the Last Year: Never true   Transportation Needs: Unknown    Lack of Transportation (Medical): Not on file    Lack of Transportation (Non-Medical):  No   Physical Activity: Not on file   Stress: Not on file   Social Connections: Not on file   Intimate Partner Violence: Not on file   Housing Stability: Unknown    Unable to Pay for Housing in the Last Year: Not on file    Number of Places Lived in the Last Year: Not on file    Unstable Housing in the Last Year: No        Current Outpatient Medications   Medication Sig Dispense Refill    busPIRone (BUSPAR) 15 MG tablet TAKE 1 TABLET BY MOUTH EVERY 12 HOURS AS NEEDED 120 tablet 3    pregabalin (LYRICA) 150 MG capsule Take 1 capsule by mouth in the morning, at noon, and at bedtime for 30 days. TAKE 1 CAPSULE BY MOUTH EVERY MORNING, AT NOON AND AT BEDTIME 90 capsule 0    furosemide (LASIX) 20 MG tablet TAKE 1 TABLET BY MOUTH DAILY 60 tablet 0    buPROPion (WELLBUTRIN SR) 100 MG extended release tablet Take 1 tablet by mouth 2 times daily TAKE 1 TABLET BY MOUTH TWICE DAILY 180 tablet 1    cyclobenzaprine (FLEXERIL) 10 MG tablet TAKE 1 TABLET BY MOUTH TWICE DAILY AS NEEDED FOR MUSCLE SPASMS 180 tablet 0    diclofenac sodium (VOLTAREN) 1 % GEL APPLY 2 GRAMS TOPICALLY TO THE AFFECTED AREA FOUR TIMES DAILY 200 g 3    calcium carb-cholecalciferol (CALCIUM 600+D3) 600-10 MG-MCG TABS per tab Take 2 tablets by mouth daily TAKE 1 TABLET BY MOUTH DAILY 90 tablet 0    nitroGLYCERIN (NITROSTAT) 0.4 MG SL tablet DISSOLVE 1 TABLET UNDER THE TONGUE AS NEEDED FOR CHEST PAIN, IF NO RELIEF AFTER 1 DOSE CALL 911.  MAX OF 3 DOSES 25 tablet 0    acetaminophen (ACETAMINOPHEN EXTRA STRENGTH) 500 MG tablet Take 1 tablet by mouth every 8 hours as needed for Pain TAKE 1 TABLET BY MOUTH EVERY 6 HOURS AS NEEDED FOR PAIN 120 tablet 1    isosorbide mononitrate (IMDUR) 30 MG extended release tablet TAKE 1 TABLET BY MOUTH EVERY DAY 90 tablet 0    metFORMIN (GLUCOPHAGE-XR) 500 MG extended release tablet Take 1 tablet by mouth daily (with breakfast) TAKE 1 TABLET BY MOUTH DAILY WITH BREAKFAST 90 tablet 0    metoprolol succinate (TOPROL XL) 50 MG extended release tablet TAKE 1 TABLET BY MOUTH DAILY 90 tablet 0    aspirin (ASPIRIN LOW DOSE) 81 MG EC tablet TAKE 1 TABLET BY MOUTH DAILY 90 tablet 0    potassium chloride (KLOR-CON M) 10 MEQ extended release tablet Take 1 tablet by mouth daily 90 tablet 0    cetirizine (ZYRTEC) 10 MG tablet Take 1 tablet by mouth daily TAKE 1 TABLET BY MOUTH DAILY 90 tablet 0    albuterol sulfate HFA (VENTOLIN HFA) 108 (90 Base) MCG/ACT inhaler INHALE 2 PUFFS INTO THE LUNGS EVERY 6 HOURS AS NEEDED FOR WHEEZING 36 g 3    enalapril (VASOTEC) 10 MG tablet TAKE 2 TABLETS BY MOUTH EVERY MORNING THEN TAKE 1 TABLET BY MOUTH EVERY EVENING 90 tablet 0    Multiple Vitamins-Minerals (PRESERVISION AREDS) CAPS TAKE 1 CAPSULE BY MOUTH TWICE DAILY      clopidogrel (PLAVIX) 75 MG tablet Take 75 mg by mouth in the morning. Handicap Placard MISC by Does not apply route Expires on 12/31/25 1 each 0    simvastatin (ZOCOR) 40 MG tablet Take 1 tablet by mouth nightly TAKE 1 TABLET BY MOUTH EVERY NIGHT 90 tablet 3    cilostazol (PLETAL) 50 MG tablet Take 50 mg by mouth 2 times daily       No current facility-administered medications for this visit. Allergies   Allergen Reactions    Claritin [Loratadine]      02/2010 Heart palpitations  02/2010 Heart palpitations    Codeine Nausea Only        REVIEW OF SYSTEMS:     Review of Systems   Constitutional:  Negative for activity change, appetite change, chills and fatigue. HENT:  Negative for ear pain and sinus pain. Eyes:  Negative for pain and visual disturbance. Respiratory:  Negative for apnea, cough, chest tightness and shortness of breath. Cardiovascular:  Negative for chest pain, palpitations and leg swelling. Gastrointestinal:  Negative for abdominal distention, abdominal pain, nausea and vomiting. Endocrine: Negative for polydipsia and polyuria. Genitourinary:  Negative for difficulty urinating and dysuria. Musculoskeletal:  Positive for back pain. Negative for arthralgias and myalgias. Skin:  Negative for rash. Allergic/Immunologic: Negative for environmental allergies. Neurological:  Negative for dizziness, facial asymmetry and headaches. Low back L5/S1 constant pain with occasional sciatica into both lower extremity    Psychiatric/Behavioral:  Negative for agitation and behavioral problems.        VITALS  /70   Pulse 94   Temp 98.6 °F (37 °C)   Resp 22   Wt 180 lb (81.6 kg)   LMP 11/07/2012   SpO2 97%   BMI 32.92 kg/m²      NEUROLOGICAL EXAMINATION:     Neurological Exam  Mental Status  Awake, alert and oriented to person, place and time. Speech is normal. Language is fluent with no aphasia. Attention and concentration are normal.    Cranial Nerves  CN II: Visual acuity is normal. Visual fields full to confrontation. CN III, IV, VI: Extraocular movements intact bilaterally. Normal lids and orbits bilaterally. Pupils equal round and reactive to light bilaterally. CN V: Facial sensation is normal.  CN VII: Full and symmetric facial movement. CN VIII: Hearing is normal.  CN IX, X: Palate elevates symmetrically. Normal gag reflex. CN XI: Shoulder shrug strength is normal.  CN XII: Tongue midline without atrophy or fasciculations. Motor  Normal muscle bulk throughout. No fasciculations present. Normal muscle tone. No abnormal involuntary movements. Strength is 5/5 throughout all four extremities. Sensory  Sensation is intact to light touch, pinprick, vibration and proprioception in all four extremities. Reflexes  Deep tendon reflexes are 2+ and symmetric in all four extremities. Coordination    Finger-to-nose, rapid alternating movements and heel-to-shin normal bilaterally without dysmetria. Gait    Currently using straight cane to assist in mild gait difficulty. .     ASSESSMENT / PLAN:     Assessment  Stable Lumbar spinal stenosis with intermittent sciatica into both legs   Extensive vascular disease with CAD s/p Stenting X2 and Femoral Stenting X3 April 2022    Plan  -Continue lyrica 150mg TID (ok to skip afternoon dose if not helpful) and use for breakthrough pain only PRN  -Continue PT/OT at home  -follows NS with Dr. Marcella Avilez holding off on any surgical intervention at this time   -previously followed pain management for lumbar spine injections s/p 3 with mild relief   -Follow up with Dr. Maykel Leone in 6 months       Ms. Ulloa received counseling on the following healthy behaviors: medical compliance, smoking cessation, blood pressure control, regular follow up with primary doctor.         Electronically signed by Josy Gary MD on 2/22/2023 at 3:59 PM

## 2023-03-01 DIAGNOSIS — I10 ESSENTIAL HYPERTENSION: ICD-10-CM

## 2023-03-01 RX ORDER — ENALAPRIL MALEATE 10 MG/1
TABLET ORAL
Qty: 90 TABLET | Refills: 3 | Status: SHIPPED | OUTPATIENT
Start: 2023-03-01

## 2023-03-13 DIAGNOSIS — M79.605 LEG PAIN, LEFT: ICD-10-CM

## 2023-03-13 DIAGNOSIS — M50.30 DEGENERATIVE DISC DISEASE, CERVICAL: ICD-10-CM

## 2023-03-13 DIAGNOSIS — M48.061 SPINAL STENOSIS OF LUMBAR REGION WITHOUT NEUROGENIC CLAUDICATION: ICD-10-CM

## 2023-03-13 DIAGNOSIS — M54.16 LUMBAR RADICULOPATHY: ICD-10-CM

## 2023-03-13 RX ORDER — PSEUDOEPHED/ACETAMINOPH/DIPHEN 30MG-500MG
TABLET ORAL
Qty: 120 TABLET | Refills: 1 | Status: SHIPPED | OUTPATIENT
Start: 2023-03-13

## 2023-03-13 RX ORDER — MULTIVITAMIN
TABLET ORAL
Qty: 90 TABLET | Refills: 0 | Status: SHIPPED | OUTPATIENT
Start: 2023-03-13

## 2023-03-13 NOTE — TELEPHONE ENCOUNTER
Please Approve or Refuse.   Send to Pharmacy per Pt's Request:      Next Visit Date:  6/27/2023   Last Visit Date: 2/16/2023    Hemoglobin A1C (%)   Date Value   02/16/2023 5.1   10/18/2022 5.3   07/18/2022 5.3             ( goal A1C is < 7)   BP Readings from Last 3 Encounters:   02/22/23 109/70   02/16/23 110/80   10/18/22 98/82          (goal 120/80)  BUN   Date Value Ref Range Status   06/24/2022 12 8 - 23 mg/dL Final     Creatinine   Date Value Ref Range Status   06/24/2022 0.96 (H) 0.50 - 0.90 mg/dL Final     Potassium   Date Value Ref Range Status   06/24/2022 4.6 3.7 - 5.3 mmol/L Final

## 2023-03-14 ENCOUNTER — E-VISIT (OUTPATIENT)
Dept: FAMILY MEDICINE CLINIC | Age: 63
End: 2023-03-14
Payer: MEDICAID

## 2023-03-14 ENCOUNTER — TELEPHONE (OUTPATIENT)
Dept: FAMILY MEDICINE CLINIC | Age: 63
End: 2023-03-14

## 2023-03-14 DIAGNOSIS — L11.8 ACANTHOLYTIC VESICULAR DERMATITIS: Primary | ICD-10-CM

## 2023-03-14 PROCEDURE — 99423 OL DIG E/M SVC 21+ MIN: CPT | Performed by: FAMILY MEDICINE

## 2023-03-14 NOTE — TELEPHONE ENCOUNTER
----- Message from Ricky Russo sent at 3/13/2023  2:49 PM EDT -----  Subject: Message to Provider    QUESTIONS  Information for Provider? Patient stated that she has a rash on her left   foot, it is spreading, she stated that she has had this before, she has a   blister on every toe, also her left thumb, and she is using the antifungal   cream and it is not helping. Patient stated that it has worked in the   past, but it is not getting any better. Patient is wanting to know if   something can be called in for her. She does use [facility] on [address]   for the pharmacy if something can be called in. Thank you.   ---------------------------------------------------------------------------  --------------  Alton BRUNNER  [phone]; OK to leave message on voicemail  ---------------------------------------------------------------------------  --------------  SCRIPT ANSWERS  Relationship to Patient?  Self

## 2023-03-14 NOTE — TELEPHONE ENCOUNTER
Moovly message was sent for pt to submit an e-visit, please let us know if pt should called to be placed on schedule or be evaluated walk in clinic? ?

## 2023-03-14 NOTE — TELEPHONE ENCOUNTER
Please Approve or Refuse.   Send to Pharmacy per Pt's Request:      Next Visit Date:  3/14/2023   Last Visit Date: 2/16/2023    Hemoglobin A1C (%)   Date Value   02/16/2023 5.1   10/18/2022 5.3   07/18/2022 5.3             ( goal A1C is < 7)   BP Readings from Last 3 Encounters:   02/22/23 109/70   02/16/23 110/80   10/18/22 98/82          (goal 120/80)  BUN   Date Value Ref Range Status   06/24/2022 12 8 - 23 mg/dL Final     Creatinine   Date Value Ref Range Status   06/24/2022 0.96 (H) 0.50 - 0.90 mg/dL Final     Potassium   Date Value Ref Range Status   06/24/2022 4.6 3.7 - 5.3 mmol/L Final

## 2023-03-15 DIAGNOSIS — R60.9 PERIPHERAL EDEMA: ICD-10-CM

## 2023-03-15 DIAGNOSIS — I10 ESSENTIAL HYPERTENSION: ICD-10-CM

## 2023-03-15 DIAGNOSIS — R73.03 PREDIABETES: ICD-10-CM

## 2023-03-15 DIAGNOSIS — J30.9 ALLERGIC SINUSITIS: ICD-10-CM

## 2023-03-15 RX ORDER — PREGABALIN 150 MG/1
150 CAPSULE ORAL 3 TIMES DAILY
Qty: 90 CAPSULE | Refills: 0 | Status: SHIPPED | OUTPATIENT
Start: 2023-03-15 | End: 2023-04-14

## 2023-03-15 RX ORDER — CLOBETASOL PROPIONATE 0.5 MG/G
CREAM TOPICAL
Qty: 1 EACH | Refills: 0 | Status: SHIPPED | OUTPATIENT
Start: 2023-03-15

## 2023-03-15 RX ORDER — POTASSIUM CHLORIDE 750 MG/1
10 TABLET, EXTENDED RELEASE ORAL DAILY
Qty: 90 TABLET | Refills: 3 | Status: SHIPPED | OUTPATIENT
Start: 2023-03-15

## 2023-03-15 RX ORDER — MUPIROCIN CALCIUM 20 MG/G
CREAM TOPICAL
Qty: 1 EACH | Refills: 0 | Status: SHIPPED | OUTPATIENT
Start: 2023-03-15 | End: 2023-04-14

## 2023-03-15 RX ORDER — ASPIRIN 81 MG/1
TABLET ORAL
Qty: 90 TABLET | Refills: 3 | Status: SHIPPED | OUTPATIENT
Start: 2023-03-15

## 2023-03-15 RX ORDER — METFORMIN HYDROCHLORIDE 500 MG/1
500 TABLET, EXTENDED RELEASE ORAL
Qty: 90 TABLET | Refills: 3 | Status: SHIPPED | OUTPATIENT
Start: 2023-03-15

## 2023-03-15 RX ORDER — KETOCONAZOLE 20 MG/G
CREAM TOPICAL
Qty: 1 EACH | Refills: 0 | Status: SHIPPED | OUTPATIENT
Start: 2023-03-15

## 2023-03-15 RX ORDER — ISOSORBIDE MONONITRATE 30 MG/1
TABLET, EXTENDED RELEASE ORAL
Qty: 90 TABLET | Refills: 3 | Status: SHIPPED | OUTPATIENT
Start: 2023-03-15

## 2023-03-15 RX ORDER — CETIRIZINE HYDROCHLORIDE 10 MG/1
10 TABLET ORAL DAILY
Qty: 90 TABLET | Refills: 3 | Status: SHIPPED | OUTPATIENT
Start: 2023-03-15

## 2023-03-15 NOTE — PROGRESS NOTES
Farhana Boykin (1960) initiated an asynchronous digital communication through [address] [place] [address]. Date of service: 3/15/2023     HPI: per patient's questionnaire    EXAM: not applicable    Diagnoses and all orders for this visit:    1. Acantholytic vesicular dermatitis    - clobetasol (TEMOVATE) 0.05 % cream; Apply topically 2 times daily. Dispense: 1 each; Refill: 0  - ketoconazole (NIZORAL) 2 % cream; Apply topically  2 times a day. Dispense: 1 each; Refill: 0  - mupirocin (BACTROBAN) 2 % cream; Apply 3 times daily. Dispense: 1 each; Refill: 0    No orders of the defined types were placed in this encounter. Patient was advised to contact PCP if symptoms worsen or failing to change as expected      Time: EV3 - 21 or more minutes were spent on the digital evaluation and management of this patient.     Electronically signed by Matthew Anguiano MD on 3/15/23 at 9:38 AM.

## 2023-03-15 NOTE — TELEPHONE ENCOUNTER
Please Approve or Refuse.   Send to Pharmacy per Pt's Request:      Next Visit Date:  6/27/2023   Last Visit Date: 3/14/2023    Hemoglobin A1C (%)   Date Value   02/16/2023 5.1   10/18/2022 5.3   07/18/2022 5.3             ( goal A1C is < 7)   BP Readings from Last 3 Encounters:   02/22/23 109/70   02/16/23 110/80   10/18/22 98/82          (goal 120/80)  BUN   Date Value Ref Range Status   06/24/2022 12 8 - 23 mg/dL Final     Creatinine   Date Value Ref Range Status   06/24/2022 0.96 (H) 0.50 - 0.90 mg/dL Final     Potassium   Date Value Ref Range Status   06/24/2022 4.6 3.7 - 5.3 mmol/L Final

## 2023-03-31 DIAGNOSIS — I25.10 CORONARY ARTERY DISEASE INVOLVING NATIVE CORONARY ARTERY OF NATIVE HEART WITHOUT ANGINA PECTORIS: ICD-10-CM

## 2023-03-31 DIAGNOSIS — R60.9 PERIPHERAL EDEMA: ICD-10-CM

## 2023-03-31 RX ORDER — FUROSEMIDE 20 MG/1
20 TABLET ORAL DAILY
Qty: 60 TABLET | Refills: 2 | Status: SHIPPED | OUTPATIENT
Start: 2023-03-31

## 2023-05-10 DIAGNOSIS — M79.605 LEG PAIN, LEFT: ICD-10-CM

## 2023-05-10 DIAGNOSIS — M51.36 LUMBAR DEGENERATIVE DISC DISEASE: ICD-10-CM

## 2023-05-10 DIAGNOSIS — M50.30 DEGENERATIVE DISC DISEASE, CERVICAL: ICD-10-CM

## 2023-05-10 DIAGNOSIS — L11.8 ACANTHOLYTIC VESICULAR DERMATITIS: ICD-10-CM

## 2023-05-10 DIAGNOSIS — M48.061 SPINAL STENOSIS OF LUMBAR REGION WITHOUT NEUROGENIC CLAUDICATION: ICD-10-CM

## 2023-05-10 DIAGNOSIS — M51.26 PROTRUDED LUMBAR DISC: ICD-10-CM

## 2023-05-10 DIAGNOSIS — S33.6XXD SPRAIN OF SACROILIAC LIGAMENT, SUBSEQUENT ENCOUNTER: ICD-10-CM

## 2023-05-10 DIAGNOSIS — M54.16 LUMBAR RADICULOPATHY: ICD-10-CM

## 2023-05-10 RX ORDER — MUPIROCIN CALCIUM 20 MG/G
CREAM TOPICAL
Qty: 1 EACH | Refills: 0 | Status: SHIPPED | OUTPATIENT
Start: 2023-05-10 | End: 2023-06-08

## 2023-05-10 RX ORDER — KETOCONAZOLE 20 MG/G
CREAM TOPICAL
Qty: 1 EACH | Refills: 0 | Status: SHIPPED | OUTPATIENT
Start: 2023-05-10

## 2023-05-10 RX ORDER — ACETAMINOPHEN 500 MG
TABLET ORAL
Qty: 120 TABLET | Refills: 1 | Status: SHIPPED | OUTPATIENT
Start: 2023-05-10

## 2023-05-10 RX ORDER — CLOBETASOL PROPIONATE 0.5 MG/G
CREAM TOPICAL
Qty: 1 EACH | Refills: 0 | Status: SHIPPED | OUTPATIENT
Start: 2023-05-10

## 2023-05-10 RX ORDER — CALCIUM CARBONATE/VITAMIN D3 600 MG-10
TABLET ORAL
Qty: 90 TABLET | Refills: 0 | Status: SHIPPED | OUTPATIENT
Start: 2023-05-10 | End: 2023-05-11 | Stop reason: SDUPTHER

## 2023-05-10 RX ORDER — PREGABALIN 150 MG/1
150 CAPSULE ORAL 3 TIMES DAILY
Qty: 90 CAPSULE | Refills: 0 | Status: SHIPPED | OUTPATIENT
Start: 2023-05-10 | End: 2023-06-09

## 2023-05-11 DIAGNOSIS — M50.30 DEGENERATIVE DISC DISEASE, CERVICAL: ICD-10-CM

## 2023-05-11 RX ORDER — CALCIUM CARBONATE/VITAMIN D3 600 MG-10
TABLET ORAL
Qty: 90 TABLET | Refills: 0 | Status: SHIPPED | OUTPATIENT
Start: 2023-05-11

## 2023-05-11 RX ORDER — CALCIUM CARBONATE/VITAMIN D3 600 MG-10
TABLET ORAL
Qty: 90 TABLET | Refills: 0 | OUTPATIENT
Start: 2023-05-11

## 2023-05-11 NOTE — TELEPHONE ENCOUNTER
Please Approve or Refuse.   Send to Pharmacy per Pt's Request: [facility]      Next Visit Date:  6/27/2023   Last Visit Date: 3/14/2023    Hemoglobin A1C (%)   Date Value   02/16/2023 5.1   10/18/2022 5.3   07/18/2022 5.3             ( goal A1C is < 7)   BP Readings from Last 3 Encounters:   02/22/23 109/70   02/16/23 110/80   10/18/22 98/82          (goal 120/80)  BUN   Date Value Ref Range Status   06/24/2022 12 8 - 23 mg/dL Final     Creatinine   Date Value Ref Range Status   06/24/2022 0.96 (H) 0.50 - 0.90 mg/dL Final     Potassium   Date Value Ref Range Status   06/24/2022 4.6 3.7 - 5.3 mmol/L Final

## 2023-06-08 DIAGNOSIS — I25.10 CORONARY ARTERY DISEASE INVOLVING NATIVE CORONARY ARTERY OF NATIVE HEART WITHOUT ANGINA PECTORIS: ICD-10-CM

## 2023-06-08 DIAGNOSIS — L11.8 ACANTHOLYTIC VESICULAR DERMATITIS: ICD-10-CM

## 2023-06-08 DIAGNOSIS — J45.20 MILD INTERMITTENT ASTHMA WITHOUT COMPLICATION: Primary | ICD-10-CM

## 2023-06-08 DIAGNOSIS — M48.061 SPINAL STENOSIS OF LUMBAR REGION WITHOUT NEUROGENIC CLAUDICATION: ICD-10-CM

## 2023-06-08 DIAGNOSIS — M50.30 DEGENERATIVE DISC DISEASE, CERVICAL: ICD-10-CM

## 2023-06-08 DIAGNOSIS — F41.9 ANXIETY: ICD-10-CM

## 2023-06-08 DIAGNOSIS — I10 ESSENTIAL HYPERTENSION: ICD-10-CM

## 2023-06-08 DIAGNOSIS — M54.16 LUMBAR RADICULOPATHY: ICD-10-CM

## 2023-06-08 RX ORDER — METOPROLOL SUCCINATE 50 MG/1
TABLET, EXTENDED RELEASE ORAL
Qty: 90 TABLET | Refills: 0 | Status: SHIPPED | OUTPATIENT
Start: 2023-06-08

## 2023-06-08 RX ORDER — CALCIUM CARBONATE/VITAMIN D3 600 MG-10
TABLET ORAL
Qty: 90 TABLET | Refills: 0 | Status: SHIPPED | OUTPATIENT
Start: 2023-06-08

## 2023-06-08 RX ORDER — KETOCONAZOLE 20 MG/G
CREAM TOPICAL
Qty: 1 EACH | Refills: 0 | Status: SHIPPED | OUTPATIENT
Start: 2023-06-08

## 2023-06-08 RX ORDER — CLOBETASOL PROPIONATE 0.5 MG/G
CREAM TOPICAL
Qty: 1 EACH | Refills: 0 | Status: SHIPPED | OUTPATIENT
Start: 2023-06-08

## 2023-06-08 RX ORDER — MUPIROCIN CALCIUM 20 MG/G
CREAM TOPICAL
Qty: 1 EACH | Refills: 0 | Status: SHIPPED | OUTPATIENT
Start: 2023-06-08 | End: 2023-07-07

## 2023-06-08 RX ORDER — PREGABALIN 150 MG/1
150 CAPSULE ORAL 3 TIMES DAILY
Qty: 90 CAPSULE | Refills: 0 | Status: SHIPPED | OUTPATIENT
Start: 2023-06-08 | End: 2023-07-08

## 2023-06-09 RX ORDER — BUPROPION HYDROCHLORIDE 100 MG/1
100 TABLET, EXTENDED RELEASE ORAL 2 TIMES DAILY
Qty: 180 TABLET | Refills: 1 | Status: SHIPPED | OUTPATIENT
Start: 2023-06-09

## 2023-06-09 RX ORDER — ALBUTEROL SULFATE 90 UG/1
AEROSOL, METERED RESPIRATORY (INHALATION)
Qty: 36 G | Refills: 3 | Status: SHIPPED | OUTPATIENT
Start: 2023-06-09

## 2023-06-09 RX ORDER — NITROGLYCERIN 0.4 MG/1
TABLET SUBLINGUAL
Qty: 25 TABLET | Refills: 0 | Status: SHIPPED | OUTPATIENT
Start: 2023-06-09

## 2023-06-27 ENCOUNTER — OFFICE VISIT (OUTPATIENT)
Dept: FAMILY MEDICINE CLINIC | Age: 63
End: 2023-06-27
Payer: MEDICAID

## 2023-06-27 VITALS
OXYGEN SATURATION: 97 % | SYSTOLIC BLOOD PRESSURE: 108 MMHG | HEART RATE: 90 BPM | HEIGHT: 62 IN | BODY MASS INDEX: 32.72 KG/M2 | DIASTOLIC BLOOD PRESSURE: 70 MMHG | WEIGHT: 177.8 LBS

## 2023-06-27 DIAGNOSIS — E78.2 MIXED HYPERLIPIDEMIA: ICD-10-CM

## 2023-06-27 DIAGNOSIS — Z12.31 SCREENING MAMMOGRAM FOR HIGH-RISK PATIENT: ICD-10-CM

## 2023-06-27 DIAGNOSIS — Z00.00 ENCOUNTER FOR WELL ADULT EXAM WITHOUT ABNORMAL FINDINGS: Primary | ICD-10-CM

## 2023-06-27 DIAGNOSIS — I10 ESSENTIAL HYPERTENSION: ICD-10-CM

## 2023-06-27 DIAGNOSIS — E66.09 CLASS 1 OBESITY DUE TO EXCESS CALORIES WITH SERIOUS COMORBIDITY AND BODY MASS INDEX (BMI) OF 32.0 TO 32.9 IN ADULT: ICD-10-CM

## 2023-06-27 DIAGNOSIS — H93.13 TINNITUS OF BOTH EARS: ICD-10-CM

## 2023-06-27 DIAGNOSIS — Z71.89 ACP (ADVANCE CARE PLANNING): ICD-10-CM

## 2023-06-27 PROCEDURE — 3074F SYST BP LT 130 MM HG: CPT | Performed by: FAMILY MEDICINE

## 2023-06-27 PROCEDURE — 99497 ADVNCD CARE PLAN 30 MIN: CPT | Performed by: FAMILY MEDICINE

## 2023-06-27 PROCEDURE — 99396 PREV VISIT EST AGE 40-64: CPT | Performed by: FAMILY MEDICINE

## 2023-06-27 PROCEDURE — 3078F DIAST BP <80 MM HG: CPT | Performed by: FAMILY MEDICINE

## 2023-06-27 ASSESSMENT — ENCOUNTER SYMPTOMS
SHORTNESS OF BREATH: 0
BACK PAIN: 1
CONSTIPATION: 0
CHEST TIGHTNESS: 0
NAUSEA: 0
WHEEZING: 0
SINUS PRESSURE: 0
DIARRHEA: 0
BLOOD IN STOOL: 0
ABDOMINAL PAIN: 0
COLOR CHANGE: 0
VOMITING: 0
TROUBLE SWALLOWING: 0
ABDOMINAL DISTENTION: 0
EYE REDNESS: 0
RECTAL PAIN: 0
STRIDOR: 0
COUGH: 0
RHINORRHEA: 0
SORE THROAT: 0

## 2023-06-27 ASSESSMENT — VISUAL ACUITY
OD_CC: 20/30
OS_CC: 20/40

## 2023-07-07 DIAGNOSIS — M48.061 SPINAL STENOSIS OF LUMBAR REGION WITHOUT NEUROGENIC CLAUDICATION: ICD-10-CM

## 2023-07-07 DIAGNOSIS — M54.16 LUMBAR RADICULOPATHY: ICD-10-CM

## 2023-07-07 DIAGNOSIS — M19.019 OSTEOARTHRITIS OF SHOULDER, UNSPECIFIED LATERALITY, UNSPECIFIED OSTEOARTHRITIS TYPE: ICD-10-CM

## 2023-07-07 DIAGNOSIS — L11.8 ACANTHOLYTIC VESICULAR DERMATITIS: ICD-10-CM

## 2023-07-07 DIAGNOSIS — M79.605 LEG PAIN, LEFT: ICD-10-CM

## 2023-07-07 DIAGNOSIS — M50.30 DEGENERATIVE DISC DISEASE, CERVICAL: ICD-10-CM

## 2023-07-10 RX ORDER — ACETAMINOPHEN 500 MG
TABLET ORAL
Qty: 120 TABLET | Refills: 1 | Status: SHIPPED | OUTPATIENT
Start: 2023-07-10

## 2023-07-10 RX ORDER — PREGABALIN 150 MG/1
150 CAPSULE ORAL 3 TIMES DAILY
Qty: 90 CAPSULE | Refills: 0 | Status: SHIPPED | OUTPATIENT
Start: 2023-07-10 | End: 2023-08-09

## 2023-07-10 RX ORDER — CLOBETASOL PROPIONATE 0.5 MG/G
CREAM TOPICAL
Qty: 1 EACH | Refills: 0 | Status: SHIPPED | OUTPATIENT
Start: 2023-07-10

## 2023-07-10 RX ORDER — KETOCONAZOLE 20 MG/G
CREAM TOPICAL
Qty: 1 EACH | Refills: 0 | Status: SHIPPED | OUTPATIENT
Start: 2023-07-10

## 2023-07-10 RX ORDER — CYCLOBENZAPRINE HCL 10 MG
TABLET ORAL
Qty: 180 TABLET | Refills: 0 | Status: SHIPPED | OUTPATIENT
Start: 2023-07-10

## 2023-07-10 RX ORDER — MUPIROCIN CALCIUM 20 MG/G
CREAM TOPICAL
Qty: 1 EACH | Refills: 0 | Status: SHIPPED | OUTPATIENT
Start: 2023-07-10 | End: 2023-08-05

## 2023-07-18 ENCOUNTER — HOSPITAL ENCOUNTER (OUTPATIENT)
Age: 63
Discharge: HOME | End: 2023-07-18
Payer: COMMERCIAL

## 2023-07-18 DIAGNOSIS — E78.2 MIXED HYPERLIPIDEMIA: ICD-10-CM

## 2023-07-18 DIAGNOSIS — I10 ESSENTIAL HYPERTENSION: ICD-10-CM

## 2023-07-18 LAB
BACTERIA URNS QL MICRO: NORMAL
BILIRUB UR QL STRIP: NEGATIVE
CASTS #/AREA URNS LPF: NORMAL /LPF
CHOLEST SERPL-MCNC: 129 MG/DL
CHOLESTEROL/HDL RATIO: 2.6
CLARITY UR: CLEAR
COLOR UR: YELLOW
EPI CELLS #/AREA URNS HPF: NORMAL /HPF
GLUCOSE UR STRIP-MCNC: NEGATIVE MG/DL
HDLC SERPL-MCNC: 50 MG/DL
HGB UR QL STRIP.AUTO: NEGATIVE
KETONES UR STRIP-MCNC: NEGATIVE MG/DL
LDLC SERPL CALC-MCNC: 55 MG/DL (ref 0–130)
LEUKOCYTE ESTERASE UR QL STRIP: ABNORMAL
NITRITE UR QL STRIP: NEGATIVE
PH UR STRIP: 7 [PH] (ref 5–8)
PROT UR STRIP-MCNC: NEGATIVE MG/DL
RBC #/AREA URNS HPF: NORMAL /HPF
SP GR UR STRIP: 1.01 (ref 1–1.03)
TRIGL SERPL-MCNC: 118 MG/DL
URATE SERPL-MCNC: 5.2 MG/DL (ref 2.4–5.7)
UROBILINOGEN UR STRIP-ACNC: NORMAL EU/DL (ref 0–1)
WBC #/AREA URNS HPF: NORMAL /HPF

## 2023-07-18 PROCEDURE — 36415 COLL VENOUS BLD VENIPUNCTURE: CPT

## 2023-07-18 PROCEDURE — 84550 ASSAY OF BLOOD/URIC ACID: CPT

## 2023-07-18 PROCEDURE — 80061 LIPID PANEL: CPT

## 2023-07-18 PROCEDURE — 81001 URINALYSIS AUTO W/SCOPE: CPT

## 2023-08-07 DIAGNOSIS — I25.10 CORONARY ARTERY DISEASE INVOLVING NATIVE CORONARY ARTERY OF NATIVE HEART WITHOUT ANGINA PECTORIS: ICD-10-CM

## 2023-08-07 DIAGNOSIS — L11.8 ACANTHOLYTIC VESICULAR DERMATITIS: ICD-10-CM

## 2023-08-07 DIAGNOSIS — M48.061 SPINAL STENOSIS OF LUMBAR REGION WITHOUT NEUROGENIC CLAUDICATION: ICD-10-CM

## 2023-08-07 DIAGNOSIS — M54.16 LUMBAR RADICULOPATHY: ICD-10-CM

## 2023-08-07 RX ORDER — NITROGLYCERIN 0.4 MG/1
TABLET SUBLINGUAL
Qty: 25 TABLET | Refills: 0 | Status: SHIPPED | OUTPATIENT
Start: 2023-08-07

## 2023-08-07 RX ORDER — MUPIROCIN CALCIUM 20 MG/G
CREAM TOPICAL
Qty: 30 G | Refills: 1 | Status: SHIPPED | OUTPATIENT
Start: 2023-08-07

## 2023-08-07 NOTE — TELEPHONE ENCOUNTER
Please Approve or Refuse.   Send to Pharmacy per Pt's Request:      Next Visit Date:  11/1/2023   Last Visit Date: 6/27/2023    Hemoglobin A1C (%)   Date Value   02/16/2023 5.1   10/18/2022 5.3   07/18/2022 5.3             ( goal A1C is < 7)   BP Readings from Last 3 Encounters:   06/27/23 108/70   02/22/23 109/70   02/16/23 110/80          (goal 120/80)  BUN   Date Value Ref Range Status   06/24/2022 12 8 - 23 mg/dL Final     Creatinine   Date Value Ref Range Status   06/24/2022 0.96 (H) 0.50 - 0.90 mg/dL Final     Potassium   Date Value Ref Range Status   06/24/2022 4.6 3.7 - 5.3 mmol/L Final

## 2023-08-07 NOTE — TELEPHONE ENCOUNTER
Please Approve or Refuse.   Send to Pharmacy per Pt's Request:      Next Visit Date:  8/7/2023   Last Visit Date: 6/27/2023    Hemoglobin A1C (%)   Date Value   02/16/2023 5.1   10/18/2022 5.3   07/18/2022 5.3             ( goal A1C is < 7)   BP Readings from Last 3 Encounters:   06/27/23 108/70   02/22/23 109/70   02/16/23 110/80          (goal 120/80)  BUN   Date Value Ref Range Status   06/24/2022 12 8 - 23 mg/dL Final     Creatinine   Date Value Ref Range Status   06/24/2022 0.96 (H) 0.50 - 0.90 mg/dL Final     Potassium   Date Value Ref Range Status   06/24/2022 4.6 3.7 - 5.3 mmol/L Final

## 2023-08-08 RX ORDER — KETOCONAZOLE 20 MG/G
CREAM TOPICAL
Qty: 1 EACH | Refills: 0 | Status: SHIPPED | OUTPATIENT
Start: 2023-08-08

## 2023-08-08 RX ORDER — PREGABALIN 150 MG/1
150 CAPSULE ORAL 3 TIMES DAILY
Qty: 90 CAPSULE | Refills: 0 | Status: SHIPPED | OUTPATIENT
Start: 2023-08-08 | End: 2023-09-07

## 2023-08-08 RX ORDER — CLOBETASOL PROPIONATE 0.5 MG/G
CREAM TOPICAL
Qty: 1 EACH | Refills: 0 | Status: SHIPPED | OUTPATIENT
Start: 2023-08-08

## 2023-08-15 ENCOUNTER — HOSPITAL ENCOUNTER (OUTPATIENT)
Age: 63
Discharge: HOME | End: 2023-08-15
Payer: COMMERCIAL

## 2023-08-15 DIAGNOSIS — I10 ESSENTIAL HYPERTENSION: ICD-10-CM

## 2023-08-15 DIAGNOSIS — E78.2 MIXED HYPERLIPIDEMIA: ICD-10-CM

## 2023-08-15 LAB
ALBUMIN SERPL-MCNC: 4.3 G/DL (ref 3.5–5.2)
ALP SERPL-CCNC: 97 U/L (ref 35–104)
ALT SERPL-CCNC: 12 U/L (ref 5–33)
ANION GAP SERPL CALCULATED.3IONS-SCNC: 13 MMOL/L (ref 9–17)
AST SERPL-CCNC: 17 U/L
BILIRUB SERPL-MCNC: 0.2 MG/DL (ref 0.3–1.2)
BUN SERPL-MCNC: 16 MG/DL (ref 8–23)
CALCIUM SERPL-MCNC: 10.7 MG/DL (ref 8.6–10.4)
CHLORIDE SERPL-SCNC: 107 MMOL/L (ref 98–107)
CO2 SERPL-SCNC: 27 MMOL/L (ref 20–31)
CREAT SERPL-MCNC: 0.9 MG/DL (ref 0.5–0.9)
ERYTHROCYTE [DISTWIDTH] IN BLOOD BY AUTOMATED COUNT: 14.5 % (ref 11.5–14.9)
GFR SERPL CREATININE-BSD FRML MDRD: >60 ML/MIN/1.73M2
GLUCOSE SERPL-MCNC: 103 MG/DL (ref 70–99)
HCT VFR BLD AUTO: 43.2 % (ref 36–46)
HGB BLD-MCNC: 14.6 G/DL (ref 12–16)
MAGNESIUM SERPL-MCNC: 2.1 MG/DL (ref 1.6–2.6)
MCH RBC QN AUTO: 31.7 PG (ref 26–34)
MCHC RBC AUTO-ENTMCNC: 33.9 G/DL (ref 31–37)
MCV RBC AUTO: 93.5 FL (ref 80–100)
PLATELET # BLD AUTO: 239 K/UL (ref 150–450)
PMV BLD AUTO: 8.5 FL (ref 6–12)
POTASSIUM SERPL-SCNC: 5 MMOL/L (ref 3.7–5.3)
PROT SERPL-MCNC: 7.4 G/DL (ref 6.4–8.3)
RBC # BLD AUTO: 4.61 M/UL (ref 4–5.2)
SODIUM SERPL-SCNC: 147 MMOL/L (ref 135–144)
TSH SERPL DL<=0.05 MIU/L-ACNC: 2.95 UIU/ML (ref 0.3–5)
WBC OTHER # BLD: 6.9 K/UL (ref 3.5–11)

## 2023-08-15 PROCEDURE — 84443 ASSAY THYROID STIM HORMONE: CPT

## 2023-08-15 PROCEDURE — 82607 VITAMIN B-12: CPT

## 2023-08-15 PROCEDURE — 83735 ASSAY OF MAGNESIUM: CPT

## 2023-08-15 PROCEDURE — 82746 ASSAY OF FOLIC ACID SERUM: CPT

## 2023-08-15 PROCEDURE — 36415 COLL VENOUS BLD VENIPUNCTURE: CPT

## 2023-08-15 PROCEDURE — 85027 COMPLETE CBC AUTOMATED: CPT

## 2023-08-15 PROCEDURE — 80053 COMPREHEN METABOLIC PANEL: CPT

## 2023-08-17 LAB
FOLATE SERPL-MCNC: >20 NG/ML
VIT B12 SERPL-MCNC: 499 PG/ML (ref 232–1245)

## 2023-08-23 ENCOUNTER — OFFICE VISIT (OUTPATIENT)
Dept: NEUROLOGY | Age: 63
End: 2023-08-23
Payer: COMMERCIAL

## 2023-08-23 VITALS
TEMPERATURE: 98.6 F | OXYGEN SATURATION: 94 % | HEART RATE: 89 BPM | BODY MASS INDEX: 32.57 KG/M2 | SYSTOLIC BLOOD PRESSURE: 109 MMHG | WEIGHT: 177 LBS | HEIGHT: 62 IN | DIASTOLIC BLOOD PRESSURE: 73 MMHG

## 2023-08-23 DIAGNOSIS — M54.16 LUMBAR RADICULOPATHY: ICD-10-CM

## 2023-08-23 DIAGNOSIS — M48.061 SPINAL STENOSIS OF LUMBAR REGION WITHOUT NEUROGENIC CLAUDICATION: ICD-10-CM

## 2023-08-23 PROCEDURE — 3017F COLORECTAL CA SCREEN DOC REV: CPT | Performed by: STUDENT IN AN ORGANIZED HEALTH CARE EDUCATION/TRAINING PROGRAM

## 2023-08-23 PROCEDURE — G8417 CALC BMI ABV UP PARAM F/U: HCPCS | Performed by: STUDENT IN AN ORGANIZED HEALTH CARE EDUCATION/TRAINING PROGRAM

## 2023-08-23 PROCEDURE — G8427 DOCREV CUR MEDS BY ELIG CLIN: HCPCS | Performed by: STUDENT IN AN ORGANIZED HEALTH CARE EDUCATION/TRAINING PROGRAM

## 2023-08-23 PROCEDURE — 3078F DIAST BP <80 MM HG: CPT | Performed by: STUDENT IN AN ORGANIZED HEALTH CARE EDUCATION/TRAINING PROGRAM

## 2023-08-23 PROCEDURE — 3074F SYST BP LT 130 MM HG: CPT | Performed by: STUDENT IN AN ORGANIZED HEALTH CARE EDUCATION/TRAINING PROGRAM

## 2023-08-23 PROCEDURE — 99214 OFFICE O/P EST MOD 30 MIN: CPT | Performed by: STUDENT IN AN ORGANIZED HEALTH CARE EDUCATION/TRAINING PROGRAM

## 2023-08-23 PROCEDURE — 1036F TOBACCO NON-USER: CPT | Performed by: STUDENT IN AN ORGANIZED HEALTH CARE EDUCATION/TRAINING PROGRAM

## 2023-08-23 RX ORDER — LIDOCAINE 50 MG/G
1 PATCH TOPICAL DAILY
Qty: 10 PATCH | Refills: 0 | Status: SHIPPED | OUTPATIENT
Start: 2023-08-23 | End: 2023-09-02

## 2023-08-23 RX ORDER — PREGABALIN 100 MG/1
100 CAPSULE ORAL NIGHTLY
Qty: 30 CAPSULE | Refills: 2 | Status: SHIPPED | OUTPATIENT
Start: 2023-08-23 | End: 2023-09-08

## 2023-08-23 NOTE — PROGRESS NOTES
tablet 1    albuterol sulfate HFA (VENTOLIN HFA) 108 (90 Base) MCG/ACT inhaler INHALE 2 PUFFS INTO THE LUNGS EVERY 6 HOURS AS NEEDED FOR WHEEZING 36 g 3    buPROPion (WELLBUTRIN SR) 100 MG extended release tablet Take 1 tablet by mouth 2 times daily TAKE 1 TABLET BY MOUTH TWICE DAILY 180 tablet 1    metoprolol succinate (TOPROL XL) 50 MG extended release tablet TAKE 1 TABLET BY MOUTH DAILY 90 tablet 0    diclofenac sodium (VOLTAREN) 1 % GEL APPLY 2 GRAMS TOPICALLY TO THE AFFECTED AREA FOUR TIMES DAILY 200 g 3    simvastatin (ZOCOR) 40 MG tablet Take 1 tablet by mouth nightly TAKE 1 TABLET BY MOUTH EVERY NIGHT 90 tablet 3    furosemide (LASIX) 20 MG tablet Take 1 tablet by mouth daily 60 tablet 2    potassium chloride (KLOR-CON M) 10 MEQ extended release tablet Take 1 tablet by mouth daily 90 tablet 3    metFORMIN (GLUCOPHAGE-XR) 500 MG extended release tablet Take 1 tablet by mouth daily (with breakfast) TAKE 1 TABLET BY MOUTH DAILY WITH BREAKFAST 90 tablet 3    cetirizine (ZYRTEC) 10 MG tablet Take 1 tablet by mouth daily TAKE 1 TABLET BY MOUTH DAILY 90 tablet 3    aspirin (ASPIRIN LOW DOSE) 81 MG EC tablet TAKE 1 TABLET BY MOUTH DAILY 90 tablet 3    isosorbide mononitrate (IMDUR) 30 MG extended release tablet TAKE 1 TABLET BY MOUTH EVERY DAY 90 tablet 3    enalapril (VASOTEC) 10 MG tablet TAKE 1 TABLET BY MOUTH EVERY MORNING 90 tablet 3    busPIRone (BUSPAR) 15 MG tablet TAKE 1 TABLET BY MOUTH EVERY 12 HOURS AS NEEDED 120 tablet 3    Multiple Vitamins-Minerals (PRESERVISION AREDS) CAPS TAKE 1 CAPSULE BY MOUTH TWICE DAILY      clopidogrel (PLAVIX) 75 MG tablet Take 1 tablet by mouth daily      Handicap Placard MISC by Does not apply route Expires on 12/31/25 1 each 0    cilostazol (PLETAL) 50 MG tablet Take 1 tablet by mouth 2 times daily       No current facility-administered medications for this visit.         Allergies   Allergen Reactions    Claritin [Loratadine]      02/2010 Heart palpitations  02/2010 Heart

## 2023-09-03 DIAGNOSIS — M79.605 LEG PAIN, LEFT: ICD-10-CM

## 2023-09-03 DIAGNOSIS — L11.8 ACANTHOLYTIC VESICULAR DERMATITIS: ICD-10-CM

## 2023-09-03 DIAGNOSIS — M51.26 PROTRUDED LUMBAR DISC: ICD-10-CM

## 2023-09-03 DIAGNOSIS — M48.061 SPINAL STENOSIS OF LUMBAR REGION WITHOUT NEUROGENIC CLAUDICATION: ICD-10-CM

## 2023-09-03 DIAGNOSIS — M51.36 LUMBAR DEGENERATIVE DISC DISEASE: ICD-10-CM

## 2023-09-03 DIAGNOSIS — I10 ESSENTIAL HYPERTENSION: ICD-10-CM

## 2023-09-03 DIAGNOSIS — S33.6XXD SPRAIN OF SACROILIAC LIGAMENT, SUBSEQUENT ENCOUNTER: ICD-10-CM

## 2023-09-03 DIAGNOSIS — M54.16 LUMBAR RADICULOPATHY: ICD-10-CM

## 2023-09-05 ENCOUNTER — HOSPITAL ENCOUNTER (OUTPATIENT)
Dept: MRI IMAGING | Age: 63
Discharge: HOME | End: 2023-09-07
Payer: COMMERCIAL

## 2023-09-05 DIAGNOSIS — M54.16 LUMBAR RADICULOPATHY: ICD-10-CM

## 2023-09-05 PROCEDURE — 72148 MRI LUMBAR SPINE W/O DYE: CPT

## 2023-09-05 RX ORDER — METOPROLOL SUCCINATE 50 MG/1
TABLET, EXTENDED RELEASE ORAL
Qty: 90 TABLET | Refills: 0 | Status: SHIPPED | OUTPATIENT
Start: 2023-09-05

## 2023-09-06 RX ORDER — CLOBETASOL PROPIONATE 0.5 MG/G
CREAM TOPICAL
Qty: 1 EACH | Refills: 0 | Status: SHIPPED | OUTPATIENT
Start: 2023-09-06 | End: 2023-10-02 | Stop reason: SDUPTHER

## 2023-09-06 RX ORDER — PREGABALIN 150 MG/1
150 CAPSULE ORAL 3 TIMES DAILY
Qty: 90 CAPSULE | Refills: 0 | OUTPATIENT
Start: 2023-09-06 | End: 2023-10-06

## 2023-09-06 RX ORDER — ACETAMINOPHEN 500 MG
TABLET ORAL
Qty: 120 TABLET | Refills: 1 | Status: SHIPPED | OUTPATIENT
Start: 2023-09-06

## 2023-09-06 RX ORDER — KETOCONAZOLE 20 MG/G
CREAM TOPICAL
Qty: 1 EACH | Refills: 0 | Status: SHIPPED | OUTPATIENT
Start: 2023-09-06 | End: 2023-10-02 | Stop reason: SDUPTHER

## 2023-09-06 NOTE — TELEPHONE ENCOUNTER
Please notify patient that we cannot refill liver Lyrica, as she has refilled that recently on August 23 with another physician. She has to make sure that she will get that medication from only 1 physician and same dose. She has to wait till September 23 to refill but before that she has to confirm if she is getting that from 2 physicians.

## 2023-09-08 DIAGNOSIS — M54.16 LUMBAR RADICULOPATHY: ICD-10-CM

## 2023-09-08 DIAGNOSIS — M48.061 SPINAL STENOSIS OF LUMBAR REGION WITHOUT NEUROGENIC CLAUDICATION: ICD-10-CM

## 2023-09-08 RX ORDER — PREGABALIN 150 MG/1
150 CAPSULE ORAL 3 TIMES DAILY
Qty: 90 CAPSULE | Refills: 0 | Status: SHIPPED | OUTPATIENT
Start: 2023-09-08 | End: 2023-10-05 | Stop reason: SDUPTHER

## 2023-09-26 ENCOUNTER — HOSPITAL ENCOUNTER (OUTPATIENT)
Dept: WOMENS IMAGING | Age: 63
Discharge: HOME | End: 2023-09-28
Payer: COMMERCIAL

## 2023-09-26 DIAGNOSIS — Z12.31 SCREENING MAMMOGRAM FOR HIGH-RISK PATIENT: ICD-10-CM

## 2023-09-26 PROCEDURE — 77063 BREAST TOMOSYNTHESIS BI: CPT

## 2023-10-02 DIAGNOSIS — F41.9 ANXIETY: ICD-10-CM

## 2023-10-02 DIAGNOSIS — R60.9 PERIPHERAL EDEMA: ICD-10-CM

## 2023-10-02 DIAGNOSIS — M19.019 OSTEOARTHRITIS OF SHOULDER, UNSPECIFIED LATERALITY, UNSPECIFIED OSTEOARTHRITIS TYPE: ICD-10-CM

## 2023-10-02 DIAGNOSIS — L11.8 ACANTHOLYTIC VESICULAR DERMATITIS: ICD-10-CM

## 2023-10-02 DIAGNOSIS — M50.30 DEGENERATIVE DISC DISEASE, CERVICAL: ICD-10-CM

## 2023-10-02 DIAGNOSIS — I25.10 CORONARY ARTERY DISEASE INVOLVING NATIVE CORONARY ARTERY OF NATIVE HEART WITHOUT ANGINA PECTORIS: ICD-10-CM

## 2023-10-03 RX ORDER — FUROSEMIDE 20 MG/1
20 TABLET ORAL DAILY
Qty: 60 TABLET | Refills: 2 | Status: SHIPPED | OUTPATIENT
Start: 2023-10-03

## 2023-10-03 RX ORDER — KETOCONAZOLE 20 MG/G
CREAM TOPICAL
Qty: 1 EACH | Refills: 0 | Status: SHIPPED | OUTPATIENT
Start: 2023-10-03

## 2023-10-03 RX ORDER — CYCLOBENZAPRINE HCL 10 MG
TABLET ORAL
Qty: 180 TABLET | Refills: 0 | Status: SHIPPED | OUTPATIENT
Start: 2023-10-03

## 2023-10-03 RX ORDER — NITROGLYCERIN 0.4 MG/1
TABLET SUBLINGUAL
Qty: 25 TABLET | Refills: 0 | Status: SHIPPED | OUTPATIENT
Start: 2023-10-03

## 2023-10-03 RX ORDER — MUPIROCIN CALCIUM 20 MG/G
CREAM TOPICAL
Qty: 30 G | Refills: 1 | Status: SHIPPED | OUTPATIENT
Start: 2023-10-03

## 2023-10-03 RX ORDER — BUSPIRONE HYDROCHLORIDE 15 MG/1
15 TABLET ORAL EVERY 12 HOURS PRN
Qty: 120 TABLET | Refills: 3 | Status: SHIPPED | OUTPATIENT
Start: 2023-10-03

## 2023-10-03 RX ORDER — CLOBETASOL PROPIONATE 0.5 MG/G
CREAM TOPICAL
Qty: 1 EACH | Refills: 0 | Status: SHIPPED | OUTPATIENT
Start: 2023-10-03

## 2023-10-05 DIAGNOSIS — M48.061 SPINAL STENOSIS OF LUMBAR REGION WITHOUT NEUROGENIC CLAUDICATION: ICD-10-CM

## 2023-10-05 DIAGNOSIS — M54.16 LUMBAR RADICULOPATHY: ICD-10-CM

## 2023-10-05 RX ORDER — PREGABALIN 150 MG/1
150 CAPSULE ORAL 3 TIMES DAILY
Qty: 90 CAPSULE | Refills: 0 | Status: SHIPPED | OUTPATIENT
Start: 2023-10-05 | End: 2023-11-04

## 2023-11-01 ENCOUNTER — OFFICE VISIT (OUTPATIENT)
Dept: FAMILY MEDICINE CLINIC | Age: 63
End: 2023-11-01
Payer: COMMERCIAL

## 2023-11-01 VITALS
SYSTOLIC BLOOD PRESSURE: 104 MMHG | BODY MASS INDEX: 31.76 KG/M2 | WEIGHT: 172.6 LBS | DIASTOLIC BLOOD PRESSURE: 76 MMHG | HEIGHT: 62 IN | OXYGEN SATURATION: 98 % | HEART RATE: 82 BPM | TEMPERATURE: 98.6 F

## 2023-11-01 DIAGNOSIS — I10 ESSENTIAL HYPERTENSION: Primary | ICD-10-CM

## 2023-11-01 DIAGNOSIS — M48.062 SPINAL STENOSIS OF LUMBAR REGION WITH NEUROGENIC CLAUDICATION: Chronic | ICD-10-CM

## 2023-11-01 DIAGNOSIS — Z87.891 PERSONAL HISTORY OF TOBACCO USE: ICD-10-CM

## 2023-11-01 DIAGNOSIS — R73.03 PREDIABETES: ICD-10-CM

## 2023-11-01 DIAGNOSIS — E78.2 MIXED HYPERLIPIDEMIA: ICD-10-CM

## 2023-11-01 DIAGNOSIS — E83.52 HYPERCALCEMIA: ICD-10-CM

## 2023-11-01 DIAGNOSIS — I25.10 CORONARY ARTERY DISEASE INVOLVING NATIVE CORONARY ARTERY OF NATIVE HEART WITHOUT ANGINA PECTORIS: ICD-10-CM

## 2023-11-01 PROBLEM — S33.6XXA SPRAIN OF SACROILIAC LIGAMENT: Status: RESOLVED | Noted: 2019-08-29 | Resolved: 2023-11-01

## 2023-11-01 PROCEDURE — 99214 OFFICE O/P EST MOD 30 MIN: CPT | Performed by: FAMILY MEDICINE

## 2023-11-01 PROCEDURE — 3078F DIAST BP <80 MM HG: CPT | Performed by: FAMILY MEDICINE

## 2023-11-01 PROCEDURE — 3074F SYST BP LT 130 MM HG: CPT | Performed by: FAMILY MEDICINE

## 2023-11-01 PROCEDURE — G0296 VISIT TO DETERM LDCT ELIG: HCPCS | Performed by: FAMILY MEDICINE

## 2023-11-01 RX ORDER — BENZYL ALCOHOL, LIDOCAINE HYDROCHLORIDE 10; 4 G/100G; G/100G
1 CREAM PERCUTANEOUS; TOPICAL; TRANSDERMAL PRN
COMMUNITY
End: 2023-11-01 | Stop reason: ALTCHOICE

## 2023-11-01 SDOH — ECONOMIC STABILITY: FOOD INSECURITY: WITHIN THE PAST 12 MONTHS, THE FOOD YOU BOUGHT JUST DIDN'T LAST AND YOU DIDN'T HAVE MONEY TO GET MORE.: NEVER TRUE

## 2023-11-01 SDOH — ECONOMIC STABILITY: INCOME INSECURITY: HOW HARD IS IT FOR YOU TO PAY FOR THE VERY BASICS LIKE FOOD, HOUSING, MEDICAL CARE, AND HEATING?: NOT HARD AT ALL

## 2023-11-01 SDOH — ECONOMIC STABILITY: FOOD INSECURITY: WITHIN THE PAST 12 MONTHS, YOU WORRIED THAT YOUR FOOD WOULD RUN OUT BEFORE YOU GOT MONEY TO BUY MORE.: NEVER TRUE

## 2023-11-01 ASSESSMENT — ENCOUNTER SYMPTOMS
CHEST TIGHTNESS: 0
WHEEZING: 0
BACK PAIN: 1
STRIDOR: 0
COLOR CHANGE: 0
BLOOD IN STOOL: 0
EYE REDNESS: 0
ABDOMINAL DISTENTION: 0
SHORTNESS OF BREATH: 0
SORE THROAT: 0
COUGH: 0
ABDOMINAL PAIN: 0
NAUSEA: 0
TROUBLE SWALLOWING: 0
RHINORRHEA: 0
CONSTIPATION: 0
DIARRHEA: 0
VOMITING: 0
RECTAL PAIN: 0
SINUS PRESSURE: 0

## 2023-11-01 ASSESSMENT — PATIENT HEALTH QUESTIONNAIRE - PHQ9
SUM OF ALL RESPONSES TO PHQ QUESTIONS 1-9: 1
6. FEELING BAD ABOUT YOURSELF - OR THAT YOU ARE A FAILURE OR HAVE LET YOURSELF OR YOUR FAMILY DOWN: 0
10. IF YOU CHECKED OFF ANY PROBLEMS, HOW DIFFICULT HAVE THESE PROBLEMS MADE IT FOR YOU TO DO YOUR WORK, TAKE CARE OF THINGS AT HOME, OR GET ALONG WITH OTHER PEOPLE: 0
3. TROUBLE FALLING OR STAYING ASLEEP: 0
7. TROUBLE CONCENTRATING ON THINGS, SUCH AS READING THE NEWSPAPER OR WATCHING TELEVISION: 1
SUM OF ALL RESPONSES TO PHQ QUESTIONS 1-9: 1
8. MOVING OR SPEAKING SO SLOWLY THAT OTHER PEOPLE COULD HAVE NOTICED. OR THE OPPOSITE, BEING SO FIGETY OR RESTLESS THAT YOU HAVE BEEN MOVING AROUND A LOT MORE THAN USUAL: 0
9. THOUGHTS THAT YOU WOULD BE BETTER OFF DEAD, OR OF HURTING YOURSELF: 0
SUM OF ALL RESPONSES TO PHQ QUESTIONS 1-9: 1
SUM OF ALL RESPONSES TO PHQ9 QUESTIONS 1 & 2: 0
SUM OF ALL RESPONSES TO PHQ QUESTIONS 1-9: 1
2. FEELING DOWN, DEPRESSED OR HOPELESS: 0
4. FEELING TIRED OR HAVING LITTLE ENERGY: 0
5. POOR APPETITE OR OVEREATING: 0
1. LITTLE INTEREST OR PLEASURE IN DOING THINGS: 0

## 2023-11-01 ASSESSMENT — COLUMBIA-SUICIDE SEVERITY RATING SCALE - C-SSRS
5. HAVE YOU STARTED TO WORK OUT OR WORKED OUT THE DETAILS OF HOW TO KILL YOURSELF? DO YOU INTEND TO CARRY OUT THIS PLAN?: NO
7. DID THIS OCCUR IN THE LAST THREE MONTHS: NO
3. HAVE YOU BEEN THINKING ABOUT HOW YOU MIGHT KILL YOURSELF?: NO
4. HAVE YOU HAD THESE THOUGHTS AND HAD SOME INTENTION OF ACTING ON THEM?: NO

## 2023-11-01 NOTE — PATIENT INSTRUCTIONS
Thank you for choosing [facility]) as your healthcare provider as your care is important to us. Please arrive at your appointment on time. If you are unable to make your appointment, please call our office as soon as possible so that we may reschedule your appointment. Missing 3 appointments in a calendar year without notifying the office may lead to dismissal from the practice. We appreciate you calling at least 24 hours in advance, when possible. Thank you. Learning About Lung Cancer Screening  What is screening for lung cancer? Lung cancer screening is a way to find some lung cancers early, before a person has any symptoms of the cancer. Lung cancer screening may help those who have the highest risk for lung cancer--people age 48 and older who are or were heavy smokers. For most people, who aren't at increased risk, screening for lung cancer probably isn't helpful. Screening won't prevent cancer. And it may not find all lung cancers. Lung cancer screening may lower the risk of dying from lung cancer in a small number of people. How is it done? Lung cancer screening is done with a low-dose CT (computed tomography) scan. A CT scan uses X-rays, or radiation, to make detailed pictures of your body. Experts recommend that screening be done in medical centers that focus on finding and treating lung cancer. Who is screening recommended for? Lung cancer screening is recommended for people age 48 and older who are or were heavy smokers. That means people with a smoking history of at least 20 pack years. A pack year is a way to measure how heavy a smoker you are or were. To figure out your pack years, multiply how many packs a day on average (assuming 20 cigarettes per pack) you have smoked by how many years you have smoked. For example: If you smoked 1 pack a day for 20 years, that's 1 times 20. So you have a smoking history of 20 pack years.   If you smoked 2 packs a day for 10 years, that's 2

## 2023-11-01 NOTE — PROGRESS NOTES
Visit Information    Have you changed or started any medications since your last visit including any over-the-counter medicines, vitamins, or herbal medicines? no   Have you stopped taking any of your medications? Is so, why? -  no  Are you having any side effects from any of your medications? - no    Have you seen any other physician or provider since your last visit? yes -    Have you had any other diagnostic tests since your last visit? yes -    Have you been seen in the emergency room and/or had an admission in a hospital since we last saw you?  no   Have you had your routine dental cleaning in the past 6 months?  no     Do you have an active MyChart account? If no, what is the barrier?   Yes    Patient Care Team:  Yas Gaitan MD as PCP - General (Family Medicine)  Yas Gaitan MD as PCP - Empaneled Provider    Medical History Review  Past Medical, Family, and Social History reviewed and does contribute to the patient presenting condition    Health Maintenance   Topic Date Due    Cervical cancer screen  04/16/2023    A1C test (Diabetic or Prediabetic)  02/16/2024    Depression Monitoring  02/16/2024    Colorectal Cancer Screen  05/06/2024    Lipids  07/18/2024    Pneumococcal 0-64 years Vaccine (3 - PPSV23 or PCV20) 05/02/2025    Breast cancer screen  09/26/2025    DTaP/Tdap/Td vaccine (2 - Td or Tdap) 04/16/2028    Flu vaccine  Completed    Shingles vaccine  Completed    COVID-19 Vaccine  Completed    Hepatitis C screen  Completed    HIV screen  Completed    Hepatitis A vaccine  Aged Out    Hepatitis B vaccine  Aged Out    Hib vaccine  Aged Out    Meningococcal (ACWY) vaccine  Aged Out
mood, hallucinations, sleep disturbance and suicidal ideas. The patient is nervous/anxious. Physical Exam  Vitals and nursing note reviewed. Constitutional:       General: She is not in acute distress. Appearance: Normal appearance. She is well-developed. She is obese. She is not diaphoretic. HENT:      Head: Normocephalic and atraumatic. Nose: Nose normal.   Eyes:      General:         Right eye: No discharge. Left eye: No discharge. Extraocular Movements: Extraocular movements intact. Conjunctiva/sclera: Conjunctivae normal.      Pupils: Pupils are equal, round, and reactive to light. Neck:      Thyroid: No thyromegaly. Cardiovascular:      Rate and Rhythm: Normal rate and regular rhythm. Heart sounds: Normal heart sounds. No murmur heard. Pulmonary:      Effort: Pulmonary effort is normal. No respiratory distress. Breath sounds: Normal breath sounds. No wheezing or rhonchi. Abdominal:      General: There is no distension. Palpations: Abdomen is soft. There is no mass. Tenderness: There is no abdominal tenderness. There is no right CVA tenderness, left CVA tenderness, guarding or rebound. Musculoskeletal:      Cervical back: Neck supple. Spasms present. No rigidity. Decreased range of motion. Thoracic back: Deformity and spasms present. Decreased range of motion. Lumbar back: Deformity, spasms and tenderness present. Decreased range of motion. Right lower leg: No edema. Left lower leg: No edema. Lymphadenopathy:      Cervical: No cervical adenopathy. Skin:     Coloration: Skin is not jaundiced or pale. Findings: No bruising, erythema or rash. Neurological:      General: No focal deficit present. Mental Status: She is alert and oriented to person, place, and time. Cranial Nerves: No cranial nerve deficit. Sensory: Sensory deficit present. Motor: No weakness or tremor.       Coordination:

## 2023-11-14 DIAGNOSIS — J45.20 MILD INTERMITTENT ASTHMA WITHOUT COMPLICATION: ICD-10-CM

## 2023-11-14 NOTE — TELEPHONE ENCOUNTER
Please Approve or Refuse.   Send to Pharmacy per Pt's Request:      Next Visit Date:  3/4/2024   Last Visit Date: 11/1/2023    Hemoglobin A1C (%)   Date Value   02/16/2023 5.1   10/18/2022 5.3   07/18/2022 5.3             ( goal A1C is < 7)   BP Readings from Last 3 Encounters:   11/01/23 104/76   08/23/23 109/73   06/27/23 108/70          (goal 120/80)  BUN   Date Value Ref Range Status   08/15/2023 16 8 - 23 mg/dL Final     Creatinine   Date Value Ref Range Status   08/15/2023 0.9 0.5 - 0.9 mg/dL Final     Potassium   Date Value Ref Range Status   08/15/2023 5.0 3.7 - 5.3 mmol/L Final

## 2023-11-15 RX ORDER — ALBUTEROL SULFATE 90 UG/1
AEROSOL, METERED RESPIRATORY (INHALATION)
Qty: 36 G | Refills: 3 | Status: SHIPPED | OUTPATIENT
Start: 2023-11-15

## 2023-11-16 ENCOUNTER — TELEPHONE (OUTPATIENT)
Dept: ONCOLOGY | Age: 63
End: 2023-11-16

## 2023-11-16 DIAGNOSIS — M48.061 SPINAL STENOSIS OF LUMBAR REGION WITHOUT NEUROGENIC CLAUDICATION: ICD-10-CM

## 2023-11-16 DIAGNOSIS — I10 ESSENTIAL HYPERTENSION: ICD-10-CM

## 2023-11-16 DIAGNOSIS — M54.16 LUMBAR RADICULOPATHY: ICD-10-CM

## 2023-11-16 DIAGNOSIS — F41.9 ANXIETY: ICD-10-CM

## 2023-11-16 RX ORDER — PREGABALIN 150 MG/1
150 CAPSULE ORAL 3 TIMES DAILY
Qty: 90 CAPSULE | Refills: 0 | Status: SHIPPED | OUTPATIENT
Start: 2023-11-16 | End: 2023-12-16

## 2023-11-16 RX ORDER — BUPROPION HYDROCHLORIDE 100 MG/1
100 TABLET, EXTENDED RELEASE ORAL 2 TIMES DAILY
Qty: 180 TABLET | Refills: 3 | Status: SHIPPED | OUTPATIENT
Start: 2023-11-16

## 2023-11-16 RX ORDER — METOPROLOL SUCCINATE 50 MG/1
TABLET, EXTENDED RELEASE ORAL
Qty: 90 TABLET | Refills: 3 | Status: SHIPPED | OUTPATIENT
Start: 2023-11-16

## 2023-11-28 ENCOUNTER — HOSPITAL ENCOUNTER (OUTPATIENT)
Dept: CT IMAGING | Age: 63
Discharge: HOME | End: 2023-11-30
Payer: COMMERCIAL

## 2023-11-28 ENCOUNTER — HOSPITAL ENCOUNTER (OUTPATIENT)
Age: 63
Discharge: HOME | End: 2023-11-28
Payer: COMMERCIAL

## 2023-11-28 VITALS — WEIGHT: 170 LBS | HEIGHT: 62 IN | BODY MASS INDEX: 31.28 KG/M2

## 2023-11-28 DIAGNOSIS — Z87.891 PERSONAL HISTORY OF TOBACCO USE: ICD-10-CM

## 2023-11-28 DIAGNOSIS — E83.52 HYPERCALCEMIA: ICD-10-CM

## 2023-11-28 LAB
CALCIUM SERPL-MCNC: 9.2 MG/DL (ref 8.6–10.4)
EST. AVERAGE GLUCOSE BLD GHB EST-MCNC: 111 MG/DL
HBA1C MFR BLD: 5.5 % (ref 4–6)

## 2023-11-28 PROCEDURE — 71271 CT THORAX LUNG CANCER SCR C-: CPT

## 2023-11-28 PROCEDURE — 82310 ASSAY OF CALCIUM: CPT

## 2023-11-28 PROCEDURE — 83036 HEMOGLOBIN GLYCOSYLATED A1C: CPT

## 2023-11-28 PROCEDURE — 36415 COLL VENOUS BLD VENIPUNCTURE: CPT

## 2023-11-29 ENCOUNTER — TELEPHONE (OUTPATIENT)
Dept: FAMILY MEDICINE CLINIC | Age: 63
End: 2023-11-29

## 2023-11-29 ENCOUNTER — OFFICE VISIT (OUTPATIENT)
Dept: NEUROLOGY | Age: 63
End: 2023-11-29

## 2023-11-29 VITALS
TEMPERATURE: 98.6 F | DIASTOLIC BLOOD PRESSURE: 73 MMHG | HEART RATE: 87 BPM | WEIGHT: 172 LBS | HEIGHT: 62 IN | BODY MASS INDEX: 31.65 KG/M2 | SYSTOLIC BLOOD PRESSURE: 114 MMHG

## 2023-11-29 DIAGNOSIS — M51.36 LUMBAR DEGENERATIVE DISC DISEASE: ICD-10-CM

## 2023-11-29 DIAGNOSIS — M54.40 CHRONIC LOW BACK PAIN WITH SCIATICA, SCIATICA LATERALITY UNSPECIFIED, UNSPECIFIED BACK PAIN LATERALITY: Primary | ICD-10-CM

## 2023-11-29 DIAGNOSIS — G89.29 CHRONIC LOW BACK PAIN WITH SCIATICA, SCIATICA LATERALITY UNSPECIFIED, UNSPECIFIED BACK PAIN LATERALITY: Primary | ICD-10-CM

## 2023-11-29 NOTE — PROGRESS NOTES
Aster SAmanda Hamilton  [facility] [address] [place]  Dept: [phone]  Dept Fax: [phone]    NEUROLOGY CLINIC NOTE       PATIENT NAME: Teja Hoang  PATIENT MRN: [id number]  PRIMARY CARE PHYSICIAN: Suad Healy MD      Today's visit:      11/29/2023: low back pain upon ambulation, 9/10, sharp and cramping. Sometimes this back pain can radiate down the legs bilaterally sometimes is just remains localized in the low back. Had MRI in 9/7/2023 showing multiple disc bulges of all the lumbar levels. Had therapy in the past and had an injection 2 years ago. She's hesitant of getting an epidural injection and as well as working with PT due to limited benefit. The patient is currently taking Lyrica 150 mg 3 times daily. She does seem to be doing fine in terms of pain control however she remains pain-free as long as she is sitting or not moving. Her pain is brought up upon ambulation and prolonged standing. I discussed with the patient trying out physical therapy after reviewing the MRI of the spine and noticing significant paraspinal muscle atrophy and fibrosis. Patient was open to the idea and would like to try therapy again. HPI:      Teja Hoang  64years old female patient, right-handed came to the clinic today for follow up. She follows with our neurology clinic as a case of low back pain with right leg numbness. She also has hx of lumbar scoliosis, obesity, DM, HTN, HLD, CAD sp 2 stents, PAD sp 3 stenting April/2022, on asa, plavix, cilostazol and statins. In summary, it seems her low back pain is a combination of  scoliosis deformity in addition to degenerative changes. She saw neurosurgery in July 2022 with an option for surgery but the patient doesn't want to seek this option at this point of time. She is also trying exercising, weight reduction and Lyrica, no red flags for low back pain, no bladder or bowel incontinence.  No

## 2023-11-29 NOTE — TELEPHONE ENCOUNTER
Patient called and stated that medication  LYRICA  , that was called in has been denied. Per patient's insurance. It is not covered. A Prior Auth will need to be started. A Prior Authorization for medication   , has been started with [address] [place] [address].      Just any f.y.i

## 2023-12-11 ENCOUNTER — HOSPITAL ENCOUNTER (OUTPATIENT)
Dept: PHYSICAL THERAPY | Age: 63
Setting detail: THERAPIES SERIES
Discharge: HOME | End: 2023-12-11
Payer: COMMERCIAL

## 2023-12-11 PROCEDURE — 97161 PT EVAL LOW COMPLEX 20 MIN: CPT

## 2023-12-11 NOTE — THERAPY EVALUATION
[x] [address] [facility]  [address]  [facility] [place]: [phone]   F: [phone]     Physical Therapy Lumbar Evaluation    Date:  2023  Patient: Julian Iverson  : 1960  MRN: [id number]  Physician: Larisa Meier MD      Insurance: [address] [place] [address] with $5 copay and Auth after Eval  Medical Diagnosis: M54.40, G89.29 (ICD-10-CM) - Chronic low back pain with sciatica, sciatica laterality unspecified, unspecified back pain laterality    Rehab Codes: M62. 81 , M25. 60 , M54. 16   Onset Date: [id number]   Next 's appt: 2024    Precautions: scoliosis lumbar     Subjective:   Pt comes in agreeable to eval. Low back pain that started around  time frame. She reports that \"all my lumbar discs have some sort of problem. \" She has been to therapy previously for her back and that it helped some but reports that she really did not follow through after discharge with exercising. States that the pain is so bad especially in the morning that she has to stretch before getting out of bed. Pt denies recent falls, comes in ambulating with straight cane and report she only uses cane in the community and ambulates without AD inside her home.       PMHx: [] Unremarkable [] Diabetes [] HTN  [] Pacemaker   [] MI/Heart Problems [] Cancer [] Arthritis [] Other:               Past Medical History:   Diagnosis Date    ARIADNE (acute kidney injury) ([address]) 2019    Anxiety     Asthma     CAD (coronary artery disease)     Chronic back pain     Cervical and lumbar disc issues    Class 1 obesity due to excess calories with serious comorbidity and body mass index (BMI) of 32.0 to 32.9 in adult 2020    Depression     Examination of participant in clinical trial 2011    End date 2015    GERD (gastroesophageal reflux disease)     Hiatal hernia    Headache     HTN (hypertension)     Lumbar radiculopathy     Mixed hyperlipidemia 01/15/2018    OA

## 2023-12-15 ENCOUNTER — HOSPITAL ENCOUNTER (OUTPATIENT)
Dept: PHYSICAL THERAPY | Age: 63
Setting detail: THERAPIES SERIES
Discharge: HOME | End: 2023-12-15
Payer: COMMERCIAL

## 2023-12-15 PROCEDURE — 97110 THERAPEUTIC EXERCISES: CPT

## 2023-12-15 NOTE — FLOWSHEET NOTE
Supine Bridge  - 1 x daily - 7 x weekly - 3 sets - 10 reps     Pt. Education:  [x] Yes  [] No  [] Reviewed Prior HEP/Ed  Method of Education: [x] Verbal  [x] Demo  [x] Written  Comprehension of Education:  [x] Verbalizes understanding. [x] Demonstrates understanding. [] Needs review. [] Demonstrates/verbalizes HEP/Ed previously given. Specific Instructions for next treatment strengthen LE and core        Assessment: [] Progressing toward goals. [] No change. [x] Other: 12/15 Initiated first treatment session post eval. Started on nustep for LE strengthening and proceeded to standing exercises. Pt reported yarely tightness at low back so added in stool roll outs with pt noting to feel some decrease tightness afterwards. Ended session with mat level exercises. Provided with HEP and gave red theraband. Pt reports pain at the same at end of session and could tell she got a work out in. [x] Patient would continue to benefit from skilled physical therapy services in order to: decrease pain and muscle restriction and improve strength and ROM to work towards improving functional mobility and better ADLs. tolerance.      GOALS:   STG: (to be met in 6 treatments)  Pt will self report worst pain no greater than 6-7/10 in order to better tolerate ADLs/work activities with minimal dysfunction  Pt will improve AROM in lumbar flexion to [facility] in order to demonstrate ability to move/reach in all planes unrestricted at PLOF  Pt will improve AROM in lumbar extension to 25% in order to demonstrate ability to move/reach in all planes unrestricted at PLOF  Pt will improve AROM in lumbar L rotation to 75% in order to demonstrate ability to move/reach in all planes unrestricted at PLOF  LTG: (to be met in 12 treatments)  Pt will demonstrate improved MARLENY global LE strength to 4+/5 or greater in order to demonstrate improved stability/strength necessary for unrestricted ADLs/work activities  Pt will decrease 16 to 4 on Mod MARYCRUZ

## 2023-12-27 ENCOUNTER — HOSPITAL ENCOUNTER (OUTPATIENT)
Dept: PHYSICAL THERAPY | Age: 63
Setting detail: THERAPIES SERIES
Discharge: HOME | End: 2023-12-27
Payer: COMMERCIAL

## 2023-12-27 PROCEDURE — 97110 THERAPEUTIC EXERCISES: CPT

## 2023-12-27 NOTE — FLOWSHEET NOTE
[facility]   [address]   Phone: [phone]   Fax: [phone]    Physical Therapy Daily Treatment Note      Date:  2023  Patient Name:  Lacey Dandy    :  1960  MRN: [id number]  Physician:      Insurance: [employer] Jackson with $5 copay and Auth after Eval   APPROVED 2023 - 3/11/2023 for 12 visits  Diagnosis: M54.40, G89.29 (ICD-10-CM) - Chronic low back pain with sciatica, sciatica laterality unspecified, unspecified back pain laterality    Onset Date: 3970-2314     Next Dr. Schaeffer Eans: 2024  Visit# / total visits:   Cancels/No Shows: 0/0    Precautions: scoliosis lumbar       Subjective:    Patient states minimal changes since last session has remained compliant with HEP doing them at least once per day. Pain:  [x] Yes  [] No Location: low back and R hip Pain Rating: (0-10 scale) 1/10  Pain altered Tx:  [] No  [] Yes  Action:  Comments:    Objective:  INTERVENTIONS  INTERVENTIONS  Reps/ Time Weight/ Level Completed  Today Comments          MODALITIES                      MANUAL                      EXERCISES        Nustep 5 min Level 3 x           Standing:       Step Ups FWD 10 x 4in x Added    Hip 3 Way 10 x  x Added    Pull Downs 15 x Red  x  inc reps    Rows  15 x  red x  added, emphasis on posture and core engagement    Paloff Press 12 x Red  x Increased reps    Paloff Walk Outs 5 x Red  x Added    Lumbar flexion stretch  10x10\" hold  x Use of wall parallel bars to keep neutral posture.            Seated:       Stool Rolls FWD / Lateral 10 x 10\"                    Mat Level:       Posterior Pelvic 10 x 3\"  x    Marches with abdominal bracing 10 x Yellow  x Added resistance 18   Bridges 10 x 2  x    BKFO 12 x yellow x Increased reps 1218   Piriformis stretch 2 x 30\"  x    Gastroc Stretch with strap 3 x 30\"      Hamstring Stretch with strap 3 x 30\"  x Added 12/18   SLR 10 x  x

## 2023-12-29 ENCOUNTER — HOSPITAL ENCOUNTER (OUTPATIENT)
Dept: PHYSICAL THERAPY | Age: 63
Setting detail: THERAPIES SERIES
Discharge: HOME | End: 2023-12-29
Payer: COMMERCIAL

## 2023-12-29 PROCEDURE — 97110 THERAPEUTIC EXERCISES: CPT

## 2023-12-29 NOTE — FLOWSHEET NOTE
[facility]   [address]   Phone: [phone]   Fax: [phone]    Physical Therapy Daily Treatment Note      Date:  2023  Patient Name:  Kaushik Hunter    :  1960  MRN: [id number]  Physician:      Insurance: [employer]esbitt with $5 copay and Auth after Eval   APPROVED 2023 - 3/11/2023 for 12 visits  Diagnosis: M54.40, G89.29 (ICD-10-CM) - Chronic low back pain with sciatica, sciatica laterality unspecified, unspecified back pain laterality    Onset Date: 0154-2826     Next Dr. Sandro Levy: 2024  Visit# / total visits:   Cancels/No Shows: 0/0    Precautions: scoliosis lumbar       Subjective:    Patient arrived and states residual soreness from yesterdays session. Pain:  [x] Yes  [] No Location: low back and R hip Pain Rating: (0-10 scale) 4/10  Pain altered Tx:  [] No  [] Yes  Action:  Comments:    Objective:  INTERVENTIONS  INTERVENTIONS  Reps/ Time Weight/ Level Completed  Today Comments          MODALITIES                      MANUAL                      EXERCISES        Nustep 5 min Level 3            Standing:       Step Ups FWD 10 x 4in x Added    Hip 3 Way 10 x  x Added    Pull Downs 15 x Red  x  inc reps    Rows  15 x  red x  added, emphasis on posture and core engagement    Paloff Press 12 x Red  x Increased reps    Paloff Walk Outs 5 x Red  x Added    Lumbar flexion stretch  10x10\" hold   Use of wall parallel bars to keep neutral posture.            Seated:       Stool Rolls FWD / Lateral 10 x 10\"                    Mat Level:       Posterior Pelvic 10 x 3\"  x    Marches with abdominal bracing 10 x Yellow  x Added resistance    Bridges 10 x 2  x    BKFO 12 x yellow x Increased reps 18   Piriformis stretch 2 x 30\"  x    Gastroc Stretch with strap 3 x 30\"      Hamstring Stretch with strap 3 x 30\"  x Added /18   SLR 10 x  x Added    Other:    Patient Education/Home

## 2024-01-04 ENCOUNTER — HOSPITAL ENCOUNTER (OUTPATIENT)
Dept: PHYSICAL THERAPY | Age: 64
Setting detail: THERAPIES SERIES
Discharge: HOME | End: 2024-01-04
Payer: COMMERCIAL

## 2024-01-04 PROCEDURE — 97110 THERAPEUTIC EXERCISES: CPT

## 2024-01-04 NOTE — FLOWSHEET NOTE
[facility]   [address]   Phone: [phone]   Fax: [phone]    Physical Therapy Daily Treatment Note      Date:  2024  Patient Name:  Christina Ulloa    :  1960  MRN: [id number]  Physician:      Insurance: [facility] [employer] with $5 copay and Auth after Eval   APPROVED 2023 - 3/11/2023 for 12 visits  Diagnosis: M54.40, G89.29 (ICD-10-CM) - Chronic low back pain with sciatica, sciatica laterality unspecified, unspecified back pain laterality    Onset Date: 4003-2884     Next  Appt: 2024  Visit# / total visits:   Cancels/No Shows: 0/0    Precautions: scoliosis lumbar       Subjective:    Patient arrived and states her pain levels have improved over last few days believes its due to PT.     Pain:  [x] Yes  [] No Location: low back and R hip Pain Rating: (0-10 scale) 4/10  Pain altered Tx:  [] No  [] Yes  Action:  Comments:    Objective:  INTERVENTIONS  INTERVENTIONS  Reps/ Time Weight/ Level Completed  Today Comments          MODALITIES                      MANUAL                      EXERCISES        Nustep 5 min Level 3            Standing:       Step Ups FWD 10 x 2 4in x Inc sets 1/4    Hip 3 Way 10 x Yellows  x Added resistance 1/4   Pull Downs 15 x Red  x 27 inc reps    Rows  15 x  red x  added, emphasis on posture and core engagement    Paloff Press 12 x Red  x Increased reps    Paloff Walk Outs 5 x Red  x Added    Lumbar flexion stretch  10x10\" hold   Use of wall parallel bars to keep neutral posture.           Seated:       Stool Rolls FWD / Lateral 10 x 10\"                    Mat Level:       Posterior Pelvic 10 x 3\"  x    Marches with abdominal bracing 10 x Yellow  x Added resistance 12/18   Bridges 10 x 2  x    BKFO 12 x yellow x Increased reps 12/18   Piriformis stretch 2 x 30\"  x    Gastroc Stretch with strap 3 x 30\"      Hamstring Stretch with strap 3 x 30\"  x Added 12/18   SLR 10 x  x

## 2024-01-06 DIAGNOSIS — M51.36 LUMBAR DEGENERATIVE DISC DISEASE: ICD-10-CM

## 2024-01-06 DIAGNOSIS — I25.10 CORONARY ARTERY DISEASE INVOLVING NATIVE CORONARY ARTERY OF NATIVE HEART WITHOUT ANGINA PECTORIS: ICD-10-CM

## 2024-01-06 DIAGNOSIS — S33.6XXD SPRAIN OF SACROILIAC LIGAMENT, SUBSEQUENT ENCOUNTER: ICD-10-CM

## 2024-01-06 DIAGNOSIS — M19.019 OSTEOARTHRITIS OF SHOULDER, UNSPECIFIED LATERALITY, UNSPECIFIED OSTEOARTHRITIS TYPE: ICD-10-CM

## 2024-01-06 DIAGNOSIS — M50.30 DEGENERATIVE DISC DISEASE, CERVICAL: ICD-10-CM

## 2024-01-06 DIAGNOSIS — M51.26 PROTRUDED LUMBAR DISC: ICD-10-CM

## 2024-01-06 DIAGNOSIS — L11.8 ACANTHOLYTIC VESICULAR DERMATITIS: ICD-10-CM

## 2024-01-06 DIAGNOSIS — M48.061 SPINAL STENOSIS OF LUMBAR REGION WITHOUT NEUROGENIC CLAUDICATION: ICD-10-CM

## 2024-01-06 DIAGNOSIS — F41.9 ANXIETY: ICD-10-CM

## 2024-01-06 DIAGNOSIS — M54.16 LUMBAR RADICULOPATHY: ICD-10-CM

## 2024-01-08 RX ORDER — BUPROPION HYDROCHLORIDE 100 MG/1
100 TABLET, EXTENDED RELEASE ORAL 2 TIMES DAILY
Qty: 180 TABLET | Refills: 3 | Status: SHIPPED | OUTPATIENT
Start: 2024-01-08

## 2024-01-08 RX ORDER — KETOCONAZOLE 20 MG/G
CREAM TOPICAL
Qty: 1 EACH | Refills: 0 | Status: SHIPPED | OUTPATIENT
Start: 2024-01-08

## 2024-01-08 RX ORDER — PREGABALIN 150 MG/1
150 CAPSULE ORAL 3 TIMES DAILY
Qty: 90 CAPSULE | Refills: 0 | Status: SHIPPED | OUTPATIENT
Start: 2024-01-08 | End: 2024-02-07

## 2024-01-08 RX ORDER — NITROGLYCERIN 0.4 MG/1
TABLET SUBLINGUAL
Qty: 25 TABLET | Refills: 0 | Status: SHIPPED | OUTPATIENT
Start: 2024-01-08

## 2024-01-08 RX ORDER — CYCLOBENZAPRINE HCL 10 MG
TABLET ORAL
Qty: 180 TABLET | Refills: 0 | Status: SHIPPED | OUTPATIENT
Start: 2024-01-08

## 2024-01-09 ENCOUNTER — HOSPITAL ENCOUNTER (OUTPATIENT)
Dept: PHYSICAL THERAPY | Age: 64
Setting detail: THERAPIES SERIES
Discharge: HOME | End: 2024-01-09
Payer: COMMERCIAL

## 2024-01-09 PROCEDURE — 97110 THERAPEUTIC EXERCISES: CPT

## 2024-01-09 NOTE — PROGRESS NOTES
Red  x Increased reps 12/18   Paloff Walk Outs 5 x Red  x Added 12/18   Lumbar flexion stretch  10x10\" hold   Use of wall parallel bars to keep neutral posture.           Seated:       Stool Rolls FWD / Lateral 10 x 10\"      Marches 10 x 2 1# x Added 1/9          Mat Level:       Posterior Pelvic 12 x 3\"  x Increased reps 1/9   Marches with abdominal bracing 12 x Yellow  x Added resistance 12/18  Increased reps 1/9   Bridges 12x 2  x Increased reps 1/9   BKFO 12 x yellow x Increased reps 12/18   Piriformis stretch 2 x 30\"  x    Gastroc Stretch with strap 3 x 30\"      Hamstring Stretch with strap 3 x 30\"  x Added 12/18   SLR 10 x   Added 12/18   Other:    Patient Education/Home Program :   -12/11 educated on attendance policy, unable to provide HEP at this time d/t prior auth      Access Code: [email or web address]  URL: [email or web address]  Date: 12/15/2023  Prepared by: Ale Coates    Exercises  - Supine March with Posterior Pelvic Tilt  - 1 x daily - 7 x weekly - 3 sets - 10 reps  - Shoulder Extension with Resistance  - 1 x daily - 7 x weekly - 3 sets - 10 reps - 3 hold  - Supine Bridge  - 1 x daily - 7 x weekly - 3 sets - 10 reps     -12/18: verbally added SLR and gave yellow band for home for strengthening progression    Pt. Education:  [x] Yes  [] No  [] Reviewed Prior HEP/Ed  Method of Education: [x] Verbal  [x] Demo  [x] Written  Comprehension of Education:  [x] Verbalizes understanding.  [x] Demonstrates understanding.  [] Needs review.  [] Demonstrates/verbalizes HEP/Ed previously given.       Specific Instructions for next treatment strengthen LE and core        Assessment: [x] Progressing toward goals. 1/9: Progress note done today with pt have complete 7/12 visits on POC thus far. Pt is currently showing improvements in lumbar motion with L rotation and lumbar extension. She is also showing improvements with overall pain in general. Functionally at this time states that she can tell when she goes to do

## 2024-01-11 ENCOUNTER — HOSPITAL ENCOUNTER (OUTPATIENT)
Dept: PHYSICAL THERAPY | Age: 64
Setting detail: THERAPIES SERIES
Discharge: HOME | End: 2024-01-11
Payer: COMMERCIAL

## 2024-01-11 PROCEDURE — 97110 THERAPEUTIC EXERCISES: CPT

## 2024-01-11 NOTE — FLOWSHEET NOTE
[facility]   [address]   Phone: [phone]   Fax: [phone]    Physical Therapy Daily Treatment Note      Date:  2024  Patient Name:  Christina Ulloa    :  1960  MRN: [id number]  Physician:      Insurance: [facility] [employer] with $5 copay and Auth after Eval   APPROVED 2023 - 3/11/2023 for 12 visits  Diagnosis: M54.40, G89.29 (ICD-10-CM) - Chronic low back pain with sciatica, sciatica laterality unspecified, unspecified back pain laterality    Onset Date: 4031-8958     Next  Appt: 2024  Visit# / total visits:   Cancels/No Shows: 0/0    Precautions: scoliosis lumbar         Subjective:     Patient arrived with antalgic gait pattern noting increased in lower back pain today attributing it to the weather.     Pain:  [x] Yes  [] No Location: low back and R hip Pain Rating: (0-10 scale) 1/10  Pain altered Tx:  [] No  [] Yes  Action:  Comments:  Pain at worst: 7/10  Pain at best: denies pain    Objective:    INTERVENTIONS  INTERVENTIONS  Reps/ Time Weight/ Level Completed  Today Comments          MODALITIES                      MANUAL                      EXERCISES        Nustep 5 min Level 3 x           Standing:       Step Ups FWD / Lateral 10 x 2 4in  Inc sets 1/    Hip 3 Way 10 x Yellows   Added resistance 1/4   Mini Squats 10 x   Added    Pull Downs 7 x Red  x  inc reps    inc lower back pain    Rows  15 x  red   added, emphasis on posture and core engagement    Paloff Press 12 x Red   Increased reps    Paloff Walk Outs 5 x Red   Added    Lumbar flexion stretch  10x10\" hold   Use of wall parallel bars to keep neutral posture.    Standing HS stretch on step  30\"x1  x  added in attempt to lessen R HS cramp but unsuccessful           Seated:       Stool Rolls FWD / Lateral 10 x 10\"      Marches 10 x 2 1#  Added           Mat Level:       Posterior Pelvic 12 x 3\"  x Increased reps

## 2024-01-16 ENCOUNTER — HOSPITAL ENCOUNTER (OUTPATIENT)
Dept: PHYSICAL THERAPY | Age: 64
Setting detail: THERAPIES SERIES
Discharge: HOME | End: 2024-01-16
Payer: COMMERCIAL

## 2024-01-16 PROCEDURE — 97110 THERAPEUTIC EXERCISES: CPT

## 2024-01-16 NOTE — FLOWSHEET NOTE
Progressing toward goals. 1/16: Started pt on nustep while collecting subjective information. Attempted hamstring stretch on step but pt reported some cramping increasing at calf with exercise. Added in incline board stretch with pt noting decrease cramping afterward. Increased reps with pull downs with no complaints of low back pain increasing during exercise. Proceeded with mat level exercises to target strengthening and stretching. Added in bridges with ABD for more strengthening with pt appropriately fatigued afterwards. Added in ball seated exercises for core stabilization/strengthening. Felt okay at end of session. Updated HEP at this date.    [] No change.     [] Other:     [x] Patient would continue to benefit from skilled physical therapy services in order to: decrease pain and muscle restriction and improve strength and ROM to work towards improving functional mobility and better ADLs. tolerance.     GOALS: Updated / Assessed 1/9/2024  STG: (to be met in 6 treatments)  Pt will self report worst pain no greater than 6-7/10 in order to better tolerate ADLs/work activities with minimal dysfunction -GOAL MET 1/9  Pt will improve AROM in lumbar flexion to WFL in order to demonstrate ability to move/reach in all planes unrestricted at PLOF -GOAL PROGRESSING 1/9  Pt will improve AROM in lumbar extension to 25% in order to demonstrate ability to move/reach in all planes unrestricted at PLOF -GOAL MET 1/9  Pt will improve AROM in lumbar L rotation to 75% in order to demonstrate ability to move/reach in all planes unrestricted at PLOF -GOAL MET 1/9  LTG: (to be met in 12 treatments)  Pt will demonstrate improved MARLENY global LE strength to 4+/5 or greater in order to demonstrate improved stability/strength necessary for unrestricted ADLs/work activities  Pt will decrease 16 to 4 on Mod MARYCRUZ in order to demonstrate improved functional tolerances at PLOF with minimal restriction/dysfunction  Pt will demonstrate

## 2024-01-18 ENCOUNTER — HOSPITAL ENCOUNTER (OUTPATIENT)
Dept: PHYSICAL THERAPY | Age: 64
Setting detail: THERAPIES SERIES
Discharge: HOME | End: 2024-01-18
Payer: COMMERCIAL

## 2024-01-18 NOTE — FLOWSHEET NOTE
[] [facility]   Outpatient Rehabilitation & Therapy  [address]  P: [phone]   F: [phone]     Physical Therapy Cancel/No Show note    Date: 2024  Patient: Christina Ulloa  : 1960  MRN: [id number]    Visit Count:   Cancels/No Shows to date:     For today's appointment patient:    [x]  Cancelled    [] Rescheduled appointment    [] No-show     Reason given by patient:    [x]  Patient ill    []  Conflicting appointment    [] No transportation      [] Conflict with work    [] No reason given    [] Weather related    [] COVID-19    [] Other:      Comments:        [x] Next appointment was confirmed    Electronically signed by: Celi Machado PTA

## 2024-01-26 ENCOUNTER — HOSPITAL ENCOUNTER (OUTPATIENT)
Dept: PHYSICAL THERAPY | Age: 64
Setting detail: THERAPIES SERIES
Discharge: HOME | End: 2024-01-26
Payer: COMMERCIAL

## 2024-01-26 PROCEDURE — 97110 THERAPEUTIC EXERCISES: CPT

## 2024-01-26 NOTE — FLOWSHEET NOTE
[facility] Outpatient Physical Therapy   [address]   Phone: [phone]   Fax: [phone]    Physical Therapy Daily Treatment Note      Date:  2024  Patient Name:  Christina Ulloa    :  1960  MRN: [id number]  Physician:      Insurance: [employer] [facility] [employer] with $5 copay and Auth after Eval   APPROVED 2023 - 3/11/2023 for 12 visits  Diagnosis: M54.40, G89.29 (ICD-10-CM) - Chronic low back pain with sciatica, sciatica laterality unspecified, unspecified back pain laterality    Onset Date: 4182-4539     Next  Appt: 2024  Visit# / total visits: 10 /12  Cancels/No Shows: 0/0    Precautions: scoliosis lumbar         Subjective:     Patient arrived and reports pain levels are minimal today believes because she did a hot soak in bath tub this morning.     Pain:  [x] Yes  [] No Location: low back and R hip Pain Rating: (0-10 scale) 1/10  Pain altered Tx:  [] No  [] Yes  Action:    Objective:    INTERVENTIONS  INTERVENTIONS  Reps/ Time Weight/ Level Completed  Today Comments          MODALITIES                      MANUAL                      EXERCISES        Nustep 5 min Level 4 x           Standing:       Step Ups FWD / Lateral 10 x  6 in x Inc sets 1/   Increased step height    Hip 3 Way 12 x Yellows  X Added resistance 1/4  Increased reps    Mini Squats 10 x   Added    Pull Downs 10 x 2 Red  x  inc reps    inc lower back pain    Rows  15 x  red   added, emphasis on posture and core engagement    Paloff Press 12 x Red  x Increased reps    Paloff Walk Outs 5 x Red  x Added    Lumbar flexion stretch  10x10\" hold   Use of wall parallel bars to keep neutral posture.    Standing HS stretch on step  30\"x1  x  added in attempt to lessen R HS cramp but unsuccessful    Standing Incline Board Stretch   x           Seated:       Stool Rolls FWD / Lateral 10 x 10\"      Marches 10 x 2 1#  Added    Ball Overhead / Chest

## 2024-02-01 ENCOUNTER — HOSPITAL ENCOUNTER (OUTPATIENT)
Dept: PHYSICAL THERAPY | Age: 64
Setting detail: THERAPIES SERIES
Discharge: HOME | End: 2024-02-01
Payer: COMMERCIAL

## 2024-02-01 PROCEDURE — 97110 THERAPEUTIC EXERCISES: CPT

## 2024-02-01 NOTE — DISCHARGE SUMMARY
Bridges 12x 3   Inc sets 1/11   Bridges with ABD 10 x   Added 1/16   BKFO 15 x 2 yellow  Inc sets 1/11  Increased reps 1/16   Piriformis stretch 2 x 30\"      Gastroc Stretch with strap 3 x 30\"      Hamstring Stretch with strap 3 x 30\"   Added 12/18   SKTC 3x30\"   Added 1/11    SLR 10 x 3   Inc sets 1/11   Side lying open book  10x ea    Added 1/11    Other:    Patient Education/Home Program :   -12/11 educated on attendance policy, unable to provide HEP at this time d/t prior auth      Access Code: [email or web address]  URL: [email or web address]  Date: 12/15/2023  Prepared by: Ale Coates    Exercises  - Supine March with Posterior Pelvic Tilt  - 1 x daily - 7 x weekly - 3 sets - 10 reps  - Shoulder Extension with Resistance  - 1 x daily - 7 x weekly - 3 sets - 10 reps - 3 hold  - Supine Bridge  - 1 x daily - 7 x weekly - 3 sets - 10 reps     -12/18: verbally added SLR and gave yellow band for home for strengthening progression    -1/16  - Seated Hamstring Stretch  - 1 x daily - 7 x weekly - 3 sets - 1 reps - 20 hold  - Standing Hip Abduction with Counter Support  - 1 x daily - 7 x weekly - 2 sets - 10 reps  - Standing Hip Extension with Counter Support  - 1 x daily - 7 x weekly - 2 sets - 10 reps  - Standing Hip Flexion with Counter Support  - 1 x daily - 7 x weekly - 2 sets - 10 reps    Access Code: [email or web address]  URL: [email or web address]  Date: 02/01/2024  Prepared by: Ale Coates    Exercises  - Shoulder Extension with Resistance  - 1 x daily - 7 x weekly - 3 sets - 10 reps  - Standing Shoulder Row with Anchored Resistance  - 1 x daily - 7 x weekly - 3 sets - 10 reps  - Standing Anti-Rotation Press with Anchored Resistance  - 1 x daily - 7 x weekly - 3 sets - 10 reps    Pt. Education:  [x] Yes  [] No  [] Reviewed Prior HEP/Ed  Method of Education: [x] Verbal  [x] Demo  [x] Written  Comprehension of Education:  [x] Verbalizes understanding.  [x] Demonstrates understanding.  [] Needs review.  []

## 2024-02-06 DIAGNOSIS — M48.061 SPINAL STENOSIS OF LUMBAR REGION WITHOUT NEUROGENIC CLAUDICATION: ICD-10-CM

## 2024-02-06 DIAGNOSIS — R73.03 PREDIABETES: ICD-10-CM

## 2024-02-06 DIAGNOSIS — I10 ESSENTIAL HYPERTENSION: ICD-10-CM

## 2024-02-06 DIAGNOSIS — R60.9 PERIPHERAL EDEMA: ICD-10-CM

## 2024-02-06 DIAGNOSIS — M54.16 LUMBAR RADICULOPATHY: ICD-10-CM

## 2024-02-06 DIAGNOSIS — L11.8 ACANTHOLYTIC VESICULAR DERMATITIS: ICD-10-CM

## 2024-02-07 RX ORDER — ISOSORBIDE MONONITRATE 30 MG/1
TABLET, EXTENDED RELEASE ORAL
Qty: 90 TABLET | Refills: 3 | Status: SHIPPED | OUTPATIENT
Start: 2024-02-07

## 2024-02-07 RX ORDER — METFORMIN HYDROCHLORIDE 500 MG/1
500 TABLET, EXTENDED RELEASE ORAL
Qty: 90 TABLET | Refills: 3 | Status: SHIPPED | OUTPATIENT
Start: 2024-02-07

## 2024-02-07 RX ORDER — POTASSIUM CHLORIDE 750 MG/1
10 TABLET, EXTENDED RELEASE ORAL DAILY
Qty: 90 TABLET | Refills: 3 | Status: SHIPPED | OUTPATIENT
Start: 2024-02-07

## 2024-02-07 RX ORDER — KETOCONAZOLE 20 MG/G
CREAM TOPICAL
Qty: 1 EACH | Refills: 0 | Status: SHIPPED | OUTPATIENT
Start: 2024-02-07

## 2024-02-07 RX ORDER — ENALAPRIL MALEATE 10 MG/1
TABLET ORAL
Qty: 90 TABLET | Refills: 3 | Status: SHIPPED | OUTPATIENT
Start: 2024-02-07

## 2024-02-07 RX ORDER — SIMVASTATIN 40 MG
40 TABLET ORAL NIGHTLY
Qty: 90 TABLET | Refills: 3 | Status: SHIPPED | OUTPATIENT
Start: 2024-02-07

## 2024-02-07 RX ORDER — PREGABALIN 150 MG/1
150 CAPSULE ORAL 3 TIMES DAILY
Qty: 90 CAPSULE | Refills: 0 | Status: SHIPPED | OUTPATIENT
Start: 2024-02-07 | End: 2024-03-08

## 2024-02-07 RX ORDER — ASPIRIN 81 MG/1
TABLET ORAL
Qty: 90 TABLET | Refills: 3 | Status: SHIPPED | OUTPATIENT
Start: 2024-02-07

## 2024-02-07 NOTE — TELEPHONE ENCOUNTER
Please Approve or Refuse.  Send to Pharmacy per Pt's Request:      Next Visit Date:  3/4/2024   Last Visit Date: 11/1/2023    Hemoglobin A1C (%)   Date Value   11/28/2023 5.5   02/16/2023 5.1   10/18/2022 5.3             ( goal A1C is < 7)   BP Readings from Last 3 Encounters:   11/29/23 114/73   11/01/23 104/76   08/23/23 109/73          (goal 120/80)  BUN   Date Value Ref Range Status   08/15/2023 16 8 - 23 mg/dL Final     Creatinine   Date Value Ref Range Status   08/15/2023 0.9 0.5 - 0.9 mg/dL Final     Potassium   Date Value Ref Range Status   08/15/2023 5.0 3.7 - 5.3 mmol/L Final

## 2024-03-04 ENCOUNTER — OFFICE VISIT (OUTPATIENT)
Dept: FAMILY MEDICINE CLINIC | Age: 64
End: 2024-03-04
Payer: COMMERCIAL

## 2024-03-04 ENCOUNTER — HOSPITAL ENCOUNTER (OUTPATIENT)
Age: 64
Setting detail: SPECIMEN
Discharge: HOME | End: 2024-03-04

## 2024-03-04 VITALS
HEART RATE: 88 BPM | SYSTOLIC BLOOD PRESSURE: 124 MMHG | BODY MASS INDEX: 32.57 KG/M2 | OXYGEN SATURATION: 96 % | HEIGHT: 62 IN | DIASTOLIC BLOOD PRESSURE: 80 MMHG | WEIGHT: 177 LBS

## 2024-03-04 DIAGNOSIS — I73.9 PERIPHERAL ARTERY DISEASE (HCC): ICD-10-CM

## 2024-03-04 DIAGNOSIS — E78.2 MIXED HYPERLIPIDEMIA: ICD-10-CM

## 2024-03-04 DIAGNOSIS — R73.03 PREDIABETES: ICD-10-CM

## 2024-03-04 DIAGNOSIS — Z51.81 MEDICATION MONITORING ENCOUNTER: ICD-10-CM

## 2024-03-04 DIAGNOSIS — I10 ESSENTIAL HYPERTENSION: Primary | ICD-10-CM

## 2024-03-04 DIAGNOSIS — K63.5 POLYP OF COLON, UNSPECIFIED PART OF COLON, UNSPECIFIED TYPE: ICD-10-CM

## 2024-03-04 DIAGNOSIS — M48.062 SPINAL STENOSIS OF LUMBAR REGION WITH NEUROGENIC CLAUDICATION: Chronic | ICD-10-CM

## 2024-03-04 DIAGNOSIS — I25.10 CORONARY ARTERY DISEASE INVOLVING NATIVE CORONARY ARTERY OF NATIVE HEART WITHOUT ANGINA PECTORIS: ICD-10-CM

## 2024-03-04 PROCEDURE — 99214 OFFICE O/P EST MOD 30 MIN: CPT | Performed by: FAMILY MEDICINE

## 2024-03-04 PROCEDURE — 3079F DIAST BP 80-89 MM HG: CPT | Performed by: FAMILY MEDICINE

## 2024-03-04 PROCEDURE — 3074F SYST BP LT 130 MM HG: CPT | Performed by: FAMILY MEDICINE

## 2024-03-04 SDOH — ECONOMIC STABILITY: INCOME INSECURITY: HOW HARD IS IT FOR YOU TO PAY FOR THE VERY BASICS LIKE FOOD, HOUSING, MEDICAL CARE, AND HEATING?: NOT HARD AT ALL

## 2024-03-04 SDOH — ECONOMIC STABILITY: FOOD INSECURITY: WITHIN THE PAST 12 MONTHS, THE FOOD YOU BOUGHT JUST DIDN'T LAST AND YOU DIDN'T HAVE MONEY TO GET MORE.: NEVER TRUE

## 2024-03-04 SDOH — ECONOMIC STABILITY: FOOD INSECURITY: WITHIN THE PAST 12 MONTHS, YOU WORRIED THAT YOUR FOOD WOULD RUN OUT BEFORE YOU GOT MONEY TO BUY MORE.: NEVER TRUE

## 2024-03-04 ASSESSMENT — PATIENT HEALTH QUESTIONNAIRE - PHQ9
8. MOVING OR SPEAKING SO SLOWLY THAT OTHER PEOPLE COULD HAVE NOTICED. OR THE OPPOSITE, BEING SO FIGETY OR RESTLESS THAT YOU HAVE BEEN MOVING AROUND A LOT MORE THAN USUAL: 0
10. IF YOU CHECKED OFF ANY PROBLEMS, HOW DIFFICULT HAVE THESE PROBLEMS MADE IT FOR YOU TO DO YOUR WORK, TAKE CARE OF THINGS AT HOME, OR GET ALONG WITH OTHER PEOPLE: 0
9. THOUGHTS THAT YOU WOULD BE BETTER OFF DEAD, OR OF HURTING YOURSELF: 0
2. FEELING DOWN, DEPRESSED OR HOPELESS: 0
SUM OF ALL RESPONSES TO PHQ QUESTIONS 1-9: 0
4. FEELING TIRED OR HAVING LITTLE ENERGY: 0
6. FEELING BAD ABOUT YOURSELF - OR THAT YOU ARE A FAILURE OR HAVE LET YOURSELF OR YOUR FAMILY DOWN: 0
SUM OF ALL RESPONSES TO PHQ9 QUESTIONS 1 & 2: 0
SUM OF ALL RESPONSES TO PHQ QUESTIONS 1-9: 0
7. TROUBLE CONCENTRATING ON THINGS, SUCH AS READING THE NEWSPAPER OR WATCHING TELEVISION: 0
3. TROUBLE FALLING OR STAYING ASLEEP: 0
SUM OF ALL RESPONSES TO PHQ QUESTIONS 1-9: 0
SUM OF ALL RESPONSES TO PHQ QUESTIONS 1-9: 0
5. POOR APPETITE OR OVEREATING: 0
1. LITTLE INTEREST OR PLEASURE IN DOING THINGS: 0

## 2024-03-04 ASSESSMENT — ENCOUNTER SYMPTOMS
RHINORRHEA: 0
RECTAL PAIN: 0
CONSTIPATION: 0
WHEEZING: 0
EYE REDNESS: 0
STRIDOR: 0
ABDOMINAL DISTENTION: 0
BACK PAIN: 1
TROUBLE SWALLOWING: 0
ABDOMINAL PAIN: 0
CHEST TIGHTNESS: 0
SHORTNESS OF BREATH: 0
BLOOD IN STOOL: 0
NAUSEA: 0
DIARRHEA: 0
VOMITING: 0
SORE THROAT: 0
COLOR CHANGE: 0
SINUS PRESSURE: 0
COUGH: 0

## 2024-03-04 NOTE — PATIENT INSTRUCTIONS
Dear valued patient,    We hope this message finds you in good health. At [ ], we are committed to providing you with the best possible healthcare experience. To further enhance your convenience and streamline your access to our services, we would like to introduce you to the benefits of utilizing direct scheduling through the [facility] Patient Portal.    Direct scheduling is a feature within the [facility] Patient Portal that allows you to schedule appointments with your healthcare provider directly, without the need to make a phone call or wait for a callback. We understand that your time is eugene, and we want to ensure that you have quick and easy access to our services whenever you need them.  Here are some reasons why you should consider utilizing direct scheduling through the [facility] Patient Portal:    1. Convenience: With direct scheduling, you can book appointments at any time that suits you best, 24 hours a day, 7 days a week. No more waiting on hold or playing phone tag with our office staff. It puts you in control of managing your healthcare appointments effortlessly.    2. Accessibility: The [facility] Patient Portal is accessible through your computer, smartphone, or tablet, allowing you to schedule appointments from the comfort of your home, office, or on the go. You can access your medical records, review test results, and communicate with your healthcare provider all in one secure and user-friendly platform.    3. Timesaving: By utilizing direct scheduling, you can avoid unnecessary delays and save uegene time. You can easily browse the available appointment slots and select the one that works best for you, eliminating the back-and-forth communication typically required when scheduling via phone.    4. Reminders and notifications: [facility] Patient Portal sends automatic reminders for upcoming appointments, ensuring that you never miss an important visit. You can also receive notifications about test

## 2024-03-04 NOTE — PROGRESS NOTES
Visit Information    Have you changed or started any medications since your last visit including any over-the-counter medicines, vitamins, or herbal medicines? no   Are you having any side effects from any of your medications? -  no  Have you stopped taking any of your medications? Is so, why? -  no    Have you seen any other physician or provider since your last visit? No  Have you had any other diagnostic tests since your last visit? No  Have you been seen in the emergency room and/or had an admission to a hospital since we last saw you? No  Have you had your routine dental cleaning in the past 6 months? no    Have you activated your [employer] account? If not, what are your barriers? Yes     Patient Care Team:  Antonio Ruiz MD as PCP - General (Family Medicine)  Antonio Ruiz MD as PCP - Empaneled Provider    Medical History Review  Past Medical, Family, and Social History reviewed and does contribute to the patient presenting condition    Health Maintenance   Topic Date Due    Cervical cancer screen  04/16/2023    Colorectal Cancer Screen  05/06/2024    Lipids  07/18/2024    Depression Monitoring  11/01/2024    A1C test (Diabetic or Prediabetic)  11/28/2024    Low dose CT lung screening &/or counseling  11/28/2024    Pneumococcal 0-64 years Vaccine (3 - PPSV23 or PCV20) 05/02/2025    Breast cancer screen  09/26/2025    DTaP/Tdap/Td vaccine (2 - Td or Tdap) 04/16/2028    Flu vaccine  Completed    Shingles vaccine  Completed    COVID-19 Vaccine  Completed    Respiratory Syncytial Virus (RSV) Pregnant or age 60 yrs+  Completed    Hepatitis C screen  Completed    HIV screen  Completed    Hepatitis A vaccine  Aged Out    Hepatitis B vaccine  Aged Out    Hib vaccine  Aged Out    Polio vaccine  Aged Out    Meningococcal (ACWY) vaccine  Aged Out     
complaints with corresponding details per ROS    The patient's past medical, surgical, social, and family history as well as her current medications and allergies were reviewed as documented intoday's encounter.        Review of Systems   Constitutional:  Positive for activity change. Negative for appetite change, fatigue and fever.   HENT:  Negative for congestion, ear pain, postnasal drip, rhinorrhea, sinus pressure, sore throat and trouble swallowing.    Eyes:  Negative for redness and visual disturbance.   Respiratory:  Negative for cough, chest tightness, shortness of breath, wheezing and stridor.    Cardiovascular:  Negative for chest pain, palpitations and leg swelling.   Gastrointestinal:  Negative for abdominal distention, abdominal pain, blood in stool, constipation, diarrhea, nausea, rectal pain and vomiting.   Endocrine: Negative for polydipsia, polyphagia and polyuria.   Genitourinary:  Negative for difficulty urinating, flank pain, frequency and urgency.   Musculoskeletal:  Positive for arthralgias, back pain and gait problem. Negative for myalgias and neck pain.   Skin:  Negative for color change, rash and wound.   Allergic/Immunologic: Negative for food allergies and immunocompromised state.   Neurological:  Positive for weakness and numbness. Negative for dizziness, speech difficulty, light-headedness and headaches.   Psychiatric/Behavioral:  Negative for agitation, behavioral problems, decreased concentration, dysphoric mood, hallucinations, sleep disturbance and suicidal ideas. The patient is nervous/anxious.            Physical Exam  Vitals and nursing note reviewed.   Constitutional:       General: She is not in acute distress.     Appearance: Normal appearance. She is well-developed. She is obese. She is not diaphoretic.   HENT:      Head: Normocephalic and atraumatic.      Nose: Nose normal.   Eyes:      General:         Right eye: No discharge.         Left eye: No discharge.      Extraocular

## 2024-03-07 LAB
ALCOHOL URINE: NEGATIVE MG/DL
AMPHETAMINES UR QL SCN: NEGATIVE NG/ML
BARBITURATES UR QL SCN: NEGATIVE NG/ML
BENZODIAZ UR QL SCN: NEGATIVE NG/ML
CANNABINOIDS CTO UR CFM-MCNC: NEGATIVE NG/ML
COCAINE UR QL SCN: NEGATIVE NG/ML
CREAT UR-MCNC: 21.1 MG/DL (ref 20–400)
Lab: NORMAL
METHADONE UR QL SCN: NEGATIVE NG/ML
OPIATES CTO UR CFM-MCNC: NEGATIVE NG/ML
PCP UR QL SCN: NEGATIVE NG/ML
PROPOXYPH UR QL SCN: NEGATIVE NG/ML

## 2024-03-08 DIAGNOSIS — I25.10 CORONARY ARTERY DISEASE INVOLVING NATIVE CORONARY ARTERY OF NATIVE HEART WITHOUT ANGINA PECTORIS: ICD-10-CM

## 2024-03-08 DIAGNOSIS — L11.8 ACANTHOLYTIC VESICULAR DERMATITIS: ICD-10-CM

## 2024-03-08 DIAGNOSIS — R60.9 PERIPHERAL EDEMA: ICD-10-CM

## 2024-03-08 NOTE — TELEPHONE ENCOUNTER
Please Approve or Refuse.  Send to Pharmacy per Pt's Request:      Next Visit Date:  7/10/2024   Last Visit Date: 3/4/2024    Hemoglobin A1C (%)   Date Value   11/28/2023 5.5   02/16/2023 5.1   10/18/2022 5.3             ( goal A1C is < 7)   BP Readings from Last 3 Encounters:   03/04/24 124/80   11/29/23 114/73   11/01/23 104/76          (goal 120/80)  BUN   Date Value Ref Range Status   08/15/2023 16 8 - 23 mg/dL Final     Creatinine   Date Value Ref Range Status   08/15/2023 0.9 0.5 - 0.9 mg/dL Final     Potassium   Date Value Ref Range Status   08/15/2023 5.0 3.7 - 5.3 mmol/L Final

## 2024-03-11 RX ORDER — FUROSEMIDE 20 MG/1
20 TABLET ORAL DAILY
Qty: 120 TABLET | Refills: 0 | Status: SHIPPED | OUTPATIENT
Start: 2024-03-11

## 2024-03-11 RX ORDER — KETOCONAZOLE 20 MG/G
CREAM TOPICAL
Qty: 60 G | Refills: 0 | Status: SHIPPED | OUTPATIENT
Start: 2024-03-11

## 2024-03-23 DIAGNOSIS — M54.16 LUMBAR RADICULOPATHY: ICD-10-CM

## 2024-03-23 DIAGNOSIS — M48.061 SPINAL STENOSIS OF LUMBAR REGION WITHOUT NEUROGENIC CLAUDICATION: ICD-10-CM

## 2024-03-23 DIAGNOSIS — I25.10 CORONARY ARTERY DISEASE INVOLVING NATIVE CORONARY ARTERY OF NATIVE HEART WITHOUT ANGINA PECTORIS: ICD-10-CM

## 2024-03-25 RX ORDER — NITROGLYCERIN 0.4 MG/1
TABLET SUBLINGUAL
Qty: 25 TABLET | Refills: 0 | Status: SHIPPED | OUTPATIENT
Start: 2024-03-25

## 2024-03-25 NOTE — TELEPHONE ENCOUNTER
Please Approve or Refuse.  Send to Pharmacy per Pt's Request:      Next Visit Date:  3/23/2024   Last Visit Date: 3/4/2024    Hemoglobin A1C (%)   Date Value   11/28/2023 5.5   02/16/2023 5.1   10/18/2022 5.3             ( goal A1C is < 7)   BP Readings from Last 3 Encounters:   03/04/24 124/80   11/29/23 114/73   11/01/23 104/76          (goal 120/80)  BUN   Date Value Ref Range Status   08/15/2023 16 8 - 23 mg/dL Final     Creatinine   Date Value Ref Range Status   08/15/2023 0.9 0.5 - 0.9 mg/dL Final     Potassium   Date Value Ref Range Status   08/15/2023 5.0 3.7 - 5.3 mmol/L Final

## 2024-03-26 RX ORDER — PREGABALIN 150 MG/1
150 CAPSULE ORAL 3 TIMES DAILY
Qty: 90 CAPSULE | Refills: 0 | Status: SHIPPED | OUTPATIENT
Start: 2024-03-26 | End: 2024-04-25

## 2024-04-03 ENCOUNTER — OFFICE VISIT (OUTPATIENT)
Dept: NEUROLOGY | Age: 64
End: 2024-04-03
Payer: COMMERCIAL

## 2024-04-03 VITALS
HEART RATE: 80 BPM | DIASTOLIC BLOOD PRESSURE: 76 MMHG | BODY MASS INDEX: 31.83 KG/M2 | WEIGHT: 173 LBS | HEIGHT: 62 IN | SYSTOLIC BLOOD PRESSURE: 118 MMHG

## 2024-04-03 DIAGNOSIS — M48.061 SPINAL STENOSIS OF LUMBAR REGION WITHOUT NEUROGENIC CLAUDICATION: ICD-10-CM

## 2024-04-03 DIAGNOSIS — M54.16 LUMBAR RADICULOPATHY: Primary | ICD-10-CM

## 2024-04-03 PROCEDURE — 99214 OFFICE O/P EST MOD 30 MIN: CPT | Performed by: STUDENT IN AN ORGANIZED HEALTH CARE EDUCATION/TRAINING PROGRAM

## 2024-04-03 PROCEDURE — 3074F SYST BP LT 130 MM HG: CPT | Performed by: STUDENT IN AN ORGANIZED HEALTH CARE EDUCATION/TRAINING PROGRAM

## 2024-04-03 PROCEDURE — 3078F DIAST BP <80 MM HG: CPT | Performed by: STUDENT IN AN ORGANIZED HEALTH CARE EDUCATION/TRAINING PROGRAM

## 2024-04-03 NOTE — PROGRESS NOTES
TAKE 1 TABLET BY MOUTH EVERY NIGHT 90 tablet 3    buPROPion (WELLBUTRIN SR) 100 MG extended release tablet TAKE 1 TABLET BY MOUTH TWICE DAILY 180 tablet 3    diclofenac sodium (VOLTAREN) 1 % GEL APPLY 2 GRAMS TOPICALLY TO THE AFFECTED AREA FOUR TIMES DAILY 200 g 3    cyclobenzaprine (FLEXERIL) 10 MG tablet TAKE 1 TABLET BY MOUTH TWICE DAILY AS NEEDED FOR MUSCLE SPASMS 180 tablet 0    metoprolol succinate (TOPROL XL) 50 MG extended release tablet TAKE 1 TABLET BY MOUTH DAILY 90 tablet 3    albuterol sulfate HFA (VENTOLIN HFA) 108 (90 Base) MCG/ACT inhaler INHALE 2 PUFFS INTO THE LUNGS EVERY 6 HOURS AS NEEDED FOR WHEEZING 36 g 3    busPIRone (BUSPAR) 15 MG tablet Take 15 mg by mouth every 12 hours as needed (anxiety) Take 15 mg by mouth every 12 hours as needed (Patient taking differently: Take 15 mg by mouth in the morning and at bedtime Take 15 mg by mouth every 12 hours as needed) 120 tablet 3    acetaminophen (TYLENOL) 500 MG tablet TAKE 1 TABLET BY MOUTH EVERY 6-8 HOURS AS NEEDED FOR PAIN Strength: 500 mg 120 tablet 1    cetirizine (ZYRTEC) 10 MG tablet Take 1 tablet by mouth daily TAKE 1 TABLET BY MOUTH DAILY 90 tablet 3    Multiple Vitamins-Minerals (PRESERVISION AREDS) CAPS TAKE 1 CAPSULE BY MOUTH TWICE DAILY      clopidogrel (PLAVIX) 75 MG tablet Take 1 tablet by mouth daily      Handicap Placard MISC by Does not apply route Expires on 12/31/25 1 each 0    cilostazol (PLETAL) 50 MG tablet Take 1 tablet by mouth 2 times daily       No current facility-administered medications for this visit.        Allergies   Allergen Reactions    Claritin [Loratadine]      02/2010 Heart palpitations  02/2010 Heart palpitations    Codeine Nausea Only        REVIEW OF SYSTEMS:     Review of Systems     Review of Systems    Constitutional: Negative for appetite change, chills, fever and unexpected weight change.    Eyes: Negative for photophobia and pain. Negative for visual disturbance.    Respiratory: Negative for cough and  Statement Selected

## 2024-04-04 DIAGNOSIS — M19.019 OSTEOARTHRITIS OF SHOULDER, UNSPECIFIED LATERALITY, UNSPECIFIED OSTEOARTHRITIS TYPE: ICD-10-CM

## 2024-04-04 DIAGNOSIS — M50.30 DEGENERATIVE DISC DISEASE, CERVICAL: ICD-10-CM

## 2024-04-04 DIAGNOSIS — L11.8 ACANTHOLYTIC VESICULAR DERMATITIS: ICD-10-CM

## 2024-04-04 RX ORDER — KETOCONAZOLE 20 MG/G
CREAM TOPICAL
Qty: 60 G | Refills: 0 | Status: SHIPPED | OUTPATIENT
Start: 2024-04-04

## 2024-04-04 NOTE — TELEPHONE ENCOUNTER
Cholesterol profile is showing high worsening LDL (Bad cholesterol) .  At this time need to start Statin as discussed on your appointment.  Crestor 20 mg daily and repeat Lipid in 2 months (Rx sent to the pharmacy)  Thyroid functions are normal   Your Liver and Kidney functions as well as Sodium and Potassium are normal.  Blood glucose is also normal.   PSA, test for screening prostate cancer is normal   Test for screening for Diabetes called A1C is in normal range. Please Approve or Refuse.  Send to Pharmacy per Pt's Request: [facility]      Next Visit Date:  7/10/2024   Last Visit Date: 3/4/2024    Hemoglobin A1C (%)   Date Value   11/28/2023 5.5   02/16/2023 5.1   10/18/2022 5.3             ( goal A1C is < 7)   BP Readings from Last 3 Encounters:   04/03/24 118/76   03/04/24 124/80   11/29/23 114/73          (goal 120/80)  BUN   Date Value Ref Range Status   08/15/2023 16 8 - 23 mg/dL Final     Creatinine   Date Value Ref Range Status   08/15/2023 0.9 0.5 - 0.9 mg/dL Final     Potassium   Date Value Ref Range Status   08/15/2023 5.0 3.7 - 5.3 mmol/L Final

## 2024-04-05 RX ORDER — CYCLOBENZAPRINE HCL 10 MG
TABLET ORAL
Qty: 180 TABLET | Refills: 0 | Status: SHIPPED | OUTPATIENT
Start: 2024-04-05

## 2024-04-05 NOTE — TELEPHONE ENCOUNTER
Please Approve or Refuse.  Send to Pharmacy per Pt's Request:      Next Visit Date:  7/10/2024   Last Visit Date: 3/4/2024    Hemoglobin A1C (%)   Date Value   11/28/2023 5.5   02/16/2023 5.1   10/18/2022 5.3             ( goal A1C is < 7)   BP Readings from Last 3 Encounters:   04/03/24 118/76   03/04/24 124/80   11/29/23 114/73          (goal 120/80)  BUN   Date Value Ref Range Status   08/15/2023 16 8 - 23 mg/dL Final     Creatinine   Date Value Ref Range Status   08/15/2023 0.9 0.5 - 0.9 mg/dL Final     Potassium   Date Value Ref Range Status   08/15/2023 5.0 3.7 - 5.3 mmol/L Final

## 2024-04-28 DIAGNOSIS — M48.061 SPINAL STENOSIS OF LUMBAR REGION WITHOUT NEUROGENIC CLAUDICATION: ICD-10-CM

## 2024-04-28 DIAGNOSIS — M54.16 LUMBAR RADICULOPATHY: ICD-10-CM

## 2024-04-29 RX ORDER — PREGABALIN 150 MG/1
150 CAPSULE ORAL 3 TIMES DAILY
Qty: 90 CAPSULE | Refills: 0 | Status: SHIPPED | OUTPATIENT
Start: 2024-04-29 | End: 2024-05-29

## 2024-04-29 NOTE — TELEPHONE ENCOUNTER
Please Approve or Refuse.  Send to Pharmacy per Pt's Request: [facility]      Next Visit Date:  7/10/2024   Last Visit Date: 3/4/2024    Hemoglobin A1C (%)   Date Value   11/28/2023 5.5   02/16/2023 5.1   10/18/2022 5.3             ( goal A1C is < 7)   BP Readings from Last 3 Encounters:   04/03/24 118/76   03/04/24 124/80   11/29/23 114/73          (goal 120/80)  BUN   Date Value Ref Range Status   08/15/2023 16 8 - 23 mg/dL Final     Creatinine   Date Value Ref Range Status   08/15/2023 0.9 0.5 - 0.9 mg/dL Final     Potassium   Date Value Ref Range Status   08/15/2023 5.0 3.7 - 5.3 mmol/L Final

## 2024-05-02 ENCOUNTER — TELEPHONE (OUTPATIENT)
Dept: FAMILY MEDICINE CLINIC | Age: 64
End: 2024-05-02

## 2024-05-02 DIAGNOSIS — H91.93 HEARING PROBLEM OF BOTH EARS: Primary | ICD-10-CM

## 2024-05-02 NOTE — TELEPHONE ENCOUNTER
P called in stating she attempted to schedule an appt with the ENT and was told that she needs a new referral in order to schedule appt. Pt adv that she has has 3 different insurances over the past year.     Writer adv pt to contact insurance and confirm the ENT is in network with her current insurance provider.

## 2024-05-03 ENCOUNTER — TELEPHONE (OUTPATIENT)
Dept: ADMINISTRATIVE | Age: 64
End: 2024-05-03

## 2024-05-03 DIAGNOSIS — L11.8 ACANTHOLYTIC VESICULAR DERMATITIS: ICD-10-CM

## 2024-05-03 RX ORDER — KETOCONAZOLE 20 MG/G
CREAM TOPICAL
Qty: 60 G | Refills: 0 | Status: SHIPPED | OUTPATIENT
Start: 2024-05-03

## 2024-05-03 NOTE — TELEPHONE ENCOUNTER
Patient stated she did not want to go to location below              Referred By: Antonio Ruiz MD NPI: [id number]   Referral Reason: Specialty Services Required         Referred To: [facility]  [address]  [place] [address]    [place]  [address]  [place] [address] Loc/POS:                Phone: [phone] Phone:     Fax: [phone] Fax:         She verbalized and provided location below where she wants to go     [facility]  Rodo Noel, FAAA   [address], [place] [address] .   She stated they told her she needs new referral to be seen with them.

## 2024-05-03 NOTE — TELEPHONE ENCOUNTER
Patient called requesting if a referral can be placed to  [facility]   Reasoning/Diagnosis : Associated Dx: Hearing problem of both ears (H91.93)    . Rodo Noel, FAAA  Location patient is requesting  [address], [place] [address] .    number [phone]     She stated this is where she wants to go due to her insurance covering     Other referral has been closed.     Also will like a call back once referral is placed and sent over.

## 2024-05-03 NOTE — TELEPHONE ENCOUNTER
1. Hearing problem of both ears      - JT - Chris Navas MD, Otolaryngology, [place]    I placed a new referral not sure if they are taking her insurance

## 2024-05-06 DIAGNOSIS — F41.9 ANXIETY: ICD-10-CM

## 2024-05-06 RX ORDER — BUSPIRONE HYDROCHLORIDE 15 MG/1
15 TABLET ORAL EVERY 12 HOURS PRN
Qty: 120 TABLET | Refills: 3 | Status: SHIPPED | OUTPATIENT
Start: 2024-05-06

## 2024-05-28 DIAGNOSIS — M48.061 SPINAL STENOSIS OF LUMBAR REGION WITHOUT NEUROGENIC CLAUDICATION: ICD-10-CM

## 2024-05-28 DIAGNOSIS — M54.16 LUMBAR RADICULOPATHY: ICD-10-CM

## 2024-05-29 RX ORDER — PREGABALIN 150 MG/1
150 CAPSULE ORAL 3 TIMES DAILY
Qty: 90 CAPSULE | Refills: 0 | Status: SHIPPED | OUTPATIENT
Start: 2024-05-29 | End: 2024-06-28

## 2024-06-02 DIAGNOSIS — L11.8 ACANTHOLYTIC VESICULAR DERMATITIS: ICD-10-CM

## 2024-06-03 RX ORDER — KETOCONAZOLE 20 MG/G
CREAM TOPICAL
Qty: 60 G | Refills: 0 | Status: SHIPPED | OUTPATIENT
Start: 2024-06-03

## 2024-06-04 DIAGNOSIS — R60.0 PERIPHERAL EDEMA: ICD-10-CM

## 2024-06-04 DIAGNOSIS — I25.10 CORONARY ARTERY DISEASE INVOLVING NATIVE CORONARY ARTERY OF NATIVE HEART WITHOUT ANGINA PECTORIS: ICD-10-CM

## 2024-06-05 RX ORDER — FUROSEMIDE 20 MG/1
20 TABLET ORAL DAILY
Qty: 120 TABLET | Refills: 0 | Status: SHIPPED | OUTPATIENT
Start: 2024-06-05

## 2024-06-10 DIAGNOSIS — L11.8 ACANTHOLYTIC VESICULAR DERMATITIS: ICD-10-CM

## 2024-06-10 RX ORDER — KETOCONAZOLE 20 MG/G
CREAM TOPICAL
Qty: 60 G | Refills: 0 | Status: SHIPPED | OUTPATIENT
Start: 2024-06-10

## 2024-06-18 ENCOUNTER — OFFICE VISIT (OUTPATIENT)
Dept: GASTROENTEROLOGY | Age: 64
End: 2024-06-18
Payer: COMMERCIAL

## 2024-06-18 ENCOUNTER — TELEPHONE (OUTPATIENT)
Dept: GASTROENTEROLOGY | Age: 64
End: 2024-06-18

## 2024-06-18 ENCOUNTER — TELEPHONE (OUTPATIENT)
Dept: FAMILY MEDICINE CLINIC | Age: 64
End: 2024-06-18

## 2024-06-18 VITALS
BODY MASS INDEX: 31.83 KG/M2 | TEMPERATURE: 97.9 F | WEIGHT: 173 LBS | SYSTOLIC BLOOD PRESSURE: 105 MMHG | HEART RATE: 87 BPM | HEIGHT: 62 IN | DIASTOLIC BLOOD PRESSURE: 71 MMHG

## 2024-06-18 DIAGNOSIS — Z86.010 H/O ADENOMATOUS POLYP OF COLON: Primary | ICD-10-CM

## 2024-06-18 PROCEDURE — 99203 OFFICE O/P NEW LOW 30 MIN: CPT | Performed by: INTERNAL MEDICINE

## 2024-06-18 PROCEDURE — 3074F SYST BP LT 130 MM HG: CPT | Performed by: INTERNAL MEDICINE

## 2024-06-18 PROCEDURE — 3078F DIAST BP <80 MM HG: CPT | Performed by: INTERNAL MEDICINE

## 2024-06-18 RX ORDER — POLYETHYLENE GLYCOL 3350 17 G/17G
POWDER, FOR SOLUTION ORAL
Qty: 255 G | Refills: 0 | Status: SHIPPED | OUTPATIENT
Start: 2024-06-18

## 2024-06-18 NOTE — TELEPHONE ENCOUNTER
[facility] [facility]    Upcoming Surgery/Procedure: Colonoscopy      Scheduled on TBD .    Pt requires need medical clearance prior to scheduled surgery/procedure .       Next Visit Date:  7/10/2024   Last Visit Date: 3/4/2024    Please see attachment below.         Thank you!

## 2024-06-18 NOTE — TELEPHONE ENCOUNTER
TBS for Colonoscopy with Dr. Jarrell.  Dx: H/O Adenomatous Polyp of Colon.    Faxed clearance for Plavix, Aspirin, and Pletal to Dr. Ruiz's office (Family Medicine).

## 2024-06-18 NOTE — PROGRESS NOTES
Reason for Referral:   No referring provider defined for this encounter.    No chief complaint on file.          HISTORY OF PRESENT ILLNESS: Ms.Deborah DELIO Ulloa is a 64 y.o. female with a pasthistory remarkable for GERD, referred for evaluation of surveillance colonoscopy.    She has h/o- colon polyps in  for which she had follow up colonoscopy in 2019 which showed sub-optimal prep, however no polyps were seen.  She is doing well.  No F/H of colon cancer    No c/o- nausea, vomiting, fever, loss of appetite, weight loss, bloating, bleeding OR, diarrhea, nocturnal diarrhea, tenesmus      Past Medical,Family, and Social History reviewed and does not contribute to the patient presentingcondition.     Michael rodriguez's PMH/PSH,SH,PSYCH Hx, MEDs, ALLERGIES, and ROS were all reviewed and updated in the appropriate sections.    PAST MEDICAL HISTORY:  Past Medical History:   Diagnosis Date    ARIADNE (acute kidney injury) (HCC) 2019    Anxiety     Asthma     CAD (coronary artery disease)     Chronic back pain     Cervical and lumbar disc issues    Class 1 obesity due to excess calories with serious comorbidity and body mass index (BMI) of 32.0 to 32.9 in adult 2020    Depression     Examination of participant in clinical trial 2011    End date 2015    GERD (gastroesophageal reflux disease)     Hiatal hernia    Headache     HTN (hypertension)     Lumbar radiculopathy     Mixed hyperlipidemia 01/15/2018    OA (osteoarthritis)     Peripheral vascular disease ([facility])     Have 4 stents    PVD (peripheral vascular disease) with claudication ([facility]) 2015       Past Surgical History:   Procedure Laterality Date     SECTION      COLONOSCOPY N/A 2019    COLORECTAL CANCER SCREENING, NOT HIGH RISK performed by Yulia Ruiz MD at [facility]    CORONARY ANGIOPLASTY  2015    2 stents    DILATION AND CURETTAGE OF UTERUS N/A     uterine polyp    EYE SURGERY  2017    Eyelid lift       CURRENT

## 2024-06-24 DIAGNOSIS — M19.019 OSTEOARTHRITIS OF SHOULDER, UNSPECIFIED LATERALITY, UNSPECIFIED OSTEOARTHRITIS TYPE: ICD-10-CM

## 2024-06-24 DIAGNOSIS — M50.30 DEGENERATIVE DISC DISEASE, CERVICAL: ICD-10-CM

## 2024-06-24 RX ORDER — CYCLOBENZAPRINE HCL 10 MG
TABLET ORAL
Qty: 180 TABLET | Refills: 0 | Status: SHIPPED | OUTPATIENT
Start: 2024-06-24

## 2024-06-24 NOTE — TELEPHONE ENCOUNTER
Please Approve or Refuse.  Send to Pharmacy per Pt's Request:      Next Visit Date:  7/10/2024   Last Visit Date: 3/4/2024    Hemoglobin A1C (%)   Date Value   11/28/2023 5.5   02/16/2023 5.1   10/18/2022 5.3             ( goal A1C is < 7)   BP Readings from Last 3 Encounters:   06/18/24 105/71   04/03/24 118/76   03/04/24 124/80          (goal 120/80)  BUN   Date Value Ref Range Status   08/15/2023 16 8 - 23 mg/dL Final     Creatinine   Date Value Ref Range Status   08/15/2023 0.9 0.5 - 0.9 mg/dL Final     Potassium   Date Value Ref Range Status   08/15/2023 5.0 3.7 - 5.3 mmol/L Final

## 2024-06-26 DIAGNOSIS — M48.061 SPINAL STENOSIS OF LUMBAR REGION WITHOUT NEUROGENIC CLAUDICATION: ICD-10-CM

## 2024-06-26 DIAGNOSIS — M54.16 LUMBAR RADICULOPATHY: ICD-10-CM

## 2024-06-26 NOTE — TELEPHONE ENCOUNTER
Faxed clearance for Plavix, Aspirin, and Pletal to Dr. Ruiz's office (Family Medicine) for the second attempt.   Alert-The patient is alert, awake and responds to voice. The patient is oriented to time, place, and person. The triage nurse is able to obtain subjective information.

## 2024-06-27 RX ORDER — PREGABALIN 150 MG/1
150 CAPSULE ORAL 3 TIMES DAILY
Qty: 90 CAPSULE | Refills: 0 | Status: SHIPPED | OUTPATIENT
Start: 2024-06-27 | End: 2024-07-27

## 2024-07-10 ENCOUNTER — OFFICE VISIT (OUTPATIENT)
Dept: FAMILY MEDICINE CLINIC | Age: 64
End: 2024-07-10
Payer: COMMERCIAL

## 2024-07-10 ENCOUNTER — PATIENT MESSAGE (OUTPATIENT)
Dept: GASTROENTEROLOGY | Age: 64
End: 2024-07-10

## 2024-07-10 VITALS
HEIGHT: 62 IN | OXYGEN SATURATION: 97 % | SYSTOLIC BLOOD PRESSURE: 110 MMHG | HEART RATE: 87 BPM | DIASTOLIC BLOOD PRESSURE: 66 MMHG | WEIGHT: 173.8 LBS | BODY MASS INDEX: 31.98 KG/M2

## 2024-07-10 DIAGNOSIS — E78.2 MIXED HYPERLIPIDEMIA: ICD-10-CM

## 2024-07-10 DIAGNOSIS — Z01.818 PRE-OPERATIVE CLEARANCE: ICD-10-CM

## 2024-07-10 DIAGNOSIS — R73.03 PREDIABETES: ICD-10-CM

## 2024-07-10 DIAGNOSIS — Z12.31 SCREENING MAMMOGRAM FOR HIGH-RISK PATIENT: ICD-10-CM

## 2024-07-10 DIAGNOSIS — I10 ESSENTIAL HYPERTENSION: Primary | ICD-10-CM

## 2024-07-10 DIAGNOSIS — M48.062 SPINAL STENOSIS OF LUMBAR REGION WITH NEUROGENIC CLAUDICATION: ICD-10-CM

## 2024-07-10 DIAGNOSIS — I73.9 PVD (PERIPHERAL VASCULAR DISEASE) WITH CLAUDICATION (HCC): ICD-10-CM

## 2024-07-10 PROBLEM — B37.31 CANDIDAL VULVOVAGINITIS: Status: ACTIVE | Noted: 2017-11-04

## 2024-07-10 PROCEDURE — 99214 OFFICE O/P EST MOD 30 MIN: CPT | Performed by: FAMILY MEDICINE

## 2024-07-10 PROCEDURE — 3078F DIAST BP <80 MM HG: CPT | Performed by: FAMILY MEDICINE

## 2024-07-10 PROCEDURE — 3074F SYST BP LT 130 MM HG: CPT | Performed by: FAMILY MEDICINE

## 2024-07-10 SDOH — ECONOMIC STABILITY: FOOD INSECURITY: WITHIN THE PAST 12 MONTHS, YOU WORRIED THAT YOUR FOOD WOULD RUN OUT BEFORE YOU GOT MONEY TO BUY MORE.: NEVER TRUE

## 2024-07-10 SDOH — ECONOMIC STABILITY: FOOD INSECURITY: WITHIN THE PAST 12 MONTHS, THE FOOD YOU BOUGHT JUST DIDN'T LAST AND YOU DIDN'T HAVE MONEY TO GET MORE.: NEVER TRUE

## 2024-07-10 SDOH — ECONOMIC STABILITY: INCOME INSECURITY: HOW HARD IS IT FOR YOU TO PAY FOR THE VERY BASICS LIKE FOOD, HOUSING, MEDICAL CARE, AND HEATING?: NOT HARD AT ALL

## 2024-07-10 ASSESSMENT — ENCOUNTER SYMPTOMS
CONSTIPATION: 0
EYE REDNESS: 0
ABDOMINAL PAIN: 0
COUGH: 0
BACK PAIN: 1
WHEEZING: 0
STRIDOR: 0
SHORTNESS OF BREATH: 0
CHEST TIGHTNESS: 0
RECTAL PAIN: 0
ABDOMINAL DISTENTION: 0
SINUS PRESSURE: 0
BLOOD IN STOOL: 0
RHINORRHEA: 0
COLOR CHANGE: 0
VOMITING: 0
TROUBLE SWALLOWING: 0
SORE THROAT: 0
DIARRHEA: 0
NAUSEA: 0

## 2024-07-10 NOTE — PROGRESS NOTES
Visit Information    Have you changed or started any medications since your last visit including any over-the-counter medicines, vitamins, or herbal medicines? no   Have you stopped taking any of your medications? Is so, why? -  no  Are you having any side effects from any of your medications? - yes     Have you seen any other physician or provider since your last visit?  no   Have you had any other diagnostic tests since your last visit?  no   Have you been seen in the emergency room and/or had an admission in a hospital since we last saw you?  no   Have you had your routine dental cleaning in the past 6 months?  no     Do you have an active MyChart account? If no, what is the barrier?  Yes    Patient Care Team:  Antonio Ruiz MD as PCP - General (Family Medicine)  Antonio Ruiz MD as PCP - Empaneled Provider    Medical History Review  Past Medical, Family, and Social History reviewed and does contribute to the patient presenting condition    Health Maintenance   Topic Date Due    Cervical cancer screen  04/16/2023    Colorectal Cancer Screen  05/06/2024    Lipids  07/18/2024    Flu vaccine (1) 08/01/2024    A1C test (Diabetic or Prediabetic)  11/28/2024    Depression Monitoring  03/04/2025    Pneumococcal 0-64 years Vaccine (3 of 3 - PPSV23 or PCV20) 05/02/2025    Breast cancer screen  09/26/2025    DTaP/Tdap/Td vaccine (2 - Td or Tdap) 04/16/2028    Shingles vaccine  Completed    COVID-19 Vaccine  Completed    Respiratory Syncytial Virus (RSV) Pregnant or age 60 yrs+  Completed    Hepatitis C screen  Completed    HIV screen  Completed    Hepatitis A vaccine  Aged Out    Hepatitis B vaccine  Aged Out    Hib vaccine  Aged Out    Polio vaccine  Aged Out    Meningococcal (ACWY) vaccine  Aged Out    Lung Cancer Screening &/or Counseling  Discontinued              
Date:   7/10/2025    CBC     Standing Status:   Future     Standing Expiration Date:   7/10/2025    Hemoglobin A1C     Standing Status:   Future     Standing Expiration Date:   7/10/2025    Lipid Panel     Standing Status:   Future     Standing Expiration Date:   7/10/2025     Order Specific Question:   Is Patient Fasting?/# of Hours     Answer:   yes, 8-10 hours    Magnesium     Standing Status:   Future     Standing Expiration Date:   7/10/2025    Uric Acid     Standing Status:   Future     Standing Expiration Date:   7/10/2025    TSH with Reflex     Standing Status:   Future     Standing Expiration Date:   7/10/2025         There are no discontinued medications.    Christina received counseling on the following healthy behaviors: nutrition, exercise, and medication adherence  Reviewed prior labs and health maintenance  Continue current medications, diet and exercise.  Discussed use, benefit, and side effects of prescribed medications. Barriers to medication compliance addressed.   Patient given educational materials - see patient instructions  Was a self-tracking handout given in paper form or via [employer]? Yes    Requested Prescriptions      No prescriptions requested or ordered in this encounter       All patient questions answered.  Patient voiced understanding.    Quality Measures    Body mass index is 31.79 kg/m². Elevated. Weight control planned discussed daily exercise regimen and Healthy diet and regular exercise.    BP: 110/66 Blood pressure is normal. Treatment plan consists of Weight Reduction, DASH Eating Plan, Dietary Sodium Restriction, and No treatment change needed.    No results found for: \"LDLDIRECT\" (goal LDL reduction with dx if diabetes is 50% LDL reduction)          3/4/2024    12:54 PM 11/1/2023     2:47 PM 2/16/2023     1:52 PM 4/14/2022     2:47 PM 1/13/2022     2:43 PM 2/8/2021     2:13 PM 3/25/2019     2:20 PM   PHQ Scores   PHQ2 Score 0 0 0 0 0 0 0   PHQ9 Score 0 1 0 4 0 0 0

## 2024-07-26 DIAGNOSIS — M54.16 LUMBAR RADICULOPATHY: ICD-10-CM

## 2024-07-26 DIAGNOSIS — M48.061 SPINAL STENOSIS OF LUMBAR REGION WITHOUT NEUROGENIC CLAUDICATION: ICD-10-CM

## 2024-07-29 RX ORDER — PREGABALIN 150 MG/1
150 CAPSULE ORAL 3 TIMES DAILY
Qty: 90 CAPSULE | Refills: 0 | Status: SHIPPED | OUTPATIENT
Start: 2024-07-29 | End: 2024-08-28

## 2024-08-28 DIAGNOSIS — M48.061 SPINAL STENOSIS OF LUMBAR REGION WITHOUT NEUROGENIC CLAUDICATION: ICD-10-CM

## 2024-08-28 DIAGNOSIS — M54.16 LUMBAR RADICULOPATHY: ICD-10-CM

## 2024-08-29 RX ORDER — PREGABALIN 150 MG/1
150 CAPSULE ORAL 3 TIMES DAILY
Qty: 90 CAPSULE | Refills: 0 | Status: SHIPPED | OUTPATIENT
Start: 2024-08-29 | End: 2024-09-28

## 2024-08-29 NOTE — TELEPHONE ENCOUNTER
Please Approve or Refuse.  Send to Pharmacy per Pt's Request:      Next Visit Date:  11/11/2024   Last Visit Date: 7/10/2024    Hemoglobin A1C (%)   Date Value   11/28/2023 5.5   02/16/2023 5.1   10/18/2022 5.3             ( goal A1C is < 7)   BP Readings from Last 3 Encounters:   07/10/24 110/66   06/18/24 105/71   04/03/24 118/76          (goal 120/80)  BUN   Date Value Ref Range Status   08/15/2023 16 8 - 23 mg/dL Final     Creatinine   Date Value Ref Range Status   08/15/2023 0.9 0.5 - 0.9 mg/dL Final     Potassium   Date Value Ref Range Status   08/15/2023 5.0 3.7 - 5.3 mmol/L Final

## 2024-09-02 DIAGNOSIS — I25.10 CORONARY ARTERY DISEASE INVOLVING NATIVE CORONARY ARTERY OF NATIVE HEART WITHOUT ANGINA PECTORIS: ICD-10-CM

## 2024-09-02 DIAGNOSIS — R60.0 PERIPHERAL EDEMA: ICD-10-CM

## 2024-09-03 RX ORDER — FUROSEMIDE 20 MG
20 TABLET ORAL DAILY
Qty: 120 TABLET | Refills: 0 | Status: SHIPPED | OUTPATIENT
Start: 2024-09-03

## 2024-09-04 ENCOUNTER — HOSPITAL ENCOUNTER (OUTPATIENT)
Dept: PREADMISSION TESTING | Age: 64
Discharge: HOME | End: 2024-09-08

## 2024-09-04 VITALS — HEIGHT: 62 IN | BODY MASS INDEX: 31.83 KG/M2 | WEIGHT: 173 LBS

## 2024-09-12 ENCOUNTER — ANESTHESIA EVENT (OUTPATIENT)
Dept: ENDOSCOPY | Age: 64
End: 2024-09-12
Payer: COMMERCIAL

## 2024-09-13 ENCOUNTER — ANESTHESIA (OUTPATIENT)
Dept: ENDOSCOPY | Age: 64
End: 2024-09-13
Payer: COMMERCIAL

## 2024-09-13 ENCOUNTER — HOSPITAL ENCOUNTER (OUTPATIENT)
Age: 64
Setting detail: OUTPATIENT SURGERY
Discharge: HOME | End: 2024-09-13
Attending: INTERNAL MEDICINE | Admitting: INTERNAL MEDICINE
Payer: COMMERCIAL

## 2024-09-13 VITALS
BODY MASS INDEX: 31.28 KG/M2 | HEIGHT: 62 IN | RESPIRATION RATE: 14 BRPM | TEMPERATURE: 97.9 F | OXYGEN SATURATION: 97 % | SYSTOLIC BLOOD PRESSURE: 109 MMHG | HEART RATE: 71 BPM | WEIGHT: 170 LBS | DIASTOLIC BLOOD PRESSURE: 68 MMHG

## 2024-09-13 DIAGNOSIS — Z86.010 HISTORY OF ADENOMATOUS POLYP OF COLON: ICD-10-CM

## 2024-09-13 LAB
GLUCOSE BLD-MCNC: 95 MG/DL (ref 65–105)
GLUCOSE BLD-MCNC: 95 MG/DL (ref 65–105)

## 2024-09-13 PROCEDURE — 45385 COLONOSCOPY W/LESION REMOVAL: CPT | Performed by: INTERNAL MEDICINE

## 2024-09-13 PROCEDURE — 88305 TISSUE EXAM BY PATHOLOGIST: CPT

## 2024-09-13 PROCEDURE — 82947 ASSAY GLUCOSE BLOOD QUANT: CPT

## 2024-09-13 PROCEDURE — 2580000003 HC RX 258: Performed by: ANESTHESIOLOGY

## 2024-09-13 PROCEDURE — 7100000011 HC PHASE II RECOVERY - ADDTL 15 MIN: Performed by: INTERNAL MEDICINE

## 2024-09-13 PROCEDURE — 2500000003 HC RX 250 WO HCPCS: Performed by: NURSE ANESTHETIST, CERTIFIED REGISTERED

## 2024-09-13 PROCEDURE — 3609010200 HC COLONOSCOPY ABLATION TUMOR POLYP/OTHER LES: Performed by: INTERNAL MEDICINE

## 2024-09-13 PROCEDURE — 7100000010 HC PHASE II RECOVERY - FIRST 15 MIN: Performed by: INTERNAL MEDICINE

## 2024-09-13 PROCEDURE — 6360000002 HC RX W HCPCS: Performed by: NURSE ANESTHETIST, CERTIFIED REGISTERED

## 2024-09-13 PROCEDURE — 45390 COLONOSCOPY W/RESECTION: CPT | Performed by: INTERNAL MEDICINE

## 2024-09-13 PROCEDURE — 3700000000 HC ANESTHESIA ATTENDED CARE: Performed by: INTERNAL MEDICINE

## 2024-09-13 PROCEDURE — C1889 IMPLANT/INSERT DEVICE, NOC: HCPCS | Performed by: INTERNAL MEDICINE

## 2024-09-13 PROCEDURE — 2709999900 HC NON-CHARGEABLE SUPPLY: Performed by: INTERNAL MEDICINE

## 2024-09-13 PROCEDURE — 3700000001 HC ADD 15 MINUTES (ANESTHESIA): Performed by: INTERNAL MEDICINE

## 2024-09-13 DEVICE — WORKING LENGTH 235CM, WORKING CHANNEL 2.8MM
Type: IMPLANTABLE DEVICE | Status: FUNCTIONAL
Brand: RESOLUTION 360 CLIP

## 2024-09-13 RX ORDER — SODIUM CHLORIDE 0.9 % (FLUSH) 0.9 %
5-40 SYRINGE (ML) INJECTION EVERY 12 HOURS SCHEDULED
Status: DISCONTINUED | OUTPATIENT
Start: 2024-09-13 | End: 2024-09-13 | Stop reason: HOSPADM

## 2024-09-13 RX ORDER — SODIUM CHLORIDE, SODIUM LACTATE, POTASSIUM CHLORIDE, CALCIUM CHLORIDE 600; 310; 30; 20 MG/100ML; MG/100ML; MG/100ML; MG/100ML
INJECTION, SOLUTION INTRAVENOUS CONTINUOUS
Status: DISCONTINUED | OUTPATIENT
Start: 2024-09-13 | End: 2024-09-13 | Stop reason: HOSPADM

## 2024-09-13 RX ORDER — LIDOCAINE HYDROCHLORIDE 10 MG/ML
1 INJECTION, SOLUTION EPIDURAL; INFILTRATION; INTRACAUDAL; PERINEURAL
Status: DISCONTINUED | OUTPATIENT
Start: 2024-09-13 | End: 2024-09-13 | Stop reason: HOSPADM

## 2024-09-13 RX ORDER — SODIUM CHLORIDE 9 MG/ML
INJECTION, SOLUTION INTRAVENOUS PRN
Status: DISCONTINUED | OUTPATIENT
Start: 2024-09-13 | End: 2024-09-13 | Stop reason: HOSPADM

## 2024-09-13 RX ORDER — PROPOFOL 10 MG/ML
INJECTION, EMULSION INTRAVENOUS
Status: DISCONTINUED | OUTPATIENT
Start: 2024-09-13 | End: 2024-09-13 | Stop reason: SDUPTHER

## 2024-09-13 RX ORDER — LIDOCAINE HYDROCHLORIDE 20 MG/ML
INJECTION, SOLUTION EPIDURAL; INFILTRATION; INTRACAUDAL; PERINEURAL
Status: DISCONTINUED | OUTPATIENT
Start: 2024-09-13 | End: 2024-09-13 | Stop reason: SDUPTHER

## 2024-09-13 RX ORDER — SODIUM CHLORIDE 0.9 % (FLUSH) 0.9 %
5-40 SYRINGE (ML) INJECTION PRN
Status: DISCONTINUED | OUTPATIENT
Start: 2024-09-13 | End: 2024-09-13 | Stop reason: HOSPADM

## 2024-09-13 RX ADMIN — SODIUM CHLORIDE, POTASSIUM CHLORIDE, SODIUM LACTATE AND CALCIUM CHLORIDE: 600; 310; 30; 20 INJECTION, SOLUTION INTRAVENOUS at 12:37

## 2024-09-13 RX ADMIN — PROPOFOL 50 MG: 10 INJECTION, EMULSION INTRAVENOUS at 13:06

## 2024-09-13 RX ADMIN — LIDOCAINE HYDROCHLORIDE 40 MG: 20 INJECTION, SOLUTION EPIDURAL; INFILTRATION; INTRACAUDAL; PERINEURAL at 13:06

## 2024-09-13 RX ADMIN — PROPOFOL 150 MCG/KG/MIN: 10 INJECTION, EMULSION INTRAVENOUS at 13:07

## 2024-09-13 ASSESSMENT — ENCOUNTER SYMPTOMS
SORE THROAT: 0
COUGH: 1
SHORTNESS OF BREATH: 0

## 2024-09-13 ASSESSMENT — PAIN - FUNCTIONAL ASSESSMENT
PAIN_FUNCTIONAL_ASSESSMENT: 0-10
PAIN_FUNCTIONAL_ASSESSMENT: NONE - DENIES PAIN

## 2024-09-18 LAB — SURGICAL PATHOLOGY REPORT: NORMAL

## 2024-09-20 ENCOUNTER — TELEPHONE (OUTPATIENT)
Dept: GASTROENTEROLOGY | Age: 64
End: 2024-09-20

## 2024-09-26 ENCOUNTER — HOSPITAL ENCOUNTER (OUTPATIENT)
Dept: WOMENS IMAGING | Age: 64
Discharge: HOME | End: 2024-09-28
Payer: COMMERCIAL

## 2024-09-26 DIAGNOSIS — Z12.31 SCREENING MAMMOGRAM FOR HIGH-RISK PATIENT: ICD-10-CM

## 2024-09-26 DIAGNOSIS — M19.019 OSTEOARTHRITIS OF SHOULDER, UNSPECIFIED LATERALITY, UNSPECIFIED OSTEOARTHRITIS TYPE: ICD-10-CM

## 2024-09-26 DIAGNOSIS — M54.16 LUMBAR RADICULOPATHY: ICD-10-CM

## 2024-09-26 DIAGNOSIS — M48.061 SPINAL STENOSIS OF LUMBAR REGION WITHOUT NEUROGENIC CLAUDICATION: ICD-10-CM

## 2024-09-26 DIAGNOSIS — M50.30 DEGENERATIVE DISC DISEASE, CERVICAL: ICD-10-CM

## 2024-09-26 PROCEDURE — 77063 BREAST TOMOSYNTHESIS BI: CPT

## 2024-09-26 RX ORDER — PREGABALIN 150 MG/1
150 CAPSULE ORAL 3 TIMES DAILY
Qty: 90 CAPSULE | Refills: 0 | Status: SHIPPED | OUTPATIENT
Start: 2024-09-26 | End: 2024-10-26

## 2024-09-26 RX ORDER — CYCLOBENZAPRINE HCL 10 MG
TABLET ORAL
Qty: 180 TABLET | Refills: 0 | Status: SHIPPED | OUTPATIENT
Start: 2024-09-26

## 2024-10-03 ENCOUNTER — OFFICE VISIT (OUTPATIENT)
Dept: NEUROLOGY | Age: 64
End: 2024-10-03

## 2024-10-03 VITALS
HEART RATE: 88 BPM | BODY MASS INDEX: 31.04 KG/M2 | DIASTOLIC BLOOD PRESSURE: 71 MMHG | HEIGHT: 62 IN | WEIGHT: 168.7 LBS | SYSTOLIC BLOOD PRESSURE: 105 MMHG

## 2024-10-03 DIAGNOSIS — G89.29 CHRONIC LOW BACK PAIN WITH SCIATICA, SCIATICA LATERALITY UNSPECIFIED, UNSPECIFIED BACK PAIN LATERALITY: ICD-10-CM

## 2024-10-03 DIAGNOSIS — M54.40 CHRONIC LOW BACK PAIN WITH SCIATICA, SCIATICA LATERALITY UNSPECIFIED, UNSPECIFIED BACK PAIN LATERALITY: ICD-10-CM

## 2024-10-03 DIAGNOSIS — M48.061 SPINAL STENOSIS OF LUMBAR REGION WITHOUT NEUROGENIC CLAUDICATION: Primary | ICD-10-CM

## 2024-10-03 DIAGNOSIS — M54.16 LUMBAR RADICULOPATHY: ICD-10-CM

## 2024-10-04 ASSESSMENT — ENCOUNTER SYMPTOMS
ABDOMINAL DISTENTION: 0
BACK PAIN: 1
ABDOMINAL PAIN: 0
SHORTNESS OF BREATH: 0
WHEEZING: 0
COUGH: 0
STRIDOR: 0

## 2024-10-04 NOTE — PROGRESS NOTES
[address] [facility], [facility] [place] [address]  [place] [id number]  P: [phone]  F: [phone]    NEUROLOGY CLINIC NOTE     PATIENT NAME: Christina Ulloa  PATIENT MRN: [id number]  PRIMARY CARE PHYSICIAN: Antonio Ruiz MD    HPI:      ?Christina Ulloa  61 years old female patient, right-handed came to the clinic today for follow up. She follows with our neurology clinic as a case of low back pain with right leg numbness.      She also has hx of lumbar scoliosis, obesity, DM, HTN, HLD, CAD sp 2 stents, PAD sp 3 stenting April/2022, on asa, plavix, cilostazol and statins.      In summary, it seems her low back pain is a combination of  scoliosis deformity in addition to degenerative changes. She saw neurosurgery in July 2022 with an option for surgery but the patient doesn't want to seek this option at this point of time. She is also trying exercising, weight reduction and Lyrica, no red flags for low back pain, no bladder or bowel incontinence. No saddle anesthesia.        Briefly, her clinical story started about 2 years ago when she started to experience gradual low back pain with right leg numbness.  also, she was experiencing numbness on her right groin down to the anterolateral right thigh. Low back Pain severity ranges between 4-7/10. She saw pain management and had epidural injection which actually helped a lot with the pain and it went away. A year later ago, this low back pain started to recur again.  She denies any changes in bowel or bladder habits.  She did have an MRI of the lumbar spine in February which showed multilevel degenerative changes at L2-3, L4-5 and L5-S1.  She saw Dr. Mcdonnell Neurosurgery in July 2022 with option for surgery but it was explained to her that it’s an extensive surgery and the patient didn’t want to seek this option at this time. She uses a cane to ambulate at times.         11/29/2023: low back pain upon ambulation, 9/10, sharp and 
  Attending Physician Statement  I have discussed the case of Christina Ulloa including pertinent history and exam findings with the resident physician. I reviewed medications, clinical labs, x-rays and other diagnostic tests with the resident physician.    I have seen and examined the patient and the key elements of the encounter have been performed by me. I agree with the assessment, plan and orders as documented by the resident physician.            Impression and Plan: Ms. Christina Ulloa is a 64 y.o. female with   Longstanding history of back pain with multilevel degenerative disc disease with lumbar spinal stenosis with intermittent radicular pain; presently she has predominant axial pain rather than radicular pain; discussion done about various physical therapy maneuvers; will refer her for PT ora for aquatic therapy.  Has had epidural injection therapy and also was evaluated by neurosurgery; she does not want any surgical interventions at this point of time.  To continue Lyrica 150 mg tid along with Voltaren gel alternating with Lidoderm cream/patch.        This note was partially created using voice recognition software and is inherently subject to errors including those of syntax and \"sound alike\" substitutions which may escape proofreading.  In such instances, original meaning may be extrapolated by contextual derivation.  Isaiah Barakat MD 10/3/2024 3:17 PM   
noninvasive blood pressure monitor

## 2024-10-08 DIAGNOSIS — Z86.0101 H/O ADENOMATOUS POLYP OF COLON: ICD-10-CM

## 2024-10-23 DIAGNOSIS — I10 ESSENTIAL HYPERTENSION: ICD-10-CM

## 2024-10-23 DIAGNOSIS — J45.20 MILD INTERMITTENT ASTHMA WITHOUT COMPLICATION: ICD-10-CM

## 2024-10-23 DIAGNOSIS — F41.9 ANXIETY: ICD-10-CM

## 2024-10-24 RX ORDER — ALBUTEROL SULFATE 90 UG/1
INHALANT RESPIRATORY (INHALATION)
Qty: 36 G | Refills: 3 | Status: SHIPPED | OUTPATIENT
Start: 2024-10-24

## 2024-10-24 RX ORDER — METOPROLOL SUCCINATE 50 MG/1
TABLET, EXTENDED RELEASE ORAL
Qty: 90 TABLET | Refills: 3 | Status: SHIPPED | OUTPATIENT
Start: 2024-10-24

## 2024-10-24 RX ORDER — BUPROPION HYDROCHLORIDE 100 MG/1
100 TABLET, EXTENDED RELEASE ORAL 2 TIMES DAILY
Qty: 180 TABLET | Refills: 3 | Status: SHIPPED | OUTPATIENT
Start: 2024-10-24

## 2024-10-24 NOTE — TELEPHONE ENCOUNTER
Please Approve or Refuse.  Send to Pharmacy per Pt's Request: [facility]     Next Visit Date:  10/23/2024   Last Visit Date: 7/10/2024    Hemoglobin A1C (%)   Date Value   11/28/2023 5.5   02/16/2023 5.1   10/18/2022 5.3             ( goal A1C is < 7)   BP Readings from Last 3 Encounters:   10/03/24 105/71   09/13/24 109/68   09/10/24 104/70          (goal 120/80)  BUN   Date Value Ref Range Status   08/15/2023 16 8 - 23 mg/dL Final     Creatinine   Date Value Ref Range Status   08/15/2023 0.9 0.5 - 0.9 mg/dL Final     Potassium   Date Value Ref Range Status   08/15/2023 5.0 3.7 - 5.3 mmol/L Final

## 2024-10-24 NOTE — TELEPHONE ENCOUNTER
Please Approve or Refuse.  Send to Pharmacy per Pt's Request: [facility]     Next Visit Date:  11/11/2024   Last Visit Date: 7/10/2024    Hemoglobin A1C (%)   Date Value   11/28/2023 5.5   02/16/2023 5.1   10/18/2022 5.3             ( goal A1C is < 7)   BP Readings from Last 3 Encounters:   10/03/24 105/71   09/13/24 109/68   09/10/24 104/70          (goal 120/80)  BUN   Date Value Ref Range Status   08/15/2023 16 8 - 23 mg/dL Final     Creatinine   Date Value Ref Range Status   08/15/2023 0.9 0.5 - 0.9 mg/dL Final     Potassium   Date Value Ref Range Status   08/15/2023 5.0 3.7 - 5.3 mmol/L Final

## 2024-10-30 DIAGNOSIS — M48.061 SPINAL STENOSIS OF LUMBAR REGION WITHOUT NEUROGENIC CLAUDICATION: ICD-10-CM

## 2024-10-30 DIAGNOSIS — M54.16 LUMBAR RADICULOPATHY: ICD-10-CM

## 2024-10-31 RX ORDER — PREGABALIN 150 MG/1
150 CAPSULE ORAL 3 TIMES DAILY
Qty: 90 CAPSULE | Refills: 0 | Status: SHIPPED | OUTPATIENT
Start: 2024-10-31 | End: 2024-11-30

## 2024-11-01 ENCOUNTER — HOSPITAL ENCOUNTER (OUTPATIENT)
Age: 64
Discharge: HOME | End: 2024-11-01
Payer: COMMERCIAL

## 2024-11-01 DIAGNOSIS — E78.2 MIXED HYPERLIPIDEMIA: ICD-10-CM

## 2024-11-01 DIAGNOSIS — I10 ESSENTIAL HYPERTENSION: ICD-10-CM

## 2024-11-01 DIAGNOSIS — R73.03 PREDIABETES: ICD-10-CM

## 2024-11-01 LAB
ALBUMIN SERPL-MCNC: 3.8 G/DL (ref 3.5–5.2)
ALP SERPL-CCNC: 82 U/L (ref 35–104)
ALT SERPL-CCNC: 13 U/L (ref 10–35)
ANION GAP SERPL CALCULATED.3IONS-SCNC: 10 MMOL/L (ref 9–16)
AST SERPL-CCNC: 22 U/L (ref 10–35)
BILIRUB SERPL-MCNC: 0.3 MG/DL (ref 0–1.2)
BUN SERPL-MCNC: 13 MG/DL (ref 8–23)
CALCIUM SERPL-MCNC: 8.9 MG/DL (ref 8.6–10.4)
CHLORIDE SERPL-SCNC: 107 MMOL/L (ref 98–107)
CHOLEST SERPL-MCNC: 130 MG/DL (ref 0–199)
CHOLESTEROL/HDL RATIO: 2.7
CO2 SERPL-SCNC: 25 MMOL/L (ref 20–31)
CREAT SERPL-MCNC: 0.9 MG/DL (ref 0.7–1.2)
ERYTHROCYTE [DISTWIDTH] IN BLOOD BY AUTOMATED COUNT: 14.4 % (ref 11.5–14.9)
EST. AVERAGE GLUCOSE BLD GHB EST-MCNC: 111 MG/DL
GFR, ESTIMATED: 71 ML/MIN/1.73M2
GLUCOSE SERPL-MCNC: 92 MG/DL (ref 74–99)
HBA1C MFR BLD: 5.5 % (ref 4–6)
HCT VFR BLD AUTO: 41 % (ref 36–46)
HDLC SERPL-MCNC: 49 MG/DL
HGB BLD-MCNC: 14.1 G/DL (ref 12–16)
LDLC SERPL CALC-MCNC: 60 MG/DL (ref 0–100)
MAGNESIUM SERPL-MCNC: 2.2 MG/DL (ref 1.6–2.4)
MCH RBC QN AUTO: 32.4 PG (ref 26–34)
MCHC RBC AUTO-ENTMCNC: 34.4 G/DL (ref 31–37)
MCV RBC AUTO: 94.1 FL (ref 80–100)
PLATELET # BLD AUTO: 228 K/UL (ref 150–450)
PMV BLD AUTO: 8.2 FL (ref 6–12)
POTASSIUM SERPL-SCNC: 4.8 MMOL/L (ref 3.7–5.3)
PROT SERPL-MCNC: 6.7 G/DL (ref 6.6–8.7)
RBC # BLD AUTO: 4.36 M/UL (ref 4–5.2)
SODIUM SERPL-SCNC: 142 MMOL/L (ref 136–145)
TRIGL SERPL-MCNC: 106 MG/DL (ref 0–149)
TSH SERPL DL<=0.05 MIU/L-ACNC: 2.93 UIU/ML (ref 0.27–4.2)
URATE SERPL-MCNC: 5.1 MG/DL (ref 2.4–5.7)
WBC OTHER # BLD: 5.8 K/UL (ref 3.5–11)

## 2024-11-01 PROCEDURE — 84443 ASSAY THYROID STIM HORMONE: CPT

## 2024-11-01 PROCEDURE — 83735 ASSAY OF MAGNESIUM: CPT

## 2024-11-01 PROCEDURE — 84550 ASSAY OF BLOOD/URIC ACID: CPT

## 2024-11-01 PROCEDURE — 36415 COLL VENOUS BLD VENIPUNCTURE: CPT

## 2024-11-01 PROCEDURE — 85027 COMPLETE CBC AUTOMATED: CPT

## 2024-11-01 PROCEDURE — 80053 COMPREHEN METABOLIC PANEL: CPT

## 2024-11-01 PROCEDURE — 83036 HEMOGLOBIN GLYCOSYLATED A1C: CPT

## 2024-11-01 PROCEDURE — 80061 LIPID PANEL: CPT

## 2024-11-11 ENCOUNTER — OFFICE VISIT (OUTPATIENT)
Dept: FAMILY MEDICINE CLINIC | Age: 64
End: 2024-11-11

## 2024-11-11 VITALS
SYSTOLIC BLOOD PRESSURE: 122 MMHG | HEIGHT: 62 IN | BODY MASS INDEX: 31.62 KG/M2 | WEIGHT: 171.8 LBS | DIASTOLIC BLOOD PRESSURE: 64 MMHG | TEMPERATURE: 97.6 F | HEART RATE: 84 BPM | OXYGEN SATURATION: 97 %

## 2024-11-11 DIAGNOSIS — E66.09 CLASS 1 OBESITY DUE TO EXCESS CALORIES WITH SERIOUS COMORBIDITY AND BODY MASS INDEX (BMI) OF 32.0 TO 32.9 IN ADULT: ICD-10-CM

## 2024-11-11 DIAGNOSIS — I25.10 CORONARY ARTERY DISEASE INVOLVING NATIVE CORONARY ARTERY OF NATIVE HEART WITHOUT ANGINA PECTORIS: Primary | ICD-10-CM

## 2024-11-11 DIAGNOSIS — F32.4 MAJOR DEPRESSIVE DISORDER WITH SINGLE EPISODE, IN PARTIAL REMISSION (HCC): ICD-10-CM

## 2024-11-11 DIAGNOSIS — E78.2 MIXED HYPERLIPIDEMIA: ICD-10-CM

## 2024-11-11 DIAGNOSIS — E66.811 CLASS 1 OBESITY DUE TO EXCESS CALORIES WITH SERIOUS COMORBIDITY AND BODY MASS INDEX (BMI) OF 32.0 TO 32.9 IN ADULT: ICD-10-CM

## 2024-11-11 DIAGNOSIS — R60.0 PERIPHERAL EDEMA: ICD-10-CM

## 2024-11-11 DIAGNOSIS — Z87.891 PERSONAL HISTORY OF TOBACCO USE: ICD-10-CM

## 2024-11-11 DIAGNOSIS — R73.03 PREDIABETES: ICD-10-CM

## 2024-11-11 DIAGNOSIS — M54.16 LUMBAR RADICULOPATHY: ICD-10-CM

## 2024-11-11 DIAGNOSIS — I73.9 PVD (PERIPHERAL VASCULAR DISEASE) WITH CLAUDICATION (HCC): ICD-10-CM

## 2024-11-11 DIAGNOSIS — I10 ESSENTIAL HYPERTENSION: ICD-10-CM

## 2024-11-11 PROBLEM — B37.31 CANDIDAL VULVOVAGINITIS: Status: RESOLVED | Noted: 2017-11-04 | Resolved: 2024-11-11

## 2024-11-11 RX ORDER — FUROSEMIDE 20 MG/1
20 TABLET ORAL DAILY
Qty: 90 TABLET | Refills: 1 | Status: SHIPPED | OUTPATIENT
Start: 2024-11-11

## 2024-11-11 SDOH — ECONOMIC STABILITY: INCOME INSECURITY: HOW HARD IS IT FOR YOU TO PAY FOR THE VERY BASICS LIKE FOOD, HOUSING, MEDICAL CARE, AND HEATING?: NOT VERY HARD

## 2024-11-11 SDOH — ECONOMIC STABILITY: FOOD INSECURITY: WITHIN THE PAST 12 MONTHS, YOU WORRIED THAT YOUR FOOD WOULD RUN OUT BEFORE YOU GOT MONEY TO BUY MORE.: NEVER TRUE

## 2024-11-11 SDOH — ECONOMIC STABILITY: FOOD INSECURITY: WITHIN THE PAST 12 MONTHS, THE FOOD YOU BOUGHT JUST DIDN'T LAST AND YOU DIDN'T HAVE MONEY TO GET MORE.: NEVER TRUE

## 2024-11-11 ASSESSMENT — ENCOUNTER SYMPTOMS
ABDOMINAL PAIN: 0
COUGH: 0
VOMITING: 0
RECTAL PAIN: 0
DIARRHEA: 0
TROUBLE SWALLOWING: 0
SINUS PRESSURE: 0
ABDOMINAL DISTENTION: 0
STRIDOR: 0
EYE REDNESS: 0
CHEST TIGHTNESS: 0
WHEEZING: 0
CONSTIPATION: 0
BACK PAIN: 0
COLOR CHANGE: 0
NAUSEA: 0
RHINORRHEA: 0
BLOOD IN STOOL: 0
SORE THROAT: 0
SHORTNESS OF BREATH: 0

## 2024-11-11 ASSESSMENT — PATIENT HEALTH QUESTIONNAIRE - PHQ9
3. TROUBLE FALLING OR STAYING ASLEEP: NOT AT ALL
1. LITTLE INTEREST OR PLEASURE IN DOING THINGS: NOT AT ALL
SUM OF ALL RESPONSES TO PHQ9 QUESTIONS 1 & 2: 0
7. TROUBLE CONCENTRATING ON THINGS, SUCH AS READING THE NEWSPAPER OR WATCHING TELEVISION: NOT AT ALL
9. THOUGHTS THAT YOU WOULD BE BETTER OFF DEAD, OR OF HURTING YOURSELF: NOT AT ALL
4. FEELING TIRED OR HAVING LITTLE ENERGY: NOT AT ALL
5. POOR APPETITE OR OVEREATING: NOT AT ALL
8. MOVING OR SPEAKING SO SLOWLY THAT OTHER PEOPLE COULD HAVE NOTICED. OR THE OPPOSITE, BEING SO FIGETY OR RESTLESS THAT YOU HAVE BEEN MOVING AROUND A LOT MORE THAN USUAL: NOT AT ALL
2. FEELING DOWN, DEPRESSED OR HOPELESS: NOT AT ALL
SUM OF ALL RESPONSES TO PHQ QUESTIONS 1-9: 0
6. FEELING BAD ABOUT YOURSELF - OR THAT YOU ARE A FAILURE OR HAVE LET YOURSELF OR YOUR FAMILY DOWN: NOT AT ALL
SUM OF ALL RESPONSES TO PHQ QUESTIONS 1-9: 0
10. IF YOU CHECKED OFF ANY PROBLEMS, HOW DIFFICULT HAVE THESE PROBLEMS MADE IT FOR YOU TO DO YOUR WORK, TAKE CARE OF THINGS AT HOME, OR GET ALONG WITH OTHER PEOPLE: NOT DIFFICULT AT ALL
SUM OF ALL RESPONSES TO PHQ QUESTIONS 1-9: 0
SUM OF ALL RESPONSES TO PHQ QUESTIONS 1-9: 0

## 2024-11-11 NOTE — PROGRESS NOTES
Visit Information    Have you changed or started any medications since your last visit including any over-the-counter medicines, vitamins, or herbal medicines? no   Have you stopped taking any of your medications? Is so, why? -  no  Are you having any side effects from any of your medications? - no    Have you seen any other physician or provider since your last visit?  yes -    Have you had any other diagnostic tests since your last visit?  yes -    Have you been seen in the emergency room and/or had an admission in a hospital since we last saw you?  no   Have you had your routine dental cleaning in the past 6 months?  no     Do you have an active MyChart account? If no, what is the barrier?  Yes    Patient Care Team:  Antonio Ruiz MD as PCP - General (Family Medicine)  Antonio Ruiz MD as PCP - Empaneled Provider    Medical History Review  Past Medical, Family, and Social History reviewed and does contribute to the patient presenting condition    Health Maintenance   Topic Date Due    Cervical cancer screen  04/16/2023    Depression Monitoring  03/04/2025    Pneumococcal 0-64 years Vaccine (3 of 3 - PPSV23 or PCV20) 05/02/2025    A1C test (Diabetic or Prediabetic)  11/01/2025    Lipids  11/01/2025    Breast cancer screen  09/26/2026    DTaP/Tdap/Td vaccine (2 - Td or Tdap) 04/16/2028    Colorectal Cancer Screen  09/18/2029    Flu vaccine  Completed    Shingles vaccine  Completed    COVID-19 Vaccine  Completed    Respiratory Syncytial Virus (RSV) Pregnant or age 60 yrs+  Completed    Hepatitis C screen  Completed    HIV screen  Completed    Hepatitis A vaccine  Aged Out    Hepatitis B vaccine  Aged Out    Hib vaccine  Aged Out    Polio vaccine  Aged Out    Meningococcal (ACWY) vaccine  Aged Out    Lung Cancer Screening &/or Counseling  Discontinued             
positive rate, and total radiation exposure.  The patient currently exhibits no signs or symptoms suggestive of lung cancer.  Discussed with patient the importance of compliance with yearly annual lung cancer screenings and willingness to undergo diagnosis and treatment if screening scan is positive.  In addition, the patient was counseled regarding the importance of remaining smoke free and/or total smoking cessation.    Also reviewed the following if the patient has Medicare that as of February 10, 2022, Medicare only covers LDCT screening in patients aged 50-77 with at least a 20 pack-year smoking history who currently smoke or have quit in the last 15 years

## 2024-11-26 DIAGNOSIS — M54.16 LUMBAR RADICULOPATHY: ICD-10-CM

## 2024-11-26 DIAGNOSIS — M48.061 SPINAL STENOSIS OF LUMBAR REGION WITHOUT NEUROGENIC CLAUDICATION: ICD-10-CM

## 2024-11-27 RX ORDER — PREGABALIN 150 MG/1
150 CAPSULE ORAL 3 TIMES DAILY
Qty: 90 CAPSULE | Refills: 0 | Status: SHIPPED | OUTPATIENT
Start: 2024-11-27 | End: 2024-12-27

## 2024-11-27 NOTE — TELEPHONE ENCOUNTER
Please Approve or Refuse.  Send to Pharmacy per Pt's Request: [facility]     Next Visit Date:  called pt lvm  Last Visit Date: 11/11/2024    Hemoglobin A1C (%)   Date Value   11/01/2024 5.5   11/28/2023 5.5   02/16/2023 5.1             ( goal A1C is < 7)   BP Readings from Last 3 Encounters:   11/11/24 122/64   10/03/24 105/71   09/13/24 109/68          (goal 120/80)  BUN   Date Value Ref Range Status   11/01/2024 13 8 - 23 mg/dL Final     Creatinine   Date Value Ref Range Status   11/01/2024 0.9 0.7 - 1.2 mg/dL Final     Potassium   Date Value Ref Range Status   11/01/2024 4.8 3.7 - 5.3 mmol/L Final

## 2024-12-11 DIAGNOSIS — M50.30 DEGENERATIVE DISC DISEASE, CERVICAL: ICD-10-CM

## 2024-12-11 DIAGNOSIS — F41.9 ANXIETY: ICD-10-CM

## 2024-12-11 DIAGNOSIS — M19.019 OSTEOARTHRITIS OF SHOULDER, UNSPECIFIED LATERALITY, UNSPECIFIED OSTEOARTHRITIS TYPE: ICD-10-CM

## 2024-12-12 RX ORDER — CYCLOBENZAPRINE HCL 10 MG
TABLET ORAL
Qty: 180 TABLET | Refills: 0 | Status: SHIPPED | OUTPATIENT
Start: 2024-12-12

## 2024-12-12 RX ORDER — BUSPIRONE HYDROCHLORIDE 15 MG/1
15 TABLET ORAL EVERY 12 HOURS PRN
Qty: 120 TABLET | Refills: 3 | Status: SHIPPED | OUTPATIENT
Start: 2024-12-12

## 2025-01-17 DIAGNOSIS — M48.061 SPINAL STENOSIS OF LUMBAR REGION WITHOUT NEUROGENIC CLAUDICATION: ICD-10-CM

## 2025-01-17 DIAGNOSIS — M54.16 LUMBAR RADICULOPATHY: ICD-10-CM

## 2025-01-20 RX ORDER — PREGABALIN 150 MG/1
150 CAPSULE ORAL 3 TIMES DAILY
Qty: 90 CAPSULE | Refills: 0 | Status: SHIPPED | OUTPATIENT
Start: 2025-01-20 | End: 2025-02-19

## 2025-01-22 DIAGNOSIS — R60.0 PERIPHERAL EDEMA: ICD-10-CM

## 2025-01-22 DIAGNOSIS — I10 ESSENTIAL HYPERTENSION: ICD-10-CM

## 2025-01-22 DIAGNOSIS — R73.03 PREDIABETES: ICD-10-CM

## 2025-01-22 RX ORDER — ASPIRIN 81 MG/1
TABLET ORAL
Qty: 90 TABLET | Refills: 3 | Status: SHIPPED | OUTPATIENT
Start: 2025-01-22

## 2025-01-22 RX ORDER — METFORMIN HYDROCHLORIDE 500 MG/1
500 TABLET, EXTENDED RELEASE ORAL
Qty: 90 TABLET | Refills: 3 | Status: SHIPPED | OUTPATIENT
Start: 2025-01-22

## 2025-01-22 RX ORDER — SIMVASTATIN 40 MG
40 TABLET ORAL NIGHTLY
Qty: 90 TABLET | Refills: 3 | Status: SHIPPED | OUTPATIENT
Start: 2025-01-22

## 2025-01-22 RX ORDER — ISOSORBIDE MONONITRATE 30 MG/1
TABLET, EXTENDED RELEASE ORAL
Qty: 90 TABLET | Refills: 3 | Status: SHIPPED | OUTPATIENT
Start: 2025-01-22

## 2025-01-22 RX ORDER — ENALAPRIL MALEATE 10 MG/1
TABLET ORAL
Qty: 90 TABLET | Refills: 3 | Status: SHIPPED | OUTPATIENT
Start: 2025-01-22

## 2025-01-22 RX ORDER — POTASSIUM CHLORIDE 750 MG/1
10 TABLET, EXTENDED RELEASE ORAL DAILY
Qty: 90 TABLET | Refills: 3 | Status: SHIPPED | OUTPATIENT
Start: 2025-01-22

## 2025-01-22 NOTE — TELEPHONE ENCOUNTER
Please Approve or Refuse.  Send to Pharmacy per Pt's Request: [facility]      Next Visit Date:  Visit date not found   Last Visit Date: 11/11/2024    Hemoglobin A1C (%)   Date Value   11/01/2024 5.5   11/28/2023 5.5   02/16/2023 5.1             ( goal A1C is < 7)   BP Readings from Last 3 Encounters:   11/11/24 122/64   10/03/24 105/71   09/13/24 109/68          (goal 120/80)  BUN   Date Value Ref Range Status   11/01/2024 13 8 - 23 mg/dL Final     Creatinine   Date Value Ref Range Status   11/01/2024 0.9 0.7 - 1.2 mg/dL Final     Potassium   Date Value Ref Range Status   11/01/2024 4.8 3.7 - 5.3 mmol/L Final

## 2025-01-23 ENCOUNTER — HOSPITAL ENCOUNTER (OUTPATIENT)
Dept: CT IMAGING | Age: 65
Discharge: HOME | End: 2025-01-25
Payer: COMMERCIAL

## 2025-01-23 VITALS — WEIGHT: 170 LBS | HEIGHT: 62 IN | BODY MASS INDEX: 31.28 KG/M2

## 2025-01-23 DIAGNOSIS — Z87.891 PERSONAL HISTORY OF TOBACCO USE: ICD-10-CM

## 2025-01-23 PROCEDURE — 71271 CT THORAX LUNG CANCER SCR C-: CPT

## 2025-02-20 ENCOUNTER — OFFICE VISIT (OUTPATIENT)
Dept: NEUROLOGY | Age: 65
End: 2025-02-20
Payer: COMMERCIAL

## 2025-02-20 VITALS
BODY MASS INDEX: 31.21 KG/M2 | SYSTOLIC BLOOD PRESSURE: 121 MMHG | WEIGHT: 169.6 LBS | HEIGHT: 62 IN | DIASTOLIC BLOOD PRESSURE: 71 MMHG | HEART RATE: 94 BPM

## 2025-02-20 DIAGNOSIS — M54.40 BILATERAL LOW BACK PAIN WITH SCIATICA, SCIATICA LATERALITY UNSPECIFIED, UNSPECIFIED CHRONICITY: Primary | ICD-10-CM

## 2025-02-20 PROCEDURE — 99214 OFFICE O/P EST MOD 30 MIN: CPT

## 2025-02-20 PROCEDURE — 3074F SYST BP LT 130 MM HG: CPT

## 2025-02-20 PROCEDURE — 3078F DIAST BP <80 MM HG: CPT

## 2025-02-20 NOTE — PROGRESS NOTES
[address] [facility], [facility] [place] [address], [address]  [place] [address]  P: [phone]  F: [phone]    NEUROLOGY CLINIC NOTE     PATIENT NAME: Christina Ulloa  PATIENT MRN: [id number]  PRIMARY CARE PHYSICIAN: Antonio Ruiz MD    HPI:      ?Christina Ulloa  61 years old female patient, right-handed came to the clinic today for follow up. She follows with our neurology clinic as a case of low back pain with right leg numbness.      She also has hx of lumbar scoliosis, obesity, DM, HTN, HLD, CAD sp 2 stents, PAD sp 3 stenting April/2022, on asa, plavix, cilostazol and statins.      In summary, it seems her low back pain is a combination of  scoliosis deformity in addition to degenerative changes. She saw neurosurgery in July 2022 with an option for surgery but the patient doesn't want to seek this option at this point of time. She is also trying exercising, weight reduction and Lyrica, no red flags for low back pain, no bladder or bowel incontinence. No saddle anesthesia.        Briefly, her clinical story started about 2 years ago when she started to experience gradual low back pain with right leg numbness.  also, she was experiencing numbness on her right groin down to the anterolateral right thigh. Low back Pain severity ranges between 4-7/10. She saw pain management and had epidural injection which actually helped a lot with the pain and it went away. A year later ago, this low back pain started to recur again.  She denies any changes in bowel or bladder habits.  She did have an MRI of the lumbar spine in February which showed multilevel degenerative changes at L2-3, L4-5 and L5-S1.  She saw Dr. Mcdonnell Neurosurgery in July 2022 with option for surgery but it was explained to her that it’s an extensive surgery and the patient didn’t want to seek this option at this time. She uses a cane to ambulate at times.         11/29/2023: low back pain upon ambulation, 9/10, sharp and

## 2025-03-07 DIAGNOSIS — M54.16 LUMBAR RADICULOPATHY: ICD-10-CM

## 2025-03-07 DIAGNOSIS — M48.061 SPINAL STENOSIS OF LUMBAR REGION WITHOUT NEUROGENIC CLAUDICATION: ICD-10-CM

## 2025-03-07 NOTE — TELEPHONE ENCOUNTER
Please Approve or Refuse.  Send to Pharmacy per Pt's Request: [email or web address]      Next Visit Date:  3/13/2025   Last Visit Date: 11/11/2024    Hemoglobin A1C (%)   Date Value   11/01/2024 5.5   11/28/2023 5.5   02/16/2023 5.1             ( goal A1C is < 7)   BP Readings from Last 3 Encounters:   02/20/25 121/71   11/11/24 122/64   10/03/24 105/71          (goal 120/80)  BUN   Date Value Ref Range Status   11/01/2024 13 8 - 23 mg/dL Final     Creatinine   Date Value Ref Range Status   11/01/2024 0.9 0.7 - 1.2 mg/dL Final     Potassium   Date Value Ref Range Status   11/01/2024 4.8 3.7 - 5.3 mmol/L Final

## 2025-03-10 RX ORDER — PREGABALIN 150 MG/1
150 CAPSULE ORAL 3 TIMES DAILY
Qty: 90 CAPSULE | Refills: 0 | Status: SHIPPED | OUTPATIENT
Start: 2025-03-10 | End: 2025-04-09

## 2025-03-13 ENCOUNTER — OFFICE VISIT (OUTPATIENT)
Dept: FAMILY MEDICINE CLINIC | Age: 65
End: 2025-03-13
Payer: COMMERCIAL

## 2025-03-13 VITALS
SYSTOLIC BLOOD PRESSURE: 110 MMHG | DIASTOLIC BLOOD PRESSURE: 60 MMHG | TEMPERATURE: 97.8 F | HEIGHT: 62 IN | OXYGEN SATURATION: 98 % | HEART RATE: 65 BPM | BODY MASS INDEX: 31.01 KG/M2

## 2025-03-13 DIAGNOSIS — Z12.4 PAP SMEAR FOR CERVICAL CANCER SCREENING: Primary | ICD-10-CM

## 2025-03-13 PROCEDURE — 3078F DIAST BP <80 MM HG: CPT | Performed by: FAMILY MEDICINE

## 2025-03-13 PROCEDURE — 99396 PREV VISIT EST AGE 40-64: CPT | Performed by: FAMILY MEDICINE

## 2025-03-13 PROCEDURE — 3074F SYST BP LT 130 MM HG: CPT | Performed by: FAMILY MEDICINE

## 2025-03-13 SDOH — ECONOMIC STABILITY: FOOD INSECURITY: WITHIN THE PAST 12 MONTHS, YOU WORRIED THAT YOUR FOOD WOULD RUN OUT BEFORE YOU GOT MONEY TO BUY MORE.: NEVER TRUE

## 2025-03-13 SDOH — ECONOMIC STABILITY: FOOD INSECURITY: WITHIN THE PAST 12 MONTHS, THE FOOD YOU BOUGHT JUST DIDN'T LAST AND YOU DIDN'T HAVE MONEY TO GET MORE.: NEVER TRUE

## 2025-03-13 ASSESSMENT — PATIENT HEALTH QUESTIONNAIRE - PHQ9
SUM OF ALL RESPONSES TO PHQ QUESTIONS 1-9: 0
2. FEELING DOWN, DEPRESSED OR HOPELESS: NOT AT ALL
8. MOVING OR SPEAKING SO SLOWLY THAT OTHER PEOPLE COULD HAVE NOTICED. OR THE OPPOSITE, BEING SO FIGETY OR RESTLESS THAT YOU HAVE BEEN MOVING AROUND A LOT MORE THAN USUAL: NOT AT ALL
SUM OF ALL RESPONSES TO PHQ QUESTIONS 1-9: 0
5. POOR APPETITE OR OVEREATING: NOT AT ALL
6. FEELING BAD ABOUT YOURSELF - OR THAT YOU ARE A FAILURE OR HAVE LET YOURSELF OR YOUR FAMILY DOWN: NOT AT ALL
4. FEELING TIRED OR HAVING LITTLE ENERGY: NOT AT ALL
10. IF YOU CHECKED OFF ANY PROBLEMS, HOW DIFFICULT HAVE THESE PROBLEMS MADE IT FOR YOU TO DO YOUR WORK, TAKE CARE OF THINGS AT HOME, OR GET ALONG WITH OTHER PEOPLE: NOT DIFFICULT AT ALL
3. TROUBLE FALLING OR STAYING ASLEEP: NOT AT ALL
SUM OF ALL RESPONSES TO PHQ QUESTIONS 1-9: 0
7. TROUBLE CONCENTRATING ON THINGS, SUCH AS READING THE NEWSPAPER OR WATCHING TELEVISION: NOT AT ALL
9. THOUGHTS THAT YOU WOULD BE BETTER OFF DEAD, OR OF HURTING YOURSELF: NOT AT ALL
1. LITTLE INTEREST OR PLEASURE IN DOING THINGS: NOT AT ALL
SUM OF ALL RESPONSES TO PHQ QUESTIONS 1-9: 0

## 2025-03-13 ASSESSMENT — ENCOUNTER SYMPTOMS: ABDOMINAL DISTENTION: 0

## 2025-03-13 NOTE — PROGRESS NOTES
Visit Information    Have you changed or started any medications since your last visit including any over-the-counter medicines, vitamins, or herbal medicines? no   Are you having any side effects from any of your medications? -  no  Have you stopped taking any of your medications? Is so, why? -  no    Have you seen any other physician or provider since your last visit? No  Have you had any other diagnostic tests since your last visit? No  Have you been seen in the emergency room and/or had an admission to a hospital since we last saw you? No  Have you had your routine dental cleaning in the past 6 months? no    Have you activated your [employer] account? If not, what are your barriers? Yes     Patient Care Team:  Antonio Ruiz MD as PCP - General (Family Medicine)  Antonio Ruiz MD as PCP - Empaneled Provider    Medical History Review  Past Medical, Family, and Social History reviewed and does contribute to the patient presenting condition    Health Maintenance   Topic Date Due    Cervical cancer screen  04/16/2023    A1C test (Diabetic or Prediabetic)  11/01/2025    Lipids  11/01/2025    Depression Monitoring  11/11/2025    Breast cancer screen  09/26/2026    Pneumococcal 50+ years Vaccine (3 of 3 - PCV20 or PCV21) 04/14/2027    DTaP/Tdap/Td vaccine (2 - Td or Tdap) 04/16/2028    Colorectal Cancer Screen  09/18/2029    Flu vaccine  Completed    Shingles vaccine  Completed    COVID-19 Vaccine  Completed    Respiratory Syncytial Virus (RSV) Pregnant or age 60 yrs+  Completed    Hepatitis C screen  Completed    HIV screen  Completed    Hepatitis A vaccine  Aged Out    Hepatitis B vaccine  Aged Out    Hib vaccine  Aged Out    Polio vaccine  Aged Out    Meningococcal (ACWY) vaccine  Aged Out    Meningococcal B vaccine  Aged Out    Pneumococcal 0-49 years Vaccine  Discontinued    Lung Cancer Screening &/or Counseling  Discontinued

## 2025-03-13 NOTE — PROGRESS NOTES
3/13/2025      Christina Ulloa (:  1960) is a 64 y.o. female, here for a preventive medicine evaluation, Gynecologic Exam   .      ASSESSMENT/PLAN:    Assessment & Plan  Pap smear for cervical cancer screening    Pap smear taken, will do HPV due to painful Pap and also fragile cervix.  Possible gynecology referral depending on the Pap smear results.  Orders:    PAP SMEAR; Future      Low carb, low fat diet, increase fruits and vegetables, and exercise 4-5 times a week 30-40 minutes a day, or walk 1-2 hours per day, or wear a pedometer and get at least 10,000 steps per day.    Dental exam 2-3 times /year advised.  Immunizations reviewed.    Health Maintenance reviewed   Smoking cessation counseling given , no      Annual eye exam advised.      No orders of the defined types were placed in this encounter.      There are no discontinued medications.    Orders Placed This Encounter   Procedures    PAP SMEAR     Standing Status:   Future     Expected Date:   3/13/2025     Expiration Date:   3/13/2026     Collection Type:   Thin Prep     Prior Abnormal Pap Test:   No     Screening or Diagnostic:   Screening     HPV Requested?:   Yes     High Risk Patient:   N/A         Christina received counseling on the following healthy behaviors: nutrition, exercise, and medication adherence  Reviewed prior labs and health maintenance  Discussed use, benefit, and side effects of prescribed medications.   Barriers to medication compliance addressed.   Patient given educational materials - see patient instructions  All patient questions answered.  Patient voiced understanding.    The patient's past medical, surgical, social, and family history as well as her  current medications and allergies were reviewed as documented in today's encounter.      Medications, labs, diagnostic studies, consultations and follow-up as documented in thisencounter.    Return in about 4 months (around 2025).    Data Unavailable    Future

## 2025-03-18 LAB — PAP SMEAR: NORMAL

## 2025-03-23 DIAGNOSIS — I25.10 CORONARY ARTERY DISEASE INVOLVING NATIVE CORONARY ARTERY OF NATIVE HEART WITHOUT ANGINA PECTORIS: ICD-10-CM

## 2025-03-24 DIAGNOSIS — Z12.4 PAP SMEAR FOR CERVICAL CANCER SCREENING: ICD-10-CM

## 2025-03-24 RX ORDER — NITROGLYCERIN 0.4 MG/1
TABLET SUBLINGUAL
Qty: 25 TABLET | Refills: 0 | Status: SHIPPED | OUTPATIENT
Start: 2025-03-24

## 2025-03-24 NOTE — TELEPHONE ENCOUNTER
Please Approve or Refuse.  Send to Pharmacy per Pt's Request:      Next Visit Date:  7/15/2025   Last Visit Date: 3/13/2025    Hemoglobin A1C (%)   Date Value   11/01/2024 5.5   11/28/2023 5.5   02/16/2023 5.1             ( goal A1C is < 7)   BP Readings from Last 3 Encounters:   03/13/25 110/60   02/20/25 121/71   11/11/24 122/64          (goal 120/80)  BUN   Date Value Ref Range Status   11/01/2024 13 8 - 23 mg/dL Final     Creatinine   Date Value Ref Range Status   11/01/2024 0.9 0.7 - 1.2 mg/dL Final     Potassium   Date Value Ref Range Status   11/01/2024 4.8 3.7 - 5.3 mmol/L Final

## 2025-03-25 ENCOUNTER — INITIAL CONSULT (OUTPATIENT)
Dept: PAIN MANAGEMENT | Age: 65
End: 2025-03-25
Payer: COMMERCIAL

## 2025-03-25 ENCOUNTER — RESULTS FOLLOW-UP (OUTPATIENT)
Dept: FAMILY MEDICINE CLINIC | Age: 65
End: 2025-03-25

## 2025-03-25 VITALS — HEIGHT: 62 IN | WEIGHT: 169 LBS | BODY MASS INDEX: 31.1 KG/M2

## 2025-03-25 DIAGNOSIS — M47.817 LUMBOSACRAL SPONDYLOSIS WITHOUT MYELOPATHY: Primary | ICD-10-CM

## 2025-03-25 DIAGNOSIS — Z79.02 LONG TERM (CURRENT) USE OF ANTITHROMBOTICS/ANTIPLATELETS: ICD-10-CM

## 2025-03-25 PROCEDURE — 99204 OFFICE O/P NEW MOD 45 MIN: CPT | Performed by: ANESTHESIOLOGY

## 2025-03-25 ASSESSMENT — ENCOUNTER SYMPTOMS
RESPIRATORY NEGATIVE: 1
BACK PAIN: 1
EYES NEGATIVE: 1
ALLERGIC/IMMUNOLOGIC NEGATIVE: 1
GASTROINTESTINAL NEGATIVE: 1

## 2025-03-25 NOTE — PROGRESS NOTES
The patient is a 64 y.o.Non- / non  female.    Chief Complaint   Patient presents with    Back Pain    Consultation        Pain History  This is a pleasant 64-year-old female with history of chronic low back pain onset many years ago located in the lower lumbar area across midline on both side  Nondermatomal extension and leg  Describes it as constant aching nagging stiffness symptoms have progressively been worsening  Aggravates with lifting bending twisting turning  Nothing seems to alleviate the pain  Have done multiple courses of physical therapy in past, \"doing at home exercises as per physical therapy program  Have tried NSAIDs  Systemic NSAID contraindicated as he is she is taking 2 different antiplatelet medication including Pletal and Plavix  She had MRI lumbar spine 2 years ago that showed multilevel lower lumbar facet arthropathy  Oswestry disability index to moderate disability associated with pain  Clinical presentation suggest facet mediated pain  Discussed diagnostic bilateral lumbar medial branch nerve block at L4-5 and L5-S1 facet with fluoroscopy guidance  If this provide 80% plus relief then consider for confirmatory block and radiofrequency ablation    If facet intervention fails we will consider for epidural injection for which she will need to obtain clearance from vascular and PCP to hold blood thinners for 7 days prior to the procedure      Pain score today: 4   1. Location: Lumbar Spine     2. Radiation: Bilateral back thighs right more than left stops at the knee   3. Character: aching shooting    5. Duration: constant   6. Onset: Years ago   7. Did an injury cause pain: no  8. Aggravating factors: Standing and bending  9. Alleviating factors: sitting   10. Associated symptoms (numbness / tingling / weakness): numbness   -Where at: top right thigh to the inside of the groin area   -Down into finger tips or toes (specify which finger or toes): n/a   -constant or intermitting:

## 2025-03-25 NOTE — RESULT ENCOUNTER NOTE
Mychart comment sent to patient.  HPV negative, patient checking MyChart, Pap smear pending    Future Appointments  3/25/2025  3:10 PM    Luis F Jacome MD         [facility]  7/15/2025  1:30 PM    Antonio Ruiz MD         [facility] DEP  8/21/2025  3:30 PM    Jhonathan Thurman MD      [facility]        Neurology -

## 2025-03-31 DIAGNOSIS — M19.019 OSTEOARTHRITIS OF SHOULDER, UNSPECIFIED LATERALITY, UNSPECIFIED OSTEOARTHRITIS TYPE: ICD-10-CM

## 2025-03-31 DIAGNOSIS — M50.30 DEGENERATIVE DISC DISEASE, CERVICAL: ICD-10-CM

## 2025-03-31 RX ORDER — CYCLOBENZAPRINE HCL 10 MG
TABLET ORAL
Qty: 180 TABLET | Refills: 0 | Status: SHIPPED | OUTPATIENT
Start: 2025-03-31

## 2025-04-04 ENCOUNTER — HOSPITAL ENCOUNTER (OUTPATIENT)
Dept: PHYSICAL THERAPY | Age: 65
Setting detail: THERAPIES SERIES
End: 2025-04-04
Attending: ANESTHESIOLOGY
Payer: COMMERCIAL

## 2025-04-09 DIAGNOSIS — M54.16 LUMBAR RADICULOPATHY: ICD-10-CM

## 2025-04-09 DIAGNOSIS — M48.061 SPINAL STENOSIS OF LUMBAR REGION WITHOUT NEUROGENIC CLAUDICATION: ICD-10-CM

## 2025-04-09 RX ORDER — PREGABALIN 150 MG/1
150 CAPSULE ORAL 3 TIMES DAILY
Qty: 90 CAPSULE | Refills: 0 | Status: SHIPPED | OUTPATIENT
Start: 2025-04-09 | End: 2025-05-09

## 2025-04-09 NOTE — TELEPHONE ENCOUNTER
Please Approve or Refuse.  Send to Pharmacy per Pt's Request: [facility]      Next Visit Date:  7/15/2025   Last Visit Date: 3/13/2025    Hemoglobin A1C (%)   Date Value   11/01/2024 5.5   11/28/2023 5.5   02/16/2023 5.1             ( goal A1C is < 7)   BP Readings from Last 3 Encounters:   03/13/25 110/60   02/20/25 121/71   11/11/24 122/64          (goal 120/80)  BUN   Date Value Ref Range Status   11/01/2024 13 8 - 23 mg/dL Final     Creatinine   Date Value Ref Range Status   11/01/2024 0.9 0.7 - 1.2 mg/dL Final     Potassium   Date Value Ref Range Status   11/01/2024 4.8 3.7 - 5.3 mmol/L Final

## 2025-04-14 ENCOUNTER — HOSPITAL ENCOUNTER (OUTPATIENT)
Dept: PHYSICAL THERAPY | Age: 65
Setting detail: THERAPIES SERIES
Discharge: HOME | End: 2025-04-14
Attending: ANESTHESIOLOGY
Payer: COMMERCIAL

## 2025-04-14 PROCEDURE — 97161 PT EVAL LOW COMPLEX 20 MIN: CPT

## 2025-04-14 NOTE — CONSULTS
[x] [facility]   Outpatient Rehabilitation & Therapy  [address]  P: [phone]   F: [phone]     Physical Therapy Lumbar Evaluation    Date:  2025  Patient: Christina Ulloa  : 1960  MRN: [id number]  Physician: Luis F Jacome MD      Insurance: [facility] AFTER [facility]AL,  visits remaining [employer]  Medical Diagnosis: M47.817 (ICD-10-CM) - Lumbosacral spondylosis without myelopathy    Rehab Codes: M62.81 , M54.5 , M54.16 , M25.60   Onset Date: 3/25/2025 referral   Next 's appt: 2025 nerve block testing    Precautions: peripheral vascular disease     Subjective:   CC: Chronic Lumbar Pain     HPI:  Patient comes in reporting that after completing physical therapy in  she noticed the pain start to come back more. Now the lumbar pain has gradually gotten worse. Pain is across her lumbar and can intermittently go into her R hip area. Has been trying to do stretching exercises as well as Tylenol. Reports that the meds do not seem to help. Reports to feel decrease sensation at R groin and into the anterior thigh. Does go in for nerve block testing next week with pain management.    PMHx: [] Unremarkable [] Diabetes [x] HTN  [] Pacemaker   [x] MI/Heart Problems [] Cancer [x] Arthritis [x] Other: anxiety / depression, hiatal hernia, hyperlipidemia                Past Medical History:   Diagnosis Date    ARIADNE (acute kidney injury) 2019    Anxiety     Asthma     CAD (coronary artery disease)     Chronic back pain     Cervical and lumbar disc issues    Class 1 obesity due to excess calories with serious comorbidity and body mass index (BMI) of 32.0 to 32.9 in adult 2020    Depression     Examination of participant in clinical trial 2011    End date 2015    GERD (gastroesophageal reflux disease)     Hiatal hernia    Headache     HTN (hypertension)     Lumbar radiculopathy     Mixed hyperlipidemia 01/15/2018    OA (osteoarthritis)

## 2025-04-23 ENCOUNTER — HOSPITAL ENCOUNTER (OUTPATIENT)
Dept: PAIN MANAGEMENT | Facility: CLINIC | Age: 65
Discharge: HOME | End: 2025-04-23
Payer: COMMERCIAL

## 2025-04-23 VITALS
BODY MASS INDEX: 31.1 KG/M2 | OXYGEN SATURATION: 95 % | HEIGHT: 62 IN | DIASTOLIC BLOOD PRESSURE: 56 MMHG | WEIGHT: 169 LBS | SYSTOLIC BLOOD PRESSURE: 98 MMHG | HEART RATE: 87 BPM | TEMPERATURE: 97.5 F | RESPIRATION RATE: 16 BRPM

## 2025-04-23 DIAGNOSIS — M47.817 LUMBOSACRAL SPONDYLOSIS WITHOUT MYELOPATHY: Primary | ICD-10-CM

## 2025-04-23 DIAGNOSIS — R52 PAIN MANAGEMENT: ICD-10-CM

## 2025-04-23 LAB — GLUCOSE BLD-MCNC: 104 MG/DL (ref 65–105)

## 2025-04-23 PROCEDURE — 64494 INJ PARAVERT F JNT L/S 2 LEV: CPT | Performed by: ANESTHESIOLOGY

## 2025-04-23 PROCEDURE — 64494 INJ PARAVERT F JNT L/S 2 LEV: CPT

## 2025-04-23 PROCEDURE — 64493 INJ PARAVERT F JNT L/S 1 LEV: CPT

## 2025-04-23 PROCEDURE — 99152 MOD SED SAME PHYS/QHP 5/>YRS: CPT | Performed by: ANESTHESIOLOGY

## 2025-04-23 PROCEDURE — 64493 INJ PARAVERT F JNT L/S 1 LEV: CPT | Performed by: ANESTHESIOLOGY

## 2025-04-23 PROCEDURE — 6360000004 HC RX CONTRAST MEDICATION: Performed by: ANESTHESIOLOGY

## 2025-04-23 PROCEDURE — 82947 ASSAY GLUCOSE BLOOD QUANT: CPT

## 2025-04-23 PROCEDURE — 6360000002 HC RX W HCPCS: Performed by: ANESTHESIOLOGY

## 2025-04-23 RX ORDER — MIDAZOLAM HYDROCHLORIDE 2 MG/2ML
INJECTION, SOLUTION INTRAMUSCULAR; INTRAVENOUS
Status: COMPLETED | OUTPATIENT
Start: 2025-04-23 | End: 2025-04-23

## 2025-04-23 RX ORDER — LIDOCAINE HYDROCHLORIDE 10 MG/ML
INJECTION, SOLUTION EPIDURAL; INFILTRATION; INTRACAUDAL; PERINEURAL
Status: COMPLETED | OUTPATIENT
Start: 2025-04-23 | End: 2025-04-23

## 2025-04-23 RX ORDER — BUPIVACAINE HYDROCHLORIDE 5 MG/ML
INJECTION, SOLUTION EPIDURAL; INTRACAUDAL; PERINEURAL
Status: COMPLETED | OUTPATIENT
Start: 2025-04-23 | End: 2025-04-23

## 2025-04-23 RX ORDER — FENTANYL CITRATE 50 UG/ML
INJECTION, SOLUTION INTRAMUSCULAR; INTRAVENOUS
Status: COMPLETED | OUTPATIENT
Start: 2025-04-23 | End: 2025-04-23

## 2025-04-23 RX ADMIN — BUPIVACAINE HYDROCHLORIDE 6 ML: 5 INJECTION, SOLUTION EPIDURAL; INTRACAUDAL; PERINEURAL at 11:52

## 2025-04-23 RX ADMIN — MIDAZOLAM HYDROCHLORIDE 1 MG: 1 INJECTION, SOLUTION INTRAMUSCULAR; INTRAVENOUS at 11:47

## 2025-04-23 RX ADMIN — IOHEXOL 3 ML: 180 INJECTION INTRAVENOUS at 11:51

## 2025-04-23 RX ADMIN — FENTANYL CITRATE 50 MCG: 50 INJECTION, SOLUTION INTRAMUSCULAR; INTRAVENOUS at 11:47

## 2025-04-23 RX ADMIN — LIDOCAINE HYDROCHLORIDE 5 ML: 10 INJECTION, SOLUTION EPIDURAL; INFILTRATION; INTRACAUDAL; PERINEURAL at 11:49

## 2025-04-23 ASSESSMENT — PAIN SCALES - GENERAL
PAINLEVEL_OUTOF10: 1
PAINLEVEL_OUTOF10: 8
PAINLEVEL_OUTOF10: 0

## 2025-04-23 ASSESSMENT — PAIN DESCRIPTION - FREQUENCY: FREQUENCY: CONTINUOUS

## 2025-04-23 ASSESSMENT — PAIN DESCRIPTION - PAIN TYPE: TYPE: CHRONIC PAIN

## 2025-04-23 ASSESSMENT — PAIN DESCRIPTION - DESCRIPTORS: DESCRIPTORS: DULL;ACHING;TIGHTNESS

## 2025-04-23 ASSESSMENT — PAIN DESCRIPTION - ORIENTATION: ORIENTATION: RIGHT;LEFT;LOWER

## 2025-04-23 ASSESSMENT — PAIN DESCRIPTION - LOCATION: LOCATION: BACK

## 2025-04-23 ASSESSMENT — PAIN - FUNCTIONAL ASSESSMENT: PAIN_FUNCTIONAL_ASSESSMENT: PREVENTS OR INTERFERES SOME ACTIVE ACTIVITIES AND ADLS

## 2025-04-23 ASSESSMENT — PAIN DESCRIPTION - DIRECTION: RADIATING_TOWARDS: BILAT HIPS AND LEGS

## 2025-04-23 ASSESSMENT — PAIN DESCRIPTION - ONSET: ONSET: PROGRESSIVE

## 2025-04-23 NOTE — INTERVAL H&P NOTE
Update History & Physical    The patient's History and Physical of March 25, 2025 was reviewed with the patient and I examined the patient. There was no change. The surgical site was confirmed by the patient and me.     Plan: The risks, benefits, expected outcome, and alternative to the recommended procedure have been discussed with the patient. Patient understands and wants to proceed with the procedure.     ASA 3  MP 3    Electronically signed by Luis F Jacome MD on 4/23/2025 at 11:09 AM

## 2025-04-23 NOTE — DISCHARGE INSTRUCTIONS
Discharge Instructions following Sedation or Anesthesia:  You have  received  a sedative/anesthetic therefore, you should not consume any alcoholic beverages for minimum of 12 hours.  Do not drive or operate machinery for 24 hours.  Do not sign legal documents for 24 hours.  Dizziness, drowsiness, and unsteadiness may occur.  Rest when need to.  Increase diet as tolerated.  Keep up on fluids if diet allows.      General Instructions:  Do not take a tub bath for 72 hours after procedure (this includes hot tubs and swimming pools).  You may shower, but avoid hot water to injection site.   Avoid strenuous activity TODAY especially if you experience dizziness.   Remove band-aid the next day.  Wash off any residual iodine   Do not use heat, heating pad, or any other heating device over the injection site for 3 days after the procedure.  If you experience pain after your procedure, you may continue with your current pain medication as prescribed.  (DO NOT INCREASE YOUR PAIN MEDICATION WITHOUT TALKING TO DOCTOR)  Soreness and pain at injection site is common, may use ice to reduce soreness.    Please complete pain diary as instructed.     Call [facility] at [phone] if you experience:   Fever, chills or temperature over 100    Vomiting, Headache, persistent stiff neck, nausea, blurred vision   Difficulty in urinating or unable to urinate with 8 hours   Increase in weakness, numbness or loss of function   Increased redness, swelling or drainage at the injection site

## 2025-04-23 NOTE — OP NOTE
Patient Name: Christina Ulloa   YOB: 1960  Room/Bed: Room/bed info not found  Medical Record Number: [id number]  Date: 4/23/2025       Sedation/ Anesthesia Plan:   intravenous sedation   as needed.    Medications Planned:   midazolam (Versed) / Fentanyl  Intravenously  as needed.    Preoperative Diagnosis: Lumbar spondylosis w/o myelopathy or radiculopathy  Postoperative Diagnosis: Lumbar spondylosis w/o myelopathy or radiculopathy  Blood Loss: none    Procedure Performed:  Bilateral Lumbar Medial Branch nerve Blocks at the transverse processes of L4, L5 and sacral  ala under fluoroscopy guidance    Procedure:      The Patient was seen in the preop area, chart was reviewed, informed consent was obtained. Patient was taken to procedure room and was placed in prone position. Vital signs were monitored through out the  Procedure. A time out was completed.  The skin over the back was prepped and draped in sterile manner.     The target point was marked at the junction of Transverse process and superior articular process at the target levels.  Skin and deep tissues were anesthetized with 1 % lidocaine. A 25-gauge needlele was advanced to the target spots under fluoroscopy guidance in AP / Lateral and Oblique views.     Then after negative aspiration contrast dye was injected with live fluoroscopy in AP views that showed  spread of the contrast with no epidural space and no vascular runoff or intrathecal spread.    Finally 0.5 ml of treatment solution 0.5% BUPIVACAINE  was injected at each level.  The needle was removed and a Band-Aid was placed over the needle  insertion site.  The patient's vital signs remained stable and the patient tolerated the procedure well.      Electronically signed by Luis F Jacome MD on 4/23/2025 at 11:56 AM    SEDATION NOTE:    ASA CLASSIFICATION  3  MP   CLASSIFICATION  3    Moderate intravenous conscious sedation was supervised by Dr. Jacome  The patient was independently

## 2025-04-24 ENCOUNTER — TELEPHONE (OUTPATIENT)
Dept: PAIN MANAGEMENT | Age: 65
End: 2025-04-24

## 2025-04-24 NOTE — TELEPHONE ENCOUNTER
Procedure: Bilateral Lumbar Medial Branch nerve Blocks at the transverse processes of L4, L5 and sacral  ala under fluoroscopy guidance     DOS:4/23/25    Pain level before procedure with activity: 8/10    Pain with activity after procedure: 1/10    What activities done the day of procedure: dishes, laundry, general housework    What percentage of  pain relief from procedure did you receive: 85%    Success: Yes    OR Scheduled : 5/14/25

## 2025-04-28 ENCOUNTER — HOSPITAL ENCOUNTER (OUTPATIENT)
Dept: PHYSICAL THERAPY | Age: 65
Setting detail: THERAPIES SERIES
Discharge: HOME | End: 2025-04-28
Attending: ANESTHESIOLOGY
Payer: COMMERCIAL

## 2025-04-28 PROCEDURE — 97110 THERAPEUTIC EXERCISES: CPT

## 2025-04-28 NOTE — FLOWSHEET NOTE
[x] [facility] Outpatient Rehabilitation &  Therapy  [address] [facility] [address]  P: [phone]  F: [phone]     Physical Therapy Daily Treatment Note    Date:  2025  Patient Name:  Christina Ulloa    :  1960  MRN: [id number]  Physician: Luis F Jacome MD                                     Insurance: [employer] with AUTH AFTER EVAL,  visits remaining [employer] Insurance change 2025  APPROVAL VAILD 4/15 - 6/15 ([id number] , [id number], [id number])  Medical Diagnosis: M47.817 (ICD-10-CM) - Lumbosacral spondylosis without myelopathy       Rehab Codes: M62.81 , M54.5 , M54.16 , M25.60   Onset Date: 3/25/2025 referral                      Next 's appt: 2025 nerve block testing  Visit# / total visits: ; Progress note at visit 6     Cancels/No Shows: 0/0    Subjective:    Pain:  [x] Yes  [] No Location: Lumbar and R hip  Pain Rating: (0-10 scale) 2/10  Pain altered Tx:  [x] No  [] Yes  Action:  Comments:Pt reports 2/10 pain at beginning of session, notes not doing much today prior to session. Friday () had CT and stress test, waiting results from that, is to see MD Wednesday (). Wednesday () pt had nerve blocking, pt reports it went well. States was there for 4 hours and notes decreased pain from nerve block testing. Second nerve block testing on . Pt has difficulty with standing for short duration, bending, and lifting.     Objective:  Modalities:   Precautions [] No  [x] Yes: peripheral vascular disease  Exercises:  Exercise Reps/ Time Weight/ Level Comments   LTR  10 x 5\"     BKFO 10x 3\"     PPT 10 x3\"      Bridges 10x     Supine marches 10x     Hamstring stretch 3 x20\"      Piriformis stretch  3 x20\"     SLR  10x each                                                                             Other:    Assessment: [] Progressing toward goals.    [] No change.     [x] Other: Pt first session since initial eval. Pt completed exercises as charted above with

## 2025-04-30 ENCOUNTER — HOSPITAL ENCOUNTER (OUTPATIENT)
Dept: PHYSICAL THERAPY | Age: 65
Setting detail: THERAPIES SERIES
Discharge: HOME | End: 2025-04-30
Attending: ANESTHESIOLOGY
Payer: COMMERCIAL

## 2025-04-30 PROCEDURE — 97140 MANUAL THERAPY 1/> REGIONS: CPT

## 2025-04-30 PROCEDURE — 97110 THERAPEUTIC EXERCISES: CPT

## 2025-04-30 NOTE — FLOWSHEET NOTE
correction 1 cycle at L3-L2  x Cramping noted after                 Other:    Specific Instructions for next treatment: core and LE strengthening, stretching, HEP    Assessment: [] Progressing toward goals.  Continued with program with progressions made as charted above.  Emphasized core engagement and proper PPT to promote neutral spine alignment during exercises to decrease excess stress to low back.  Patient had mention having scoliosis in the lumbar spine.  Mild curvature to L from L3-L2 region noted.  Added NSL correction this date with patient only noting tightness in the low back that dissipated with rest.      [] No change.     [] Other:   [x] Patient would continue to benefit from skilled physical therapy services in order to:  decrease lumbar pain while improving ROM, strength, and overall functional mobility to work towards improving quality of life and better ADL tolerance.      STG: (to be met in 6 treatments)  Pt will self report worst pain no greater than 6/10 in order to better tolerate ADLs/work activities with minimal dysfunction  Pt will improve AROM in lumbar extension to 50% in order to demonstrate ability to move/reach in all planes unrestricted at PLOF  Pt will demonstrate independence with a long term HEP for continued progress/maintenance after completion of PT  LTG: (to be met in 12 treatments)  Pt will demonstrate improved MARLENY LE strength to 4+/5 or greater in order to demonstrate improved stability/strength necessary for unrestricted ADLs/work activities  Pt will decrease from 34% to 22% or less on Mod MARYCRUZ in order to demonstrate improved functional tolerances at PLOF with minimal restriction/dysfunction  Pt will report the ability to stand for at least 1 hour for better tolerance to ADLs with minimal restriction   Pt will self report worst pain no greater than 4-5/10 in order to better tolerate ADLs/work activities with minimal dysfunction        Pt. Education:  [x] Yes  [] No  [x] 
auscultated w/ stethoscope

## 2025-05-05 ENCOUNTER — HOSPITAL ENCOUNTER (OUTPATIENT)
Dept: PHYSICAL THERAPY | Age: 65
Setting detail: THERAPIES SERIES
Discharge: HOME | End: 2025-05-05
Attending: ANESTHESIOLOGY
Payer: COMMERCIAL

## 2025-05-05 PROCEDURE — 97110 THERAPEUTIC EXERCISES: CPT

## 2025-05-05 NOTE — FLOWSHEET NOTE
[x] [facility] Outpatient Rehabilitation &  Therapy  [address]  P: [phone]  F: [phone]     Physical Therapy Daily Treatment Note    Date:  2025  Patient Name:  Christina Ulloa    :  1960  MRN: [id number]  Physician: Luis F Jacome MD                                     Insurance: INSURANCE UPDATED: [employer] , $35 copay --visits BMN  Medical Diagnosis: M47.817 (ICD-10-CM) - Lumbosacral spondylosis without myelopathy       Rehab Codes: M62.81 , M54.5 , M54.16 , M25.60   Onset Date: 3/25/2025 referral                      Next 's appt: 2025 nerve block testing  Visit# / total visits: 3/12; Progress note at visit 6     Cancels/No Shows: 0/0    Subjective:    Patient comes in reporting that she is scheduled for another injection next week. Reports that her nerve block did work but it has worn off, so is hopeful that this longer testing will be better. Has been doing her HEP.      Pain:  [] Yes  [x] No Location: Lumbar and R hip  Pain Rating: (0-10 scale) denies/10  Pain altered Tx:  [x] No  [] Yes  Action:  Comments:     Objective:  Modalities:   Precautions [] No  [x] Yes: peripheral vascular disease  Exercises:  Exercise Reps/ Time Weight/ Level Completed Comments   Mat Level:       LTR  10 x 5\"  x    BKFO- Alt 15 x 3\" red x 4/30 added TB and alternating pattern  Increase reps 5/5   PPT + abdominal bracing 10 x5\"   x    Bridges 10x 3\" x    Supine marches 10x2 red x 4/30 inc set and added TB   Hamstring stretch 3 x20\"       Piriformis stretch  3 x20\"      SLR  10x 2each  x 4/30 inc sets   Modified Dead bug 10x2  x 4/30 UE/LE  Added PPT 5/5                 Seated:       Ball Core Exercises 15 x eac 2# ball x Added 5/5  Overhead, press outs          Standing:       Pull Downs 10 x 3\" yellow x Added 5/5   Paloff Press 10 x yellow x Added 5/5   Hip 3 Way 10 x  x Added 5/5          Manual       NSL correction 1 cycle at L3-L2   Cramping noted after

## 2025-05-07 ENCOUNTER — HOSPITAL ENCOUNTER (OUTPATIENT)
Dept: PHYSICAL THERAPY | Age: 65
Setting detail: THERAPIES SERIES
Discharge: HOME | End: 2025-05-07
Attending: ANESTHESIOLOGY
Payer: COMMERCIAL

## 2025-05-07 PROCEDURE — 97110 THERAPEUTIC EXERCISES: CPT

## 2025-05-07 NOTE — FLOWSHEET NOTE
[x] [facility] Outpatient Rehabilitation &  Therapy  [address]  P: [phone]  F: [phone]     Physical Therapy Daily Treatment Note    Date:  2025  Patient Name:  Christina Ulloa    :  1960  MRN: [id number]  Physician: Luis F Jacome MD                                     Insurance: INSURANCE UPDATED: [place] [employer] , $35 copay --visits BMN  Medical Diagnosis: M47.817 (ICD-10-CM) - Lumbosacral spondylosis without myelopathy       Rehab Codes: M62.81 , M54.5 , M54.16 , M25.60   Onset Date: 3/25/2025 referral                      Next 's appt: 2025 nerve block testing  Visit# / total visits: ; Progress note at visit 6     Cancels/No Shows: 0/0    Subjective:    Patient reporting she was able to load and unload  without taking a break.  Patient noting improvement to tolerance with tasks.        Pain:  [] Yes  [x] No Location: Lumbar and R hip  Pain Rating: (0-10 scale) denies/10  Pain altered Tx:  [x] No  [] Yes  Action:  Comments:     Objective:  Modalities:   Precautions [] No  [x] Yes: peripheral vascular disease  Exercises:  Exercise Reps/ Time Weight/ Level Completed Comments   Mat Level:       LTR  10 x 5\"  x    BKFO- Alt 15 x 3\" red x 4/30 added TB and alternating pattern  Increase reps 5/5   PPT + abdominal bracing 10 x5\"       Bridges 10x 3\" x    Supine marches 10x2 red x 4/30 inc set and added TB   Hamstring stretch 3 x20\"       Piriformis stretch  3 x20\"      SLR  10x 2each  x 4/30 inc sets   Modified Dead bug 10x2  x 4/30 UE/LE  Added PPT 5/5                 Seated:       Ball Core Exercises 15 x 2 each 2# ball x Added 5/5  Overhead, press outs          Standing:       Pull Downs/rows 10 x 3\" yellow x Added 5/5  5/7 added rows   Paloff Press 10 x yellow x Added 5/5   Hip 3 Way 10 x   Added 5/5          Manual       NSL correction 1 cycle at L3-L2   Cramping noted after                 Other:    Specific Instructions for next treatment: core

## 2025-05-08 DIAGNOSIS — M48.061 SPINAL STENOSIS OF LUMBAR REGION WITHOUT NEUROGENIC CLAUDICATION: ICD-10-CM

## 2025-05-08 DIAGNOSIS — M54.16 LUMBAR RADICULOPATHY: ICD-10-CM

## 2025-05-09 RX ORDER — PREGABALIN 150 MG/1
150 CAPSULE ORAL 3 TIMES DAILY
Qty: 90 CAPSULE | Refills: 0 | Status: SHIPPED | OUTPATIENT
Start: 2025-05-09 | End: 2025-06-08

## 2025-05-12 ENCOUNTER — HOSPITAL ENCOUNTER (OUTPATIENT)
Dept: PHYSICAL THERAPY | Age: 65
Setting detail: THERAPIES SERIES
Discharge: HOME | End: 2025-05-12
Attending: ANESTHESIOLOGY
Payer: COMMERCIAL

## 2025-05-12 PROCEDURE — 97110 THERAPEUTIC EXERCISES: CPT

## 2025-05-12 NOTE — PROGRESS NOTES
[x] [facility] Outpatient Rehabilitation &  Therapy  [address]  P: [phone]  F: [phone]     Physical Therapy Daily Treatment Note / Progress Note    Date:  2025  Patient Name:  Christina Ulloa    :  1960  MRN: [id number]  Physician: Luis F Jacome MD                                     Insurance: INSURANCE UPDATED: [place] [employer] , $35 copay --visits BMN  Medical Diagnosis: M47.817 (ICD-10-CM) - Lumbosacral spondylosis without myelopathy       Rehab Codes: M62.81 , M54.5 , M54.16 , M25.60   Onset Date: 3/25/2025 referral                      Next 's appt: 2025 nerve block testing  Visit# / total visits: ; Progress note at visit 6     Cancels/No Shows: 0/0    Subjective:    Patient states she was a little sore for a few hours after last session. HEP is still going well. Over the past couple weeks she does note 8/10 over the past 2 weeks and notes that this was after a lot of walking and bending.        Pain:  [] Yes  [x] No Location: Lumbar and R hip  Pain Rating: (0-10 scale) 1/10  Pain altered Tx:  [x] No  [] Yes  Action:  Comments:     Objective:       Range of Motion  Left Range of Motion  Right Strength  Left Strength  Right    Lumbar Flexion WFL  Pulling       Lumbar Extension 50%        Lumbar Rotation WFL  Mild pain WFL  Mild pain       Lumbar Side Bend WFL WFL              Modalities:   Precautions [] No  [x] Yes: peripheral vascular disease  Exercises:  Exercise Reps/ Time Weight/ Level Completed Comments   Mat Level:       LTR  10 x 5\"  x    BKFO- Alt 15 x 3\" red x 4/30 added TB and alternating pattern  Increase reps 5/5   PPT + abdominal bracing 10 x5\"       Bridges 10x 3\"  yellow x Add resistance 5/12   Supine Hip ABD 10 x yellow x Added 5/12   Supine marches 10x2 red x 4/30 inc set and added TB   Hamstring stretch 3 x20\"       Piriformis stretch  3 x20\"      SLR  10x 2each yellow x 4/30 inc sets  Add resistance 5/12   Modified Dead bug 10x  x

## 2025-05-14 ENCOUNTER — HOSPITAL ENCOUNTER (OUTPATIENT)
Dept: PAIN MANAGEMENT | Facility: CLINIC | Age: 65
Discharge: HOME | End: 2025-05-14
Payer: COMMERCIAL

## 2025-05-14 VITALS
SYSTOLIC BLOOD PRESSURE: 96 MMHG | OXYGEN SATURATION: 92 % | DIASTOLIC BLOOD PRESSURE: 72 MMHG | TEMPERATURE: 97.5 F | RESPIRATION RATE: 12 BRPM | HEIGHT: 62 IN | WEIGHT: 169 LBS | HEART RATE: 79 BPM | BODY MASS INDEX: 31.1 KG/M2

## 2025-05-14 DIAGNOSIS — M47.817 LUMBOSACRAL SPONDYLOSIS WITHOUT MYELOPATHY: Primary | ICD-10-CM

## 2025-05-14 DIAGNOSIS — R52 PAIN MANAGEMENT: ICD-10-CM

## 2025-05-14 LAB — GLUCOSE BLD-MCNC: 91 MG/DL (ref 65–105)

## 2025-05-14 PROCEDURE — 64494 INJ PARAVERT F JNT L/S 2 LEV: CPT | Performed by: ANESTHESIOLOGY

## 2025-05-14 PROCEDURE — 6360000004 HC RX CONTRAST MEDICATION: Performed by: ANESTHESIOLOGY

## 2025-05-14 PROCEDURE — 82947 ASSAY GLUCOSE BLOOD QUANT: CPT

## 2025-05-14 PROCEDURE — 64493 INJ PARAVERT F JNT L/S 1 LEV: CPT | Performed by: ANESTHESIOLOGY

## 2025-05-14 PROCEDURE — 64494 INJ PARAVERT F JNT L/S 2 LEV: CPT

## 2025-05-14 PROCEDURE — 6360000002 HC RX W HCPCS: Performed by: ANESTHESIOLOGY

## 2025-05-14 PROCEDURE — 64493 INJ PARAVERT F JNT L/S 1 LEV: CPT

## 2025-05-14 PROCEDURE — 99152 MOD SED SAME PHYS/QHP 5/>YRS: CPT | Performed by: ANESTHESIOLOGY

## 2025-05-14 RX ORDER — LIDOCAINE HYDROCHLORIDE 10 MG/ML
INJECTION, SOLUTION EPIDURAL; INFILTRATION; INTRACAUDAL; PERINEURAL
Status: COMPLETED | OUTPATIENT
Start: 2025-05-14 | End: 2025-05-14

## 2025-05-14 RX ORDER — FENTANYL CITRATE 50 UG/ML
INJECTION, SOLUTION INTRAMUSCULAR; INTRAVENOUS
Status: COMPLETED | OUTPATIENT
Start: 2025-05-14 | End: 2025-05-14

## 2025-05-14 RX ORDER — MIDAZOLAM HYDROCHLORIDE 2 MG/2ML
INJECTION, SOLUTION INTRAMUSCULAR; INTRAVENOUS
Status: COMPLETED | OUTPATIENT
Start: 2025-05-14 | End: 2025-05-14

## 2025-05-14 RX ORDER — BUPIVACAINE HYDROCHLORIDE 5 MG/ML
INJECTION, SOLUTION EPIDURAL; INTRACAUDAL; PERINEURAL
Status: COMPLETED | OUTPATIENT
Start: 2025-05-14 | End: 2025-05-14

## 2025-05-14 RX ADMIN — FENTANYL CITRATE 50 MCG: 50 INJECTION, SOLUTION INTRAMUSCULAR; INTRAVENOUS at 13:42

## 2025-05-14 RX ADMIN — MIDAZOLAM HYDROCHLORIDE 1 MG: 1 INJECTION, SOLUTION INTRAMUSCULAR; INTRAVENOUS at 13:42

## 2025-05-14 RX ADMIN — BUPIVACAINE HYDROCHLORIDE 6 ML: 5 INJECTION, SOLUTION EPIDURAL; INTRACAUDAL; PERINEURAL at 13:44

## 2025-05-14 RX ADMIN — IOHEXOL 3 ML: 180 INJECTION INTRAVENOUS at 13:44

## 2025-05-14 RX ADMIN — LIDOCAINE HYDROCHLORIDE 5 ML: 10 INJECTION, SOLUTION EPIDURAL; INFILTRATION; INTRACAUDAL; PERINEURAL at 13:42

## 2025-05-14 ASSESSMENT — PAIN DESCRIPTION - LOCATION: LOCATION: BACK

## 2025-05-14 ASSESSMENT — PAIN - FUNCTIONAL ASSESSMENT
PAIN_FUNCTIONAL_ASSESSMENT: 0-10
PAIN_FUNCTIONAL_ASSESSMENT: PREVENTS OR INTERFERES SOME ACTIVE ACTIVITIES AND ADLS

## 2025-05-14 ASSESSMENT — PAIN DESCRIPTION - DESCRIPTORS: DESCRIPTORS: CRAMPING;DULL;ACHING

## 2025-05-14 ASSESSMENT — PAIN DESCRIPTION - ORIENTATION: ORIENTATION: LOWER

## 2025-05-14 ASSESSMENT — PAIN SCALES - GENERAL: PAINLEVEL_OUTOF10: 2

## 2025-05-14 NOTE — DISCHARGE INSTRUCTIONS
Discharge Instructions following Sedation or Anesthesia:  You have  received  a sedative/anesthetic therefore, you should not consume any alcoholic beverages for minimum of 12 hours.  Do not drive or operate machinery for 24 hours.  Do not sign legal documents for 24 hours.  Dizziness, drowsiness, and unsteadiness may occur.  Rest when need to.  Increase diet as tolerated.  Keep up on fluids if diet allows.      General Instructions:  Do not take a tub bath for 72 hours after procedure (this includes hot tubs and swimming pools).  You may shower, but avoid hot water to injection site.   Avoid strenuous activity TODAY especially if you experience dizziness.   Remove band-aid the next day.  Wash off any residual iodine   Do not use heat, heating pad, or any other heating device over the injection site for 3 days after the procedure.  If you experience pain after your procedure, you may continue with your current pain medication as prescribed.  (DO NOT INCREASE YOUR PAIN MEDICATION WITHOUT TALKING TO DOCTOR)  Soreness and pain at injection site is common, may use ice to reduce soreness.    Please complete pain diary as instructed.     Call [facility] at [phone] if you experience:   Fever, chills or temperature over 100    Vomiting, Headache, persistent stiff neck, nausea, blurred vision   Difficulty in urinating or unable to urinate with 8 hours   Increase in weakness, numbness or loss of function   Increased redness, swelling or drainage at the injection site

## 2025-05-14 NOTE — OP NOTE
Patient Name: Christina Ulloa   YOB: 1960  Room/Bed: Room/bed info not found  Medical Record Number: [id number]  Date: 5/14/2025       Sedation/ Anesthesia Plan:   intravenous sedation   as needed.    Medications Planned:   midazolam (Versed) / Fentanyl  Intravenously  as needed.    Preoperative Diagnosis: Lumbar spondylosis w/o myelopathy or radiculopathy  Postoperative Diagnosis: Lumbar spondylosis w/o myelopathy or radiculopathy  Blood Loss: none    Procedure Performed:  Bilateral Lumbar Medial Branch nerve Blocks at the transverse processes of L4, L5 and sacral  ala under fluoroscopy guidance    Procedure:      The Patient was seen in the preop area, chart was reviewed, informed consent was obtained. Patient was taken to procedure room and was placed in prone position. Vital signs were monitored through out the  Procedure. A time out was completed.  The skin over the back was prepped and draped in sterile manner.     The target point was marked at the junction of Transverse process and superior articular process at the target levels.  Skin and deep tissues were anesthetized with 1 % lidocaine. A 25-gauge needlele was advanced to the target spots under fluoroscopy guidance in AP / Lateral and Oblique views.     Then after negative aspiration contrast dye was injected with live fluoroscopy in AP views that showed  spread of the contrast with no epidural space and no vascular runoff or intrathecal spread.    Finally 0.5 ml of treatment solution 0.5% BUPIVACAINE  was injected at each level.  The needle was removed and a Band-Aid was placed over the needle  insertion site.  The patient's vital signs remained stable and the patient tolerated the procedure well.      Electronically signed by Luis F Jacome MD on 5/14/2025 at 2:08 PM    SEDATION NOTE:    ASA CLASSIFICATION  3  MP   CLASSIFICATION  3    Moderate intravenous conscious sedation was supervised by Dr. Jacome  The patient was independently

## 2025-05-14 NOTE — H&P
extended release tablet, Take 1 tablet by mouth daily, Disp: 90 tablet, Rfl: 3    metFORMIN (GLUCOPHAGE-XR) 500 MG extended release tablet, Take 1 tablet by mouth daily (with breakfast) TAKE 1 TABLET BY MOUTH DAILY WITH BREAKFAST, Disp: 90 tablet, Rfl: 3    aspirin (ASPIRIN LOW DOSE) 81 MG EC tablet, TAKE 1 TABLET BY MOUTH DAILY, Disp: 90 tablet, Rfl: 3    isosorbide mononitrate (IMDUR) 30 MG extended release tablet, TAKE 1 TABLET BY MOUTH EVERY DAY, Disp: 90 tablet, Rfl: 3    simvastatin (ZOCOR) 40 MG tablet, Take 1 tablet by mouth nightly TAKE 1 TABLET BY MOUTH EVERY NIGHT, Disp: 90 tablet, Rfl: 3    busPIRone (BUSPAR) 15 MG tablet, Take 15 mg by mouth every 12 hours as needed (anxiety) Take 15 mg by mouth every 12 hours as needed, Disp: 120 tablet, Rfl: 3    furosemide (LASIX) 20 MG tablet, Take 1 tablet by mouth daily, Disp: 90 tablet, Rfl: 1    buPROPion (WELLBUTRIN SR) 100 MG extended release tablet, Take 1 tablet by mouth 2 times daily, Disp: 180 tablet, Rfl: 3    albuterol sulfate HFA (VENTOLIN HFA) 108 (90 Base) MCG/ACT inhaler, INHALE 2 PUFFS INTO THE LUNGS EVERY 6 HOURS AS NEEDED FOR WHEEZING, Disp: 36 g, Rfl: 3    metoprolol succinate (TOPROL XL) 50 MG extended release tablet, TAKE 1 TABLET BY MOUTH DAILY, Disp: 90 tablet, Rfl: 3    Multiple Vitamins-Minerals (ONE A DAY WOMEN 50 PLUS PO), Take by mouth daily, Disp: , Rfl:     diclofenac sodium (VOLTAREN) 1 % GEL, APPLY 2 GRAMS TOPICALLY TO THE AFFECTED AREA FOUR TIMES DAILY, Disp: 200 g, Rfl: 3    acetaminophen (TYLENOL) 500 MG tablet, TAKE 1 TABLET BY MOUTH EVERY 6-8 HOURS AS NEEDED FOR PAIN Strength: 500 mg, Disp: 120 tablet, Rfl: 1    cetirizine (ZYRTEC) 10 MG tablet, Take 1 tablet by mouth daily TAKE 1 TABLET BY MOUTH DAILY, Disp: 90 tablet, Rfl: 3    clopidogrel (PLAVIX) 75 MG tablet, Take 1 tablet by mouth daily, Disp: , Rfl:     cilostazol (PLETAL) 50 MG tablet, Take 1 tablet by mouth 2 times daily, Disp: , Rfl:     nitroGLYCERIN (NITROSTAT) 0.4

## 2025-05-15 ENCOUNTER — TELEPHONE (OUTPATIENT)
Dept: PAIN MANAGEMENT | Age: 65
End: 2025-05-15

## 2025-05-15 ENCOUNTER — HOSPITAL ENCOUNTER (OUTPATIENT)
Dept: PHYSICAL THERAPY | Age: 65
Setting detail: THERAPIES SERIES
Discharge: HOME | End: 2025-05-15
Attending: ANESTHESIOLOGY
Payer: COMMERCIAL

## 2025-05-15 PROCEDURE — 97110 THERAPEUTIC EXERCISES: CPT

## 2025-05-15 NOTE — FLOWSHEET NOTE
[x] [facility] Outpatient Rehabilitation &  Therapy  [address]  P: [phone]  F: [phone]     Physical Therapy Daily Treatment Note   Date:  5/15/2025  Patient Name:  Christina Ulloa    :  1960  MRN: [id number]  Physician: Luis F Jacome MD                                     Insurance: INSURANCE UPDATED: [place] [employer] , $35 copay --visits BMN  Medical Diagnosis: M47.817 (ICD-10-CM) - Lumbosacral spondylosis without myelopathy       Rehab Codes: M62.81 , M54.5 , M54.16 , M25.60   Onset Date: 3/25/2025 referral                      Next 's appt: 2025 nerve block testing  Visit# / total visits: ; Progress note at visit 6     Cancels/No Shows: 0/0    Subjective:    Pt states \"just barely noticeable\" when asked about pain, reporting 1/10 at start of session.  Pt reports had second nerve block testing yesterday(25) still waiting to hear from office to schedule follow up.     Pain:  [] Yes  [x] No Location: Lumbar and R hip  Pain Rating: (0-10 scale) 1/10  Pain altered Tx:  [x] No  [] Yes  Action:  Comments:     Objective:  Modalities:   Precautions [] No  [x] Yes: peripheral vascular disease  Exercises:  Exercise Reps/ Time Weight/ Level Completed Comments   Mat Level:       LTR  15 x 5\"  x    BKFO- Alt 15 x 3\" red x 30 added TB and alternating pattern  Increase reps 5/5   PPT + abdominal bracing 10 x5\"       Bridges 15x 3\"  yellow x Add resistance 5/12   Supine Hip ABD 15 x yellow x Added 5/12   Supine marches 10x2 red x 4/30 inc set and added TB   Hamstring stretch 3 x20\"       Piriformis stretch  3 x20\"      SLR  10x 2each yellow x 4/30 inc sets  Add resistance 5/12   Modified Dead bug 15x  x 4/30 UE/LE  Added PPT 5/5  Progressed to alternating 5/12                 Seated:       Ball Core Exercises 15 x 2 each 4# ball x Added 5/5  Inc weight 5/12  Overhead, press outs          Standing:       Pull Downs/rows 10 x 3\" red x Added 5/5  5/7 added rows  Inc

## 2025-05-15 NOTE — TELEPHONE ENCOUNTER
Procedure:  Bilateral Lumbar Medial Branch nerve Blocks at the transverse processes of L4, L5 and sacral  ala under fluoroscopy guidance     DOS: 5/14/25    Pain level before procedure with activity: 7/10    Pain with activity after procedure: 3/10    What activities done the day of procedure: Normal housework, running around doing errands     What percentage of  pain relief from procedure did you receive: 60%    Success: No    OV Scheduled: 6/5/25

## 2025-05-19 ENCOUNTER — APPOINTMENT (OUTPATIENT)
Dept: PHYSICAL THERAPY | Age: 65
End: 2025-05-19
Attending: ANESTHESIOLOGY
Payer: COMMERCIAL

## 2025-05-19 DIAGNOSIS — R60.0 PERIPHERAL EDEMA: ICD-10-CM

## 2025-05-19 DIAGNOSIS — I25.10 CORONARY ARTERY DISEASE INVOLVING NATIVE CORONARY ARTERY OF NATIVE HEART WITHOUT ANGINA PECTORIS: ICD-10-CM

## 2025-05-19 DIAGNOSIS — J45.20 MILD INTERMITTENT ASTHMA WITHOUT COMPLICATION: ICD-10-CM

## 2025-05-19 RX ORDER — FUROSEMIDE 20 MG/1
20 TABLET ORAL DAILY
Qty: 90 TABLET | Refills: 1 | Status: SHIPPED | OUTPATIENT
Start: 2025-05-19

## 2025-05-19 RX ORDER — ALBUTEROL SULFATE 90 UG/1
INHALANT RESPIRATORY (INHALATION)
Qty: 36 G | Refills: 3 | Status: SHIPPED | OUTPATIENT
Start: 2025-05-19

## 2025-05-19 NOTE — TELEPHONE ENCOUNTER
Please Approve or Refuse.  Send to Pharmacy per Pt's Request:      Next Visit Date:  7/15/2025   Last Visit Date: 3/13/2025    Hemoglobin A1C (%)   Date Value   11/01/2024 5.5   11/28/2023 5.5   02/16/2023 5.1             ( goal A1C is < 7)   BP Readings from Last 3 Encounters:   05/14/25 96/72   04/23/25 (!) 98/56   03/13/25 110/60          (goal 120/80)  BUN   Date Value Ref Range Status   11/01/2024 13 8 - 23 mg/dL Final     Creatinine   Date Value Ref Range Status   11/01/2024 0.9 0.7 - 1.2 mg/dL Final     Potassium   Date Value Ref Range Status   11/01/2024 4.8 3.7 - 5.3 mmol/L Final

## 2025-05-21 ENCOUNTER — HOSPITAL ENCOUNTER (OUTPATIENT)
Dept: PHYSICAL THERAPY | Age: 65
Setting detail: THERAPIES SERIES
Discharge: HOME | End: 2025-05-21
Attending: ANESTHESIOLOGY
Payer: COMMERCIAL

## 2025-05-21 PROCEDURE — 97110 THERAPEUTIC EXERCISES: CPT

## 2025-05-21 NOTE — FLOWSHEET NOTE
[x] [facility]  [address]  P: [phone]  F: [phone]     Physical Therapy Daily Treatment Note   Date:  2025  Patient Name:  Christina Ulloa    :  1960  MRN: [id number]  Physician: Luis F Jacome MD                                     Insurance: INSURANCE UPDATED: [employer] [employer] , $35 copay --visits BMN  Medical Diagnosis: M47.817 (ICD-10-CM) - Lumbosacral spondylosis without myelopathy       Rehab Codes: M62.81 , M54.5 , M54.16 , M25.60   Onset Date: 3/25/2025 referral                      Next 's appt: 2025  Visit# / total visits: ; Progress note at visit 6     Cancels/No Shows: 0/0    Subjective:    Pt reports 1/10 pain at start of session notes some \"cramping in back of leg\". Pt reports MD called and states \"nerve block was not successful.\"  Pt continues to report compliance with HEP.     Pain:  [] Yes  [x] No Location: Lumbar and R hip  Pain Rating: (0-10 scale) 1/10  Pain altered Tx:  [x] No  [] Yes  Action:  Comments:     Objective:  Modalities:   Precautions [] No  [x] Yes: peripheral vascular disease  Exercises:  Exercise Reps/ Time Weight/ Level Completed Comments   Mat Level:       LTR  15 x 5\"  x    BKFO- Alt 15 x 3\" red x  added TB and alternating pattern  Increase reps    PPT + abdominal bracing 10 x5\"       Bridges 15x 3\"  yellow x Add resistance /   Supine Hip ABD 15 x yellow x Added 5/12   Seated marches 10x2  x 30 inc set and added TB  Progressed to seated    Hamstring stretch 3 x20\"       Piriformis stretch  3 x20\"      SLR  10x 2each yellow x 30 inc sets  Add resistance /12   Modified Dead bug 15x  x 4/30 UE/LE  Added PPT 5/5  Progressed to alternating /12                 Seated:       Ball Core Exercises 15 x 2 each 4# ball x Added /  Inc weight /  Overhead, press outs  Added diagonals           Standing:       Pull Downs/rows 15 x 3\" red x Added  added rows  Inc

## 2025-05-23 ENCOUNTER — HOSPITAL ENCOUNTER (OUTPATIENT)
Dept: PHYSICAL THERAPY | Age: 65
Setting detail: THERAPIES SERIES
Discharge: HOME | End: 2025-05-23
Attending: ANESTHESIOLOGY
Payer: COMMERCIAL

## 2025-05-23 PROCEDURE — 97110 THERAPEUTIC EXERCISES: CPT

## 2025-05-23 NOTE — FLOWSHEET NOTE
[x] [facility]  [address]  P: [phone]  F: [phone]     Physical Therapy Daily Treatment Note   Date:  2025  Patient Name:  Christina Ulloa    :  1960  MRN: [id number]  Physician: Luis F Jacome MD                                     Insurance: INSURANCE UPDATED: [place] [employer] , $35 copay --visits BMN  Medical Diagnosis: M47.817 (ICD-10-CM) - Lumbosacral spondylosis without myelopathy       Rehab Codes: M62.81 , M54.5 , M54.16 , M25.60   Onset Date: 3/25/2025 referral                      Next 's appt: 2025  Visit# / total visits: ; Progress note at visit 6     Cancels/No Shows: 0/0    Subjective:    Pt comes in with low pain at this time. Reports that this pain does not feel like nerve pain, feels more like muscle / joint pain.    Pain:  [] Yes  [x] No Location: Lumbar and R hip  Pain Rating: (0-10 scale) 1/10  Pain altered Tx:  [x] No  [] Yes  Action:  Comments:     Objective:  Modalities:   Precautions [] No  [x] Yes: peripheral vascular disease  Exercises:  Exercise Reps/ Time Weight/ Level Completed Comments   Mat Level:       LTR  15 x 5\"      BKFO- Alt 15 x 3\" red   added TB and alternating pattern  Increase reps    PPT + abdominal bracing 10 x5\"       Bridges 15x 3\"  yellow x Add resistance /   Supine Hip ABD 15 x yellow  Added 5/   Seated marches 10x2   430 inc set and added TB  Progressed to seated    Hamstring stretch 3 x20\"       Piriformis stretch  3 x20\"      SLR  10x 2each yellow x 30 inc sets  Add resistance /   Modified Dead bug 15x   4/30 UE/LE  Added PPT 5/  Progressed to alternating                  Seated:       Ball Core Exercises 15 x 2 each 4# ball x Added /  Inc weight /  Overhead, press outs  Added diagonals    Physioball FWD Roll Outs 10 x 5\"  x Added           Standing:       Pull Downs/rows 15 x 3\" red x Added 5/5  5/7 added rows  Inc resistance

## 2025-05-28 ENCOUNTER — HOSPITAL ENCOUNTER (OUTPATIENT)
Dept: PHYSICAL THERAPY | Age: 65
Setting detail: THERAPIES SERIES
Discharge: HOME | End: 2025-05-28
Attending: ANESTHESIOLOGY
Payer: COMMERCIAL

## 2025-05-28 PROCEDURE — 97110 THERAPEUTIC EXERCISES: CPT

## 2025-05-28 NOTE — FLOWSHEET NOTE
[x] [facility]  [address]  P: [phone]  F: [phone]     Physical Therapy Daily Treatment Note   Date:  2025  Patient Name:  Christina Ulloa    :  1960  MRN: [id number]  Physician: Luis F Jacome MD                                     Insurance: INSURANCE UPDATED: [place] [employer] , $35 copay --visits BMN  Medical Diagnosis: M47.817 (ICD-10-CM) - Lumbosacral spondylosis without myelopathy       Rehab Codes: M62.81 , M54.5 , M54.16 , M25.60   Onset Date: 3/25/2025 referral                      Next 's appt: 2025  Visit# / total visits: 10/12; Progress note at visit 6     Cancels/No Shows: 0/0    Subjective:    Pt reports 1/10 pain at start of session. Pt states \"not doing enough\" when asked about HEP compliance, however notes completing once per day.    Pain:  [] Yes  [x] No Location: Lumbar and R hip  Pain Rating: (0-10 scale) 1/10  Pain altered Tx:  [x] No  [] Yes  Action:  Comments:     Objective:  Modalities:   Precautions [] No  [x] Yes: peripheral vascular disease  Exercises:  Exercise Reps/ Time Weight/ Level Completed Comments   Mat Level:       LTR  15 x 5\"      BKFO- Alt 15 x 3\" red   added TB and alternating pattern  Increase reps 5   PPT + abdominal bracing 10 x5\"       Bridges 2 x10 3\"  yellow x Add resistance /   Supine Hip ABD 15 x yellow  Added 5/12   Seated marches 10x2   4/30 inc set and added TB  Progressed to seated    Hamstring stretch 3 x20\"       Piriformis stretch  3 x20\"      SLR  15x red x /30 inc sets  Add resistance /12   Modified Dead bug 15x   4/30 UE/LE  Added PPT 5/5  Progressed to alternating /12                 Seated:       Ball Core Exercises 15 x 2 each 4# ball x Added 5/5  Inc weight /  Overhead, press outs  Added diagonals    Physioball FWD Roll Outs 15 x 5\"  x Added           Standing:       Pull Downs/rows 2 x10 red x Added 5/5  5/7 added rows  Inc resistance

## 2025-06-02 ENCOUNTER — HOSPITAL ENCOUNTER (OUTPATIENT)
Dept: PHYSICAL THERAPY | Age: 65
Setting detail: THERAPIES SERIES
Discharge: HOME | End: 2025-06-02
Attending: ANESTHESIOLOGY
Payer: COMMERCIAL

## 2025-06-02 PROCEDURE — 97110 THERAPEUTIC EXERCISES: CPT

## 2025-06-02 NOTE — FLOWSHEET NOTE
[x] [facility]  [address]  P: [phone]  F: [phone]     Physical Therapy Daily Treatment Note   Date:  2025  Patient Name:  Christina Ulloa    :  1960  MRN: [id number]  Physician: Luis F Jacome MD                                     Insurance: INSURANCE UPDATED: [place] [employer] , $35 copay --visits BMN  Medical Diagnosis: M47.817 (ICD-10-CM) - Lumbosacral spondylosis without myelopathy       Rehab Codes: M62.81 , M54.5 , M54.16 , M25.60   Onset Date: 3/25/2025 referral                      Next 's appt: 2025  Visit# / total visits: ; Progress note at visit 6     Cancels/No Shows: 0/0    Subjective:    Patient continues to note discomfort under the R glut and states it feels like a dull cramp.  Pt reporting her discomfort remains around 1/10pain.  Patient reporting her stretches help keep her symptoms low.  Patient reporting she is able to walk longer distances but still has difficulty with standing in one place for a long period of time.      Pain:  [x] Yes  [] No Location: Under R glut Pain Rating: (0-10 scale) 1/10  Pain altered Tx:  [x] No  [] Yes  Action:  Comments:     Objective:  Modalities:   Precautions [] No  [x] Yes: peripheral vascular disease  Exercises:  Exercise Reps/ Time Weight/ Level Completed Comments   Mat Level:       LTR  15 x 5\"      BKFO- Alt 15 x 3\" red   added TB and alternating pattern  Increase reps 5/   PPT + abdominal bracing 10 x5\"       Bridges 2 x10 3\"  yellow  Add resistance 5/12   Supine Hip ABD 15 x yellow  Added 5/12   Seated marches 10x2   4/30 inc set and added TB  Progressed to seated    Hamstring stretch 3 x20\"       Piriformis stretch  3 x20\"      SLR  15x red  4/30 inc sets  Add resistance 5/12   Modified Dead bug 15x   4/30 UE/LE  Added PPT 5/5  Progressed to alternating 5/12                 Seated:       Ball Core Exercises 15 x 2 each 4# ball  Added 5/  Inc weight

## 2025-06-05 ENCOUNTER — HOSPITAL ENCOUNTER (OUTPATIENT)
Dept: PAIN MANAGEMENT | Age: 65
Discharge: HOME | End: 2025-06-05
Payer: COMMERCIAL

## 2025-06-05 VITALS — WEIGHT: 169 LBS | HEIGHT: 62 IN | BODY MASS INDEX: 31.1 KG/M2

## 2025-06-05 DIAGNOSIS — M47.817 LUMBOSACRAL SPONDYLOSIS WITHOUT MYELOPATHY: Primary | ICD-10-CM

## 2025-06-05 DIAGNOSIS — Z79.02 LONG TERM CURRENT USE OF ANTITHROMBOTICS/ANTIPLATELETS: ICD-10-CM

## 2025-06-05 PROCEDURE — 99213 OFFICE O/P EST LOW 20 MIN: CPT

## 2025-06-05 PROCEDURE — 99213 OFFICE O/P EST LOW 20 MIN: CPT | Performed by: ANESTHESIOLOGY

## 2025-06-05 ASSESSMENT — ENCOUNTER SYMPTOMS
BACK PAIN: 1
RESPIRATORY NEGATIVE: 1
EYES NEGATIVE: 1

## 2025-06-05 NOTE — H&P (VIEW-ONLY)
Allergy (Severe) Sister         Bee stings    Arthritis Sister     Stroke Maternal Uncle        Allergies   Allergen Reactions    Claritin [Loratadine]      02/2010 Heart palpitations  02/2010 Heart palpitations    Codeine Nausea Only       There were no vitals filed for this visit.    Current Outpatient Medications   Medication Sig Dispense Refill    albuterol sulfate HFA (VENTOLIN HFA) 108 (90 Base) MCG/ACT inhaler INHALE 2 PUFFS INTO THE LUNGS EVERY 6 HOURS AS NEEDED FOR WHEEZING 36 g 3    furosemide (LASIX) 20 MG tablet Take 1 tablet by mouth daily 90 tablet 1    pregabalin (LYRICA) 150 MG capsule Take 1 capsule by mouth in the morning, at noon, and at bedtime for 30 days. TAKE 1 CAPSULE BY MOUTH EVERY MORNING, AT NOON AND AT BEDTIME 90 capsule 0    cyclobenzaprine (FLEXERIL) 10 MG tablet TAKE 1 TABLET BY MOUTH TWICE DAILY AS NEEDED FOR MUSCLE SPASMS 180 tablet 0    nitroGLYCERIN (NITROSTAT) 0.4 MG SL tablet DISSOLVE 1 TABLET UNDER THE TONGUE AS NEEDED FOR CHEST PAIN, IF NO RELIEF AFTER 1 DOSE CALL 911. MAX OF 3 DOSES 25 tablet 0    enalapril (VASOTEC) 10 MG tablet TAKE 1 TABLET BY MOUTH EVERY MORNING 90 tablet 3    potassium chloride (KLOR-CON M) 10 MEQ extended release tablet Take 1 tablet by mouth daily 90 tablet 3    metFORMIN (GLUCOPHAGE-XR) 500 MG extended release tablet Take 1 tablet by mouth daily (with breakfast) TAKE 1 TABLET BY MOUTH DAILY WITH BREAKFAST 90 tablet 3    aspirin (ASPIRIN LOW DOSE) 81 MG EC tablet TAKE 1 TABLET BY MOUTH DAILY 90 tablet 3    isosorbide mononitrate (IMDUR) 30 MG extended release tablet TAKE 1 TABLET BY MOUTH EVERY DAY 90 tablet 3    simvastatin (ZOCOR) 40 MG tablet Take 1 tablet by mouth nightly TAKE 1 TABLET BY MOUTH EVERY NIGHT 90 tablet 3    busPIRone (BUSPAR) 15 MG tablet Take 15 mg by mouth every 12 hours as needed (anxiety) Take 15 mg by mouth every 12 hours as needed 120 tablet 3    buPROPion (WELLBUTRIN SR) 100 MG extended release tablet Take 1 tablet by mouth 2

## 2025-06-05 NOTE — PROGRESS NOTES
GERD (gastroesophageal reflux disease)     Hiatal hernia    Headache     HTN (hypertension)     Lumbar radiculopathy     Mixed hyperlipidemia 01/15/2018    OA (osteoarthritis)     Peripheral vascular disease 2014    Have 4 stents    PVD (peripheral vascular disease) with claudication 2015    Spinal stenosis         Past Surgical History:   Procedure Laterality Date     SECTION      COLONOSCOPY N/A 2019    COLORECTAL CANCER SCREENING, NOT HIGH RISK performed by Yulia Ruiz MD at [facility]    COLONOSCOPY N/A 2024    COLONOSCOPY ENDOSCOPIC MUCOSAL RESECTION performed by Marcus Jarrell MD at [facility]    CORONARY ANGIOPLASTY  2015    2 stents    DILATION AND CURETTAGE OF UTERUS N/A     uterine polyp    EYE SURGERY  2017    Eyelid lift       Social History     Socioeconomic History    Marital status:      Spouse name: None    Number of children: None    Years of education: None    Highest education level: None   Tobacco Use    Smoking status: Former     Current packs/day: 0.00     Average packs/day: 1.5 packs/day for 32.4 years (48.6 ttl pk-yrs)     Types: Cigarettes     Start date: 1977     Quit date: 2009     Years since quittin.0     Passive exposure: Current    Smokeless tobacco: Former     Quit date: 10/1/2009   Vaping Use    Vaping status: Never Used   Substance and Sexual Activity    Alcohol use: Never    Drug use: Never    Sexual activity: Not Currently     Partners: Male     Social Drivers of Health     Financial Resource Strain: Low Risk  (2024)    Overall Financial Resource Strain (CARDIA)     Difficulty of Paying Living Expenses: Not very hard   Food Insecurity: No Food Insecurity (2025)    Received from [facility]    Hunger Screening     Within the past 12 months we worried whether our food would run out before we got money to buy more.: Never True     Within the past 12 months the food we bought just didn't last and we didn't

## 2025-06-08 DIAGNOSIS — M54.16 LUMBAR RADICULOPATHY: ICD-10-CM

## 2025-06-08 DIAGNOSIS — M48.061 SPINAL STENOSIS OF LUMBAR REGION WITHOUT NEUROGENIC CLAUDICATION: ICD-10-CM

## 2025-06-09 RX ORDER — PREGABALIN 150 MG/1
150 CAPSULE ORAL 3 TIMES DAILY
Qty: 90 CAPSULE | Refills: 0 | Status: SHIPPED | OUTPATIENT
Start: 2025-06-09 | End: 2025-07-09

## 2025-06-12 ENCOUNTER — HOSPITAL ENCOUNTER (OUTPATIENT)
Dept: PHYSICAL THERAPY | Age: 65
Setting detail: THERAPIES SERIES
Discharge: HOME | End: 2025-06-12
Attending: ANESTHESIOLOGY
Payer: COMMERCIAL

## 2025-06-12 PROCEDURE — 97110 THERAPEUTIC EXERCISES: CPT

## 2025-06-12 NOTE — THERAPY DISCHARGE
[x] [facility]  [address]  P: [phone]  F: [phone]     Physical Therapy Daily Treatment Note  / Discharge Summary  Date:  2025  Patient Name:  Christina Ulloa    :  1960  MRN: [id number]  Physician: Luis F Jacome MD                                     Insurance: INSURANCE UPDATED: [place] [employer] , $35 copay --visits BMN  Medical Diagnosis: M47.817 (ICD-10-CM) - Lumbosacral spondylosis without myelopathy       Rehab Codes: M62.81 , M54.5 , M54.16 , M25.60   Onset Date: 3/25/2025 referral                      Next 's appt: 2025  Visit# / total visits: ; Progress note at visit 6     Cancels/No Shows: 0/0    Subjective:    Patient comes in continuing to note 1/10 pain. She saw pain management and was decided to do an oblation. Has that scheduled at the end of the month. Patient reports that she thinks therapy has helped her. She has more stamina and endurance. Patient notes that the pain can up to 6-7/10, however notes that it takes longer to get up these higher pain levels.     Pain:  [x] Yes  [] No Location: Under R glut Pain Rating: (0-10 scale) 1/10  Pain altered Tx:  [x] No  [] Yes  Action:  Comments:     Objective:     Range of Motion  Left  Range of Motion  Right Strength  Left  Strength  Right   Lumbar Flexion WFL  Pain / pulling       Lumbar Extension 25%  Pain        Lumbar Rotation WFL   WFL       Lumbar Side Bend WFL WFL        Hip Flexion (L1-2)     4 4   Hip Abduction     4+ 4+   Hip Adduction     4 4   Hip Extension     4 4   Knee Flexion      5 5   Knee Extension  (L3)     5 5   Dorsiflexion (L4-L5)     5 5   Plantar flexion (S1)     5 5   All MMT done in modified seated position      Functional Assessment Used: Mod. MARYCRUZ  Previous Score: 34% disability  Current Status Score: 32% disability  Goal Status Score: 22% disability or less    Modalities:   Precautions [] No  [x] Yes: peripheral vascular

## 2025-06-25 ENCOUNTER — HOSPITAL ENCOUNTER (OUTPATIENT)
Dept: PAIN MANAGEMENT | Facility: CLINIC | Age: 65
Discharge: HOME | End: 2025-06-25
Payer: COMMERCIAL

## 2025-06-25 VITALS
SYSTOLIC BLOOD PRESSURE: 96 MMHG | TEMPERATURE: 98.1 F | BODY MASS INDEX: 31.1 KG/M2 | HEIGHT: 62 IN | RESPIRATION RATE: 12 BRPM | DIASTOLIC BLOOD PRESSURE: 64 MMHG | WEIGHT: 169 LBS | OXYGEN SATURATION: 94 % | HEART RATE: 83 BPM

## 2025-06-25 DIAGNOSIS — R52 PAIN MANAGEMENT: ICD-10-CM

## 2025-06-25 DIAGNOSIS — M47.817 LUMBOSACRAL SPONDYLOSIS WITHOUT MYELOPATHY: Primary | ICD-10-CM

## 2025-06-25 PROCEDURE — 64635 DESTROY LUMB/SAC FACET JNT: CPT

## 2025-06-25 PROCEDURE — 6360000002 HC RX W HCPCS: Performed by: ANESTHESIOLOGY

## 2025-06-25 PROCEDURE — 64636 DESTROY L/S FACET JNT ADDL: CPT

## 2025-06-25 RX ORDER — LIDOCAINE HYDROCHLORIDE 10 MG/ML
INJECTION, SOLUTION EPIDURAL; INFILTRATION; INTRACAUDAL; PERINEURAL
Status: COMPLETED | OUTPATIENT
Start: 2025-06-25 | End: 2025-06-25

## 2025-06-25 RX ORDER — MIDAZOLAM HYDROCHLORIDE 2 MG/2ML
INJECTION, SOLUTION INTRAMUSCULAR; INTRAVENOUS
Status: COMPLETED | OUTPATIENT
Start: 2025-06-25 | End: 2025-06-25

## 2025-06-25 RX ORDER — FENTANYL CITRATE 50 UG/ML
INJECTION, SOLUTION INTRAMUSCULAR; INTRAVENOUS
Status: COMPLETED | OUTPATIENT
Start: 2025-06-25 | End: 2025-06-25

## 2025-06-25 RX ORDER — LIDOCAINE HYDROCHLORIDE 40 MG/ML
INJECTION, SOLUTION RETROBULBAR
Status: COMPLETED | OUTPATIENT
Start: 2025-06-25 | End: 2025-06-25

## 2025-06-25 RX ADMIN — LIDOCAINE HYDROCHLORIDE 5 ML: 10 INJECTION, SOLUTION EPIDURAL; INFILTRATION; INTRACAUDAL; PERINEURAL at 16:01

## 2025-06-25 RX ADMIN — FENTANYL CITRATE 50 MCG: 50 INJECTION, SOLUTION INTRAMUSCULAR; INTRAVENOUS at 16:01

## 2025-06-25 RX ADMIN — LIDOCAINE HYDROCHLORIDE 5 ML: 40 INJECTION, SOLUTION RETROBULBAR at 16:07

## 2025-06-25 RX ADMIN — MIDAZOLAM HYDROCHLORIDE 1 MG: 1 INJECTION, SOLUTION INTRAMUSCULAR; INTRAVENOUS at 16:01

## 2025-06-25 RX ADMIN — LIDOCAINE HYDROCHLORIDE 5 ML: 10 INJECTION, SOLUTION EPIDURAL; INFILTRATION; INTRACAUDAL; PERINEURAL at 16:09

## 2025-06-25 ASSESSMENT — PAIN - FUNCTIONAL ASSESSMENT
PAIN_FUNCTIONAL_ASSESSMENT: PREVENTS OR INTERFERES SOME ACTIVE ACTIVITIES AND ADLS
PAIN_FUNCTIONAL_ASSESSMENT: 0-10

## 2025-06-25 ASSESSMENT — PAIN DESCRIPTION - DESCRIPTORS: DESCRIPTORS: BURNING;ACHING;DULL

## 2025-06-25 NOTE — DISCHARGE INSTRUCTIONS
Discharge Instructions following Sedation or Anesthesia:  You have  received  a sedative/anesthetic therefore, you should not consume any alcoholic beverages for minimum of 12 hours.  Do not drive or operate machinery for 24 hours.  Do not sign legal documents for 24 hours.  Dizziness, drowsiness, and unsteadiness may occur.  Rest when need to.  Increase diet as tolerated.  Keep up on fluids if diet allows.      General Instructions:  Do not take a tub bath for 72 hours after procedure (this includes hot tubs and swimming pools).  You may shower, but avoid hot water to injection site.   Avoid strenuous activity TODAY especially if you experience dizziness.   Remove band-aid the next day.  Wash off any residual iodine   Do not use heat, heating pad, or any other heating device over the injection site for 3 days after the procedure.  If you experience pain after your procedure, you may continue with your current pain medication as prescribed.  (DO NOT INCREASE YOUR PAIN MEDICATION WITHOUT TALKING TO DOCTOR)  Soreness and pain at injection site is common, may use ice to reduce soreness.    Call [facility] at [phone] if you experience:   Fever, chills or temperature over 100    Vomiting, Headache, persistent stiff neck, nausea, blurred vision   Difficulty in urinating or unable to urinate with 8 hours   Increase in weakness, numbness or loss of function   Increased redness, swelling or drainage at the injection site

## 2025-06-25 NOTE — INTERVAL H&P NOTE
Update History & Physical    The patient's History and Physical of June 5, 2025 was reviewed with the patient and I examined the patient. There was no change. The surgical site was confirmed by the patient and me.     Plan: The risks, benefits, expected outcome, and alternative to the recommended procedure have been discussed with the patient. Patient understands and wants to proceed with the procedure.     ASA 3  MP 3    Electronically signed by Luis F Jacome MD on 6/25/2025 at 3:59 PM

## 2025-07-07 DIAGNOSIS — M48.061 SPINAL STENOSIS OF LUMBAR REGION WITHOUT NEUROGENIC CLAUDICATION: ICD-10-CM

## 2025-07-07 DIAGNOSIS — M54.16 LUMBAR RADICULOPATHY: ICD-10-CM

## 2025-07-07 DIAGNOSIS — I25.10 CORONARY ARTERY DISEASE INVOLVING NATIVE CORONARY ARTERY OF NATIVE HEART WITHOUT ANGINA PECTORIS: ICD-10-CM

## 2025-07-07 DIAGNOSIS — M19.019 OSTEOARTHRITIS OF SHOULDER, UNSPECIFIED LATERALITY, UNSPECIFIED OSTEOARTHRITIS TYPE: ICD-10-CM

## 2025-07-07 DIAGNOSIS — M50.30 DEGENERATIVE DISC DISEASE, CERVICAL: ICD-10-CM

## 2025-07-08 RX ORDER — NITROGLYCERIN 0.4 MG/1
TABLET SUBLINGUAL
Qty: 25 TABLET | Refills: 0 | Status: SHIPPED | OUTPATIENT
Start: 2025-07-08

## 2025-07-08 RX ORDER — PREGABALIN 150 MG/1
150 CAPSULE ORAL 3 TIMES DAILY
Qty: 90 CAPSULE | Refills: 0 | Status: SHIPPED | OUTPATIENT
Start: 2025-07-08 | End: 2025-08-07

## 2025-07-08 RX ORDER — CYCLOBENZAPRINE HCL 10 MG
TABLET ORAL
Qty: 180 TABLET | Refills: 0 | Status: SHIPPED | OUTPATIENT
Start: 2025-07-08

## 2025-07-08 SDOH — HEALTH STABILITY: PHYSICAL HEALTH: ON AVERAGE, HOW MANY DAYS PER WEEK DO YOU ENGAGE IN MODERATE TO STRENUOUS EXERCISE (LIKE A BRISK WALK)?: 3 DAYS

## 2025-07-08 SDOH — HEALTH STABILITY: PHYSICAL HEALTH: ON AVERAGE, HOW MANY MINUTES DO YOU ENGAGE IN EXERCISE AT THIS LEVEL?: 30 MIN

## 2025-07-08 ASSESSMENT — LIFESTYLE VARIABLES
HOW MANY STANDARD DRINKS CONTAINING ALCOHOL DO YOU HAVE ON A TYPICAL DAY: 0
HOW OFTEN DO YOU HAVE SIX OR MORE DRINKS ON ONE OCCASION: 1
HOW MANY STANDARD DRINKS CONTAINING ALCOHOL DO YOU HAVE ON A TYPICAL DAY: PATIENT DOES NOT DRINK
HOW OFTEN DO YOU HAVE A DRINK CONTAINING ALCOHOL: NEVER
HOW OFTEN DO YOU HAVE A DRINK CONTAINING ALCOHOL: 1

## 2025-07-08 ASSESSMENT — PATIENT HEALTH QUESTIONNAIRE - PHQ9
SUM OF ALL RESPONSES TO PHQ QUESTIONS 1-9: 0
8. MOVING OR SPEAKING SO SLOWLY THAT OTHER PEOPLE COULD HAVE NOTICED. OR THE OPPOSITE, BEING SO FIGETY OR RESTLESS THAT YOU HAVE BEEN MOVING AROUND A LOT MORE THAN USUAL: NOT AT ALL
2. FEELING DOWN, DEPRESSED OR HOPELESS: NOT AT ALL
SUM OF ALL RESPONSES TO PHQ QUESTIONS 1-9: 0
1. LITTLE INTEREST OR PLEASURE IN DOING THINGS: NOT AT ALL
3. TROUBLE FALLING OR STAYING ASLEEP: NOT AT ALL
4. FEELING TIRED OR HAVING LITTLE ENERGY: NOT AT ALL
10. IF YOU CHECKED OFF ANY PROBLEMS, HOW DIFFICULT HAVE THESE PROBLEMS MADE IT FOR YOU TO DO YOUR WORK, TAKE CARE OF THINGS AT HOME, OR GET ALONG WITH OTHER PEOPLE: NOT DIFFICULT AT ALL
SUM OF ALL RESPONSES TO PHQ QUESTIONS 1-9: 0
9. THOUGHTS THAT YOU WOULD BE BETTER OFF DEAD, OR OF HURTING YOURSELF: NOT AT ALL
6. FEELING BAD ABOUT YOURSELF - OR THAT YOU ARE A FAILURE OR HAVE LET YOURSELF OR YOUR FAMILY DOWN: NOT AT ALL
SUM OF ALL RESPONSES TO PHQ QUESTIONS 1-9: 0
7. TROUBLE CONCENTRATING ON THINGS, SUCH AS READING THE NEWSPAPER OR WATCHING TELEVISION: NOT AT ALL
5. POOR APPETITE OR OVEREATING: NOT AT ALL

## 2025-07-08 NOTE — TELEPHONE ENCOUNTER
Please Approve or Refuse.  Send to Pharmacy per Pt's Request: [facility]      Next Visit Date:  7/15/2025   Last Visit Date: 3/13/2025    Hemoglobin A1C (%)   Date Value   11/01/2024 5.5   11/28/2023 5.5   02/16/2023 5.1             ( goal A1C is < 7)   BP Readings from Last 3 Encounters:   06/25/25 96/64   05/14/25 96/72   04/23/25 (!) 98/56          (goal 120/80)  BUN   Date Value Ref Range Status   11/01/2024 13 8 - 23 mg/dL Final     Creatinine   Date Value Ref Range Status   11/01/2024 0.9 0.7 - 1.2 mg/dL Final     Potassium   Date Value Ref Range Status   11/01/2024 4.8 3.7 - 5.3 mmol/L Final

## 2025-07-08 NOTE — TELEPHONE ENCOUNTER
Please Approve or Refuse.  Send to Pharmacy per Pt's Request: [facility]      Next Visit Date:  7/7/2025   Last Visit Date: 3/13/2025    Hemoglobin A1C (%)   Date Value   11/01/2024 5.5   11/28/2023 5.5   02/16/2023 5.1             ( goal A1C is < 7)   BP Readings from Last 3 Encounters:   06/25/25 96/64   05/14/25 96/72   04/23/25 (!) 98/56          (goal 120/80)  BUN   Date Value Ref Range Status   11/01/2024 13 8 - 23 mg/dL Final     Creatinine   Date Value Ref Range Status   11/01/2024 0.9 0.7 - 1.2 mg/dL Final     Potassium   Date Value Ref Range Status   11/01/2024 4.8 3.7 - 5.3 mmol/L Final

## 2025-07-15 ENCOUNTER — OFFICE VISIT (OUTPATIENT)
Dept: FAMILY MEDICINE CLINIC | Age: 65
End: 2025-07-15
Payer: COMMERCIAL

## 2025-07-15 VITALS
HEART RATE: 82 BPM | BODY MASS INDEX: 30.91 KG/M2 | SYSTOLIC BLOOD PRESSURE: 110 MMHG | HEIGHT: 62 IN | OXYGEN SATURATION: 97 % | WEIGHT: 168 LBS | DIASTOLIC BLOOD PRESSURE: 60 MMHG

## 2025-07-15 DIAGNOSIS — M48.062 SPINAL STENOSIS OF LUMBAR REGION WITH NEUROGENIC CLAUDICATION: Chronic | ICD-10-CM

## 2025-07-15 DIAGNOSIS — Z00.00 WELCOME TO MEDICARE PREVENTIVE VISIT: Primary | ICD-10-CM

## 2025-07-15 DIAGNOSIS — M47.817 LUMBOSACRAL SPONDYLOSIS WITHOUT MYELOPATHY: ICD-10-CM

## 2025-07-15 DIAGNOSIS — E78.2 MIXED HYPERLIPIDEMIA: ICD-10-CM

## 2025-07-15 DIAGNOSIS — Z12.31 ENCOUNTER FOR SCREENING MAMMOGRAM FOR HIGH-RISK PATIENT: ICD-10-CM

## 2025-07-15 DIAGNOSIS — I10 ESSENTIAL HYPERTENSION: ICD-10-CM

## 2025-07-15 DIAGNOSIS — Z78.0 POST-MENOPAUSAL: ICD-10-CM

## 2025-07-15 DIAGNOSIS — Z71.89 ACP (ADVANCE CARE PLANNING): ICD-10-CM

## 2025-07-15 DIAGNOSIS — R73.03 PREDIABETES: ICD-10-CM

## 2025-07-15 PROCEDURE — 99214 OFFICE O/P EST MOD 30 MIN: CPT | Performed by: FAMILY MEDICINE

## 2025-07-15 PROCEDURE — 3078F DIAST BP <80 MM HG: CPT | Performed by: FAMILY MEDICINE

## 2025-07-15 PROCEDURE — G0402 INITIAL PREVENTIVE EXAM: HCPCS | Performed by: FAMILY MEDICINE

## 2025-07-15 PROCEDURE — 1123F ACP DISCUSS/DSCN MKR DOCD: CPT | Performed by: FAMILY MEDICINE

## 2025-07-15 PROCEDURE — 99497 ADVNCD CARE PLAN 30 MIN: CPT | Performed by: FAMILY MEDICINE

## 2025-07-15 PROCEDURE — 3074F SYST BP LT 130 MM HG: CPT | Performed by: FAMILY MEDICINE

## 2025-07-15 SDOH — ECONOMIC STABILITY: FOOD INSECURITY: WITHIN THE PAST 12 MONTHS, YOU WORRIED THAT YOUR FOOD WOULD RUN OUT BEFORE YOU GOT MONEY TO BUY MORE.: NEVER TRUE

## 2025-07-15 SDOH — ECONOMIC STABILITY: FOOD INSECURITY: WITHIN THE PAST 12 MONTHS, THE FOOD YOU BOUGHT JUST DIDN'T LAST AND YOU DIDN'T HAVE MONEY TO GET MORE.: NEVER TRUE

## 2025-07-15 ASSESSMENT — ENCOUNTER SYMPTOMS
WHEEZING: 0
RHINORRHEA: 0
BLOOD IN STOOL: 0
EYE REDNESS: 0
ABDOMINAL DISTENTION: 0
VOMITING: 0
NAUSEA: 0
SINUS PRESSURE: 0
SHORTNESS OF BREATH: 0
CONSTIPATION: 0
COLOR CHANGE: 0
CHEST TIGHTNESS: 0
RECTAL PAIN: 0
COUGH: 0
TROUBLE SWALLOWING: 0
ABDOMINAL PAIN: 0
STRIDOR: 0
DIARRHEA: 0
SORE THROAT: 0
BACK PAIN: 1

## 2025-07-15 ASSESSMENT — VISUAL ACUITY
OS_CC: 20/50
OD_CC: 20/25

## 2025-07-15 NOTE — PROGRESS NOTES
Medicare Annual Wellness Visit    Christina Ulloa is here for Medicare AWV  The patient (or guardian, if applicable) and other individuals in attendance with the patient were advised that Artificial Intelligence will be utilized during this visit to record, process the conversation to generate a clinical note, and support improvement of the AI technology. The patient (or guardian, if applicable) and other individuals in attendance at the appointment consented to the use of AI, including the recording.                  Assessment & Plan  1. Degenerative disc disease.  - Physical therapy and nerve ablation have not significantly alleviated her back pain, which she attributes more to arthritis.  - She is advised to continue using her cane for support when outside the home and to maintain her home exercise regimen.  - A follow-up with her pain management specialist is scheduled for next week.  - No recent MRI; last MRI was done approximately 1.5 years ago.    2. Balance issues.  - Experiences occasional risk of falling and uses a cane when outside.  - Physical therapy has not improved her balance due to persistent back pain.  - Advised to continue using the cane and perform home exercises.  - Discussed safety measures at home to prevent falls.    3. Hearing loss.  - Slight hearing loss in one ear, particularly at certain frequencies.  - No hearing aids were recommended.  - An MRI was suggested during her last hearing test in summer 2024.  - No recent hearing test done.    4. Health Maintenance.  - Blood pressure is well-controlled with current medications including Lyrica 150 mg, metformin, Wellbutrin, cholesterol medication, and blood pressure medications.  - Last blood work was done in 12/2024.  - DEXA scan has been ordered to assess bone health.  - Mammogram has been ordered as she is due for one; last mammogram was in 09/2024.  - A1c levels are within the normal range and will be reassessed during her next

## 2025-07-15 NOTE — PROGRESS NOTES
Visit Information    Have you changed or started any medications since your last visit including any over-the-counter medicines, vitamins, or herbal medicines? no   Are you having any side effects from any of your medications? -  no  Have you stopped taking any of your medications? Is so, why? -  no    Have you seen any other physician or provider since your last visit? No  Have you had any other diagnostic tests since your last visit? No  Have you been seen in the emergency room and/or had an admission to a hospital since we last saw you? No  Have you had your routine dental cleaning in the past 6 months? no    Have you activated your [employer] account? If not, what are your barriers? Yes     Patient Care Team:  Antonio Ruiz MD as PCP - General (Family Medicine)  Antonio Ruiz MD as PCP - Empaneled Provider    Medical History Review  Past Medical, Family, and Social History reviewed and does contribute to the patient presenting condition    Health Maintenance   Topic Date Due    DEXA (modify frequency per FRAX score)  Never done    Annual Wellness Visit (Medicare Advantage)  Never done    COVID-19 Vaccine (8 - 2024-25 season) 05/02/2025    Flu vaccine (1) 08/01/2025    A1C test (Diabetic or Prediabetic)  11/01/2025    Lipids  11/01/2025    Depression Monitoring  07/08/2026    Breast cancer screen  09/26/2026    Pneumococcal 50+ years Vaccine (3 of 3 - PCV20 or PCV21) 04/14/2027    Cervical cancer screen  03/18/2028    DTaP/Tdap/Td vaccine (2 - Td or Tdap) 04/16/2028    Colorectal Cancer Screen  09/18/2029    Shingles vaccine  Completed    Respiratory Syncytial Virus (RSV) Pregnant or age 60 yrs+  Completed    Hepatitis C screen  Completed    HIV screen  Completed    Hepatitis A vaccine  Aged Out    Hepatitis B vaccine  Aged Out    Hib vaccine  Aged Out    Polio vaccine  Aged Out    Meningococcal (ACWY) vaccine  Aged Out    Meningococcal B vaccine  Aged Out    Pneumococcal 0-49 years Vaccine  Discontinued    Lung

## 2025-07-23 ENCOUNTER — HOSPITAL ENCOUNTER (OUTPATIENT)
Dept: WOMENS IMAGING | Age: 65
Discharge: HOME | End: 2025-07-25
Payer: COMMERCIAL

## 2025-07-23 DIAGNOSIS — Z78.0 POST-MENOPAUSAL: ICD-10-CM

## 2025-07-23 PROCEDURE — 77080 DXA BONE DENSITY AXIAL: CPT

## 2025-07-24 ENCOUNTER — HOSPITAL ENCOUNTER (OUTPATIENT)
Dept: PAIN MANAGEMENT | Age: 65
Discharge: HOME | End: 2025-07-24
Payer: COMMERCIAL

## 2025-07-24 VITALS — HEIGHT: 62 IN | BODY MASS INDEX: 30.91 KG/M2 | WEIGHT: 168 LBS

## 2025-07-24 DIAGNOSIS — M47.817 LUMBOSACRAL SPONDYLOSIS WITHOUT MYELOPATHY: Primary | ICD-10-CM

## 2025-07-24 DIAGNOSIS — M43.16 SPONDYLOLISTHESIS OF LUMBAR REGION: ICD-10-CM

## 2025-07-24 PROCEDURE — 99214 OFFICE O/P EST MOD 30 MIN: CPT | Performed by: ANESTHESIOLOGY

## 2025-07-24 PROCEDURE — 99213 OFFICE O/P EST LOW 20 MIN: CPT

## 2025-07-24 ASSESSMENT — PAIN DESCRIPTION - LOCATION: LOCATION: BACK

## 2025-07-24 ASSESSMENT — PAIN DESCRIPTION - PAIN TYPE: TYPE: CHRONIC PAIN

## 2025-07-24 ASSESSMENT — ENCOUNTER SYMPTOMS
BACK PAIN: 1
GASTROINTESTINAL NEGATIVE: 1
RESPIRATORY NEGATIVE: 1

## 2025-07-24 ASSESSMENT — PAIN SCALES - GENERAL: PAINLEVEL_OUTOF10: 5

## 2025-07-24 ASSESSMENT — PAIN DESCRIPTION - ORIENTATION: ORIENTATION: LOWER

## 2025-07-24 ASSESSMENT — PAIN DESCRIPTION - FREQUENCY: FREQUENCY: CONTINUOUS

## 2025-07-24 ASSESSMENT — PAIN DESCRIPTION - DESCRIPTORS: DESCRIPTORS: ACHING

## 2025-07-24 NOTE — PROGRESS NOTES
signs are normal.  No fever.    Output: Producing urine and producing stool.    HEENT: Normal HEENT exam.    Lungs:  Normal effort and normal respiratory rate.  She is not in respiratory distress.    Heart: Normal rate.    Extremities: Normal range of motion.  There is no deformity.    Neurological: Patient is alert and oriented to person, place and time.  Normal strength.  Patient has normal coordination.    Pupils:  Pupils are equal, round, and reactive to light.  Pupils are equal.   Skin:  Warm and dry.  No rash or cyanosis.     Assessment & Plan  1. Lumbosacral spondylosis without myelopathy    2. Spondylolisthesis of lumbar region        Orders Placed This Encounter   Procedures    MRI LUMBAR SPINE WO CONTRAST    XR LUMBAR SPINE FLEXION AND EXTENSION ONLY      No orders of the defined types were placed in this encounter.           Electronically signed by Luis F Jacome MD on 7/24/2025 at 3:24 PM

## 2025-07-31 ENCOUNTER — HOSPITAL ENCOUNTER (OUTPATIENT)
Dept: MRI IMAGING | Age: 65
Discharge: HOME | End: 2025-08-02
Attending: ANESTHESIOLOGY
Payer: COMMERCIAL

## 2025-07-31 ENCOUNTER — HOSPITAL ENCOUNTER (OUTPATIENT)
Age: 65
Discharge: HOME | End: 2025-08-02
Attending: ANESTHESIOLOGY
Payer: COMMERCIAL

## 2025-07-31 ENCOUNTER — HOSPITAL ENCOUNTER (OUTPATIENT)
Dept: GENERAL RADIOLOGY | Age: 65
Discharge: HOME | End: 2025-08-02
Attending: ANESTHESIOLOGY
Payer: COMMERCIAL

## 2025-07-31 DIAGNOSIS — M47.817 LUMBOSACRAL SPONDYLOSIS WITHOUT MYELOPATHY: ICD-10-CM

## 2025-07-31 DIAGNOSIS — M43.16 SPONDYLOLISTHESIS OF LUMBAR REGION: ICD-10-CM

## 2025-07-31 PROCEDURE — 72120 X-RAY BEND ONLY L-S SPINE: CPT

## 2025-07-31 PROCEDURE — 72148 MRI LUMBAR SPINE W/O DYE: CPT

## 2025-08-09 DIAGNOSIS — F41.9 ANXIETY: ICD-10-CM

## 2025-08-09 DIAGNOSIS — M54.16 LUMBAR RADICULOPATHY: ICD-10-CM

## 2025-08-09 DIAGNOSIS — M48.061 SPINAL STENOSIS OF LUMBAR REGION WITHOUT NEUROGENIC CLAUDICATION: ICD-10-CM

## 2025-08-11 RX ORDER — PREGABALIN 150 MG/1
150 CAPSULE ORAL 3 TIMES DAILY
Qty: 90 CAPSULE | Refills: 0 | Status: SHIPPED | OUTPATIENT
Start: 2025-08-11 | End: 2025-09-10

## 2025-08-11 RX ORDER — BUSPIRONE HYDROCHLORIDE 15 MG/1
15 TABLET ORAL EVERY 12 HOURS PRN
Qty: 120 TABLET | Refills: 3 | Status: SHIPPED | OUTPATIENT
Start: 2025-08-11

## 2025-08-21 ENCOUNTER — OFFICE VISIT (OUTPATIENT)
Dept: NEUROLOGY | Age: 65
End: 2025-08-21
Payer: COMMERCIAL

## 2025-08-21 VITALS
BODY MASS INDEX: 30.55 KG/M2 | HEIGHT: 62 IN | OXYGEN SATURATION: 95 % | HEART RATE: 83 BPM | DIASTOLIC BLOOD PRESSURE: 66 MMHG | WEIGHT: 166 LBS | SYSTOLIC BLOOD PRESSURE: 111 MMHG

## 2025-08-21 DIAGNOSIS — G57.80 ILIOHYPOGASTRIC NERVE NEURALGIA: ICD-10-CM

## 2025-08-21 DIAGNOSIS — M54.9 BACK PAIN, UNSPECIFIED BACK LOCATION, UNSPECIFIED BACK PAIN LATERALITY, UNSPECIFIED CHRONICITY: Primary | ICD-10-CM

## 2025-08-21 DIAGNOSIS — M05.542: ICD-10-CM

## 2025-08-21 PROCEDURE — 3078F DIAST BP <80 MM HG: CPT

## 2025-08-21 PROCEDURE — 99214 OFFICE O/P EST MOD 30 MIN: CPT

## 2025-08-21 PROCEDURE — 1123F ACP DISCUSS/DSCN MKR DOCD: CPT

## 2025-08-21 PROCEDURE — 3074F SYST BP LT 130 MM HG: CPT

## 2025-08-22 ASSESSMENT — ENCOUNTER SYMPTOMS
STRIDOR: 0
SHORTNESS OF BREATH: 0
BACK PAIN: 1
WHEEZING: 0
ABDOMINAL PAIN: 0
ABDOMINAL DISTENTION: 0
COUGH: 0

## 2025-08-28 ENCOUNTER — HOSPITAL ENCOUNTER (OUTPATIENT)
Dept: PAIN MANAGEMENT | Age: 65
Discharge: HOME | End: 2025-08-28
Payer: COMMERCIAL

## 2025-08-28 VITALS — WEIGHT: 166 LBS | HEIGHT: 62 IN | BODY MASS INDEX: 30.55 KG/M2

## 2025-08-28 DIAGNOSIS — M47.817 LUMBOSACRAL SPONDYLOSIS WITHOUT MYELOPATHY: ICD-10-CM

## 2025-08-28 DIAGNOSIS — Z79.02 LONG TERM CURRENT USE OF ANTITHROMBOTICS/ANTIPLATELETS: ICD-10-CM

## 2025-08-28 DIAGNOSIS — M43.16 SPONDYLOLISTHESIS OF LUMBAR REGION: Primary | ICD-10-CM

## 2025-08-28 DIAGNOSIS — M54.51 VERTEBROGENIC LOW BACK PAIN: ICD-10-CM

## 2025-08-28 PROCEDURE — 99214 OFFICE O/P EST MOD 30 MIN: CPT | Performed by: ANESTHESIOLOGY

## 2025-08-28 PROCEDURE — 99213 OFFICE O/P EST LOW 20 MIN: CPT

## 2025-08-28 ASSESSMENT — ENCOUNTER SYMPTOMS
GASTROINTESTINAL NEGATIVE: 1
RESPIRATORY NEGATIVE: 1
BACK PAIN: 1

## 2025-08-28 ASSESSMENT — PAIN DESCRIPTION - PAIN TYPE: TYPE: CHRONIC PAIN

## 2025-08-28 ASSESSMENT — PAIN DESCRIPTION - ORIENTATION: ORIENTATION: LOWER

## 2025-08-28 ASSESSMENT — PAIN SCALES - GENERAL: PAINLEVEL_OUTOF10: 3

## 2025-08-28 ASSESSMENT — PAIN DESCRIPTION - FREQUENCY: FREQUENCY: CONTINUOUS

## 2025-08-28 ASSESSMENT — PAIN DESCRIPTION - LOCATION: LOCATION: BACK

## 2025-08-28 ASSESSMENT — PAIN DESCRIPTION - DESCRIPTORS: DESCRIPTORS: ACHING

## (undated) DEVICE — DEFENDO AIR WATER SUCTION AND BIOPSY VALVE KIT FOR  OLYMPUS: Brand: DEFENDO AIR/WATER/SUCTION AND BIOPSY VALVE

## (undated) DEVICE — NEEDLE SCLERO 25GA L240CM OD0.51MM ID0.24MM EXTN L4MM SHTH

## (undated) DEVICE — ENDO KIT W/SYRINGE: Brand: MEDLINE INDUSTRIES, INC.

## (undated) DEVICE — SNARE ENDOSCP AD L240CM LOOP W10MM SHTH DIA2.4MM RND INSUL